# Patient Record
Sex: MALE | Race: AMERICAN INDIAN OR ALASKA NATIVE | NOT HISPANIC OR LATINO | Employment: OTHER | URBAN - METROPOLITAN AREA
[De-identification: names, ages, dates, MRNs, and addresses within clinical notes are randomized per-mention and may not be internally consistent; named-entity substitution may affect disease eponyms.]

---

## 2017-08-27 ENCOUNTER — HOSPITAL ENCOUNTER (INPATIENT)
Facility: CLINIC | Age: 27
LOS: 1 days | Discharge: HOME | DRG: 885 | End: 2017-08-29
Attending: EMERGENCY MEDICINE | Admitting: PSYCHIATRY & NEUROLOGY
Payer: COMMERCIAL

## 2017-08-27 DIAGNOSIS — F31.63 BIPOLAR DISORDER, CURRENT EPISODE MIXED, SEVERE, WITHOUT PSYCHOTIC FEATURES (H): Primary | ICD-10-CM

## 2017-08-27 DIAGNOSIS — F84.5 ASPERGER'S DISORDER: ICD-10-CM

## 2017-08-27 DIAGNOSIS — R45.851 SUICIDAL IDEATION: ICD-10-CM

## 2017-08-27 PROCEDURE — 99285 EMERGENCY DEPT VISIT HI MDM: CPT | Mod: 25 | Performed by: EMERGENCY MEDICINE

## 2017-08-27 PROCEDURE — 80307 DRUG TEST PRSMV CHEM ANLYZR: CPT | Performed by: EMERGENCY MEDICINE

## 2017-08-27 PROCEDURE — 99283 EMERGENCY DEPT VISIT LOW MDM: CPT | Mod: Z6 | Performed by: EMERGENCY MEDICINE

## 2017-08-27 PROCEDURE — 80320 DRUG SCREEN QUANTALCOHOLS: CPT | Performed by: EMERGENCY MEDICINE

## 2017-08-27 RX ORDER — MYCOPHENOLATE MOFETIL 250 MG/1
750 CAPSULE ORAL 2 TIMES DAILY
COMMUNITY
End: 2018-02-28

## 2017-08-27 RX ORDER — CHOLECALCIFEROL (VITAMIN D3) 1250 MCG
50000 CAPSULE ORAL WEEKLY
COMMUNITY
End: 2022-06-13

## 2017-08-27 RX ORDER — TACROLIMUS 1 MG/1
2 CAPSULE ORAL 2 TIMES DAILY
COMMUNITY
End: 2018-02-28

## 2017-08-27 RX ORDER — MUPIROCIN 20 MG/G
OINTMENT TOPICAL 3 TIMES DAILY
Status: ON HOLD | COMMUNITY
End: 2019-10-23

## 2017-08-27 RX ORDER — ARIPIPRAZOLE 30 MG/1
30 TABLET ORAL DAILY
COMMUNITY
End: 2018-04-04

## 2017-08-27 RX ORDER — VENLAFAXINE HYDROCHLORIDE 150 MG/1
75 CAPSULE, EXTENDED RELEASE ORAL EVERY EVENING
COMMUNITY
End: 2019-10-23

## 2017-08-27 NOTE — IP AVS SNAPSHOT
[place] [facility] [[email or web address]] Mental Health    [facility] Station 4AW    [address] [address]    Phone:  [phone]                                       After Visit Summary   8/27/2017    Barry Mcgrath    MRN: [id number]           After Visit Summary Signature Page     I have received my discharge instructions, and my questions have been answered. I have discussed any challenges I see with this plan with the nurse or doctor.    ..........................................................................................................................................  Patient/Patient Representative Signature      ..........................................................................................................................................  Patient Representative Print Name and Relationship to Patient    ..................................................               ................................................  Date                                            Time    ..........................................................................................................................................  Reviewed by Signature/Title    ...................................................              ..............................................  Date                                                            Time

## 2017-08-27 NOTE — IP AVS SNAPSHOT
MRN:[id number]                      After Visit Summary   8/27/2017    Barry Mcgrath    MRN: [id number]           Patient Information     Date Of Birth          1990        Designated Caregiver       Most Recent Value    Caregiver    Will someone help with your care after discharge? yes    Name of designated caregiver August, Friend    Phone number of caregiver [phone]    Caregiver address [address] [place] [id number]      About your hospital stay     You were admitted on:  August 28, 2017 You last received care in the:  [facility]    You were discharged on:  August 29, 2017       Who to Call     For medical emergencies, please call 911.  For non-urgent questions about your medical care, please call your primary care provider or clinic, [phone]          Attending Provider     Provider Specialty    Vinh Contreras MD Emergency Medicine    [facility]       Primary Care Provider Office Phone # Fax #    [facility] [phone]      Further instructions from your care team       Behavioral Discharge Planning and Instructions      Summary: You were admitted on 8/27/2017 to Station [address] [place] [address] due to Suicidal Ideations.  You were treated by Debra Naegele, APRN, CNS and discharged on 8/29/2017 from Station [facility] [place] to Home.     Principle Diagnosis: (Per EMR)  1.  Bipolar disorder without psychosis.   2.  Posttraumatic stress disorder.   3.  Autism spectrum disorder.   4.  End-stage renal disease stage III post-kidney transplant.     Health Care Follow-up Appointments:   Medication Management  Note: CTC offered to get a follow-up medication management appointment scheduled for you before discharge. You report that you don't need assistance making the appointment and would like to make the appointment yourself.   Provider: Dr. Jacqueline Plascencia MD @ [facility]    Address: [address] [place] [address]  [place] [address]   Phone: [phone]  [facility] has faxed the Discharge Summary and AVS to this provider at Fax: [phone]    Therapy Appointment   Date: 8/29/2017  Time: 4:00 PM      Provider: Erin BLAKE @ [facility]  Address: [address] [place] [address]  Phone: [phone]  [facility] has faxed the Discharge Summary and AVS to this provider at Fax: [phone]    Attend all scheduled appointments with your outpatient providers. Call at least 24 hours in advance if you need to reschedule an appointment to ensure continued access to your outpatient providers.   Major Treatments, Procedures and Findings: You were provided with: a psychiatric assessment, assessed for medical stability, medication evaluation and/or management, group therapy, art therapy, milieu management, medical interventions and skills/OT groups.    Symptoms to Report: If you experience any of the following symptoms please report them right away to your provider or to family/friends; feeling more aggressive, increased confusion, losing more sleep, mood getting worse or thoughts of suicide.    Early warning signs can include: Early warning signs that could signal a potential relapse could include but not limited to the following; increased depression or anxiety sleep disturbances increased thoughts or behaviors of suicide or self-harm  increased unusual thinking, such as paranoia or hearing voices.    Safety and Wellness: Take all medicines as directed. Make no changes unless your doctor suggests them.      Follow treatment recommendations. Refrain from alcohol and non-prescribed drugs.  Items could include: Firearms  Medicines (both prescribed and over-the-counter)  Knives and other sharp objects  Ropes and like materials  Car keys  If there is a concern for safety, call 911. If there is a concern for safety, call 911..    Resources:    Crisis Intervention: 534.529.7803 or 794-925-1772 (TTY:  "818.295.3662).  Call anytime for help.  National [place] on Mental Illness (www.mn.azar.org): 274.856.4478 or 071-131-7948.  Alcoholics Anonymous (www.alcoholics-anonymous.org): Check your phone book for your local chapter.  National Suicide Prevention Line (www.mentalhealthmn.org): 996-511-QFBJ (8152)  Self- Management and Recovery Training., SMART-- Toll free: 662.531.8336  www.[employer]  [facility] Crisis (COPE) Response - Adult 299 691-7847  Text 4 Life: txt \"LIFE\" to 17697 for immediate support and crisis intervention  Crisis text line: Text \"START\" to 274-734. Free, confidential, 24/7.  Crisis Intervention: 235.794.6643 or 012-660-1262. Call anytime for help.     The treatment team has appreciated the opportunity to work with you. Barry,  please take care and make your recovery a priority. If you have any questions or concerns our unit number is [phone]. You will be receiving a follow-up phone call within the next three days from a representative from behavioral health. You have identified the best phone number to reach you at is [phone] (home) .      Pending Results     No orders found from 8/25/2017 to 8/28/2017.            Admission Information     Date & Time Provider Department Dept. Phone    8/27/2017 Noah Alvarez MD [facility] [phone]      Your Vitals Were     Blood Pressure Pulse Temperature Respirations Height Weight    129/71 92 98.3  F (36.8  C) (Oral) 18 1.779 m (5' 10.04\") 106.4 kg (234 lb 9.1 oz)    Pulse Oximetry BMI (Body Mass Index)                97% 33.62 kg/m2          [employer] Information     [employer] lets you send messages to your doctor, view your test results, renew your prescriptions, schedule appointments and more. To sign up, go to [email or web address] . Click on \"Log in\" on the left side of the screen, which will take you to the Welcome page. Then click on \"Sign up Now\" on the right side of the page.     You will be " asked to enter the access code listed below, as well as some personal information. Please follow the directions to create your username and password.     Your access code is: [email or web address]  Expires: 2017 11:14 AM     Your access code will  in 90 days. If you need help or a new code, please call your [facility] clinic or [phone].        Care EveryWhere ID     This is your Care EveryWhere ID. This could be used by other organizations to access your [facility] medical records  [id number]        Equal Access to Services     [facility]: Hadlulú chaparro Sotino, waaxda luqadaha, qaybkaden kaalmadoreen medina, deepali foley . So [facility] [phone].    ATENCIÓN: Si habla español, tiene a doll disposición servicios gratuitos de asistencia lingüística. Federico al [phone].    We comply with applicable federal civil rights laws and [place] laws. We do not discriminate on the basis of race, color, national origin, age, disability sex, sexual orientation or gender identity.               Review of your medicines      CONTINUE these medicines which may have CHANGED, or have new prescriptions. If we are uncertain of the size of tablets/capsules you have at home, strength may be listed as something that might have changed.        Dose / Directions    risperiDONE 1 MG ODT tab   Commonly known as:  risperDAL M-TABS   This may have changed:    - medication strength  - how much to take   Used for:  Bipolar disorder, current episode mixed, severe, without psychotic features (H)        Dose:  1 mg   Take 1 tablet (1 mg) by mouth At Bedtime   Quantity:  30 tablet   Refills:  1         CONTINUE these medicines which have NOT CHANGED        Dose / Directions    ARIPiprazole 30 MG tablet   Commonly known as:  ABILIFY        Dose:  30 mg   Take 30 mg by mouth daily   Refills:  0       LAMOTRIGINE PO        Dose:  150 mg   Take 150 mg by mouth daily   Refills:  0       LISINOPRIL PO        Dose:   10 mg   Take 10 mg by mouth daily   Refills:  0       MULTIVITAMIN ADULT PO        Dose:  1 tablet   Take 1 tablet by mouth   Refills:  0       mupirocin 2 % ointment   Commonly known as:  BACTROBAN        Apply topically 3 times daily   Refills:  0       mycophenolate 250 MG capsule   Commonly known as:  GENERIC EQUIVALENT        Dose:  750 mg   Take 750 mg by mouth 2 times daily   Refills:  0       OXCARBAZEPINE PO        Dose:  600 mg   Take 600 mg by mouth 2 times daily   Refills:  0       PREDNISONE PO        Dose:  5 mg   Take 5 mg by mouth daily   Refills:  0       tacrolimus 1 MG capsule   Commonly known as:  GENERIC EQUIVALENT        Dose:  2 mg   Take 2 mg by mouth 2 times daily   Refills:  0       venlafaxine 150 MG 24 hr capsule   Commonly known as:  EFFEXOR-XR        Dose:  300 mg   Take 300 mg by mouth daily   Refills:  0       vitamin D3 84667 UNITS capsule   Commonly known as:  CHOLECALCIFEROL        Dose:  11857 Units   Take 50,000 Units by mouth once a week   Refills:  0            Where to get your medicines      These medications were sent to [facility] [address]  [address] [address]     Phone:  [phone]     risperiDONE 1 MG ODT tab                Protect others around you: Learn how to safely use, store and throw away your medicines at www.disposemymeds.org.             Medication List: This is a list of all your medications and when to take them. Check marks below indicate your daily home schedule. Keep this list as a reference.      Medications           Morning Afternoon Evening Bedtime As Needed    ARIPiprazole 30 MG tablet   Commonly known as:  ABILIFY   Take 30 mg by mouth daily   Last time this was given:  30 mg on 8/29/2017  7:50 AM                                LAMOTRIGINE PO   Take 150 mg by mouth daily   Last time this was given:  150 mg on 8/29/2017  7:50 AM                                LISINOPRIL PO   Take 10  mg by mouth daily   Last time this was given:  10 mg on 8/29/2017  7:50 AM                                MULTIVITAMIN ADULT PO   Take 1 tablet by mouth   Last time this was given:  1 tablet on 8/29/2017  7:50 AM                                mupirocin 2 % ointment   Commonly known as:  BACTROBAN   Apply topically 3 times daily                                mycophenolate 250 MG capsule   Commonly known as:  GENERIC EQUIVALENT   Take 750 mg by mouth 2 times daily   Last time this was given:  750 mg on 8/29/2017  7:50 AM                                OXCARBAZEPINE PO   Take 600 mg by mouth 2 times daily   Last time this was given:  600 mg on 8/29/2017  7:50 AM                                PREDNISONE PO   Take 5 mg by mouth daily   Last time this was given:  5 mg on 8/29/2017  7:50 AM                                risperiDONE 1 MG ODT tab   Commonly known as:  risperDAL M-TABS   Take 1 tablet (1 mg) by mouth At Bedtime   Last time this was given:  0.5 mg on 8/28/2017  9:44 PM                                tacrolimus 1 MG capsule   Commonly known as:  GENERIC EQUIVALENT   Take 2 mg by mouth 2 times daily   Last time this was given:  2 mg on 8/29/2017  7:50 AM                                venlafaxine 150 MG 24 hr capsule   Commonly known as:  EFFEXOR-XR   Take 300 mg by mouth daily                                vitamin D3 39198 UNITS capsule   Commonly known as:  CHOLECALCIFEROL   Take 50,000 Units by mouth once a week

## 2017-08-28 PROBLEM — R45.851 SUICIDAL IDEATION: Status: ACTIVE | Noted: 2017-08-28

## 2017-08-28 LAB
ALBUMIN SERPL-MCNC: 3.7 G/DL (ref 3.4–5)
ALP SERPL-CCNC: 124 U/L (ref 40–150)
ALT SERPL W P-5'-P-CCNC: 40 U/L (ref 0–70)
AMPHETAMINES UR QL SCN: NEGATIVE
ANION GAP SERPL CALCULATED.3IONS-SCNC: 9 MMOL/L (ref 3–14)
AST SERPL W P-5'-P-CCNC: 28 U/L (ref 0–45)
BARBITURATES UR QL: NEGATIVE
BASOPHILS # BLD AUTO: 0.1 10E9/L (ref 0–0.2)
BASOPHILS NFR BLD AUTO: 0.6 %
BENZODIAZ UR QL: NEGATIVE
BILIRUB SERPL-MCNC: 0.3 MG/DL (ref 0.2–1.3)
BUN SERPL-MCNC: 15 MG/DL (ref 7–30)
CALCIUM SERPL-MCNC: 9.1 MG/DL (ref 8.5–10.1)
CANNABINOIDS UR QL SCN: NEGATIVE
CHLORIDE SERPL-SCNC: 109 MMOL/L (ref 94–109)
CO2 SERPL-SCNC: 25 MMOL/L (ref 20–32)
COCAINE UR QL: NEGATIVE
CREAT SERPL-MCNC: 0.74 MG/DL (ref 0.66–1.25)
DIFFERENTIAL METHOD BLD: ABNORMAL
EOSINOPHIL # BLD AUTO: 0.4 10E9/L (ref 0–0.7)
EOSINOPHIL NFR BLD AUTO: 2.8 %
ERYTHROCYTE [DISTWIDTH] IN BLOOD BY AUTOMATED COUNT: 13.7 % (ref 10–15)
ETHANOL UR QL SCN: NEGATIVE
GFR SERPL CREATININE-BSD FRML MDRD: >90 ML/MIN/1.7M2
GLUCOSE SERPL-MCNC: 135 MG/DL (ref 70–99)
HCT VFR BLD AUTO: 45.6 % (ref 40–53)
HGB BLD-MCNC: 15.2 G/DL (ref 13.3–17.7)
IMM GRANULOCYTES # BLD: 0.1 10E9/L (ref 0–0.4)
IMM GRANULOCYTES NFR BLD: 0.5 %
LYMPHOCYTES # BLD AUTO: 2.4 10E9/L (ref 0.8–5.3)
LYMPHOCYTES NFR BLD AUTO: 16.8 %
MCH RBC QN AUTO: 28.8 PG (ref 26.5–33)
MCHC RBC AUTO-ENTMCNC: 33.3 G/DL (ref 31.5–36.5)
MCV RBC AUTO: 87 FL (ref 78–100)
MONOCYTES # BLD AUTO: 1.1 10E9/L (ref 0–1.3)
MONOCYTES NFR BLD AUTO: 7.4 %
NEUTROPHILS # BLD AUTO: 10.2 10E9/L (ref 1.6–8.3)
NEUTROPHILS NFR BLD AUTO: 71.9 %
NRBC # BLD AUTO: 0 10*3/UL
NRBC BLD AUTO-RTO: 0 /100
OPIATES UR QL SCN: NEGATIVE
PLATELET # BLD AUTO: 327 10E9/L (ref 150–450)
POTASSIUM SERPL-SCNC: 3.6 MMOL/L (ref 3.4–5.3)
PROT SERPL-MCNC: 7.4 G/DL (ref 6.8–8.8)
RBC # BLD AUTO: 5.27 10E12/L (ref 4.4–5.9)
SODIUM SERPL-SCNC: 143 MMOL/L (ref 133–144)
TACROLIMUS BLD-MCNC: 3.2 UG/L (ref 5–15)
TME LAST DOSE: ABNORMAL H
WBC # BLD AUTO: 14.2 10E9/L (ref 4–11)

## 2017-08-28 PROCEDURE — 25000125 ZZHC RX 250: Performed by: EMERGENCY MEDICINE

## 2017-08-28 PROCEDURE — 25000131 ZZH RX MED GY IP 250 OP 636 PS 637: Mod: GY | Performed by: EMERGENCY MEDICINE

## 2017-08-28 PROCEDURE — 25000132 ZZH RX MED GY IP 250 OP 250 PS 637: Mod: GY | Performed by: EMERGENCY MEDICINE

## 2017-08-28 PROCEDURE — 12400003 ZZH R&B MH CRITICAL UMMC

## 2017-08-28 PROCEDURE — 85025 COMPLETE CBC W/AUTO DIFF WBC: CPT | Performed by: EMERGENCY MEDICINE

## 2017-08-28 PROCEDURE — 80053 COMPREHEN METABOLIC PANEL: CPT | Performed by: EMERGENCY MEDICINE

## 2017-08-28 PROCEDURE — 99222 1ST HOSP IP/OBS MODERATE 55: CPT | Mod: AI | Performed by: CLINICAL NURSE SPECIALIST

## 2017-08-28 PROCEDURE — 80197 ASSAY OF TACROLIMUS: CPT | Performed by: EMERGENCY MEDICINE

## 2017-08-28 PROCEDURE — A9270 NON-COVERED ITEM OR SERVICE: HCPCS | Mod: GY | Performed by: EMERGENCY MEDICINE

## 2017-08-28 PROCEDURE — 90791 PSYCH DIAGNOSTIC EVALUATION: CPT

## 2017-08-28 RX ORDER — PREDNISONE 5 MG/1
5 TABLET ORAL DAILY
Status: DISCONTINUED | OUTPATIENT
Start: 2017-08-28 | End: 2017-08-29 | Stop reason: HOSPADM

## 2017-08-28 RX ORDER — TACROLIMUS 0.5 MG/1
1 CAPSULE ORAL
Status: DISCONTINUED | OUTPATIENT
Start: 2017-08-28 | End: 2017-08-28

## 2017-08-28 RX ORDER — CHOLECALCIFEROL (VITAMIN D3) 1250 MCG
50000 CAPSULE ORAL WEEKLY
Status: DISCONTINUED | OUTPATIENT
Start: 2017-08-28 | End: 2017-08-28 | Stop reason: CLARIF

## 2017-08-28 RX ORDER — ARIPIPRAZOLE 30 MG/1
30 TABLET ORAL DAILY
Status: DISCONTINUED | OUTPATIENT
Start: 2017-08-28 | End: 2017-08-29 | Stop reason: HOSPADM

## 2017-08-28 RX ORDER — OXCARBAZEPINE 600 MG/1
600 TABLET, FILM COATED ORAL 2 TIMES DAILY
Status: DISCONTINUED | OUTPATIENT
Start: 2017-08-28 | End: 2017-08-29 | Stop reason: HOSPADM

## 2017-08-28 RX ORDER — TACROLIMUS 1 MG/1
2 CAPSULE ORAL 2 TIMES DAILY
Status: DISCONTINUED | OUTPATIENT
Start: 2017-08-28 | End: 2017-08-29 | Stop reason: HOSPADM

## 2017-08-28 RX ORDER — CHOLECALCIFEROL (VITAMIN D3) 1250 MCG
50000 CAPSULE ORAL WEEKLY
Status: DISCONTINUED | OUTPATIENT
Start: 2017-09-03 | End: 2017-08-29 | Stop reason: HOSPADM

## 2017-08-28 RX ORDER — MYCOPHENOLATE MOFETIL 250 MG/1
750 CAPSULE ORAL 2 TIMES DAILY
Status: DISCONTINUED | OUTPATIENT
Start: 2017-08-28 | End: 2017-08-29 | Stop reason: HOSPADM

## 2017-08-28 RX ORDER — RISPERIDONE 0.5 MG/1
0.5 TABLET, ORALLY DISINTEGRATING ORAL AT BEDTIME
Status: DISCONTINUED | OUTPATIENT
Start: 2017-08-28 | End: 2017-08-29

## 2017-08-28 RX ORDER — LISINOPRIL 10 MG/1
10 TABLET ORAL DAILY
Status: DISCONTINUED | OUTPATIENT
Start: 2017-08-28 | End: 2017-08-29 | Stop reason: HOSPADM

## 2017-08-28 RX ORDER — RISPERIDONE 0.5 MG/1
0.5 TABLET, ORALLY DISINTEGRATING ORAL AT BEDTIME
Status: DISCONTINUED | OUTPATIENT
Start: 2017-08-28 | End: 2017-08-28

## 2017-08-28 RX ORDER — MULTIPLE VITAMINS W/ MINERALS TAB 9MG-400MCG
1 TAB ORAL DAILY
Status: DISCONTINUED | OUTPATIENT
Start: 2017-08-28 | End: 2017-08-29 | Stop reason: HOSPADM

## 2017-08-28 RX ORDER — OLANZAPINE 10 MG/1
10 TABLET, ORALLY DISINTEGRATING ORAL DAILY PRN
Status: DISCONTINUED | OUTPATIENT
Start: 2017-08-28 | End: 2017-08-29 | Stop reason: HOSPADM

## 2017-08-28 RX ORDER — VENLAFAXINE HYDROCHLORIDE 150 MG/1
300 TABLET, EXTENDED RELEASE ORAL DAILY
Status: DISCONTINUED | OUTPATIENT
Start: 2017-08-28 | End: 2017-08-29 | Stop reason: HOSPADM

## 2017-08-28 RX ORDER — MYCOPHENOLATE MOFETIL 250 MG/1
750 CAPSULE ORAL
Status: DISCONTINUED | OUTPATIENT
Start: 2017-08-28 | End: 2017-08-28

## 2017-08-28 RX ORDER — LAMOTRIGINE 150 MG/1
150 TABLET ORAL DAILY
Status: DISCONTINUED | OUTPATIENT
Start: 2017-08-28 | End: 2017-08-29 | Stop reason: HOSPADM

## 2017-08-28 RX ORDER — OLANZAPINE 10 MG/2ML
10 INJECTION, POWDER, FOR SOLUTION INTRAMUSCULAR DAILY PRN
Status: DISCONTINUED | OUTPATIENT
Start: 2017-08-28 | End: 2017-08-29 | Stop reason: HOSPADM

## 2017-08-28 RX ORDER — OXCARBAZEPINE 600 MG/1
600 TABLET, FILM COATED ORAL 2 TIMES DAILY
Status: DISCONTINUED | OUTPATIENT
Start: 2017-08-28 | End: 2017-08-28

## 2017-08-28 RX ORDER — MYCOPHENOLATE MOFETIL 250 MG/1
750 CAPSULE ORAL 2 TIMES DAILY
Status: DISCONTINUED | OUTPATIENT
Start: 2017-08-28 | End: 2017-08-28 | Stop reason: CLARIF

## 2017-08-28 RX ORDER — HYDROXYZINE HYDROCHLORIDE 25 MG/1
25-50 TABLET, FILM COATED ORAL EVERY 4 HOURS PRN
Status: DISCONTINUED | OUTPATIENT
Start: 2017-08-28 | End: 2017-08-29 | Stop reason: HOSPADM

## 2017-08-28 RX ADMIN — LISINOPRIL 10 MG: 10 TABLET ORAL at 08:24

## 2017-08-28 RX ADMIN — LAMOTRIGINE 150 MG: 150 TABLET ORAL at 08:26

## 2017-08-28 RX ADMIN — VENLAFAXINE HYDROCHLORIDE 300 MG: 150 TABLET, EXTENDED RELEASE ORAL at 08:29

## 2017-08-28 RX ADMIN — HYDROXYZINE HYDROCHLORIDE 50 MG: 25 TABLET ORAL at 22:11

## 2017-08-28 RX ADMIN — TACROLIMUS 2 MG: 1 CAPSULE ORAL at 08:30

## 2017-08-28 RX ADMIN — OXCARBAZEPINE 600 MG: 600 TABLET ORAL at 00:56

## 2017-08-28 RX ADMIN — MULTIPLE VITAMINS W/ MINERALS TAB 1 TABLET: TAB at 08:29

## 2017-08-28 RX ADMIN — MYCOPHENOLATE MOFETIL 750 MG: 250 CAPSULE ORAL at 00:57

## 2017-08-28 RX ADMIN — RISPERIDONE 0.5 MG: 0.5 TABLET, ORALLY DISINTEGRATING ORAL at 21:44

## 2017-08-28 RX ADMIN — PREDNISONE 5 MG: 5 TABLET ORAL at 08:26

## 2017-08-28 RX ADMIN — MYCOPHENOLATE MOFETIL 750 MG: 250 CAPSULE ORAL at 08:27

## 2017-08-28 RX ADMIN — RISPERIDONE 0.5 MG: 0.5 TABLET, ORALLY DISINTEGRATING ORAL at 00:55

## 2017-08-28 RX ADMIN — TACROLIMUS 2 MG: 1 CAPSULE ORAL at 21:43

## 2017-08-28 RX ADMIN — OXCARBAZEPINE 600 MG: 600 TABLET ORAL at 08:25

## 2017-08-28 RX ADMIN — ARIPIPRAZOLE 30 MG: 30 TABLET ORAL at 08:24

## 2017-08-28 RX ADMIN — TACROLIMUS 1 MG: 1 CAPSULE ORAL at 00:56

## 2017-08-28 RX ADMIN — MYCOPHENOLATE MOFETIL 750 MG: 250 CAPSULE ORAL at 21:43

## 2017-08-28 RX ADMIN — OXCARBAZEPINE 600 MG: 600 TABLET ORAL at 21:43

## 2017-08-28 ASSESSMENT — ACTIVITIES OF DAILY LIVING (ADL)
ORAL_HYGIENE: INDEPENDENT
GROOMING: INDEPENDENT
ORAL_HYGIENE: INDEPENDENT
DRESS: INDEPENDENT
LAUNDRY: UNABLE TO COMPLETE
DRESS: INDEPENDENT
GROOMING: INDEPENDENT

## 2017-08-28 ASSESSMENT — ENCOUNTER SYMPTOMS
DYSPHORIC MOOD: 1
AGITATION: 1

## 2017-08-28 NOTE — PROGRESS NOTES
08/28/17 0507   Patient Belongings   Did you bring any home meds/supplements to the hospital?  Yes   Disposition of meds  Sent to security/pharmacy per site process   Patient Belongings cell phone/electronics;clothing;keys;shoes;wallet   Disposition of Belongings Meds to security/pharmacy. ID & credit cards to security. All other items are in patient's storage bin.   Belongings Search Yes   Clothing Search Yes   Second Staff Marco Antonio URENA     Medications sent to pharmacy/security in envelope #[id number]: 1 bottle mycophenolate, 1 bottle venlafaxine, 1 bottle prednisone, 1 bottle lisinopril    Medications sent to pharmacy/security in envelope #[id number]: men's multivitamin bottle, 1 bottle D3-50 capsules, 1 bottle oxcarbazepine, 1 bottle tacrolimus, 1 bottle aripiprazole, pill case w/ various pills,    Medications sent to pharmacy/security in envelope #[id number]: 1 bottle oxcarbazepine, 1 bottle risperidone, mupirocin ointment, pill cutter,    Items sent to security in envelope #[id number]: [place] ID card, [facility] health insurance card, medicare health insurance card, [place] mastercard ending in [id number], [place] EBT card ending in [id number], [place] ID card,    Items in patient's storage bin: green shirt, black pants, white cane collapsing cane, brown belt, black wallet containing go-to bus pass, Iphone, keys and keychain, 28 cents in change, black shoes,    A               Admission: I am responsible for any personal items that are not sent to the safe or pharmacy.  [facility] is not responsible for loss, theft or damage of any property in my possession.    Signature:  _________________________________ Date: _______  Time: _____                                              Staff Signature:  ____________________________ Date: ________  Time: _____      2nd Staff person, if patient is unable/unwilling to sign:    Signature: ________________________________ Date: ________  Time: _____     Discharge: Sandip has returned all of my  personal belongings:    Signature: _________________________________ Date: ________  Time: _____                                          Staff Signature:  ____________________________ Date: ________  Time: _____

## 2017-08-28 NOTE — ED NOTES
Pt assinged to unit 4A. Answered questions/concerns that pt had regarding the behavioral units. Pt still had some concerns about being admitted but understood more specific concerns can be answered on unit.

## 2017-08-28 NOTE — PLAN OF CARE
Problem: General Plan of Care (Inpatient Behavioral)  Goal: Team Discussion  Team Plan:   Outcome: No Change  Behavioral Team Discussion: (8/28/2017)     Continued Stay Criteria/Rationale: Patient admitted for Suicidal Ideations.  Plan: The following services will be provided to the patient; psychiatric assessment, medication management, therapeutic milieu, individual and group support, art therapy, and skills/OT groups.   Participants: 4A Provider: Debra Naegele, APRN, CNS; 4A RN's: Claudine Hall RN; 4A CTC's: Reynaldo Queen (CTC) and Hardy Kaur (CTC).  Summary/Recommendation: Providers will assess today for treatment recommendations, discharge planning, and aftercare plans. CTC will meet with pt to complete psych-social assessment.   Medical/Physical: Deferred (see medical notes).  Precautions:   Behavioral Orders   Procedures     Code 1 - Restrict to Unit     Routine Programming       As clinically indicated     Status 15       Every 15 minutes.     Suicide precautions     Rationale for change in precautions or plan: N/A  Progress: No Change.

## 2017-08-28 NOTE — PROGRESS NOTES
"CLINICAL NUTRITION SERVICES - BRIEF NOTE    Received provider consult for nutrition education with comments \"ESRD III, low potassium.\"     Per chart review, pt s/p kidney transplant on 7/14/17 and today's potassium lab of 3.6 (wnl).    Attempted to meet with pt regarding low potassium diet education. Pt declined education today. Will attempt to provide education as able/appropriate during pt's admission.     Ritu Guillaume RD, LD   Pager: [phone]      "

## 2017-08-28 NOTE — PLAN OF CARE
"Problem: General Plan of Care (Inpatient Behavioral)  Goal: Individualization/Patient Specific Goal (IP Behavioral)  The patient and/or their representative will achieve their patient-specific goals related to the plan of care.    The patient-specific goals include:   Illness Management Recovery model: Objectives  Patient will identify reason(s) for hospitalization from their perspective.  Patient will identify a minimum of three goals for discharge.  Patient will identify a minimum of three triggers that may increase their symptoms.  Patient will identify a minimum of three coping skills they can do to stay well.   Patient will identify their support system to demonstrate readiness for discharge.    Illness Management & Recovery assists patient to develop relapse prevention as  patient identifies triggers for relapse.  patient identifies a general wellness strategy.  patient identifies the warning signs that they are in danger of relapse.  patient identifies someone they count on to get feedback .  patient identifies ways to take action when in danger of relapse.  patient identifies way to cope with stress or other symptoms.   patient participates in self-reflection.   Outcome: No Change  The patient and/or their representative will achieve their patient-specific goals related to the plan of care.    The patient-specific goals include:      \"Reasons you are in the hospital;\" The patient identifies the following reasons for current hospitalization:   1) Suicidal ideation.  2) Mood decompensation.     \"Goals for Discharge\" The patient identifies the following goals for discharge:  1) Absence of suicidal ideation.  2) Mood stabilization.      "

## 2017-08-28 NOTE — ED PROVIDER NOTES
"  History     Chief Complaint   Patient presents with     Suicidal     Patient states he is feeling suicidal, refuses to report plan     Mental Health Problem     HPI  Barry Mcgrath is a 26 year old male with a history of depression, anxiety, bipolar 1, Asperger's, blindness, ESRD (stage III), and kidney transplant 07/14/17 who presents to the ED with suicidal ideation. Patient states he feels angry and hurt at everyone from family, friends, strangers, and politics. He states he wants to \"harm Bear and all of these f#$%ers\".  Patient also posted several messages stating he wanted to kill himself on his Facebook page.  Father was also concerned that he may harm himself.  He recently moved to to [place] in December and has been in inpatient mental health 4 times at [facility]. His last visit was 1.5 years ago. He otherwise currently denies alcohol or substance use or psychosis.  He states he had SI an hour ago.    PAST MEDICAL HISTORY  Past Medical History:   Diagnosis Date     Anxiety      Bipolar 1 disorder (H)      Blind      Depressive disorder      PAST SURGICAL HISTORY  Past Surgical History:   Procedure Laterality Date     TRANSPLANT       FAMILY HISTORY  No family history on file.  SOCIAL HISTORY  Social History   Substance Use Topics     Smoking status: Never Smoker     Smokeless tobacco: Never Used     Alcohol use No     MEDICATIONS  Current Facility-Administered Medications   Medication     OXcarbazepine (TRILEPTAL) tablet 600 mg     tacrolimus (GENERIC EQUIVALENT) capsule 1 mg     risperiDONE (risperDAL M-TABS) ODT tab 0.5 mg     mycophenolate (GENERIC EQUIVALENT) capsule 750 mg     ARIPiprazole (ABILIFY) tablet 30 mg     lamoTRIgine (LaMICtal) tablet 150 mg     lisinopril (PRINIVIL/ZESTRIL) tablet 10 mg     MULTIVITAMIN ADULT TABS 1 tablet     mycophenolate (GENERIC EQUIVALENT) capsule 750 mg     OXcarbazepine (TRILEPTAL) tablet 600 mg     predniSONE (DELTASONE) tablet 5 mg     tacrolimus (GENERIC " EQUIVALENT) capsule 2 mg     venlafaxine (EFFEXOR-XR) 24 hr capsule 300 mg     vitamin D3 (CHOLECALCIFEROL) capsule 50,000 Units     hydrOXYzine (ATARAX) tablet 25-50 mg     OLANZapine zydis (zyPREXA) ODT tab 10 mg     OLANZapine (zyPREXA) injection 10 mg     ALLERGIES  No Known Allergies    I have reviewed the Medications, Allergies, Past Medical and Surgical History, and Social History in the Epic system.    Review of Systems   Psychiatric/Behavioral: Positive for agitation, dysphoric mood and suicidal ideas.   All other systems reviewed and are negative.    Physical Exam   BP: 154/77  Pulse: 111  Temp: 98  F (36.7  C)  Resp: 20  SpO2: 94 %  Physical Exam   Constitutional: He appears well-developed and well-nourished. No distress.   HENT:   Head: Normocephalic.   Cardiovascular: Normal rate, regular rhythm and normal heart sounds.    Pulmonary/Chest: Effort normal and breath sounds normal. No respiratory distress. He has no wheezes. He has no rales.   Musculoskeletal: Normal range of motion.   Neurological: He is alert.   Skin: Skin is warm and dry. He is not diaphoretic.   Psychiatric: His speech is normal. He is not agitated, not aggressive, not hyperactive and not combative. He exhibits a depressed mood. He expresses homicidal and suicidal ideation. He expresses no suicidal plans and no homicidal plans.   Nursing note and vitals reviewed.    ED Course     ED Course     Procedures        Critical Care time:  none        Labs Ordered and Resulted from Time of ED Arrival Up to the Time of Departure from the ED   CBC WITH PLATELETS DIFFERENTIAL - Abnormal; Notable for the following:        Result Value    WBC 14.2 (*)     Absolute Neutrophil 10.2 (*)     All other components within normal limits   COMPREHENSIVE METABOLIC PANEL - Abnormal; Notable for the following:     Glucose 135 (*)     All other components within normal limits   DRUG ABUSE SCREEN 6 CHEM DEP URINE ([facility])   TACROLIMUS LEVEL        Assessments &  Plan (with Medical Decision Making)   I was physically present and have reviewed and verified the accuracy of this note documented by (myself).     Disclaimer: This note consists of symbols derived from keyboarding, dictation, and/or voice recognition software. As a result, there may be errors in the script that have gone undetected.  Please consider this when interpreting information found in the chart.These sections of the chart were reviewed for accuracy to the best of my knowledge and ability.    Patient was clinically assessed and consented to treatment. After assessment, medical decision making and workup were discussed with the patient. The patient was agreeable to plan for testing, workup, and treatment.  Barry Mcgrath is a 26 year old male who presents today for suicidal ideation.  Patient with a history of kidney transplant and creatinine does look normal here.  He follows up regularly with the [facility] where he had his transplant and was seen in February with concerns.  Labs did show slightly white blood cell count however patient reports no infectious process at this time problem.  He denies any urinary symptoms, no cough or sounds on auscultation.  Patient otherwise well except for mental health.  Patient was stating suicidal thoughts over his concern about coming in the hospital as he [place] and there is no access to any things for blind persons including audio books which he enjoys.  He reports that he is agreeable to coming in but was concerned that he would be isolated again because he is blind and unable to just sit and watch TV like several other patients do.  I spoke with him about this and would inform behavioral intake and that she will need access to either audiobooks or some sort of alternative form of engagement since he is unable to just sit and watch TV or easily engaged with another activities.  Patient agreeable to  coming in given his suicidal thoughts he will be placed in the mental health floor for further care.    I have reviewed the nursing notes.    I have reviewed the findings, diagnosis, plan and need for follow up with the patient.    Current Discharge Medication List          Final diagnoses:   Suicidal ideation       8/27/2017   [facility], [place], EMERGENCY DEPARTMENT     Vinh Contreras MD  08/28/17 0341       Vinh Contreras MD  08/28/17 0503

## 2017-08-28 NOTE — PROGRESS NOTES
"Initial Psychosocial Assessment     Information for assessment was obtained from the patient and the patient's chart: I have reviewed the chart and interviewed the patient.      Presenting Problem/Precipitory Event: Treatment due to own awareness of need for help and treatment due to pressure from family members to get help. Prior to admission, pt had been posting messages on Facebook that he wanted to kill himself. Pt has been getting increasingly angry about the political climate leading up to admission. Per EMR: \"Barry Mcgrath is a 26-year-old  who presents with a history of depression and suicidal ideation.  The patient had a kidney transplant when he was 15 years old at [facility] in [place].  He is followed by a transplant coordinator named Love Ruano, phone number is [phone].  The patient is originally from [place], [place].  He moved to [place] in 12/2016.  He said he wanted to have more opportunities.  He wants to attend the [employer] and study Mandarin.  Patient reports that he said a lot of things on Facebook because he was upset.  He is upset with the political climate, he is upset with white supremacists and says that he has been having trouble with intense anger.  He does not think his meds are working correctly.  The patient is followed by Dr. Jacqueline Plascencia at the [facility].  He is currently being treated with Abilify 30 mg, Lamictal 150 mg, Risperdal 0.5 mg, Trileptal 600 mg b.i.d. and Effexor 300 mg.  The patient was unable to tell me if he has had a recent medication adjustment.\" Patient was admitted to station 4A for further psychiatric evaluation and stabilization.    Current Stressors: Pt reports having relational, housing, financial, medical/physical health, and current political climate as his stressors at this time.    Legal Status: Pt admitted under a voluntary status.  MENTAL HEALTH & CHEMICAL DEPENDENCY HISTORY:  Current symptoms: Pt " "reports having the following MH symptomology; isolating / withdrawn, lack of energy, poor sleep, loss of interest / pleasure, low mood, trouble concentrating, racing thoughts, irritability, agitation, depressed, hopeless, impaired thinking, anger problems and disorganized behaviors and/or thinking.  Previous  Hospitalizations: Yes (explain) - Pt reports having; 4 prior [facility] hospital admission(s) which include the following year(s) and hospital(s) 4-prior hospitalizations at [facility] in [place], [place]. Pt reports his last admission was back in 2015..    Previous MH Treatment (day treatments, psychotherapy, medication management, etc) history: Yes (explain) - Denies currently working with a therapist but does endorse working with a psychiatrict provider for medication management    Commitments (Current or Previous): No.    Hx of suicidal attempts: No.  SIB's: No.  Suicidal thoughts and plan: Yes (explain) - Pt reports having chronic passive SI's and denies having any plan or intent at this time.  Homicidal Ideation and History of Aggression: No. Prior to admission pt had made aggressive and threatening statements about hurting others but adamantly denies that he would actually hurt anyone.     Substance Use/Abuse History:    At time of admission, the patient s drug screen was negative for all substances tested.    History of Chemical Dependency: No.  Family Description (Constellation, Family Psychiatric History):  Pt reports he grew up in [place] until 2016 when he moved to [place], [place] to attend the [employer].  Pt reports his childhood was \"pretty good\" and that they felt supported 75% of the time growing up.   Are you close to any of your family members/natural supports? Yes.  Current relationship(s): Single, in no serious relationship.  Children: No children.    Family MH and/or CD History: Yes (Explain) - Family CD History: Pt reports an extensive family hx of alcoholism in " his family.  Significant Life Events (Illness, Abuse, Trauma, Death):  None    Living Situation: Pt reports that he; has stable housing and has no concerns at this time. Pt reports he is currently living with three other roommates.  Educational Background:   Highest grade of school completed: Pt reports he has some college (2 years), but have no degree.    Occupational History:    Pt reports he is unemployed and is on SSDI for disability.  Financial Status: No immediate concerns identified.  Sources of Income (i.e. social security, [employer], [place], employed or other): Social Security Disability.  Transportation: Pt reports he takes public transportation, relies on family and/or friends for rides and gets medical rides.  Type of insurance: Payor: MEDICARE / Plan: MEDICARE / Product Type: Medicare /  [id number]    Criminal History:  No.  Ethnic/Cultural Issues:  The patient does not identify having any ethnic or cultural issues that would impact treatment.   Spiritual Orientation: Pt reports he is no affiliations.    Service History:  No.  Social Functioning (organization, interests):  Pt reports he enjoys, reading books. Pt reports also having the following; good support network, tendency to isolate from others, no history of legal charges, unemployed and has recently been a student at the [employer].  Current Treatment Providers:  Psychiatrist: Dr. Jacqueline Plascencia MD @ [place] [address]. P: [phone]  Therapist: None  Primary Care Provider: None    Social Service Assessment/Plan:  Patient has been admitted for psychiatric stabilization due to suicidal ideations. Patient will have psychiatric assessment and medication management by the psychiatrist. Medications will be reviewed and adjusted per MD as indicated. The treatment team will continue to assess and stabilize the patient's mental health symptoms with the use of medications and therapeutic  programming. Hospital staff will provide a safe environment and a therapeutic milieu. Staff will continue to assess patient as needed. Patient will participate in unit groups and activities. Patient will receive individual and group support on the unit.  CTC will do individual inpatient treatment planning and after care planning. CTC will discuss options for increasing community supports with the patient. CTC will coordinate with outpatient providers and will place referrals to ensure appropriate follow up care is in place.

## 2017-08-28 NOTE — H&P
"DATE OF ASSESSMENT:  08/28/2017.       CHIEF COMPLAINT:  \"My meds are not quite working, I get intense anger.\"      HISTORY OF PRESENT ILLNESS:  Barry Mcgrath is a 26-year-old  who presents with a history of depression and suicidal ideation.  The patient had a kidney transplant when he was 15 years old at [facility] in [place].  He is followed by a transplant coordinator named Love Ruano, phone number is [phone].  The patient is originally from [place], [place].  He moved to [place] in 12/2016.  He said he wanted to have more opportunities.  He wants to attend the [employer] and study Mandarin.  Patient reports that he said a lot of things on Facebook because he was upset.  He is upset with the political climate, he is upset with white supremacists and says that he has been having trouble with intense anger.  He does not think his meds are working correctly.  The patient is followed by Dr. Jacqueline Plascencia at the [facility].  He is currently being treated with Abilify 30 mg, Lamictal 150 mg, Risperdal 0.5 mg, Trileptal 600 mg b.i.d. and Effexor 300 mg.  The patient was unable to tell me if he has had a recent medication adjustment.      PSYCHIATRIC REVIEW OF SYSTEMS:  The patient reports that he has been depressed and he gets intense anger.  He reports that his sleep and appetite have not been altered.  He has good focus and motivation.  He is able to enjoy all the things that he does.  The patient reports that he has passive suicidal thoughts.  He does not have a plan.  He denies homicidal thoughts.  The patient does endorse some symptoms of shaniqua including pressured speech and intense anger.  He does not endorse psychosis including auditory and visual hallucinations.  He denies any feelings of paranoia.  The patient reports he does have symptoms of PTSD including flashbacks and nightmares.  He denies any history of eating disorder or OCD.  The patient denies any " "problems with general anxiety disorder.      PSYCHIATRIC HISTORY:  The patient has a history of bipolar, anxiety, depression and Asperger's.  He has been hospitalized 4 times in [place], [place].  His last hospitalization was about a year and a half ago.      PAST MEDICAL HISTORY:  The patient is blind.  He had a kidney transplant when he was 15 years old.  History of ESRD stage III.      SUBSTANCE ABUSE HISTORY:  U-tox is negative.  The patient denies using any drugs or alcohol.      FAMILY HISTORY:  Strong history of alcoholism.      SOCIAL HISTORY:  The patient lived in [place], [place].  He moved to [place] to go to the [employer] in 12/2016.  He has 3 roommates that he lives with.        MEDICAL REVIEW OF SYSTEMS:  Reviewed documentation for a 10-point systems review completed by Vinh Contreras, dated 08/27/2017.  No changes noted.      PHYSICAL EXAMINATION:   VITAL SIGNS:  Blood pressure 154/77, pulse 111, temperature 98 Fahrenheit, respiration 20, SpO2 is at 94%.  Reviewed documentation completed by Dr. Vinh Contreras, dated 08/27/2017.  The only changes noted is that he does not express any homicidal ideation.      MENTAL STATUS EXAMINATION:  The patient appears his stated age.  He is dressed in scrubs.  He has adequate hygiene.  The patient was in the lounge area with staff and accompanied me to the interview room.  He was calm and cooperative throughout the interview.  The patient is blind.  No psychomotor abnormalities noted.  Speech was somewhat pressured, \"good.\"  Affect was somewhat blunted.  Thought process was linear and logical.  Associations were intact.  Thought content did not display any evidence of psychosis.  He denies any passive suicidal thoughts.  He does not have an active plan.  He denies any homicidal thoughts.  Insight and judgment appear to be fair.  Cognition appears intact to interviewing including orientation to person, place, time and situation, use " of language and fund of knowledge.  His recent and remote memory are grossly intact.  Muscle strength, tone and gait appear to be within normal limits upon observation.      ASSESSMENT:   1.  Bipolar disorder without psychosis.   2.  Posttraumatic stress disorder.   3.  Autism spectrum disorder.   4.  End-stage renal disease stage III post-kidney transplant.      PLAN:   1.  The patient has been admitted unit 4A on a voluntary basis.   2.  Left message with his transplant coordinator, Love Ruano at [phone].   3.  Continued his psychiatric medications in addition to his transplant medications.   4.  Order a nutrition consult due to lower potassium level.   5.  Psychosocial treatments to be addressed with social work consult.         DEBRA A. NAEGELE, APRN, CNS             D: 2017 14:15   T: 2017 15:26   MT: CITLALI      Name:     DYAN GARCIA   MRN:      [id number]        Account:      [id number]   :      1990           Admitted:     [id number]      Document: [id number]

## 2017-08-28 NOTE — PROGRESS NOTES
"This writer was not able to directly check-in with the pt due to him sleeping a majority of the shift. Pt's stated goal of the day was, \"to speak with my doctor regarding discharge.\" Pt attended community but no other scheduled groups. Pt presents as irritable yet cooperative with a flat/blunt affect. Pt was withdrawn from others in the milieu. Pt is fixated on being able to leave ASAP and mentioned feeling \"way better than 10 hours ago.\" Pt does not appear to be responding to auditory and visual hallucinations. In regards to SI and SIB, none reported or observed.      08/28/17 1450   Behavioral Health   Hallucinations denies / not responding to hallucinations   Thinking distractable;poor concentration   Orientation person: oriented;place: oriented;date: oriented;time: oriented   Memory baseline memory   Insight poor   Judgement impaired   Eye Contact blinking   Affect blunted, flat   Mood depressed;irritable   Physical Appearance/Attire attire appropriate to age and situation   Hygiene neglected grooming - unclean body, hair, teeth   Suicidality other (see comments)  (None reported or observed)   Self Injury other (see comment)  (None reported or observed)   Elopement (No elopement concerns)   Activity isolative;withdrawn   Speech pressured   Medication Sensitivity no stated side effects;no observed side effects   Psychomotor / Gait slow   Safety   Suicidality status 1:1   Psycho Education   Type of Intervention 1:1 intervention   Response observes from a distance   Hours 0.5   Treatment Detail Triggers   Activities of Daily Living   Hygiene/Grooming independent   Oral Hygiene independent   Dress independent   Room Organization independent   Activity   Activity Level of Assistance independent   Behavioral Health Interventions   Depression maintain safety precautions;monitor need to revise level of observation;maintain safe secure environment;assist patient in developing safety plan;assist patient in following " safety plan;encourage nutrition and hydration;encourage participation / independence with adls;provide emotional support;establish therapeutic relationship;assist with developing and utilizing healthy coping strategies;build upon strengths   Social and Therapeutic Interventions (Depression) encourage socialization with peers;encourage effective boundaries with peers;encourage participation in therapeutic groups and milieu activities

## 2017-08-28 NOTE — ED NOTES
"Patient called police d/t suicidal thoughts.  Patient refuses to report plan, denies past suicide attempts.  Patient exhibiting pressured speech, expresses that he would like to hurt \"God and Bear Faustino\" but denies homicidal ideation.  Patient cooperative with plan of care.  Will continue to monitor.  "

## 2017-08-28 NOTE — ED NOTES
"Patient expressing that he does not want to be admitted--reports he gets \"bored\" while hospitalized; states \"they lock you up\".  Patient expresses he called 911 seeking help, explained rationale for admitting patient to hospital.  Patient verbalized understanding. ERMD updated.  "
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
## 2017-08-29 VITALS
HEIGHT: 70 IN | OXYGEN SATURATION: 97 % | HEART RATE: 92 BPM | TEMPERATURE: 98.3 F | BODY MASS INDEX: 33.58 KG/M2 | DIASTOLIC BLOOD PRESSURE: 71 MMHG | WEIGHT: 234.57 LBS | SYSTOLIC BLOOD PRESSURE: 129 MMHG | RESPIRATION RATE: 18 BRPM

## 2017-08-29 PROCEDURE — 99239 HOSP IP/OBS DSCHRG MGMT >30: CPT | Performed by: CLINICAL NURSE SPECIALIST

## 2017-08-29 PROCEDURE — 25000131 ZZH RX MED GY IP 250 OP 636 PS 637: Performed by: EMERGENCY MEDICINE

## 2017-08-29 PROCEDURE — 25000125 ZZHC RX 250: Performed by: EMERGENCY MEDICINE

## 2017-08-29 PROCEDURE — 25000132 ZZH RX MED GY IP 250 OP 250 PS 637: Performed by: EMERGENCY MEDICINE

## 2017-08-29 RX ORDER — RISPERIDONE 0.5 MG/1
1 TABLET, ORALLY DISINTEGRATING ORAL AT BEDTIME
Status: DISCONTINUED | OUTPATIENT
Start: 2017-08-29 | End: 2017-08-29 | Stop reason: HOSPADM

## 2017-08-29 RX ORDER — RISPERIDONE 1 MG/1
1 TABLET, ORALLY DISINTEGRATING ORAL AT BEDTIME
Qty: 30 TABLET | Refills: 1 | Status: SHIPPED | OUTPATIENT
Start: 2017-08-29 | End: 2019-12-16

## 2017-08-29 RX ADMIN — VENLAFAXINE HYDROCHLORIDE 300 MG: 150 TABLET, EXTENDED RELEASE ORAL at 07:50

## 2017-08-29 RX ADMIN — ARIPIPRAZOLE 30 MG: 30 TABLET ORAL at 07:50

## 2017-08-29 RX ADMIN — LAMOTRIGINE 150 MG: 150 TABLET ORAL at 07:50

## 2017-08-29 RX ADMIN — OXCARBAZEPINE 600 MG: 600 TABLET ORAL at 07:50

## 2017-08-29 RX ADMIN — HYDROXYZINE HYDROCHLORIDE 50 MG: 25 TABLET ORAL at 03:24

## 2017-08-29 RX ADMIN — MYCOPHENOLATE MOFETIL 750 MG: 250 CAPSULE ORAL at 07:50

## 2017-08-29 RX ADMIN — TACROLIMUS 2 MG: 1 CAPSULE ORAL at 07:50

## 2017-08-29 RX ADMIN — LISINOPRIL 10 MG: 10 TABLET ORAL at 07:50

## 2017-08-29 RX ADMIN — MULTIPLE VITAMINS W/ MINERALS TAB 1 TABLET: TAB at 07:50

## 2017-08-29 RX ADMIN — PREDNISONE 5 MG: 5 TABLET ORAL at 07:50

## 2017-08-29 NOTE — PROGRESS NOTES
DISCHARGE: pt was DC to his friend August. His aftercare plan and meds were reviewed by this RN with pt. Pt denies SI. Pt  Belongings returned. Pt bright and smiling upon DC.

## 2017-08-29 NOTE — PROGRESS NOTES
Patient had a positive shift.    Barry Mcgrath did/did not participate in groups and was/was not visible in the milieu.    Mental health status: Patient maintained a full range affect and endorses/denies SI, SIB and HI.    Patient is working on these coping/social skills: patience, acceptance, vulnerability     Patient did not receive visitors this shift.      Other information about this shift: pt is pleasant on approach with good insight.  Pt is approachable with a positive attitude.  Pt performs daily tasks independently and moves well with guidance.         08/28/17 2027   Behavioral Health   Hallucinations denies / not responding to hallucinations   Thinking distractable;poor concentration   Orientation person: oriented;place: oriented;date: oriented;time: oriented   Memory baseline memory   Insight poor   Judgement impaired   Eye Contact blinking   Affect blunted, flat   Mood depressed   Physical Appearance/Attire attire appropriate to age and situation   Hygiene neglected grooming - unclean body, hair, teeth   Suicidality other (see comments)  (none stated or observed)   Self Injury other (see comment)  (none stated or observed)   Elopement (no observed elopement behavior)   Activity isolative;withdrawn   Speech pressured   Medication Sensitivity no stated side effects;no observed side effects   Psychomotor / Gait balanced;steady  (moves well with guidance/direction)   Activities of Daily Living   Hygiene/Grooming independent   Oral Hygiene independent   Dress independent   Laundry unable to complete   Room Organization independent   Behavioral Health Interventions   Depression maintain safety precautions;provide emotional support;establish therapeutic relationship;build upon strengths;assist with developing and utilizing healthy coping strategies   Social and Therapeutic Interventions (Depression) encourage socialization with peers;encourage effective boundaries with peers;encourage participation in  therapeutic groups and milieu activities

## 2017-08-29 NOTE — DISCHARGE SUMMARY
Psychiatric Discharge Summary    Barry Mcgrath MRN# [id number]   Age: 26 year old YOB: 1990     Date of Admission:  8/27/2017  Date of Discharge:  8/29/2017  Admitting Physician:  Noah Alvarez MD  Discharge Physician:  Debra A. Naegele, APRN CNS (Contact: [phone])         Event Leading to Hospitalization:    Barry Mcgrath is a 26-year-old  who presents with a history of depression and suicidal ideation.  The patient had a kidney transplant when he was 15 years old at [facility] in [place].  He is followed by a transplant coordinator named Love Ruano, phone number is [phone].  The patient is originally from [place], [place].  He moved to [place] in 12/2016.  He said he wanted to have more opportunities.  He wants to attend the [employer] and study Mandarin.  Patient reports that he said a lot of things on Facebook because he was upset.  He is upset with the political climate, he is upset with white supremacists and says that he has been having trouble with intense anger.  He does not think his meds are working correctly.  The patient is followed by Dr. Jacqueline Plascencia at the [facility].  He is currently being treated with Abilify 30 mg, Lamictal 150 mg, Risperdal 0.5 mg, Trileptal 600 mg b.i.d. and Effexor 300 mg.  The patient was unable to tell me if he has had a recent medication adjustment.        See Admission note by Debra Naegele APRN, CNS on 8/28/2017 for additional details.          DIagnoses:   1.  Bipolar disorder without psychosis.   2.  Posttraumatic stress disorder.   3.  Autism spectrum disorder.   4.  End-stage renal disease stage III post-kidney transplant.            Labs:     Results for orders placed or performed during the hospital encounter of 08/27/17   Drug abuse screen 6 urine (tox)   Result Value Ref Range    Amphetamine Qual Urine Negative NEG^Negative    Barbiturates Qual Urine Negative NEG^Negative    Benzodiazepine  Qual Urine Negative NEG^Negative    Cannabinoids Qual Urine Negative NEG^Negative    Cocaine Qual Urine Negative NEG^Negative    Ethanol Qual Urine Negative NEG^Negative    Opiates Qualitative Urine Negative NEG^Negative   CBC with platelets differential   Result Value Ref Range    WBC 14.2 (H) 4.0 - 11.0 10e9/L    RBC Count 5.27 4.4 - 5.9 10e12/L    Hemoglobin 15.2 13.3 - 17.7 g/dL    Hematocrit 45.6 40.0 - 53.0 %    MCV 87 78 - 100 fl    MCH 28.8 26.5 - 33.0 pg    MCHC 33.3 31.5 - 36.5 g/dL    RDW 13.7 10.0 - 15.0 %    Platelet Count 327 150 - 450 10e9/L    Diff Method Automated Method     % Neutrophils 71.9 %    % Lymphocytes 16.8 %    % Monocytes 7.4 %    % Eosinophils 2.8 %    % Basophils 0.6 %    % Immature Granulocytes 0.5 %    Nucleated RBCs 0 0 /100    Absolute Neutrophil 10.2 (H) 1.6 - 8.3 10e9/L    Absolute Lymphocytes 2.4 0.8 - 5.3 10e9/L    Absolute Monocytes 1.1 0.0 - 1.3 10e9/L    Absolute Eosinophils 0.4 0.0 - 0.7 10e9/L    Absolute Basophils 0.1 0.0 - 0.2 10e9/L    Abs Immature Granulocytes 0.1 0 - 0.4 10e9/L    Absolute Nucleated RBC 0.0    Comprehensive metabolic panel   Result Value Ref Range    Sodium 143 133 - 144 mmol/L    Potassium 3.6 3.4 - 5.3 mmol/L    Chloride 109 94 - 109 mmol/L    Carbon Dioxide 25 20 - 32 mmol/L    Anion Gap 9 3 - 14 mmol/L    Glucose 135 (H) 70 - 99 mg/dL    Urea Nitrogen 15 7 - 30 mg/dL    Creatinine 0.74 0.66 - 1.25 mg/dL    GFR Estimate >90 >60 mL/min/1.7m2    GFR Estimate If Black >90 >60 mL/min/1.7m2    Calcium 9.1 8.5 - 10.1 mg/dL    Bilirubin Total 0.3 0.2 - 1.3 mg/dL    Albumin 3.7 3.4 - 5.0 g/dL    Protein Total 7.4 6.8 - 8.8 g/dL    Alkaline Phosphatase 124 40 - 150 U/L    ALT 40 0 - 70 U/L    AST 28 0 - 45 U/L   Tacrolimus level   Result Value Ref Range    Tacrolimus Last Dose Not Provided     Tacrolimus Level 3.2 (L) 5.0 - 15.0 ug/L            Consults:   No consultations this admission.         Hospital Course:   Barry Mcgrath was admitted to [facility]  "with attending Noah Alvarez MD as a voluntary patient. The patient was placed under status 15 (15 minute checks) to ensure patient safety.     Patient was admitted for suicidal ideation. Patient did not think his medications were \"working right\". Patient is on multiple psychiatric medications in addition to his transplant medication. Increased Risperdal from 0.5 mg to 1 mg to provide more mood stabilization. This patient's care is complex so to provide continuity of care for patient I notified his outpatient psychiatrist , Dr. Jacqueline Plascencia, at the [facility] and his transplant coordinator Love Ruano at [facility].     Patient declined nutrition consult which was ordered because of is potassium level (3.6). It was within normal  limits but of the lower end of normal.     Barry Mcgrath did participate in groups and was visible in the milieu. The patient's symptoms of suicidal ideation improved. He is presenting with a stable mood and denies any suicidal or homicidal ideation. He will be attending his therapy appointment on Tuesday at 4:00 pm. Patient has protective factors of supportive friends and room mates and family. Patient is future oriented. He is looking forward to attending the [employer] to study Mandrin. He has a talent for languages. He does not meet hospital level of care. He is at low risk of relapse.     Barry Mcgrath was released to home. At the time of discharge Barry Mcgrath was determined to not be a danger to himself or others.          Discharge Medications:     Current Discharge Medication List      CONTINUE these medications which have CHANGED    Details   risperiDONE (RISPERDAL M-TABS) 1 MG ODT tab Take 1 tablet (1 mg) by mouth At Bedtime  Qty: 30 tablet, Refills: 1    Associated Diagnoses: Bipolar disorder, current episode mixed, severe, without psychotic features (H)         CONTINUE these medications which have NOT CHANGED    Details   venlafaxine (EFFEXOR-XR) 150 " MG 24 hr capsule Take 300 mg by mouth daily      ARIPiprazole (ABILIFY) 30 MG tablet Take 30 mg by mouth daily      LISINOPRIL PO Take 10 mg by mouth daily      mycophenolate (GENERIC EQUIVALENT) 250 MG capsule Take 750 mg by mouth 2 times daily      OXCARBAZEPINE PO Take 600 mg by mouth 2 times daily      PREDNISONE PO Take 5 mg by mouth daily      Multiple Vitamins-Minerals (MULTIVITAMIN ADULT PO) Take 1 tablet by mouth      tacrolimus (GENERIC EQUIVALENT) 1 MG capsule Take 2 mg by mouth 2 times daily      vitamin D3 (CHOLECALCIFEROL) 78475 UNITS capsule Take 50,000 Units by mouth once a week      LAMOTRIGINE PO Take 150 mg by mouth daily      mupirocin (BACTROBAN) 2 % ointment Apply topically 3 times daily                  Psychiatric Examination:   Appearance:  awake, alert and adequately groomed  Attitude:  cooperative  Eye Contact:  blind  Mood:  good  Affect:  appropriate and in normal range  Speech:  clear, coherent  Psychomotor Behavior:  no evidence of tardive dyskinesia, dystonia, or tics  Thought Process:  logical, linear and goal oriented  Associations:  no loose associations  Thought Content:  no evidence of suicidal ideation or homicidal ideation  Insight:  fair  Judgment:  fair  Oriented to:  time, person, and place  Attention Span and Concentration:  intact  Recent and Remote Memory:  intact  Language: Able to name objects, Able to repeat phrases and Able to read and write  Fund of Knowledge: adequate  Muscle Strength and Tone: normal  Gait and Station: Normal         Discharge Plan:   Summary: You were admitted on 8/27/2017 to Station [address] [place] [address] due to Suicidal Ideations.  You were treated by Debra Naegele, APRN, CNS and discharged on 8/29/2017 from [address] to Home.      Principle Diagnosis: (Per EMR)  1.  Bipolar disorder without psychosis.   2.  Posttraumatic stress disorder.   3.  Autism spectrum disorder.   4.  End-stage renal disease stage III post-kidney transplant.      Health Care  Follow-up Appointments:   Medication Management  Note: [facility] offered to get a follow-up medication management appointment scheduled for you before discharge. You report that you don't need assistance making the appointment and would like to make the appointment yourself.   Provider: Dr. Jacqueline Plascencia MD @ [facility]    Address: [address] [place] [address]   Phone: [phone]  [facility] has faxed the Discharge Summary and AVS to this provider at Fax: [phone]     Therapy Appointment   Date: 8/29/2017  Time: 4:00 PM      Provider: Erin BLAKE @ [facility]  Address: [address] [place] [address]  Phone: [phone]  [facility] has faxed the Discharge Summary and AVS to this provider at Fax: [phone]     Attend all scheduled appointments with your outpatient providers. Call at least 24 hours in advance if you need to reschedule an appointment to ensure continued access to your outpatient providers.   Major Treatments, Procedures and Findings: You were provided with: a psychiatric assessment, assessed for medical stability, medication evaluation and/or management, group therapy, art therapy, milieu management, medical interventions and skills/OT groups.     Symptoms to Report: If you experience any of the following symptoms please report them right away to your provider or to family/friends; feeling more aggressive, increased confusion, losing more sleep, mood getting worse or thoughts of suicide.     Early warning signs can include: Early warning signs that could signal a potential relapse could include but not limited to the following; increased depression or anxiety sleep disturbances increased thoughts or behaviors of suicide or self-harm  increased unusual thinking, such as paranoia or hearing voices.     Safety and Wellness: Take all medicines as directed. Make no changes unless your doctor suggests them.      Follow treatment recommendations. Refrain from  "alcohol and non-prescribed drugs.  Items could include: Firearms  Medicines (both prescribed and over-the-counter)  Knives and other sharp objects  Ropes and like materials  Car keys  If there is a concern for safety, call 911. If there is a concern for safety, call 911..     Resources:    Crisis Intervention: 824.714.9028 or 348-135-9537 (TTY: 358.722.7677).  Call anytime for help.  National [place] on Mental Illness (www.mn.azar.org): 757.434.5853 or 216-411-4378.  Alcoholics Anonymous (www.alcoholics-anonymous.org): Check your phone book for your local chapter.  National Suicide Prevention Line (www.mentalhealthmn.org): 335-892-GBYJ (8168)  Self- Management and Recovery Training., SMART-- Toll free: 830.513.6203  www.[employer]  [facility] Crisis (COPE) Response - Adult 997 633-4564  Text 4 Life: txt \"LIFE\" to 19272 for immediate support and crisis intervention  Crisis text line: Text \"START\" to 098-479. Free, confidential, 24/7.  Crisis Intervention: 628.528.8655 or 203-638-1366. Call anytime for help.      The treatment team has appreciated the opportunity to work with you. Barry,  please take care and make your recovery a priority. If you have any questions or concerns our unit number is [phone]. You will be receiving a follow-up phone call within the next three days from a representative from behavioral health. You have identified the best phone number to reach you at is [phone] (home) .       Attestation:  The patient has been seen and evaluated by me,  Debra A. Naegele, APRN CNS on 8/29/2017  Discharge time > 30 minutes  "

## 2017-08-29 NOTE — DISCHARGE INSTRUCTIONS
Behavioral Discharge Planning and Instructions      Summary: You were admitted on 8/27/2017 to [facility] [place] due to Suicidal Ideations.  You were treated by Debra Naegele, APRN, CNS and discharged on 8/29/2017 from [facility] [place] to Home.     Principle Diagnosis: (Per EMR)  1.  Bipolar disorder without psychosis.   2.  Posttraumatic stress disorder.   3.  Autism spectrum disorder.   4.  End-stage renal disease stage III post-kidney transplant.     Health Care Follow-up Appointments:   Medication Management  Note: [facility] offered to get a follow-up medication management appointment scheduled for you before discharge. You report that you don't need assistance making the appointment and would like to make the appointment yourself.   Provider: Dr. Jacqueline Plascencia MD @ [facility]    Address: [address] [place] [address]   Phone: [phone]  [facility] has faxed the Discharge Summary and AVS to this provider at Fax: [phone]    Therapy Appointment   Date: 8/29/2017  Time: 4:00 PM      Provider: Erin BLAKE @ [facility]  Address: [address] [place] [address]  Phone: [phone]  [facility] has faxed the Discharge Summary and AVS to this provider at Fax: [phone]    Attend all scheduled appointments with your outpatient providers. Call at least 24 hours in advance if you need to reschedule an appointment to ensure continued access to your outpatient providers.   Major Treatments, Procedures and Findings: You were provided with: a psychiatric assessment, assessed for medical stability, medication evaluation and/or management, group therapy, art therapy, milieu management, medical interventions and skills/OT groups.    Symptoms to Report: If you experience any of the following symptoms please report them right away to your provider or to family/friends; feeling more aggressive, increased confusion, losing more sleep, mood getting worse or thoughts of suicide.    Early  "warning signs can include: Early warning signs that could signal a potential relapse could include but not limited to the following; increased depression or anxiety sleep disturbances increased thoughts or behaviors of suicide or self-harm  increased unusual thinking, such as paranoia or hearing voices.    Safety and Wellness: Take all medicines as directed. Make no changes unless your doctor suggests them.      Follow treatment recommendations. Refrain from alcohol and non-prescribed drugs.  Items could include: Firearms  Medicines (both prescribed and over-the-counter)  Knives and other sharp objects  Ropes and like materials  Car keys  If there is a concern for safety, call 911. If there is a concern for safety, call 911..    Resources:    Crisis Intervention: 272.390.8949 or 113-454-7608 (TTY: 109.576.2772).  Call anytime for help.  National [place] on Mental Illness (www.mn.azar.org): 914.530.6047 or 721-705-2587.  Alcoholics Anonymous (www.alcoholics-anonymous.org): Check your phone book for your local chapter.  National Suicide Prevention Line (www.mentalhealthmn.org): 911-396-EMNN (3242)  Self- Management and Recovery Training., SMART-- Toll free: 602.245.3292  www.[employer].org  [facility] Crisis (COPE) Response - Adult 109 618-7293  Text 4 Life: txt \"LIFE\" to 98380 for immediate support and crisis intervention  Crisis text line: Text \"START\" to 036-418. Free, confidential, 24/7.  Crisis Intervention: 176.528.3068 or 451-135-3565. Call anytime for help.     The treatment team has appreciated the opportunity to work with you. Barry,  please take care and make your recovery a priority. If you have any questions or concerns our unit number is [phone]. You will be receiving a follow-up phone call within the next three days from a representative from behavioral health. You have identified the best phone number to reach you at is [phone] (home) .    "

## 2017-10-13 ENCOUNTER — HOSPITAL ENCOUNTER (EMERGENCY)
Facility: CLINIC | Age: 27
Discharge: HOME | End: 2017-10-13
Attending: EMERGENCY MEDICINE | Admitting: EMERGENCY MEDICINE
Payer: MEDICARE

## 2017-10-13 VITALS — TEMPERATURE: 97.4 F | OXYGEN SATURATION: 97 % | SYSTOLIC BLOOD PRESSURE: 128 MMHG | DIASTOLIC BLOOD PRESSURE: 56 MMHG

## 2017-10-13 DIAGNOSIS — F43.10 POSTTRAUMATIC STRESS DISORDER: ICD-10-CM

## 2017-10-13 DIAGNOSIS — R45.851 SUICIDAL IDEATION: ICD-10-CM

## 2017-10-13 DIAGNOSIS — F31.9 BIPOLAR I DISORDER (H): ICD-10-CM

## 2017-10-13 DIAGNOSIS — F31.11 BIPOLAR I DISORDER, MOST RECENT EPISODE (OR CURRENT) MANIC, MILD (H): ICD-10-CM

## 2017-10-13 PROCEDURE — 90791 PSYCH DIAGNOSTIC EVALUATION: CPT

## 2017-10-13 PROCEDURE — 99284 EMERGENCY DEPT VISIT MOD MDM: CPT | Mod: Z6 | Performed by: EMERGENCY MEDICINE

## 2017-10-13 PROCEDURE — 99285 EMERGENCY DEPT VISIT HI MDM: CPT | Mod: 25 | Performed by: EMERGENCY MEDICINE

## 2017-10-13 ASSESSMENT — ENCOUNTER SYMPTOMS
DYSPHORIC MOOD: 1
CARDIOVASCULAR NEGATIVE: 1
GASTROINTESTINAL NEGATIVE: 1

## 2017-10-13 NOTE — ED NOTES
Bed: ED08  Expected date:   Expected time:   Means of arrival:   Comments:  Oren 89  26 yo M  SI  ETA 5 min

## 2017-10-13 NOTE — ED AVS SNAPSHOT
[facility] [place], Emergency Department    [address]    [facility] [place] [address]    Phone:  [phone]    Fax:  [phone]                                       Barry Mcgrath   MRN: [id number]    Department:  [facility], Emergency Department   Date of Visit:  10/13/2017           After Visit Summary Signature Page     I have received my discharge instructions, and my questions have been answered. I have discussed any challenges I see with this plan with the nurse or doctor.    ..........................................................................................................................................  Patient/Patient Representative Signature      ..........................................................................................................................................  Patient Representative Print Name and Relationship to Patient    ..................................................               ................................................  Date                                            Time    ..........................................................................................................................................  Reviewed by Signature/Title    ...................................................              ..............................................  Date                                                            Time
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
## 2017-10-13 NOTE — ED AVS SNAPSHOT
[facility], Emergency Department    [address]    [place] [address]    Phone:  [phone]    Fax:  [phone]                                       Barry Mcgrath   MRN: [id number]    Department:  [facility], Emergency Department   Date of Visit:  10/13/2017           Patient Information     Date Of Birth          1990        Your diagnoses for this visit were:     Suicidal ideation     Bipolar I disorder (H)        You were seen by Fadi Montague MD.        Discharge Instructions       See your psychiatrist today as scheduled  Return if you are not doing well    24 Hour Appointment Hotline       To make an appointment at any [facility] clinic, call [phone] ([phone]). If you don't have a family doctor or clinic, we will help you find one. [facility] clinics are conveniently located to serve the needs of you and your family.             Review of your medicines      Our records show that you are taking the medicines listed below. If these are incorrect, please call your family doctor or clinic.        Dose / Directions Last dose taken    ARIPiprazole 30 MG tablet   Commonly known as:  ABILIFY   Dose:  30 mg        Take 30 mg by mouth daily   Refills:  0        LAMOTRIGINE PO   Dose:  150 mg        Take 150 mg by mouth daily   Refills:  0        LISINOPRIL PO   Dose:  10 mg        Take 10 mg by mouth daily   Refills:  0        MULTIVITAMIN ADULT PO   Dose:  1 tablet        Take 1 tablet by mouth   Refills:  0        mupirocin 2 % ointment   Commonly known as:  BACTROBAN        Apply topically 3 times daily   Refills:  0        mycophenolate 250 MG capsule   Commonly known as:  GENERIC EQUIVALENT   Dose:  750 mg        Take 750 mg by mouth 2 times daily   Refills:  0        OXCARBAZEPINE PO   Dose:  600 mg        Take 600 mg by mouth 2 times daily   Refills:  0        PREDNISONE PO   Dose:  5 mg        Take 5 mg by mouth daily   Refills:  0        risperiDONE 1 MG ODT tab    Commonly known as:  risperDAL M-TABS   Dose:  1 mg   Quantity:  30 tablet        Take 1 tablet (1 mg) by mouth At Bedtime   Refills:  1        tacrolimus 1 MG capsule   Commonly known as:  GENERIC EQUIVALENT   Dose:  2 mg        Take 2 mg by mouth 2 times daily   Refills:  0        venlafaxine 150 MG 24 hr capsule   Commonly known as:  EFFEXOR-XR   Dose:  300 mg        Take 300 mg by mouth daily   Refills:  0        vitamin D3 81733 UNITS capsule   Commonly known as:  CHOLECALCIFEROL   Dose:  48201 Units        Take 50,000 Units by mouth once a week   Refills:  0                Orders Needing Specimen Collection     Ordered          10/13/17 0323  Drug abuse screen 6 urine (tox) - STAT, [facility]: STAT, Needs to be Collected     Scheduled Task Status   10/13/17 0323 Collect Drug abuse screen 6 urine (tox) Open   Order Class:  PCU Collect                  Pending Results     No orders found from 10/11/2017 to 10/14/2017.            Pending Culture Results     No orders found from 10/11/2017 to 10/14/2017.            Pending Results Instructions     If you had any lab results that were not finalized at the time of your Discharge, you can call the ED Lab Result RN at [phone]. You will be contacted by this team for any positive Lab results or changes in treatment. The nurses are available 7 days a week from 10A to 6:30P.  You can leave a message 24 hours per day and they will return your call.        Thank you for choosing [facility]       Thank you for choosing [facility] for your care. Our goal is always to provide you with excellent care. Hearing back from our patients is one way we can continue to improve our services. Please take a few minutes to complete the written survey that you may receive in the mail after you visit with us. Thank you!        [employer] Information     [facility] lets you send messages to your doctor, view your test results, renew your prescriptions, schedule appointments and more. To sign up, go to  [email or web address] . Click on \"Log in\" on the left side of the screen, which will take you to the Welcome page. Then click on \"Sign up Now\" on the right side of the page.     You will be asked to enter the access code listed below, as well as some personal information. Please follow the directions to create your username and password.     Your access code is: [email or web address]  Expires: 2017 11:14 AM     Your access code will  in 90 days. If you need help or a new code, please call your [facility] clinic or [phone].        Care EveryWhere ID     This is your Care EveryWhere ID. This could be used by other organizations to access your [facility] medical records  [id number]        Equal Access to Services     JACOB BAE : Marcello Marcos, igor saenz, malachi medina, deepali sotelo. So [facility] [phone].    ATENCIÓN: Si habla español, tiene a doll disposición servicios gratuitos de asistencia lingüística. Federico al [phone].    We comply with applicable federal civil rights laws and [place] laws. We do not discriminate on the basis of race, color, national origin, age, disability, sex, sexual orientation, or gender identity.            After Visit Summary       This is your record. Keep this with you and show to your community pharmacist(s) and doctor(s) at your next visit.                  "

## 2017-12-05 ENCOUNTER — TRANSFERRED RECORDS (OUTPATIENT)
Dept: HEALTH INFORMATION MANAGEMENT | Facility: CLINIC | Age: 27
End: 2017-12-05

## 2018-02-13 ENCOUNTER — OFFICE VISIT (OUTPATIENT)
Dept: NEPHROLOGY | Facility: CLINIC | Age: 28
End: 2018-02-13
Attending: INTERNAL MEDICINE
Payer: MEDICARE

## 2018-02-13 VITALS
BODY MASS INDEX: 35.79 KG/M2 | DIASTOLIC BLOOD PRESSURE: 77 MMHG | OXYGEN SATURATION: 95 % | HEIGHT: 70 IN | TEMPERATURE: 99.1 F | HEART RATE: 104 BPM | WEIGHT: 250 LBS | SYSTOLIC BLOOD PRESSURE: 122 MMHG

## 2018-02-13 DIAGNOSIS — R73.01 IMPAIRED FASTING GLUCOSE: ICD-10-CM

## 2018-02-13 DIAGNOSIS — I10 BENIGN ESSENTIAL HYPERTENSION: ICD-10-CM

## 2018-02-13 DIAGNOSIS — Z94.0 STATUS POST KIDNEY TRANSPLANT: Primary | ICD-10-CM

## 2018-02-13 DIAGNOSIS — D84.9 IMMUNOSUPPRESSION (H): ICD-10-CM

## 2018-02-13 DIAGNOSIS — Z76.89 ENCOUNTER TO ESTABLISH CARE WITH NEW DOCTOR: ICD-10-CM

## 2018-02-13 DIAGNOSIS — E66.9 OBESITY WITH SERIOUS COMORBIDITY, UNSPECIFIED CLASSIFICATION, UNSPECIFIED OBESITY TYPE: ICD-10-CM

## 2018-02-13 DIAGNOSIS — Z12.83 SKIN CANCER SCREENING: ICD-10-CM

## 2018-02-13 DIAGNOSIS — Z94.0 KIDNEY REPLACED BY TRANSPLANT: ICD-10-CM

## 2018-02-13 DIAGNOSIS — Z94.0 KIDNEY REPLACED BY TRANSPLANT: Primary | ICD-10-CM

## 2018-02-13 LAB
ALBUMIN SERPL-MCNC: 3.7 G/DL (ref 3.4–5)
ALBUMIN UR-MCNC: 30 MG/DL
ANION GAP SERPL CALCULATED.3IONS-SCNC: 5 MMOL/L (ref 3–14)
APPEARANCE UR: CLEAR
BILIRUB UR QL STRIP: NEGATIVE
BUN SERPL-MCNC: 16 MG/DL (ref 7–30)
CALCIUM SERPL-MCNC: 9 MG/DL (ref 8.5–10.1)
CHLORIDE SERPL-SCNC: 103 MMOL/L (ref 94–109)
CO2 SERPL-SCNC: 30 MMOL/L (ref 20–32)
COLOR UR AUTO: YELLOW
CREAT SERPL-MCNC: 0.9 MG/DL (ref 0.66–1.25)
CREAT UR-MCNC: 97 MG/DL
ERYTHROCYTE [DISTWIDTH] IN BLOOD BY AUTOMATED COUNT: 13.6 % (ref 10–15)
GFR SERPL CREATININE-BSD FRML MDRD: >90 ML/MIN/1.7M2
GLUCOSE SERPL-MCNC: 106 MG/DL (ref 70–99)
GLUCOSE UR STRIP-MCNC: NEGATIVE MG/DL
HCT VFR BLD AUTO: 48.4 % (ref 40–53)
HGB BLD-MCNC: 15.4 G/DL (ref 13.3–17.7)
HGB UR QL STRIP: NEGATIVE
KETONES UR STRIP-MCNC: NEGATIVE MG/DL
LEUKOCYTE ESTERASE UR QL STRIP: NEGATIVE
MCH RBC QN AUTO: 27.5 PG (ref 26.5–33)
MCHC RBC AUTO-ENTMCNC: 31.8 G/DL (ref 31.5–36.5)
MCV RBC AUTO: 86 FL (ref 78–100)
NITRATE UR QL: NEGATIVE
PH UR STRIP: 6 PH (ref 5–7)
PHOSPHATE SERPL-MCNC: 4.6 MG/DL (ref 2.5–4.5)
PLATELET # BLD AUTO: 283 10E9/L (ref 150–450)
POTASSIUM SERPL-SCNC: 4.6 MMOL/L (ref 3.4–5.3)
PROT UR-MCNC: 0.33 G/L
PROT/CREAT 24H UR: 0.34 G/G CR (ref 0–0.2)
RBC # BLD AUTO: 5.61 10E12/L (ref 4.4–5.9)
RBC #/AREA URNS AUTO: 1 /HPF (ref 0–2)
SODIUM SERPL-SCNC: 138 MMOL/L (ref 133–144)
SOURCE: ABNORMAL
SP GR UR STRIP: 1.01 (ref 1–1.03)
UROBILINOGEN UR STRIP-MCNC: 0 MG/DL (ref 0–2)
WBC # BLD AUTO: 11.6 10E9/L (ref 4–11)
WBC #/AREA URNS AUTO: <1 /HPF (ref 0–2)

## 2018-02-13 PROCEDURE — 36415 COLL VENOUS BLD VENIPUNCTURE: CPT | Performed by: INTERNAL MEDICINE

## 2018-02-13 PROCEDURE — 85027 COMPLETE CBC AUTOMATED: CPT | Performed by: INTERNAL MEDICINE

## 2018-02-13 PROCEDURE — 81001 URINALYSIS AUTO W/SCOPE: CPT | Performed by: INTERNAL MEDICINE

## 2018-02-13 PROCEDURE — 84156 ASSAY OF PROTEIN URINE: CPT | Performed by: INTERNAL MEDICINE

## 2018-02-13 PROCEDURE — 80069 RENAL FUNCTION PANEL: CPT | Performed by: INTERNAL MEDICINE

## 2018-02-13 PROCEDURE — G0463 HOSPITAL OUTPT CLINIC VISIT: HCPCS | Mod: ZF

## 2018-02-13 RX ORDER — ARIPIPRAZOLE 30 MG/1
30 TABLET ORAL
COMMUNITY
End: 2018-04-04

## 2018-02-13 RX ORDER — METFORMIN HYDROCHLORIDE 500 MG/5ML
1000 SOLUTION ORAL 2 TIMES DAILY
Status: ON HOLD | COMMUNITY
End: 2019-10-23

## 2018-02-13 ASSESSMENT — PAIN SCALES - GENERAL: PAINLEVEL: NO PAIN (0)

## 2018-02-13 NOTE — MR AVS SNAPSHOT
"              After Visit Summary   2/13/2018    Barry Mcgrath    MRN: [id number]           Patient Information     Date Of Birth          1990        Visit Information        Provider Department      2/13/2018 4:25 PM Reagan Burkett MD [facility] Nephrology        Today's Diagnoses     Status post kidney transplant    -  1    Immunosuppression (H)        Benign essential hypertension        Skin cancer screening        Impaired fasting glucose        Obesity with serious comorbidity, unspecified classification, unspecified obesity type           Follow-ups after your visit        Who to contact     If you have questions or need follow up information about today's clinic visit or your schedule please contact [facility] NEPHROLOGY directly at [phone].  Normal or non-critical lab and imaging results will be communicated to you by MyChart, letter or phone within 4 business days after the clinic has received the results. If you do not hear from us within 7 days, please contact the clinic through [employer] or phone. If you have a critical or abnormal lab result, we will notify you by phone as soon as possible.  Submit refill requests through [employer] or call your pharmacy and they will forward the refill request to us. Please allow 3 business days for your refill to be completed.          Additional Information About Your Visit        MyChart Information     [employer] lets you send messages to your doctor, view your test results, renew your prescriptions, schedule appointments and more. To sign up, go to [email or web address] . Click on \"Log in\" on the left side of the screen, which will take you to the Welcome page. Then click on \"Sign up Now\" on the right side of the page.     You will be asked to enter the access code listed below, as well as some personal information. Please follow the directions to create your username and password.     Your access code is: [email or web address]  Expires: 4/30/2018  6:31 AM     Your " "access code will  in 90 days. If you need help or a new code, please call your [facility] clinic or [phone].        Care EveryWhere ID     This is your Care EveryWhere ID. This could be used by other organizations to access your [facility] medical records  [id number]        Your Vitals Were     Pulse Temperature Height Pulse Oximetry BMI (Body Mass Index)       104 99.1  F (37.3  C) (Oral) 1.779 m (5' 10.04\") 95% 35.83 kg/m2        Blood Pressure from Last 3 Encounters:   18 122/77   10/13/17 128/56   17 129/71    Weight from Last 3 Encounters:   18 113.4 kg (250 lb)   17 106.4 kg (234 lb 9.1 oz)              Today, you had the following     No orders found for display       Primary Care Provider Office Phone # Fax #    [facility] [place] [facility] [phone]       [facility] [place] [address]        Equal Access to Services     JACOB BAE : Hadii aad ku hadasho Soomaali, waaxda luqadaha, qaybta kaalmada adeegyada, deepali foley . So [facility] [phone].    ATENCIÓN: Si habla español, tiene a doll disposición servicios gratuitos de asistencia lingüística. Llame al [phone].    We comply with applicable federal civil rights laws and [place] laws. We do not discriminate on the basis of race, color, national origin, age, disability, sex, sexual orientation, or gender identity.            Thank you!     Thank you for choosing [facility] NEPHROLOGY  for your care. Our goal is always to provide you with excellent care. Hearing back from our patients is one way we can continue to improve our services. Please take a few minutes to complete the written survey that you may receive in the mail after your visit with us. Thank you!             Your Updated Medication List - Protect others around you: Learn how to safely use, store and throw away your medicines at www.disposemymeds.org.          This list is accurate as of 18 " 11:59 PM.  Always use your most recent med list.                   Brand Name Dispense Instructions for use Diagnosis    * ARIPiprazole 30 MG tablet    ABILIFY     Take 30 mg by mouth daily        * ARIPiprazole 30 MG tablet    ABILIFY     Take 30 mg by mouth        LISINOPRIL PO      Take 10 mg by mouth daily        metFORMIN 500 MG/5ML Soln solution    GLUCOPHAGE     Take 500 mg by mouth 2 times daily        MULTIVITAMIN ADULT PO      Take 1 tablet by mouth        mupirocin 2 % ointment    BACTROBAN     Apply topically 3 times daily        mycophenolate 250 MG capsule    GENERIC EQUIVALENT     Take 750 mg by mouth 2 times daily        OXCARBAZEPINE PO      Take 600 mg by mouth 2 times daily        PREDNISONE PO      Take 5 mg by mouth daily        risperiDONE 1 MG ODT tab    risperDAL M-TABS    30 tablet    Take 1 tablet (1 mg) by mouth At Bedtime    Bipolar disorder, current episode mixed, severe, without psychotic features (H)       tacrolimus 1 MG capsule    GENERIC EQUIVALENT     Take 2 mg by mouth 2 times daily        venlafaxine 150 MG 24 hr capsule    EFFEXOR-XR     Take 300 mg by mouth daily        vitamin D3 44780 UNITS capsule    CHOLECALCIFEROL     Take 50,000 Units by mouth once a week        * Notice:  This list has 2 medication(s) that are the same as other medications prescribed for you. Read the directions carefully, and ask your doctor or other care provider to review them with you.

## 2018-02-13 NOTE — LETTER
2018      RE: Barry Mcgrath  [address]  [facility] [address]       Assessment and Plan:  1. 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  He is status post  donor kidney transplant in  at [facility].  He is currently on chronic immunosuppression with tacrolimus CellCept and prednisone.  Next    Urine protein to creatinine ratio: 0.34 g/g.    Given the age of the allograft there is no need to follow BK or donor specific antibody.    Baseline creatinine is 0.9 mg/dL with excellent allograft function.    Follow-up in 12 months.  Continue with his screening labs every 2 months locally.    2. Immunosuppression: See above.  His goal tacrolimus level is 3-5 ng/mL.  3.  Hypertension: The patient will continue on with lisinopril.  His goal blood pressure is 100 130/60-80.  4.  Need for skin cancer screening: Patient will follow up with his local primary care provider for this.  We also discussed good sun health.  5.  Impaired fasting glucose: Patient is working on weight loss.  He will need a continual monitoring program for his blood sugars.  He is on multiple medications that can lead to impaired fasting glucose as well.  Including, Risperdal and oxcarbazepine.    Assessment and plan was discussed with patient and he voiced his understanding and agreement.    Reason for Visit:  Mr. Mcgrath is here for routine follow up and kidney transplant.    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         Transplant Hx:       Tx: DDKT  Date:        Present Maintenance IS: Tacrolimus, Mycophenolate mofetil and Prednisone       Baseline Creatinine: 0.9 mg / dl       Recent DSA: getting records       Biopsy: No    The patient is a 27-year-old male with history of end-stage kidney disease due to congenital abnormality of the kidney and urogenital tract.  He is status post kidney transplant in  at [place].   The patient is currently on immunosuppression with tacrolimus CellCept and prednisone.  He recently had his tacrolimus dose increased in January.    The patient overall feels well.  Specifically, he denies any chest pain or shortness of breath.  He has no nausea or vomiting.  No constipation or diarrhea.  No fever shakes or chills.    The patient is working on volitionally losing weight.  He is doing so through decreased caloric intake.    Home BP: at goal impaired fasting glucose:.      ROS:   A comprehensive review of systems was obtained and negative, except as noted in the HPI or PMH.    Active Medical Problems:  Patient Active Problem List   Diagnosis     Suicidal ideation       Personal Hx:  Social History     Social History     Marital status: Single     Spouse name: N/A     Number of children: N/A     Years of education: N/A     Occupational History     Not on file.     Social History Main Topics     Smoking status: Never Smoker     Smokeless tobacco: Never Used     Alcohol use No     Drug use: No     Sexual activity: No     Other Topics Concern     Not on file     Social History Narrative       Allergies:  No Known Allergies    Medications:  Prior to Admission medications    Medication Sig Start Date End Date Taking? Authorizing Provider   risperiDONE (RISPERDAL M-TABS) 1 MG ODT tab Take 1 tablet (1 mg) by mouth At Bedtime 8/29/17  Yes Naegele, Debra Ann, APRN CNS   venlafaxine (EFFEXOR-XR) 150 MG 24 hr capsule Take 300 mg by mouth daily   Yes Reported, Patient   ARIPiprazole (ABILIFY) 30 MG tablet Take 30 mg by mouth daily   Yes Reported, Patient   LISINOPRIL PO Take 10 mg by mouth daily   Yes Reported, Patient   mycophenolate (GENERIC EQUIVALENT) 250 MG capsule Take 750 mg by mouth 2 times daily   Yes Reported, Patient   OXCARBAZEPINE PO Take 600 mg by mouth 2 times daily   Yes Reported, Patient   PREDNISONE PO Take 5 mg by mouth daily   Yes Reported, Patient   Multiple Vitamins-Minerals (MULTIVITAMIN  "ADULT PO) Take 1 tablet by mouth   Yes Reported, Patient   tacrolimus (GENERIC EQUIVALENT) 1 MG capsule Take 2 mg by mouth 2 times daily   Yes Reported, Patient   vitamin D3 (CHOLECALCIFEROL) 86787 UNITS capsule Take 50,000 Units by mouth once a week   Yes Reported, Patient   mupirocin (BACTROBAN) 2 % ointment Apply topically 3 times daily   Yes Reported, Patient   ARIPiprazole (ABILIFY) 30 MG tablet Take 30 mg by mouth    Reported, Patient     Vitals:  /77 (BP Location: Right arm, Patient Position: Sitting, Cuff Size: Adult Large)  Pulse 104  Temp 99.1  F (37.3  C) (Oral)  Ht 1.779 m (5' 10.04\")  Wt 113.4 kg (250 lb)  SpO2 95%  BMI 35.83 kg/m2    Exam:   GENERAL APPEARANCE: alert and no distress  HENT: mouth without ulcers or lesions  LYMPHATICS: no cervical or supraclavicular nodes  RESP: lungs clear to auscultation - no rales, rhonchi or wheezes  CV: regular rhythm, normal rate, no rub, no murmur  EDEMA: no LE edema bilaterally  ABDOMEN: soft, nondistended, nontender, bowel sounds normal  MS: extremities normal - no gross deformities noted, no evidence of inflammation in joints, no muscle tenderness  SKIN: no rash    Results:   Labs reviewed with patient.       Reagan Burkett MD      "

## 2018-02-13 NOTE — NURSING NOTE
"Chief Complaint   Patient presents with     Consult     consult kidney TX       Initial /77 (BP Location: Right arm, Patient Position: Sitting, Cuff Size: Adult Large)  Pulse 104  Temp 99.1  F (37.3  C) (Oral)  Ht 1.779 m (5' 10.04\")  Wt 113.4 kg (250 lb)  SpO2 95%  BMI 35.83 kg/m2 Estimated body mass index is 35.83 kg/(m^2) as calculated from the following:    Height as of this encounter: 1.779 m (5' 10.04\").    Weight as of this encounter: 113.4 kg (250 lb).  Medication Reconciliation: complete     Grzegorz Mckinney MA    "

## 2018-02-13 NOTE — PROGRESS NOTES
Assessment and Plan:  1. 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  He is status post  donor kidney transplant in  at [facility].  He is currently on chronic immunosuppression with tacrolimus CellCept and prednisone.  Next    Urine protein to creatinine ratio: 0.34 g/g.    Given the age of the allograft there is no need to follow BK or donor specific antibody.    Baseline creatinine is 0.9 mg/dL with excellent allograft function.    Follow-up in 12 months.  Continue with his screening labs every 2 months locally.    2. Immunosuppression: See above.  His goal tacrolimus level is 3-5 ng/mL.  3.  Hypertension: The patient will continue on with lisinopril.  His goal blood pressure is 100 130/60-80.  4.  Need for skin cancer screening: Patient will follow up with his local primary care provider for this.  We also discussed good sun health.  5.  Impaired fasting glucose: Patient is working on weight loss.  He will need a continual monitoring program for his blood sugars.  He is on multiple medications that can lead to impaired fasting glucose as well.  Including, Risperdal and oxcarbazepine.    Assessment and plan was discussed with patient and he voiced his understanding and agreement.    Reason for Visit:  Mr. Mcgrath is here for routine follow up and kidney transplant.    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         Transplant Hx:       Tx: DDKT  Date:        Present Maintenance IS: Tacrolimus, Mycophenolate mofetil and Prednisone       Baseline Creatinine: 0.9 mg / dl       Recent DSA: getting records       Biopsy: No    The patient is a 27-year-old male with history of end-stage kidney disease due to congenital abnormality of the kidney and urogenital tract.  He is status post kidney transplant in  at [facility].  The patient is currently on immunosuppression with tacrolimus CellCept and  prednisone.  He recently had his tacrolimus dose increased in January.    The patient overall feels well.  Specifically, he denies any chest pain or shortness of breath.  He has no nausea or vomiting.  No constipation or diarrhea.  No fever shakes or chills.    The patient is working on volitionally losing weight.  He is doing so through decreased caloric intake.    Home BP: at goal impaired fasting glucose:.      ROS:   A comprehensive review of systems was obtained and negative, except as noted in the HPI or PMH.    Active Medical Problems:  Patient Active Problem List   Diagnosis     Suicidal ideation       Personal Hx:  Social History     Social History     Marital status: Single     Spouse name: N/A     Number of children: N/A     Years of education: N/A     Occupational History     Not on file.     Social History Main Topics     Smoking status: Never Smoker     Smokeless tobacco: Never Used     Alcohol use No     Drug use: No     Sexual activity: No     Other Topics Concern     Not on file     Social History Narrative       Allergies:  No Known Allergies    Medications:  Prior to Admission medications    Medication Sig Start Date End Date Taking? Authorizing Provider   risperiDONE (RISPERDAL M-TABS) 1 MG ODT tab Take 1 tablet (1 mg) by mouth At Bedtime 8/29/17  Yes Naegele, Debra Ann, APRN CNS   venlafaxine (EFFEXOR-XR) 150 MG 24 hr capsule Take 300 mg by mouth daily   Yes Reported, Patient   ARIPiprazole (ABILIFY) 30 MG tablet Take 30 mg by mouth daily   Yes Reported, Patient   LISINOPRIL PO Take 10 mg by mouth daily   Yes Reported, Patient   mycophenolate (GENERIC EQUIVALENT) 250 MG capsule Take 750 mg by mouth 2 times daily   Yes Reported, Patient   OXCARBAZEPINE PO Take 600 mg by mouth 2 times daily   Yes Reported, Patient   PREDNISONE PO Take 5 mg by mouth daily   Yes Reported, Patient   Multiple Vitamins-Minerals (MULTIVITAMIN ADULT PO) Take 1 tablet by mouth   Yes Reported, Patient   tacrolimus (GENERIC  "EQUIVALENT) 1 MG capsule Take 2 mg by mouth 2 times daily   Yes Reported, Patient   vitamin D3 (CHOLECALCIFEROL) 14491 UNITS capsule Take 50,000 Units by mouth once a week   Yes Reported, Patient   mupirocin (BACTROBAN) 2 % ointment Apply topically 3 times daily   Yes Reported, Patient   ARIPiprazole (ABILIFY) 30 MG tablet Take 30 mg by mouth    Reported, Patient     Vitals:  /77 (BP Location: Right arm, Patient Position: Sitting, Cuff Size: Adult Large)  Pulse 104  Temp 99.1  F (37.3  C) (Oral)  Ht 1.779 m (5' 10.04\")  Wt 113.4 kg (250 lb)  SpO2 95%  BMI 35.83 kg/m2    Exam:   GENERAL APPEARANCE: alert and no distress  HENT: mouth without ulcers or lesions  LYMPHATICS: no cervical or supraclavicular nodes  RESP: lungs clear to auscultation - no rales, rhonchi or wheezes  CV: regular rhythm, normal rate, no rub, no murmur  EDEMA: no LE edema bilaterally  ABDOMEN: soft, nondistended, nontender, bowel sounds normal  MS: extremities normal - no gross deformities noted, no evidence of inflammation in joints, no muscle tenderness  SKIN: no rash    Results:   Labs reviewed with patient.     "

## 2018-02-28 DIAGNOSIS — Z94.0 STATUS POST KIDNEY TRANSPLANT: ICD-10-CM

## 2018-02-28 DIAGNOSIS — Z94.0 STATUS POST KIDNEY TRANSPLANT: Primary | ICD-10-CM

## 2018-02-28 DIAGNOSIS — R56.9 SEIZURES (H): Primary | ICD-10-CM

## 2018-02-28 RX ORDER — MYCOPHENOLATE MOFETIL 250 MG/1
750 CAPSULE ORAL 2 TIMES DAILY
Qty: 540 CAPSULE | Refills: 3 | Status: SHIPPED | OUTPATIENT
Start: 2018-02-28 | End: 2018-11-06

## 2018-02-28 RX ORDER — PREDNISONE 5 MG/1
5 TABLET ORAL DAILY
Qty: 90 TABLET | Refills: 3 | Status: SHIPPED | OUTPATIENT
Start: 2018-02-28 | End: 2018-02-28

## 2018-02-28 RX ORDER — PREDNISONE 5 MG/1
5 TABLET ORAL DAILY
Qty: 90 TABLET | Refills: 3 | Status: SHIPPED | OUTPATIENT
Start: 2018-02-28 | End: 2018-03-05

## 2018-02-28 RX ORDER — TACROLIMUS 1 MG/1
CAPSULE ORAL
Qty: 450 CAPSULE | Refills: 3 | Status: SHIPPED | OUTPATIENT
Start: 2018-02-28 | End: 2018-08-09

## 2018-02-28 RX ORDER — OXCARBAZEPINE 150 MG/1
600 TABLET, FILM COATED ORAL 2 TIMES DAILY
Qty: 240 TABLET | Refills: 11 | Status: SHIPPED | OUTPATIENT
Start: 2018-02-28 | End: 2019-10-23

## 2018-02-28 RX ORDER — TACROLIMUS 1 MG/1
CAPSULE ORAL
Qty: 450 CAPSULE | Refills: 3 | Status: SHIPPED | OUTPATIENT
Start: 2018-02-28 | End: 2018-02-28

## 2018-02-28 RX ORDER — MYCOPHENOLATE MOFETIL 250 MG/1
750 CAPSULE ORAL 2 TIMES DAILY
Qty: 540 CAPSULE | Refills: 3 | Status: SHIPPED | OUTPATIENT
Start: 2018-02-28 | End: 2018-02-28

## 2018-02-28 NOTE — TELEPHONE ENCOUNTER
Patient called in with the help of his friend, August (both on speaker phone).     Patient recently was seen in Nephrology with Dr. Burkett (last seen 2/13/18). Patient is transferring his cares from [place] to the [facility] and is running low on one of his anti-rejection medications.     Patient goes through [facility] [address] [place] [facility] - [place], [place] - [address] [facility]  [phone] (Phone)  [phone] (Fax)    Prednisone is running low (7 days left - Prescription #: [id number]). Pharmacy won't refill until they hear from pt's provider. Patient has attempted to reach his previous provider at [facility] as well but have not heard back. Since patient has been established with Dr. Burkett he is hoping that the prednisone, as well as his other transplant medications could have their prescriptions updated.    Writer verified current prescription dosages with the patient and August:    -Mycophenolate 750 twice daily     -Tacrolimus - 3mg in the AM and 2mg in the PM (Updated 1-2 months ago via [facility])    -Prednisone - 5 mg daily in AM    -Oxycarbazepine - 600 mg by mouth 2 times daily    Will send high priority to Dr. Burkett and his team.    Patient requesting an update at his number ([phone]). Second number to call would be August ([phone]). Ok to leave a message but prefer to speak with someone.

## 2018-03-05 DIAGNOSIS — Z94.0 STATUS POST KIDNEY TRANSPLANT: ICD-10-CM

## 2018-03-06 RX ORDER — PREDNISONE 5 MG/1
5 TABLET ORAL DAILY
Qty: 90 TABLET | Refills: 3 | Status: SHIPPED | OUTPATIENT
Start: 2018-03-06 | End: 2019-04-12

## 2018-04-04 ENCOUNTER — OFFICE VISIT (OUTPATIENT)
Dept: SLEEP MEDICINE | Facility: CLINIC | Age: 28
End: 2018-04-04
Payer: MEDICARE

## 2018-04-04 VITALS
HEART RATE: 98 BPM | BODY MASS INDEX: 36.54 KG/M2 | WEIGHT: 261 LBS | HEIGHT: 71 IN | SYSTOLIC BLOOD PRESSURE: 112 MMHG | RESPIRATION RATE: 16 BRPM | OXYGEN SATURATION: 93 % | DIASTOLIC BLOOD PRESSURE: 67 MMHG

## 2018-04-04 DIAGNOSIS — R06.83 SNORING: Primary | ICD-10-CM

## 2018-04-04 DIAGNOSIS — R51.9 SLEEP RELATED HEADACHES: ICD-10-CM

## 2018-04-04 DIAGNOSIS — I10 HYPERTENSION, UNSPECIFIED TYPE: ICD-10-CM

## 2018-04-04 PROCEDURE — 99204 OFFICE O/P NEW MOD 45 MIN: CPT | Performed by: PHYSICIAN ASSISTANT

## 2018-04-04 RX ORDER — ARIPIPRAZOLE 30 MG/1
30 TABLET ORAL AT BEDTIME
Status: ON HOLD | COMMUNITY
Start: 2018-04-04 | End: 2019-10-23

## 2018-04-04 NOTE — PATIENT INSTRUCTIONS
"MY TREATMENT INFORMATION FOR SLEEP APNEA-  Barry Mcgrath    DOCTOR : Bennett Ezra Goltz, PA-C  SLEEP CENTER : [facility]     MY CONTACT NUMBER: [phone]    Am I having a sleep study at a sleep center?  Make sure you have an appointment for the study before you leave!    Am I having a home sleep study?  Watch this video:  https://www.[employer].com/watch?v=CteI_GhyP9g&[email or web address]    Frequently asked questions:  1. What is Obstructive Sleep Apnea (RAQUEL)? RAQUEL is the most common type of sleep apnea. Apnea means, \"without breath.\"  Apnea is most often caused by narrowing or collapse of the upper airway as muscles relax during sleep.   Almost everyone has occasional apneas. Most people with sleep apnea have had brief interruptions at night frequently for many years.  The severity of sleep apnea is related to how frequent and severe the events are.   2. What are the consequences of RAQUEL? Symptoms include: feeling sleepy during the day, snoring loudly, gasping or stopping of breathing, trouble sleeping, and occasionally morning headaches or heartburn at night.  Sleepiness can be serious and even increase the risk of falling asleep while driving. Other health consequences may include development of high blood pressure and other cardiovascular disease in persons who are susceptible. Untreated RAQUEL  can contribute to heart disease, stroke and diabetes.   3. What are the treatment options? In most situations, sleep apnea is a lifelong disease that must be managed with daily therapy. Medications are not effective for sleep apnea and surgery is generally not considered until other therapies have been tried. Your treatment is your choice . Continuous Positive Airway (CPAP) works right away and is the therapy that is effective in nearly everyone. An oral device to hold your jaw forward is usually the next most reliable option. Other options include postioning devices (to keep you off your back), weight loss, and " surgery including a tongue pacing device. There is more detail about some of these options below.    Important tips for using CPAP and similar devices   Know your equipment:  CPAP is continuous positive airway pressure that prevents obstructive sleep apnea by keeping the throat from collapsing while you are sleeping. In most cases, the device is  smart  and can slowly self-adjusts if your throat collapses and keeps a record every day of how well you are treated-this information is available to you and your care team.  BPAP is bilevel positive airway pressure that keeps your throat open and also assists each breath with a pressure boost to maintain adequate breathing.  Special kinds of BPAP are used in patients who have inadequate breathing from lung or heart disease. In most cases, the device is  smart  and can slowly self-adjusts to assist breathing. Like CPAP, the device keeps a record of how well you are treated.  Your mask is your connection to the device. You get to choose what feels most comfortable and the staff will help to make sure if fits. Here: are some examples of the different masks that are available:       Key points to remember on your journey with sleep apnea:  1. Sleep study.  PAP devices often need to be adjusted during a sleep study to show that they are effective and adjusted right.  2. Good tips to remember: Try wearing just the mask during a quiet time during the day so your body adapts to wearing it. A humidifier is recommended for comfort in most cases to prevent drying of your nose and throat. Allergy medication from your provider may help you if you are having nasal congestion.  3. Getting settled-in. It takes more than one night for most of us to get used to wearing a mask. Try wearing just the mask during a quiet time during the day so your body adapts to wearing it. A humidifier is recommended for comfort in most cases. Our team will work with you carefully on the first day and will be  in contact within 4 days and again at 2 and 4 weeks for advice and remote device adjustments. Your therapy is evaluated by the device each day.   4. Use it every night. The more you are able to sleep naturally for 7-8 hours, the more likely you will have good sleep and to prevent health risks or symptoms from sleep apnea. Even if you use it 4 hours it helps. Occasionally all of us are unable to use a medical therapy, in sleep apnea, it is not dangerous to miss one night.   5. Communicate. Call our skilled team on the number provided on the first day if your visit for problems that make it difficult to wear the device. Over 2 out of 3 patients can learn to wear the device long-term with help from our team. Remember to call our team or your sleep providers if you are unable to wear the device as we may have other solutions for those who cannot adapt to mask CPAP therapy. It is recommended that you sleep your sleep provider within the first 3 months and yearly after that if you are not having problems.   Take care of your equipment. Make sure you clean your mask and tubing using directions every day and that your filter and mask are replaced as recommended or if they are not working.     BESIDES CPAP, WHAT OTHER THERAPIES ARE THERE?    Positioning Device  Positioning devices are generally used when sleep apnea is mild and only occurs on your back.This example shows a pillow that straps around the waist. It may be appropriate for those whose sleep study shows milder sleep apnea that occurs primarily when lying flat on one's back. Preliminary studies have shown benefit but effectiveness at home may need to be verified by a home sleep test. These devices are generally not covered by medical insurance.  Examples of devices that maintain sleeping on the back to prevent snoring and mild sleep apnea.    Belt type body positioner  Http://Roadstruck/    Electronic  reminder  Http://nightshifttherapy.com/  Http://www.SurroundsMe.com.au/    Oral Appliance  What is oral appliance therapy?  An oral appliance device fits on your teeth at night like a retainer used after having braces. The device is made by a specialized dentist and requires several visits over 1-2 months before a manufactured device is made to fit your teeth and is adjusted to prevent your sleep apnea. Once an oral device is working properly, snoring should be improved. A home sleep test may be recommended at that time if to determine whether the sleep apnea is adequately treated.       Some things to remember:  -Oral devices are often, but not always, covered by your medical insurance. Be sure to check with your insurance provider.   -If you are referred for oral therapy, you will be given a list of specialized dentists to consider or you may choose to visit the Web site of the American Academy of Dental Sleep Medicine  -Oral devices are less likely to work if you have severe sleep apnea or are extremely overweight.     More detailed information  An oral appliance is a small acrylic device that fits over the upper and lower teeth  (similar to a retainer or a mouth guard). This device slightly moves jaw forward, which moves the base of the tongue forward, opens the airway, improves breathing for effective treat snoring and obstructive sleep apnea in perhaps 7 out of 10 people .  The best working devices are custom-made by a dental device  after a mold is made of the teeth 1, 2, 3.  When is an oral appliance indicated?  Oral appliance therapy is recommended as a first-line treatment for patients with primary snoring, mild sleep apnea, and for patients with moderate sleep apnea who prefer appliance therapy to use of CPAP4, 5. Severity of sleep apnea is determined by sleep testing and is based on the number of respiratory events per hour of sleep.   How successful is oral appliance therapy?  The success rate  of oral appliance therapy in patients with mild sleep apnea is 75-80% while in patients with moderate sleep apnea it is 50-70%. The chance of success in patients with severe sleep apnea is 40-50%. The research also shows that oral appliances have a beneficial effect on the cardiovascular health of RAQUEL patients at the same magnitude as CPAP therapy7.  Oral appliances should be a second-line treatment in cases of severe sleep apnea, but if not completely successful then a combination therapy utilizing CPAP plus oral appliance therapy may be effective. Oral appliances tend to be effective in a broad range of patients although studies show that the patients who have the highest success are females, younger patients, those with milder disease, and less severe obesity. 3, 6.   Finding a dentist that practices dental sleep medicine  Specific training is available through the American Academy of Dental Sleep Medicine for dentists interested in working in the field of sleep. To find a dentist who is educated in the field of sleep and the use of oral appliances, near you, visit the Web site of the American Academy of Dental Sleep Medicine.    References  1. Cassius et al. Objectively measured vs self-reported compliance during oral appliance therapy for sleep-disordered breathing. Chest 2013; 144(5): 3459-7271.  2. Cecilia et al. Objective measurement of compliance during oral appliance therapy for sleep-disordered breathing. Thorax 2013; 68(1): 91-96.  3. Brandi et al. Mandibular advancement devices in 620 men and women with RAQUEL and snoring: tolerability and predictors of treatment success. Chest 2004; 125: 9720-8044.  4. Charmaine, et al. Oral appliances for snoring and RAQUEL: a review. Sleep 2006; 29: 244-262.  5. Austin et al. Oral appliance treatment for RAQUEL: an update. J Clin Sleep Med 2014; 10(2): 215-227.  6. Louie et al. Predictors of OSAH treatment outcome. J Dent Res 2007; 86: 3610-8028.    Weight  Loss:    Weight loss is a long-term strategy that may improve sleep apnea in some patients.    Weight management is a personal decision and the decision should be based on your interest and the potential benefits.  If you are interested in exploring weight loss strategies, the following discussion covers the impact on weight loss on sleep apnea and the approaches that may be successful.    Being overweight does not necessarily mean you will have health consequences.  Those who have BMI over 35 or over 27 with existing medical conditions carries greater risk.   Weight loss decreases severity of sleep apnea in most people with obesity. For those with mild obesity who have developed snoring with weight gain, even 15-30 pound weight loss can improve and occasionally eliminate sleep apnea.  Structured and life-long dietary and health habits are necessary to lose weight and keep healthier weight levels.     Though there may be significant health benefits from weight loss, long-term weight loss is very difficult to achieve- studies show success with dietary management in less than 10% of people. In addition, substantial weight loss may require years of dietary control and may be difficult if patients have severe obesity. In these cases, surgical management may be considered.  Finally, older individuals who have tolerated obesity without health complications may be less likely to benefit from weight loss strategies.      Your BMI is Body mass index is 36.92 kg/(m^2).  Weight management is a personal decision.  If you are interested in exploring weight loss strategies, the following discussion covers the approaches that may be successful. Body mass index (BMI) is one way to tell whether you are at a healthy weight, overweight, or obese. It measures your weight in relation to your height.  A BMI of 18.5 to 24.9 is in the healthy range. A person with a BMI of 25 to 29.9 is considered overweight, and someone with a BMI of 30 or  greater is considered obese. More than two-thirds of American adults are considered overweight or obese.  Being overweight or obese increases the risk for further weight gain. Excess weight may lead to heart disease and diabetes.  Creating and following plans for healthy eating and physical activity may help you improve your health.  Weight control is part of healthy lifestyle and includes exercise, emotional health, and healthy eating habits. Careful eating habits lifelong are the mainstay of weight control. Though there are significant health benefits from weight loss, long-term weight loss with diet alone may be very difficult to achieve- studies show long-term success with dietary management in less than 10% of people. Attaining a healthy weight may be especially difficult to achieve in those with severe obesity. In some cases, medications, devices and surgical management might be considered.  What can you do?  If you are overweight or obese and are interested in methods for weight loss, you should discuss this with your provider.     Consider reducing daily calorie intake by 500 calories.     Keep a food journal.     Avoiding skipping meals, consider cutting portions instead.    Diet combined with exercise helps maintain muscle while optimizing fat loss. Strength training is particularly important for building and maintaining muscle mass. Exercise helps reduce stress, increase energy, and improves fitness. Increasing exercise without diet control, however, may not burn enough calories to loose weight.       Start walking three days a week 10-20 minutes at a time    Work towards walking thirty minutes five days a week     Eventually, increase the speed of your walking for 1-2 minutes at time    In addition, we recommend that you review healthy lifestyles and methods for weight loss available through the National Institutes of Health patient information  sites:  http://win.niddk.nih.gov/publications/index.htm    And look into health and wellness programs that may be available through your health insurance provider, employer, local community center, or [employer]ilyn club.    Weight management plan: Patient was referred to their PCP to discuss a diet and exercise plan.    Surgery:    Surgery for obstructive sleep apnea is considered generally only when other therapies fail to work. Surgery may be discussed with you if you are having a difficult time tolerating CPAP and or when there is an abnormal structure that requires surgical correction.  Nose and throat surgeries often enlarge the airway to prevent collapse.  Most of these surgeries create pain for 1-2 weeks and up to half of the most common surgeries are not effective throughout life.  You should carefully discuss the benefits and drawbacks to surgery with your sleep provider and surgeon to determine if it is the best solution for you.   More information  Surgery for RAQUEL is directed at areas that are responsible for narrowing or complete obstruction of the airway during sleep.  There are a wide range of procedures available to enlarge and/or stabilize the airway to prevent blockage of breathing in the three major areas where it can occur: the palate, tongue, and nasal regions.  Successful surgical treatment depends on the accurate identification of the factors responsible for obstructive sleep apnea in each person.  A personalized approach is required because there is no single treatment that works well for everyone.  Because of anatomic variation, consultation with an examination by a sleep surgeon is a critical first step in determining what surgical options are best for each patient.  In some cases, examination during sedation may be recommended in order to guide the selection of procedures.  Patients will be counseled about risks and benefits as well as the typical recovery course after surgery. Surgery is typically  not a cure for a person s RAQUEL.  However, surgery will often significantly improve one s RAQUEL severity (termed  success rate ).  Even in the absence of a cure, surgery will decrease the cardiovascular risk associated with OSA7; improve overall quality of life8 (sleepiness, functionality, sleep quality, etc).  Palate Procedures:  Patients with RAQUEL often have narrowing of their airway in the region of their tonsils and uvula.  The goals of palate procedures are to widen the airway in this region as well as to help the tissues resist collapse.  Modern palate procedure techniques focus on tissue conservation and soft tissue rearrangement, rather than tissue removal.  Often the uvula is preserved in this procedure. Residual sleep apnea is common in patient after pharyngoplasty with an average reduction in sleep apnea events of 33%2.    Tongue Procedures:  ExamWhile patients are awake, the muscles that surround the throat are active and keep this region open for breathing. These muscles relax during sleep, allowing the tongue and other structures to collapse and block breathing.  There are several different tongue procedures available.  Selection of a tongue base procedure depends on characteristics seen on physical exam.  Generally, procedures are aimed at removing bulky tissues in this area or preventing the back of the tongue from falling back during sleep.  Success rates for tongue surgery range from 50-62%3.  Hypoglossal Nerve Stimulation:  Hypoglossal nerve stimulation has recently received approval from the [place] Food and Drug Administration for the treatment of obstructive sleep apnea.  This is based on research showing that the system was safe and effective in treating sleep apnea6.  Results showed that the median AHI score decreased 68%, from 29.3 to 9.0. This therapy uses an implant system that senses breathing patterns and delivers mild stimulation to airway muscles, which keeps the airway open during  sleep.  The system consists of three fully implanted components: a small generator (similar in size to a pacemaker), a breathing sensor, and a stimulation lead.  Using a small handheld remote, a patient turns the therapy on before bed and off upon awakening.    Candidates for this device must be greater than 22 years of age, have moderate to severe RAQUEL (AHI between 20-65), BMI less than 32, have tried CPAP/oral appliance without success, and have appropriate upper airway anatomy (determined by a sleep endoscopy performed by Dr. Hernandez).  Hypoglossal Nerve Stimulation Pathway:    The sleep surgeon s office will work with the patient through the insurance prior-authorization process (including communications and appeals).    Nasal Procedures:  Nasal obstruction can interfere with nasal breathing during the day and night.  Studies have shown that relief of nasal obstruction can improve the ability of some patients to tolerate positive airway pressure therapy for obstructive sleep apnea1.  Treatment options include medications such as nasal saline, topical corticosteroid and antihistamine sprays, and oral medications such as antihistamines or decongestants. Non-surgical treatments can include external nasal dilators for selected patients. If these are not successful by themselves, surgery can improve the nasal airway either alone or in combination with these other options.  Combination Procedures:  Combination of surgical procedures and other treatments may be recommended, particularly if patients have more than one area of narrowing or persistent positional disease.  The success rate of combination surgery ranges from 66-80%2,3.    References  1. Philip MOLINA. The Role of the Nose in Snoring and Obstructive Sleep Apnoea: An Update.  Eur Arch Otorhinolaryngol. 2011; 268: 1365-73.  2.  Travis SM; Michele JA; Peter JR; Pallanch JF; Edilma CHENG; Marc DAVILA; Jing MORILLO. Surgical modifications of the upper airway for obstructive  sleep apnea in adults: a systematic review and meta-analysis. SLEEP 2010;33(10):7399-7762. Nia MCCALL. Hypopharyngeal surgery in obstructive sleep apnea: an evidence-based medicine review.  Arch Otolaryngol Head Neck Surg. 2006 Feb;132(2):206-13.  3. Tyree YH1, Agatha Y, Beto ANGELIA. The efficacy of anatomically based multilevel surgery for obstructive sleep apnea. Otolaryngol Head Neck Surg. 2003 Oct;129(4):327-35.  4. Kezirian E, Goldberg A. Hypopharyngeal Surgery in Obstructive Sleep Apnea: An Evidence-Based Medicine Review. Arch Otolaryngol Head Neck Surg. 2006 Feb;132(2):206-13.  5. Richard RUIZ et al. Upper-Airway Stimulation for Obstructive Sleep Apnea.  N Engl J Med. 2014 Jan 9;370(2):139-49.  6. Jesse Y et al. Increased Incidence of Cardiovascular Disease in Middle-aged Men with Obstructive Sleep Apnea. Am J Respir Crit Care Med; 2002 166: 159-165  7. Caitlyn COLLAZO et al. Studying Life Effects and Effectiveness of Palatopharyngoplasty (SLEEP) study: Subjective Outcomes of Isolated Uvulopalatopharyngoplasty. Otolaryngol Head Neck Surg. 2011; 144: 623-631.

## 2018-04-04 NOTE — MR AVS SNAPSHOT
"              After Visit Summary   4/4/2018    Brary Mcgrath    MRN: [id number]           Patient Information     Date Of Birth          1990        Visit Information        Provider Department      4/4/2018 2:30 PM Goltz, Bennett Ezra, PA-C [facility]        Today's Diagnoses     Snoring    -  1    BMI 36.0-36.9,adult        Hypertension, unspecified type        Sleep related headaches          Care Instructions    MY TREATMENT INFORMATION FOR SLEEP APNEA-  Barry Mcgrath    DOCTOR : Bennett Ezra Goltz, PA-C  SLEEP CENTER : [facility]     MY CONTACT NUMBER: [phone]    Am I having a sleep study at a sleep center?  Make sure you have an appointment for the study before you leave!    Am I having a home sleep study?  Watch this video:  [email or web address]    Frequently asked questions:  1. What is Obstructive Sleep Apnea (RAQUEL)? RAQUEL is the most common type of sleep apnea. Apnea means, \"without breath.\"  Apnea is most often caused by narrowing or collapse of the upper airway as muscles relax during sleep.   Almost everyone has occasional apneas. Most people with sleep apnea have had brief interruptions at night frequently for many years.  The severity of sleep apnea is related to how frequent and severe the events are.   2. What are the consequences of RAQUEL? Symptoms include: feeling sleepy during the day, snoring loudly, gasping or stopping of breathing, trouble sleeping, and occasionally morning headaches or heartburn at night.  Sleepiness can be serious and even increase the risk of falling asleep while driving. Other health consequences may include development of high blood pressure and other cardiovascular disease in persons who are susceptible. Untreated RAQUEL  can contribute to heart disease, stroke and diabetes.   3. What are the treatment options? In most situations, sleep apnea is a lifelong disease that must be managed with daily " therapy. Medications are not effective for sleep apnea and surgery is generally not considered until other therapies have been tried. Your treatment is your choice . Continuous Positive Airway (CPAP) works right away and is the therapy that is effective in nearly everyone. An oral device to hold your jaw forward is usually the next most reliable option. Other options include postioning devices (to keep you off your back), weight loss, and surgery including a tongue pacing device. There is more detail about some of these options below.    Important tips for using CPAP and similar devices   Know your equipment:  CPAP is continuous positive airway pressure that prevents obstructive sleep apnea by keeping the throat from collapsing while you are sleeping. In most cases, the device is  smart  and can slowly self-adjusts if your throat collapses and keeps a record every day of how well you are treated-this information is available to you and your care team.  BPAP is bilevel positive airway pressure that keeps your throat open and also assists each breath with a pressure boost to maintain adequate breathing.  Special kinds of BPAP are used in patients who have inadequate breathing from lung or heart disease. In most cases, the device is  smart  and can slowly self-adjusts to assist breathing. Like CPAP, the device keeps a record of how well you are treated.  Your mask is your connection to the device. You get to choose what feels most comfortable and the staff will help to make sure if fits. Here: are some examples of the different masks that are available:       Key points to remember on your journey with sleep apnea:  1. Sleep study.  PAP devices often need to be adjusted during a sleep study to show that they are effective and adjusted right.  2. Good tips to remember: Try wearing just the mask during a quiet time during the day so your body adapts to wearing it. A humidifier is recommended for comfort in most cases to  prevent drying of your nose and throat. Allergy medication from your provider may help you if you are having nasal congestion.  3. Getting settled-in. It takes more than one night for most of us to get used to wearing a mask. Try wearing just the mask during a quiet time during the day so your body adapts to wearing it. A humidifier is recommended for comfort in most cases. Our team will work with you carefully on the first day and will be in contact within 4 days and again at 2 and 4 weeks for advice and remote device adjustments. Your therapy is evaluated by the device each day.   4. Use it every night. The more you are able to sleep naturally for 7-8 hours, the more likely you will have good sleep and to prevent health risks or symptoms from sleep apnea. Even if you use it 4 hours it helps. Occasionally all of us are unable to use a medical therapy, in sleep apnea, it is not dangerous to miss one night.   5. Communicate. Call our skilled team on the number provided on the first day if your visit for problems that make it difficult to wear the device. Over 2 out of 3 patients can learn to wear the device long-term with help from our team. Remember to call our team or your sleep providers if you are unable to wear the device as we may have other solutions for those who cannot adapt to mask CPAP therapy. It is recommended that you sleep your sleep provider within the first 3 months and yearly after that if you are not having problems.   Take care of your equipment. Make sure you clean your mask and tubing using directions every day and that your filter and mask are replaced as recommended or if they are not working.     BESIDES CPAP, WHAT OTHER THERAPIES ARE THERE?    Positioning Device  Positioning devices are generally used when sleep apnea is mild and only occurs on your back.This example shows a pillow that straps around the waist. It may be appropriate for those whose sleep study shows milder sleep apnea that  occurs primarily when lying flat on one's back. Preliminary studies have shown benefit but effectiveness at home may need to be verified by a home sleep test. These devices are generally not covered by medical insurance.  Examples of devices that maintain sleeping on the back to prevent snoring and mild sleep apnea.    Belt type body positioner  Http://DailyLook.[employer]/    Electronic reminder  Http://nightshifttherapy.com/  Http://www.[employer]au/    Oral Appliance  What is oral appliance therapy?  An oral appliance device fits on your teeth at night like a retainer used after having braces. The device is made by a specialized dentist and requires several visits over 1-2 months before a manufactured device is made to fit your teeth and is adjusted to prevent your sleep apnea. Once an oral device is working properly, snoring should be improved. A home sleep test may be recommended at that time if to determine whether the sleep apnea is adequately treated.       Some things to remember:  -Oral devices are often, but not always, covered by your medical insurance. Be sure to check with your insurance provider.   -If you are referred for oral therapy, you will be given a list of specialized dentists to consider or you may choose to visit the Web site of the American Academy of Dental Sleep Medicine  -Oral devices are less likely to work if you have severe sleep apnea or are extremely overweight.     More detailed information  An oral appliance is a small acrylic device that fits over the upper and lower teeth  (similar to a retainer or a mouth guard). This device slightly moves jaw forward, which moves the base of the tongue forward, opens the airway, improves breathing for effective treat snoring and obstructive sleep apnea in perhaps 7 out of 10 people .  The best working devices are custom-made by a dental device  after a mold is made of the teeth 1, 2, 3.  When is an oral appliance indicated?  Oral  appliance therapy is recommended as a first-line treatment for patients with primary snoring, mild sleep apnea, and for patients with moderate sleep apnea who prefer appliance therapy to use of CPAP4, 5. Severity of sleep apnea is determined by sleep testing and is based on the number of respiratory events per hour of sleep.   How successful is oral appliance therapy?  The success rate of oral appliance therapy in patients with mild sleep apnea is 75-80% while in patients with moderate sleep apnea it is 50-70%. The chance of success in patients with severe sleep apnea is 40-50%. The research also shows that oral appliances have a beneficial effect on the cardiovascular health of RAQUEL patients at the same magnitude as CPAP therapy7.  Oral appliances should be a second-line treatment in cases of severe sleep apnea, but if not completely successful then a combination therapy utilizing CPAP plus oral appliance therapy may be effective. Oral appliances tend to be effective in a broad range of patients although studies show that the patients who have the highest success are females, younger patients, those with milder disease, and less severe obesity. 3, 6.   Finding a dentist that practices dental sleep medicine  Specific training is available through the American Academy of Dental Sleep Medicine for dentists interested in working in the field of sleep. To find a dentist who is educated in the field of sleep and the use of oral appliances, near you, visit the Web site of the American Academy of Dental Sleep Medicine.    References  1. Cassius et al. Objectively measured vs self-reported compliance during oral appliance therapy for sleep-disordered breathing. Chest 2013; 144(5): 1863-4512.  2. Cecilia et al. Objective measurement of compliance during oral appliance therapy for sleep-disordered breathing. Thorax 2013; 68(1): 91-96.  3. Brandi et al. Mandibular advancement devices in 620 men and women with RAQUEL  and snoring: tolerability and predictors of treatment success. Chest 2004; 125: 5514-3846.  4. Charmaine et al. Oral appliances for snoring and RAQUEL: a review. Sleep 2006; 29: 244-262.  5. Austin et al. Oral appliance treatment for RAQUEL: an update. J Clin Sleep Med 2014; 10(2): 215-227.  6. Louie et al. Predictors of OSAH treatment outcome. J Dent Res 2007; 86: 1221-2524.    Weight Loss:    Weight loss is a long-term strategy that may improve sleep apnea in some patients.    Weight management is a personal decision and the decision should be based on your interest and the potential benefits.  If you are interested in exploring weight loss strategies, the following discussion covers the impact on weight loss on sleep apnea and the approaches that may be successful.    Being overweight does not necessarily mean you will have health consequences.  Those who have BMI over 35 or over 27 with existing medical conditions carries greater risk.   Weight loss decreases severity of sleep apnea in most people with obesity. For those with mild obesity who have developed snoring with weight gain, even 15-30 pound weight loss can improve and occasionally eliminate sleep apnea.  Structured and life-long dietary and health habits are necessary to lose weight and keep healthier weight levels.     Though there may be significant health benefits from weight loss, long-term weight loss is very difficult to achieve- studies show success with dietary management in less than 10% of people. In addition, substantial weight loss may require years of dietary control and may be difficult if patients have severe obesity. In these cases, surgical management may be considered.  Finally, older individuals who have tolerated obesity without health complications may be less likely to benefit from weight loss strategies.      Your BMI is Body mass index is 36.92 kg/(m^2).  Weight management is a personal decision.  If you are interested in  exploring weight loss strategies, the following discussion covers the approaches that may be successful. Body mass index (BMI) is one way to tell whether you are at a healthy weight, overweight, or obese. It measures your weight in relation to your height.  A BMI of 18.5 to 24.9 is in the healthy range. A person with a BMI of 25 to 29.9 is considered overweight, and someone with a BMI of 30 or greater is considered obese. More than two-thirds of American adults are considered overweight or obese.  Being overweight or obese increases the risk for further weight gain. Excess weight may lead to heart disease and diabetes.  Creating and following plans for healthy eating and physical activity may help you improve your health.  Weight control is part of healthy lifestyle and includes exercise, emotional health, and healthy eating habits. Careful eating habits lifelong are the mainstay of weight control. Though there are significant health benefits from weight loss, long-term weight loss with diet alone may be very difficult to achieve- studies show long-term success with dietary management in less than 10% of people. Attaining a healthy weight may be especially difficult to achieve in those with severe obesity. In some cases, medications, devices and surgical management might be considered.  What can you do?  If you are overweight or obese and are interested in methods for weight loss, you should discuss this with your provider.     Consider reducing daily calorie intake by 500 calories.     Keep a food journal.     Avoiding skipping meals, consider cutting portions instead.    Diet combined with exercise helps maintain muscle while optimizing fat loss. Strength training is particularly important for building and maintaining muscle mass. Exercise helps reduce stress, increase energy, and improves fitness. Increasing exercise without diet control, however, may not burn enough calories to loose weight.       Start walking  three days a week 10-20 minutes at a time    Work towards walking thirty minutes five days a week     Eventually, increase the speed of your walking for 1-2 minutes at time    In addition, we recommend that you review healthy lifestyles and methods for weight loss available through the National Institutes of Health patient information sites:  http://win.niddk.nih.gov/publications/index.htm    And look into health and wellness programs that may be available through your health insurance provider, employer, local community center, or marilyn club.    Weight management plan: Patient was referred to their PCP to discuss a diet and exercise plan.    Surgery:    Surgery for obstructive sleep apnea is considered generally only when other therapies fail to work. Surgery may be discussed with you if you are having a difficult time tolerating CPAP and or when there is an abnormal structure that requires surgical correction.  Nose and throat surgeries often enlarge the airway to prevent collapse.  Most of these surgeries create pain for 1-2 weeks and up to half of the most common surgeries are not effective throughout life.  You should carefully discuss the benefits and drawbacks to surgery with your sleep provider and surgeon to determine if it is the best solution for you.   More information  Surgery for RAQUEL is directed at areas that are responsible for narrowing or complete obstruction of the airway during sleep.  There are a wide range of procedures available to enlarge and/or stabilize the airway to prevent blockage of breathing in the three major areas where it can occur: the palate, tongue, and nasal regions.  Successful surgical treatment depends on the accurate identification of the factors responsible for obstructive sleep apnea in each person.  A personalized approach is required because there is no single treatment that works well for everyone.  Because of anatomic variation, consultation with an examination by a  sleep surgeon is a critical first step in determining what surgical options are best for each patient.  In some cases, examination during sedation may be recommended in order to guide the selection of procedures.  Patients will be counseled about risks and benefits as well as the typical recovery course after surgery. Surgery is typically not a cure for a person s RAQUEL.  However, surgery will often significantly improve one s RAQUEL severity (termed  success rate ).  Even in the absence of a cure, surgery will decrease the cardiovascular risk associated with OSA7; improve overall quality of life8 (sleepiness, functionality, sleep quality, etc).  Palate Procedures:  Patients with RAQUEL often have narrowing of their airway in the region of their tonsils and uvula.  The goals of palate procedures are to widen the airway in this region as well as to help the tissues resist collapse.  Modern palate procedure techniques focus on tissue conservation and soft tissue rearrangement, rather than tissue removal.  Often the uvula is preserved in this procedure. Residual sleep apnea is common in patient after pharyngoplasty with an average reduction in sleep apnea events of 33%2.    Tongue Procedures:  ExamWhile patients are awake, the muscles that surround the throat are active and keep this region open for breathing. These muscles relax during sleep, allowing the tongue and other structures to collapse and block breathing.  There are several different tongue procedures available.  Selection of a tongue base procedure depends on characteristics seen on physical exam.  Generally, procedures are aimed at removing bulky tissues in this area or preventing the back of the tongue from falling back during sleep.  Success rates for tongue surgery range from 50-62%3.  Hypoglossal Nerve Stimulation:  Hypoglossal nerve stimulation has recently received approval from the [place] Food and Drug Administration for the treatment of obstructive  sleep apnea.  This is based on research showing that the system was safe and effective in treating sleep apnea6.  Results showed that the median AHI score decreased 68%, from 29.3 to 9.0. This therapy uses an implant system that senses breathing patterns and delivers mild stimulation to airway muscles, which keeps the airway open during sleep.  The system consists of three fully implanted components: a small generator (similar in size to a pacemaker), a breathing sensor, and a stimulation lead.  Using a small handheld remote, a patient turns the therapy on before bed and off upon awakening.    Candidates for this device must be greater than 22 years of age, have moderate to severe RAQUEL (AHI between 20-65), BMI less than 32, have tried CPAP/oral appliance without success, and have appropriate upper airway anatomy (determined by a sleep endoscopy performed by Dr. Hernandez).  Hypoglossal Nerve Stimulation Pathway:    The sleep surgeon s office will work with the patient through the insurance prior-authorization process (including communications and appeals).    Nasal Procedures:  Nasal obstruction can interfere with nasal breathing during the day and night.  Studies have shown that relief of nasal obstruction can improve the ability of some patients to tolerate positive airway pressure therapy for obstructive sleep apnea1.  Treatment options include medications such as nasal saline, topical corticosteroid and antihistamine sprays, and oral medications such as antihistamines or decongestants. Non-surgical treatments can include external nasal dilators for selected patients. If these are not successful by themselves, surgery can improve the nasal airway either alone or in combination with these other options.  Combination Procedures:  Combination of surgical procedures and other treatments may be recommended, particularly if patients have more than one area of narrowing or persistent positional disease.  The success rate of  combination surgery ranges from 66-80%2,3.    References  1. Philip MOLINA. The Role of the Nose in Snoring and Obstructive Sleep Apnoea: An Update.  Eur Arch Otorhinolaryngol. 2011; 268: 1365-73.  2.  Travis SM; Michele JA; Peter JR; Pallanch JF; Edilma MB; Marc SG; Jing MORILLO. Surgical modifications of the upper airway for obstructive sleep apnea in adults: a systematic review and meta-analysis. SLEEP 2010;33(10):7593-2470. Nia MCCALL. Hypopharyngeal surgery in obstructive sleep apnea: an evidence-based medicine review.  Arch Otolaryngol Head Neck Surg. 2006 Feb;132(2):206-13.  3. Tyree YH1, Agatha Y, Beto ANGELIA. The efficacy of anatomically based multilevel surgery for obstructive sleep apnea. Otolaryngol Head Neck Surg. 2003 Oct;129(4):327-35.  4. Nia MCCALL, Goldberg A. Hypopharyngeal Surgery in Obstructive Sleep Apnea: An Evidence-Based Medicine Review. Arch Otolaryngol Head Neck Surg. 2006 Feb;132(2):206-13.  5. Strollo PJ et al. Upper-Airway Stimulation for Obstructive Sleep Apnea.  N Engl J Med. 2014 Jan 9;370(2):139-49.  6. Jesse Y et al. Increased Incidence of Cardiovascular Disease in Middle-aged Men with Obstructive Sleep Apnea. Am J Respir Crit Care Med; 2002 166: 159-165  7. Brady EM et al. Studying Life Effects and Effectiveness of Palatopharyngoplasty (SLEEP) study: Subjective Outcomes of Isolated Uvulopalatopharyngoplasty. Otolaryngol Head Neck Surg. 2011; 144: 623-631.              Follow-ups after your visit        Follow-up notes from your care team     Return in about 2 weeks (around 4/18/2018) for Sleep Study Review.      Future tests that were ordered for you today     Open Future Orders        Priority Expected Expires Ordered    Comprehensive Sleep Study Routine  10/1/2018 4/4/2018            Who to contact     If you have questions or need follow up information about today's clinic visit or your schedule please contact [facility] directly at [phone].  Normal or  "non-critical lab and imaging results will be communicated to you by MyChart, letter or phone within 4 business days after the clinic has received the results. If you do not hear from us within 7 days, please contact the clinic through [employer]t or phone. If you have a critical or abnormal lab result, we will notify you by phone as soon as possible.  Submit refill requests through [employer] or call your pharmacy and they will forward the refill request to us. Please allow 3 business days for your refill to be completed.          Additional Information About Your Visit        [employer] Information     [employer] lets you send messages to your doctor, view your test results, renew your prescriptions, schedule appointments and more. To sign up, go to [email or web address] . Click on \"Log in\" on the left side of the screen, which will take you to the Welcome page. Then click on \"Sign up Now\" on the right side of the page.     You will be asked to enter the access code listed below, as well as some personal information. Please follow the directions to create your username and password.     Your access code is: [email or web address]  Expires: 2018  7:31 AM     Your access code will  in 90 days. If you need help or a new code, please call your [facility] clinic or [phone].        Care EveryWhere ID     This is your Care EveryWhere ID. This could be used by other organizations to access your [facility] medical records  [id number]        Your Vitals Were     Pulse Respirations Height Pulse Oximetry BMI (Body Mass Index)       98 16 1.791 m (5' 10.5\") 93% 36.92 kg/m2        Blood Pressure from Last 3 Encounters:   18 112/67   18 122/77   10/13/17 128/56    Weight from Last 3 Encounters:   18 118.4 kg (261 lb)   18 113.4 kg (250 lb)   17 106.4 kg (234 lb 9.1 oz)                 Today's Medication Changes          These changes are accurate as of 18  3:44 PM.  If you have any questions, ask your " nurse or doctor.               These medicines have changed or have updated prescriptions.        Dose/Directions    ARIPiprazole 30 MG tablet   Commonly known as:  ABILIFY   This may have changed:  when to take this   Changed by:  Goltz, Bennett Ezra, PA-C        Dose:  30 mg   Take 1 tablet (30 mg) by mouth At Bedtime   Refills:  0                Primary Care Provider Office Phone # Fax #    [facility] [phone]       [facility] [place] [facility] [address]        Equal Access to Services     JACOB BAE : Hadii aad ku hadasho Soomaali, waaxda luqadaha, qaybta kaalmada adeegyada, waxay idiin hayaan adeeg loyaraelian foley . So [facility] [phone].    ATENCIÓN: Si habla español, tiene a doll disposición servicios gratuitos de asistencia lingüística. [facility] [phone].    We comply with applicable federal civil rights laws and [place] laws. We do not discriminate on the basis of race, color, national origin, age, disability, sex, sexual orientation, or gender identity.            Thank you!     Thank you for choosing [facility]  for your care. Our goal is always to provide you with excellent care. Hearing back from our patients is one way we can continue to improve our services. Please take a few minutes to complete the written survey that you may receive in the mail after your visit with us. Thank you!             Your Updated Medication List - Protect others around you: Learn how to safely use, store and throw away your medicines at www.disposemymeds.org.          This list is accurate as of 4/4/18  3:44 PM.  Always use your most recent med list.                   Brand Name Dispense Instructions for use Diagnosis    ARIPiprazole 30 MG tablet    ABILIFY     Take 1 tablet (30 mg) by mouth At Bedtime        LISINOPRIL PO      Take 10 mg by mouth daily        metFORMIN 500 MG/5ML Soln solution    GLUCOPHAGE     Take 500 mg by mouth 2 times daily         MULTIVITAMIN ADULT PO      Take 1 tablet by mouth        mupirocin 2 % ointment    BACTROBAN     Apply topically 3 times daily        mycophenolate 250 MG capsule    GENERIC EQUIVALENT    540 capsule    Take 3 capsules (750 mg) by mouth 2 times daily    Status post kidney transplant       OXcarbazepine 150 MG tablet    TRILEPTAL    240 tablet    Take 4 tablets (600 mg) by mouth 2 times daily    Seizures (H)       predniSONE 5 MG tablet    DELTASONE    90 tablet    Take 1 tablet (5 mg) by mouth daily    Status post kidney transplant       risperiDONE 1 MG ODT tab    risperDAL M-TABS    30 tablet    Take 1 tablet (1 mg) by mouth At Bedtime    Bipolar disorder, current episode mixed, severe, without psychotic features (H)       tacrolimus 1 MG capsule    GENERIC EQUIVALENT    450 capsule    3 mg q am and 2 mg q pm    Status post kidney transplant       venlafaxine 150 MG 24 hr capsule    EFFEXOR-XR     Take 300 mg by mouth daily        vitamin D3 54497 UNITS capsule    CHOLECALCIFEROL     Take 50,000 Units by mouth once a week
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
## 2018-04-04 NOTE — NURSING NOTE
"Chief Complaint   Patient presents with     Sleep Problem     \"I snore very loud and I think I have sleep apnea.\"       Initial /67  Pulse 98  Resp 16  Ht 1.791 m (5' 10.5\")  Wt 118.4 kg (261 lb)  SpO2 93%  BMI 36.92 kg/m2 Estimated body mass index is 36.92 kg/(m^2) as calculated from the following:    Height as of this encounter: 1.791 m (5' 10.5\").    Weight as of this encounter: 118.4 kg (261 lb).  Medication Reconciliation: complete     ESS 12  Neck 52cm  Christal Llanes    "

## 2018-05-14 ENCOUNTER — TELEPHONE (OUTPATIENT)
Dept: TRANSPLANT | Facility: CLINIC | Age: 28
End: 2018-05-14

## 2018-05-14 NOTE — LETTER
OUTPATIENT LABORATORY TEST ORDER:  After First Year    Patient Name: Barry Mcgrath                           Transplant Date: 2/28/2006  YOB: 1990                                   Issue Date & Time:May 15, 819059:38 AM    [facility] MR: [id number]                                   Exp. Date (1 year after date issued)    Diagnoses:   Kidney Transplant (ICD-10 z94.0)              Lab results to be available on the same day drawn.   Please release results to patient upon their request    Please fax to the Transplant Center at [phone].  Every other month   Complete Blood Count with Platelets    Basic Metabolic Panel (Sodium, Potassium, Chloride, CO2, Creatinine, Urea Nitrogen, Glucose, Calcium)   Amylase, Lipase   /Tacrolimus/Prograf drug level     Every 6 months,     Due: 5/2018 and 11/2018            Urine for Protein/Creatinine    If you have any questions, please call The [facility] at [phone] or [phone].

## 2018-05-15 NOTE — TELEPHONE ENCOUNTER
Returned phone call regarding order needed.  Spoke with nurse gave verbal of what labs we would need checked, and order sent to [facility].

## 2018-05-15 NOTE — TELEPHONE ENCOUNTER
[facility] Call Center    Phone Message    May a detailed message be left on voicemail: no    Reason for Call: Other: Tam at [facility] wanting to know if [facility] is going to send orders for pt to have labs drawn. Per Tam, pt used to have been seen at [facility], and they lennie labs for him every 3 months. He recently transferred care to the  with [facility]. They usually had drawn tacrolimus, cellcept, kidney panel, glucose, and CBC with differential. Per Tam, pt is currently in clinic seeing a provider, so they would like this addressed ASAP. Please advise.     Action Taken: Message routed to:  [facility] ([facility]

## 2018-05-17 ENCOUNTER — THERAPY VISIT (OUTPATIENT)
Dept: SLEEP MEDICINE | Facility: CLINIC | Age: 28
End: 2018-05-17
Payer: MEDICARE

## 2018-05-17 DIAGNOSIS — R06.83 SNORING: ICD-10-CM

## 2018-05-17 DIAGNOSIS — R51.9 SLEEP RELATED HEADACHES: ICD-10-CM

## 2018-05-17 DIAGNOSIS — G47.33 OSA (OBSTRUCTIVE SLEEP APNEA): ICD-10-CM

## 2018-05-17 DIAGNOSIS — I10 HYPERTENSION, UNSPECIFIED TYPE: ICD-10-CM

## 2018-05-17 PROCEDURE — 95811 POLYSOM 6/>YRS CPAP 4/> PARM: CPT | Performed by: INTERNAL MEDICINE

## 2018-05-17 NOTE — Clinical Note
() Very Severe Obstructive Sleep Apnea, liked CPAP and wants to get on therapy. Rec auto 10 - 15 with NEDRA Chamberlain

## 2018-05-17 NOTE — MR AVS SNAPSHOT
"              After Visit Summary   5/17/2018    Barry Mcgrath    MRN: [id number]           Patient Information     Date Of Birth          1990        Visit Information        Provider Department      5/17/2018 8:30 PM BED [facility]        Today's Diagnoses     Snoring        BMI 36.0-36.9,adult        Hypertension, unspecified type        Sleep related headaches           Follow-ups after your visit        Your next 10 appointments already scheduled     May 30, 2018  4:00 PM CDT   Return Sleep Patient with Bennett Ezra Goltz, PA-C   [facility] ([facility])    [address] [address] [phone]              Who to contact     If you have questions or need follow up information about today's clinic visit or your schedule please contact [facility] directly at [phone].  Normal or non-critical lab and imaging results will be communicated to you by MyChart, letter or phone within 4 business days after the clinic has received the results. If you do not hear from us within 7 days, please contact the clinic through [employer] or phone. If you have a critical or abnormal lab result, we will notify you by phone as soon as possible.  Submit refill requests through [employer] or call your pharmacy and they will forward the refill request to us. Please allow 3 business days for your refill to be completed.          Additional Information About Your Visit        MyChart Information     [employer] lets you send messages to your doctor, view your test results, renew your prescriptions, schedule appointments and more. To sign up, go to [email or web address] . Click on \"Log in\" on the left side of the screen, which will take you to the Welcome page. Then click on \"Sign up Now\" on the right side of the page.     You will be asked to enter the access code listed below, as well as some personal information. Please " follow the directions to create your username and password.     Your access code is: [email or web address]  Expires: 2018  6:15 AM     Your access code will  in 90 days. If you need help or a new code, please call your [place] clinic or [phone].        Care EveryWhere ID     This is your Care EveryWhere ID. This could be used by other organizations to access your [place] medical records  [id number]         Blood Pressure from Last 3 Encounters:   18 112/67   18 122/77   10/13/17 128/56    Weight from Last 3 Encounters:   18 118.4 kg (261 lb)   18 113.4 kg (250 lb)   17 106.4 kg (234 lb 9.1 oz)              We Performed the Following     Comprehensive Sleep Study        Primary Care Provider Office Phone # Fax #    [facility] [phone]       [address] [place] [address]        Equal Access to Services     JACOB BAE AH: Hadii aad ku hadasho Soomaali, waaxda luqadaha, qaybta kaalmada adeegyada, waxay idiin haygabrielan erich foley . So [facility] [phone].    ATENCIÓN: Si habla español, tiene a doll disposición servicios gratuitos de asistencia lingüística. Llame al [phone].    We comply with applicable federal civil rights laws and [place] laws. We do not discriminate on the basis of race, color, national origin, age, disability, sex, sexual orientation, or gender identity.            Thank you!     Thank you for choosing [facility]  for your care. Our goal is always to provide you with excellent care. Hearing back from our patients is one way we can continue to improve our services. Please take a few minutes to complete the written survey that you may receive in the mail after your visit with us. Thank you!             Your Updated Medication List - Protect others around you: Learn how to safely use, store and throw away your medicines at www.disposemymeds.org.          This list is accurate as of  5/17/18 11:59 PM.  Always use your most recent med list.                   Brand Name Dispense Instructions for use Diagnosis    ARIPiprazole 30 MG tablet    ABILIFY     Take 1 tablet (30 mg) by mouth At Bedtime        LISINOPRIL PO      Take 10 mg by mouth daily        metFORMIN 500 MG/5ML Soln solution    GLUCOPHAGE     Take 500 mg by mouth 2 times daily        MULTIVITAMIN ADULT PO      Take 1 tablet by mouth        mupirocin 2 % ointment    BACTROBAN     Apply topically 3 times daily        mycophenolate 250 MG capsule    GENERIC EQUIVALENT    540 capsule    Take 3 capsules (750 mg) by mouth 2 times daily    Status post kidney transplant       OXcarbazepine 150 MG tablet    TRILEPTAL    240 tablet    Take 4 tablets (600 mg) by mouth 2 times daily    Seizures (H)       predniSONE 5 MG tablet    DELTASONE    90 tablet    Take 1 tablet (5 mg) by mouth daily    Status post kidney transplant       risperiDONE 1 MG ODT tab    risperDAL M-TABS    30 tablet    Take 1 tablet (1 mg) by mouth At Bedtime    Bipolar disorder, current episode mixed, severe, without psychotic features (H)       tacrolimus 1 MG capsule    GENERIC EQUIVALENT    450 capsule    3 mg q am and 2 mg q pm    Status post kidney transplant       venlafaxine 150 MG 24 hr capsule    EFFEXOR-XR     Take 300 mg by mouth daily        vitamin D3 99076 UNITS capsule    CHOLECALCIFEROL     Take 50,000 Units by mouth once a week

## 2018-05-20 PROBLEM — G47.33 OSA (OBSTRUCTIVE SLEEP APNEA): Status: ACTIVE | Noted: 2018-05-20

## 2018-05-20 NOTE — PROCEDURES
" SLEEP STUDY INTERPRETATION  SPLIT NIGHT STUDY      Patient: DYAN GARCIA  YOB: 1990  Study Date: 5/17/2018  MRN: [id number]  Referring Provider: self  Ordering Provider: Bulmaro Marvin MD    Indications for Polysomnography: The patient is a 27 y old Male who is 5' 10\" and weighs 261.0 lbs. His BMI is 37.8, [place] sleepiness scale 12.0 and neck circumference is 52.0 cm. Relevant medical history includes morbid obesity, HTN, Kideny transplant, bipolar disorder type 1, anxiety, and blindness. A diagnostic polysomnogram was performed to evaluate for RAQUEL and hypoventilation. After 132.5 minutes of sleep time the patient exhibited sufficient respiratory events qualifying him for a CPAP trial which was then initiated.    Polysomnogram Data: A full night polysomnogram recorded the standard physiologic parameters including EEG, EOG, EMG, ECG, nasal and oral airflow. Respiratory parameters of chest and abdominal movements were recorded with respiratory inductance plethysmography. Oxygen saturation was recorded by pulse oximetry.  Hypopnea scoring rule used: 1B 4%    Diagnostic PSG  Sleep Architecture: Normal sleep latency without sleep aid, increased arousal index, and normal sleep efficiency.  The total recording time of the polysomnogram was 152.8 minutes. The total sleep time was 132.5 minutes. Sleep latency was normal at 10.9 minutes without the use of a sleep aid. REM latency was - minutes. Arousal index was increased at 46.2 arousals per hour. Sleep efficiency was normal at 86.7%. Wake after sleep onset was 3.0 minutes. The patient spent 5.3% of total sleep time in Stage N1, 54.0% in Stage N2, 40.8% in Stage N3, and 0.0% in REM. Time in REM supine was - minutes.    Respiration: Very severe obstructive sleep apnea with associated oscillatory hypoxia.  Hypoventilation was not noted, however, should patient have had REM during baseline hypoventilation likely would have been present.  Tachypnea throughout " study.    Events ? The polysomnogram revealed a presence of 16 obstructive, - central, and - mixed apneas resulting in an apnea index of 7.2 events per hour. There were 227 obstructive hypopneas and - central hypopneas resulting in an obstructive hypopnea index of 102.8 events per hour. The combined apnea/hypopnea index was 110.0 events per hour.  The REM AHI was - events per hour. The supine AHI was 110.0 events per hour. The RERA index was 1.8 events per hour. The RDI was 111.8 events per hour.    Snoring - was reported as absent/mild/moderate/loud/intermittent.    Respiratory rate and pattern - was notable for tachypnea which improved on later CPAP settings.    Sustained Sleep Associated Hypoventilation - Transcutaneous carbon dioxide monitoring was used, however significant hypoventilation was not present with a maximum change from 34.8 to 46.5 mmHg and 0 minutes at or greater than 55 mmHg.    Sleep Associated Hypoxemia - (Greater than 5 minutes O2 sat at or below 88%) was present. Baseline oxygen saturation was 88.5%. Lowest oxygen saturation was 65.3%. Time spent less than or equal to 88% was 60.6 minutes. Time spent less than or equal to 89% was 72.5 minutes.     Treatment PSG  Sleep Architecture: On PAP, normal arousal index and normal sleep efficiency.  At 12:51:21 AM the patient was placed on PAP treatment and was titrated at pressures ranging from CPAP 5 cmH2O up to CPAP 11 cmH2O. The total recording time of the treatment portion of the study was 274.8 minutes. The total sleep time was 240.5 minutes. During the treatment portion of the study the sleep latency was 11.5 minutes. REM latency was 23.0 minutes. Arousal index was normal at 17.7 arousals per hour. Sleep efficiency was normal at 87.5%. Wake after sleep onset was 14.5 minutes. The patient spent 3.3% of total sleep time in Stage N1, 27.2% in Stage N2, 23.1% in Stage N3, and 46.4% in REM. Time in REM supine was 111.5 minutes.     Respiration: CPAP  of 11 cmH2O addressed RAQUEL, hypoxia, and tachypnea.  Recommend floor pressure for Auto-CPAP at no lower than 10 cmH2O.    The final pressure was CPAP 11 cmH2O with an AHI of 6.9 events per hour. Time in REM supine on final pressure was - minutes.     This titration was considered:  Good (residual AHI < 10 events per hour or 50% decrease if baseline AHI > 15 events per hour, and including REM?supine sleep at final pressure).    Movement Activity: No abnormal behaviors noted.    Periodic Limb Movements - There were no PLMs recorded.    REM EMG Activity - Excessive muscle activity was not present.    Nocturnal Behavior - Abnormal sleep related behaviors were not noted.    Bruxism - None apparent.    Cardiac Summary: No significant arrhythmias noted.  During the diagnostic portion of the study, the average pulse rate was 81.6 bpm. The minimum pulse rate was 69.8 bpm while the maximum pulse rate was 108.0 bpm.    During the treatment portion of the study, the average pulse rate was 76.4 bpm. The minimum pulse rate was 63.9 bpm while the maximum pulse rate was 109.0 bpm.     Assessment:     Normal sleep latency without sleep aid, increased arousal index, and normal sleep efficiency.    On PAP, normal arousal index and normal sleep efficiency.    Very severe obstructive sleep apnea with associated oscillatory hypoxia.      Hypoventilation was not noted, however, should patient have had REM during baseline hypoventilation likely would have been present.      Tachypnea throughout study.    CPAP of 11 cmH2O addressed RAQUEL, hypoxia, and tachypnea.  Recommend floor pressure for Auto-CPAP at no lower than 10 cmH2O.    Coughing fit during titration PSG necessitated removing CPAP mask temporarily.    No abnormal behaviors noted.    No significant arrhythmias noted.    Recommendations:    Treatment of RAQUEL with Auto?titrating PAP therapy with a range of 10 cmH2O to 15 cmH2O. Recommend clinical follow up with sleep management team,  including review of compliance measures.    If devices are not acceptable or effective, patient may benefit from evaluation of possible surgical options for the treatment of obstructive sleep apnea and/or socially disruptive snoring. If he is interested, would recommend referral to specialized ENT?Sleep provider.    Advice regarding the risks of drowsy driving.    Suggest optimizing sleep schedule and avoiding sleep deprivation.    Weight management (if BMI > 30).    Pharmacologic therapy should be used for management of restless legs syndrome only if present and clinically indicated and not based on the presence of periodic limb movements alone.    Diagnostic Codes: Obstructive Sleep Apnea G47.33; Sleep Hypoxemia/Hypoventilation G47.36    _____________________________________   Electronically Signed By: Bulmaro Marvin MD 5/20/2018

## 2018-05-23 DIAGNOSIS — G47.33 OSA (OBSTRUCTIVE SLEEP APNEA): Primary | ICD-10-CM

## 2018-05-23 NOTE — PROGRESS NOTES
I called Barry and informed him that his PSG showed an AHI of 110/hr. He was eager to get started on CPAP. I placed an order for a full face mask and auto CPAP 10-15 cm. His follow up will get pushed out 2 months.  Bennett Goltz, PA-C

## 2018-05-25 LAB — SLPCOMP: NORMAL

## 2018-05-30 ENCOUNTER — TELEPHONE (OUTPATIENT)
Dept: TRANSPLANT | Facility: CLINIC | Age: 28
End: 2018-05-30

## 2018-06-05 ENCOUNTER — DOCUMENTATION ONLY (OUTPATIENT)
Dept: SLEEP MEDICINE | Facility: CLINIC | Age: 28
End: 2018-06-05
Payer: MEDICARE

## 2018-06-05 NOTE — PROGRESS NOTES
Patient was offered choice of vendor and chose [facility].  Patient Barry Mcgrath was set up at [facility] on June 5, 2018. Patient received a [employer] DreamStation Auto. Pressures were set at 10-15 cm H2O.   Patient s ramp is 7 cm H2O for Auto and FLEX/EPR is A Flex.  Patient received a [employer] Mask name: SIMPLUS  Full Face mask Size Medium, heated tubing and heated humidifier.  Patient is enrolled in the STM Program and does need to meet compliance. Patient has a follow up on 7/31/2018 with Bennett Goltz, PA.    Onur Saunders

## 2018-06-06 ENCOUNTER — CARE COORDINATION (OUTPATIENT)
Dept: NEPHROLOGY | Facility: CLINIC | Age: 28
End: 2018-06-06

## 2018-06-06 NOTE — PROGRESS NOTES
Reason for Call    Patient states BP is averaging 120/80's at home. Denied any symptoms or concerns at this time.    Patient Education    1. Call this nurse if systolic (top number) blood pressure is consistently <110 or >160 for further instructions.     Plan    1. Continue to monitor BP  2. Call if any changes with BP readings or symptoms    Patient was given an opportunity to ask questions and have those questions answered to his satisfaction.  Patient verbalized understanding of instructions provided and agreed to plan of care.    Radha Hogan RN

## 2018-06-08 ENCOUNTER — DOCUMENTATION ONLY (OUTPATIENT)
Dept: SLEEP MEDICINE | Facility: CLINIC | Age: 28
End: 2018-06-08
Payer: MEDICARE

## 2018-06-08 NOTE — PROGRESS NOTES
3 DAY STM VISIT    Diagnostic AHI: 110     Patient contacted for 3 day STM visit  Subjective measures:  Patient states things are going good with his machine and he is sleeping better.     Device type: Auto-CPAP  PAP settings from order::  CPAP min 10 cm  H20       CPAP max 15 cm  H20  Mask type:    Full Face Mask     Device settings from machine     Assessment: No usage reporting. Patient data shows no usage.   Action plan: Pt to have f/u 14 day STM visit.

## 2018-06-20 ENCOUNTER — DOCUMENTATION ONLY (OUTPATIENT)
Dept: SLEEP MEDICINE | Facility: CLINIC | Age: 28
End: 2018-06-20
Payer: MEDICARE

## 2018-06-20 NOTE — PROGRESS NOTES
14 DAY STM VISIT    Diagnostic AHI: 110     Message left for patient to return call.     Assessment: Not getting data the problem is currently being fixed.   Action plan: waiting for patient to return call and pt to have 30 day STM visit.    Device type: Auto-CPAP  PAP settings:  CPAP min 10 cm  H20      CPAP max 15 cm  H20  Mask type:  Full Face Mask  Objective measures: 14 day rolling measures     Objective measure goal  Compliance   Goal >70%  Leak   Goal < 24 lpm  AHI  Goal < 5  Usage  Goal >240

## 2018-07-06 ENCOUNTER — DOCUMENTATION ONLY (OUTPATIENT)
Dept: SLEEP MEDICINE | Facility: CLINIC | Age: 28
End: 2018-07-06
Payer: MEDICARE

## 2018-07-06 NOTE — PROGRESS NOTES
30 DAY STM VISIT    Diagnostic AHI: 110     Subjective measures:   Patient  Stated everything going well with CPAP and he has no questions or concerns.     Assessment: Pt not meeting objective benchmarks for compliance  Patient meeting subjective benchmarks. Patient compliance improving spoke with patient about meeting compliance.   Action plan: 2 week STM recheck appt scheduled.  Patient has a follow up visit with Bennett Goltz, PA on 7/31/2018.   Device type: Auto-CPAP  PAP settings: CPAP min 10 cm  H20     CPAP max 15 cm  H20  Mask type:  Full Face Mask  Objective measures: 14 day rolling measures      Compliance  50 %              AHI 6.82   last  upload      Average number of minutes 220      Objective measure goal  Compliance   Goal >70%  Leak   Goal < 24 lpm  AHI  Goal < 5  Usage  Goal >240

## 2018-07-19 ENCOUNTER — DOCUMENTATION ONLY (OUTPATIENT)
Dept: SLEEP MEDICINE | Facility: CLINIC | Age: 28
End: 2018-07-19
Payer: MEDICARE

## 2018-07-19 NOTE — PROGRESS NOTES
Patient called back and stated he sometimes falls asleep on the couch and forgets to put his mask on. Patient and I discussed compliance and Barry will work hard to use his machine everyday 4 days or greater.

## 2018-07-31 ENCOUNTER — OFFICE VISIT (OUTPATIENT)
Dept: SLEEP MEDICINE | Facility: CLINIC | Age: 28
End: 2018-07-31
Payer: MEDICARE

## 2018-07-31 VITALS
HEART RATE: 80 BPM | SYSTOLIC BLOOD PRESSURE: 108 MMHG | DIASTOLIC BLOOD PRESSURE: 70 MMHG | BODY MASS INDEX: 33.46 KG/M2 | OXYGEN SATURATION: 93 % | RESPIRATION RATE: 16 BRPM | WEIGHT: 239 LBS | HEIGHT: 71 IN

## 2018-07-31 DIAGNOSIS — G47.33 OSA (OBSTRUCTIVE SLEEP APNEA): Primary | ICD-10-CM

## 2018-07-31 PROCEDURE — 99214 OFFICE O/P EST MOD 30 MIN: CPT | Performed by: PHYSICIAN ASSISTANT

## 2018-07-31 NOTE — PROGRESS NOTES
Sleep Study Follow-Up Visit:    Date on this visit: 7/31/2018    Barry Mcgrath comes in today for follow-up of his CPAP use for severe RAQUEL. He was initially seen at the [facility] for snoring and concerns for sleep apnea for several years. 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    Barry's PSG on 5/17/2018 showed an AHI of 110/hr.   He was initiated on auto CPAP 10-15 cm with a full face mask (Simplus).  The compliance data shows that he has used the CPAP for 23/30 nights, 46.7% of nights for >4 hours.  The 90th% pressure is 14.8 cm.  The average time in large leak is 10 min.  The average nightly usage is 4:18.  The average AHI is 3.7/hr.  He sometimes falls asleep before he puts the mask on. He falls asleep quickly. He denies trouble using the machine or sleeping. He is comfortable with the pressures. He has not needed the ramp. He denies dry mouth. He had dry mouth without the mask. He denies mask leak. He is told he does not snore with the CPAP. He does feel he sleeps better and has better energy with it. He has not been using water in the humidifier. He still wakes with condensation on his face. That is why he sometimes removes the mask in the middle of the night.     Past medical/surgical history, family history, social history, medications and allergies were reviewed.      Problem List:  Patient Active Problem List    Diagnosis Date Noted     RAQUEL (obstructive sleep apnea) 05/20/2018     Priority: Medium     5/17/2018 [place] [facility] Study (261.0 lbs) - .0, .8, Supine .0, REM AHI -, Low O2% 65.3%, Time Spent ?88% 60.6, Time Spent ?89% 72.5. Treatment was titrated to a pressure of CPAP 11 with an AHI 6.9. Time spent in REM supine at this pressure was - minutes.       Hypertension, unspecified type 04/04/2018     Priority: Medium     Suicidal ideation 08/28/2017     Priority: Medium         Impression/Plan:    (G47.33) RAQUEL (obstructive sleep apnea)  (primary encounter diagnosis)  Comment: Barry is doing well with CPAP subjectively. His compliance is low because he sometimes falls asleep without the mask on and sometimes removes the mask if he wakes and thinks he has his 4 hours in. He has mask leak, about 10 minutes per night on average. I think his mask is a decent fit though.  Plan: Comprehensive DME        Changed pressures to 11-16 cm. We talked about usage goals. He was advised to use it whenever he sleeps. Set an alarm at bedtime as a reminder to put it on in case he falls asleep without it. Put the mask on the pillow. We reviewed recommendations for cleaning and replacing supplies. Keep a towel by the bed to wipe face if noticing condensation. He will get a new cushion for his mask.      He will follow up with me in about 1 month(s).     Twenty-five minutes spent with patient, all of which were spent face-to-face counseling, consulting, coordinating plan of care.      Bennett Goltz, PA-C    CC: No ref. provider found

## 2018-07-31 NOTE — MR AVS SNAPSHOT
"              After Visit Summary   7/31/2018    Barry Mcgrath    MRN: [id number]           Patient Information     Date Of Birth          1990        Visit Information        Provider Department      7/31/2018 11:30 AM Goltz, Bennett Ezra, PA-C [facility]        Today's Diagnoses     RAQUEL (obstructive sleep apnea)    -  1       Follow-ups after your visit        Your next 10 appointments already scheduled     Aug 28, 2018  1:30 PM CDT   Return Sleep Patient with Bennett Ezra Goltz, PA-C   [facility] [place] [facility])    [address] [id number] [phone]              Who to contact     If you have questions or need follow up information about today's clinic visit or your schedule please contact [facility] directly at [phone].  Normal or non-critical lab and imaging results will be communicated to you by MyChart, letter or phone within 4 business days after the clinic has received the results. If you do not hear from us within 7 days, please contact the clinic through [employer] or phone. If you have a critical or abnormal lab result, we will notify you by phone as soon as possible.  Submit refill requests through [employer] or call your pharmacy and they will forward the refill request to us. Please allow 3 business days for your refill to be completed.          Additional Information About Your Visit        MyChart Information     [employer] lets you send messages to your doctor, view your test results, renew your prescriptions, schedule appointments and more. To sign up, go to [email or web address] . Click on \"Log in\" on the left side of the screen, which will take you to the Welcome page. Then click on \"Sign up Now\" on the right side of the page.     You will be asked to enter the access code listed below, as well as some personal information. Please follow the directions to create your username and " "password.     Your access code is: [email or web address]  Expires: 2018  6:15 AM     Your access code will  in 90 days. If you need help or a new code, please call your [facility] clinic or [phone].        Care EveryWhere ID     This is your Care EveryWhere ID. This could be used by other organizations to access your [facility] medical records  [id number]        Your Vitals Were     Pulse Respirations Height Pulse Oximetry BMI (Body Mass Index)       80 16 1.791 m (5' 10.5\") 93% 33.81 kg/m2        Blood Pressure from Last 3 Encounters:   18 108/70   18 112/67   18 122/77    Weight from Last 3 Encounters:   18 108.4 kg (239 lb)   18 118.4 kg (261 lb)   18 113.4 kg (250 lb)              We Performed the Following     Comprehensive DME        Primary Care Provider Office Phone # Fax #    [facility] [phone] [address] [facility] [address]        Equal Access to Services     [facility] Center[facility] : Hadii aad ku hadasho Solcuíaali, waaxda luqadaha, qaybta kaalmada adeharithayada, deepali foley . So [facility] [phone].    ATENCIÓN: Si habla español, tiene a doll disposición servicios gratuitos de asistencia lingüística. Federico al [phone].    We comply with applicable federal civil rights laws and [place] laws. We do not discriminate on the basis of race, color, national origin, age, disability, sex, sexual orientation, or gender identity.            Thank you!     Thank you for choosing [facility]  for your care. Our goal is always to provide you with excellent care. Hearing back from our patients is one way we can continue to improve our services. Please take a few minutes to complete the written survey that you may receive in the mail after your visit with us. Thank you!             Your Updated Medication List - Protect others around you: Learn how to safely use, store and throw away your " medicines at www.disposemymeds.org.          This list is accurate as of 7/31/18 12:03 PM.  Always use your most recent med list.                   Brand Name Dispense Instructions for use Diagnosis    ARIPiprazole 30 MG tablet    ABILIFY     Take 1 tablet (30 mg) by mouth At Bedtime        LISINOPRIL PO      Take 10 mg by mouth daily        metFORMIN 500 MG/5ML Soln solution    GLUCOPHAGE     Take 500 mg by mouth 2 times daily        MULTIVITAMIN ADULT PO      Take 1 tablet by mouth        mupirocin 2 % ointment    BACTROBAN     Apply topically 3 times daily        mycophenolate 250 MG capsule    GENERIC EQUIVALENT    540 capsule    Take 3 capsules (750 mg) by mouth 2 times daily    Status post kidney transplant       OXcarbazepine 150 MG tablet    TRILEPTAL    240 tablet    Take 4 tablets (600 mg) by mouth 2 times daily    Seizures (H)       predniSONE 5 MG tablet    DELTASONE    90 tablet    Take 1 tablet (5 mg) by mouth daily    Status post kidney transplant       risperiDONE 1 MG ODT tab    risperDAL M-TABS    30 tablet    Take 1 tablet (1 mg) by mouth At Bedtime    Bipolar disorder, current episode mixed, severe, without psychotic features (H)       tacrolimus 1 MG capsule    GENERIC EQUIVALENT    450 capsule    3 mg q am and 2 mg q pm    Status post kidney transplant       venlafaxine 150 MG 24 hr capsule    EFFEXOR-XR     Take 300 mg by mouth daily        vitamin D3 44117 UNITS capsule    CHOLECALCIFEROL     Take 50,000 Units by mouth once a week

## 2018-08-02 ENCOUNTER — DOCUMENTATION ONLY (OUTPATIENT)
Dept: SLEEP MEDICINE | Facility: CLINIC | Age: 28
End: 2018-08-02
Payer: MEDICARE

## 2018-08-08 ENCOUNTER — HOSPITAL ENCOUNTER (EMERGENCY)
Facility: CLINIC | Age: 28
Discharge: HOME | End: 2018-08-08
Attending: EMERGENCY MEDICINE | Admitting: EMERGENCY MEDICINE
Payer: MEDICARE

## 2018-08-08 ENCOUNTER — DOCUMENTATION ONLY (OUTPATIENT)
Dept: SLEEP MEDICINE | Facility: CLINIC | Age: 28
End: 2018-08-08
Payer: MEDICARE

## 2018-08-08 VITALS
HEART RATE: 98 BPM | OXYGEN SATURATION: 93 % | TEMPERATURE: 97.9 F | RESPIRATION RATE: 18 BRPM | SYSTOLIC BLOOD PRESSURE: 143 MMHG | DIASTOLIC BLOOD PRESSURE: 78 MMHG

## 2018-08-08 DIAGNOSIS — F31.9 BIPOLAR I DISORDER (H): ICD-10-CM

## 2018-08-08 DIAGNOSIS — F43.10 PTSD (POST-TRAUMATIC STRESS DISORDER): ICD-10-CM

## 2018-08-08 DIAGNOSIS — G47.33 OSA (OBSTRUCTIVE SLEEP APNEA): ICD-10-CM

## 2018-08-08 LAB
AMPHETAMINES UR QL SCN: NEGATIVE
BARBITURATES UR QL: NEGATIVE
BENZODIAZ UR QL: NEGATIVE
CANNABINOIDS UR QL SCN: NEGATIVE
COCAINE UR QL: NEGATIVE
ETHANOL UR QL SCN: NEGATIVE
OPIATES UR QL SCN: NEGATIVE

## 2018-08-08 PROCEDURE — 80320 DRUG SCREEN QUANTALCOHOLS: CPT | Performed by: EMERGENCY MEDICINE

## 2018-08-08 PROCEDURE — 99284 EMERGENCY DEPT VISIT MOD MDM: CPT | Mod: Z6 | Performed by: EMERGENCY MEDICINE

## 2018-08-08 PROCEDURE — 80307 DRUG TEST PRSMV CHEM ANLYZR: CPT | Performed by: EMERGENCY MEDICINE

## 2018-08-08 PROCEDURE — 99285 EMERGENCY DEPT VISIT HI MDM: CPT | Mod: 25 | Performed by: EMERGENCY MEDICINE

## 2018-08-08 PROCEDURE — 90791 PSYCH DIAGNOSTIC EVALUATION: CPT

## 2018-08-08 ASSESSMENT — ENCOUNTER SYMPTOMS
HALLUCINATIONS: 0
NERVOUS/ANXIOUS: 1
DYSPHORIC MOOD: 1
SLEEP DISTURBANCE: 0

## 2018-08-08 NOTE — ED TRIAGE NOTES
Pt stays with roomates at [address] [place] [address] ben,christopher pryor.  He called ems for ptsd exacerbation.  He denies si or mi.  He is legally blind.  A search has been performed and he is allowed to keep belongings and he is not on a watch due to being voluntary, not under influenc e, and no si or mi.  Pt is pleasant and cooperative.

## 2018-08-08 NOTE — ED NOTES
Bed: ED11  Expected date: 8/8/18  Expected time: 6:02 PM  Means of arrival:   Comments:  27 year old male for Crisis Evaluation; Oren Lockett; Yellow; 8 mins out.

## 2018-08-08 NOTE — ED AVS SNAPSHOT
[facility], [place], Emergency Department    [address]    [facility] [place] [address]    Phone:  [phone]    Fax:  [phone]                                       Barry Mcgrath   MRN: [id number]    Department:  [facility], Emergency Department   Date of Visit:  8/8/2018           After Visit Summary Signature Page     I have received my discharge instructions, and my questions have been answered. I have discussed any challenges I see with this plan with the nurse or doctor.    ..........................................................................................................................................  Patient/Patient Representative Signature      ..........................................................................................................................................  Patient Representative Print Name and Relationship to Patient    ..................................................               ................................................  Date                                            Time    ..........................................................................................................................................  Reviewed by Signature/Title    ...................................................              ..............................................  Date                                                            Time

## 2018-08-08 NOTE — ED AVS SNAPSHOT
[facility], Emergency Department    [address]    [place] [address]    Phone:  [phone]    Fax:  [phone]                                       Barry Mcgrath   MRN: [id number]    Department:  [facility], Emergency Department   Date of Visit:  8/8/2018           Patient Information     Date Of Birth          1990        Your diagnoses for this visit were:     Bipolar I disorder (H)     PTSD (post-traumatic stress disorder)        You were seen by vEi Ba MD.        Discharge Instructions       Continue working with your current outpatient providers.     Your next 10 appointments already scheduled     Aug 28, 2018  1:30 PM CDT   Return Sleep Patient with Bennett Ezra Goltz, PA-C   [facility] ([facility])    [address] [place] [address] [phone]              24 Hour Appointment Hotline       To make an appointment at any [facility] clinic, call [phone] ([phone]). If you don't have a family doctor or clinic, we will help you find one. [facility] clinics are conveniently located to serve the needs of you and your family.             Review of your medicines      Our records show that you are taking the medicines listed below. If these are incorrect, please call your family doctor or clinic.        Dose / Directions Last dose taken    ARIPiprazole 30 MG tablet   Commonly known as:  ABILIFY   Dose:  30 mg        Take 1 tablet (30 mg) by mouth At Bedtime   Refills:  0        LISINOPRIL PO   Dose:  10 mg        Take 10 mg by mouth daily   Refills:  0        metFORMIN 500 MG/5ML Soln solution   Commonly known as:  GLUCOPHAGE   Dose:  500 mg        Take 500 mg by mouth 2 times daily   Refills:  0        MULTIVITAMIN ADULT PO   Dose:  1 tablet        Take 1 tablet by mouth   Refills:  0        mupirocin 2 % ointment   Commonly known as:  BACTROBAN        Apply topically 3 times daily   Refills:  0         mycophenolate 250 MG capsule   Commonly known as:  GENERIC EQUIVALENT   Dose:  750 mg   Quantity:  540 capsule        Take 3 capsules (750 mg) by mouth 2 times daily   Refills:  3        OXcarbazepine 150 MG tablet   Commonly known as:  TRILEPTAL   Dose:  600 mg   Quantity:  240 tablet        Take 4 tablets (600 mg) by mouth 2 times daily   Refills:  11        predniSONE 5 MG tablet   Commonly known as:  DELTASONE   Dose:  5 mg   Quantity:  90 tablet        Take 1 tablet (5 mg) by mouth daily   Refills:  3        risperiDONE 1 MG ODT tab   Commonly known as:  risperDAL M-TABS   Dose:  1 mg   Quantity:  30 tablet        Take 1 tablet (1 mg) by mouth At Bedtime   Refills:  1        tacrolimus 1 MG capsule   Commonly known as:  GENERIC EQUIVALENT   Quantity:  450 capsule        3 mg q am and 2 mg q pm   Refills:  3        venlafaxine 150 MG 24 hr capsule   Commonly known as:  EFFEXOR-XR   Dose:  300 mg        Take 300 mg by mouth daily   Refills:  0        vitamin D3 62011 UNITS capsule   Commonly known as:  CHOLECALCIFEROL   Dose:  20151 Units        Take 50,000 Units by mouth once a week   Refills:  0                Procedures and tests performed during your visit     Drug abuse screen 6 urine (tox)      Orders Needing Specimen Collection     None      Pending Results     No orders found from 8/6/2018 to 8/9/2018.            Pending Culture Results     No orders found from 8/6/2018 to 8/9/2018.            Pending Results Instructions     If you had any lab results that were not finalized at the time of your Discharge, you can call the ED Lab Result RN at [phone]. You will be contacted by this team for any positive Lab results or changes in treatment. The nurses are available 7 days a week from 10A to 6:30P.  You can leave a message 24 hours per day and they will return your call.        Thank you for choosing [facility]       Thank you for choosing [facility] for your care. Our goal is always to provide you with  "excellent care. Hearing back from our patients is one way we can continue to improve our services. Please take a few minutes to complete the written survey that you may receive in the mail after you visit with us. Thank you!        [employer] Information     [employer] lets you send messages to your doctor, view your test results, renew your prescriptions, schedule appointments and more. To sign up, go to [email or web address] . Click on \"Log in\" on the left side of the screen, which will take you to the Welcome page. Then click on \"Sign up Now\" on the right side of the page.     You will be asked to enter the access code listed below, as well as some personal information. Please follow the directions to create your username and password.     Your access code is: [email or web address]  Expires: 2018  6:15 AM     Your access code will  in 90 days. If you need help or a new code, please call your [facility] clinic or [phone].        Care EveryWhere ID     This is your Care EveryWhere ID. This could be used by other organizations to access your [facility] medical records  [id number]        Equal Access to Services     JACOB BAE : Hadii katty Marcos, washanelle saenz, qasuleman kaalmadoreen medina, deepali foley . So [facility] [phone].    ATENCIÓN: Si habla español, tiene a doll disposición servicios gratuitos de asistencia lingüística. Llame al [phone].    We comply with applicable federal civil rights laws and [place] laws. We do not discriminate on the basis of race, color, national origin, age, disability, sex, sexual orientation, or gender identity.            After Visit Summary       This is your record. Keep this with you and show to your community pharmacist(s) and doctor(s) at your next visit.                  "

## 2018-08-09 ENCOUNTER — HOSPITAL ENCOUNTER (EMERGENCY)
Facility: CLINIC | Age: 28
Discharge: HOME | End: 2018-08-09
Attending: EMERGENCY MEDICINE | Admitting: EMERGENCY MEDICINE
Payer: MEDICARE

## 2018-08-09 VITALS
SYSTOLIC BLOOD PRESSURE: 125 MMHG | DIASTOLIC BLOOD PRESSURE: 60 MMHG | RESPIRATION RATE: 16 BRPM | OXYGEN SATURATION: 95 % | HEART RATE: 82 BPM | TEMPERATURE: 96.7 F

## 2018-08-09 DIAGNOSIS — F31.9 BIPOLAR I DISORDER (H): ICD-10-CM

## 2018-08-09 DIAGNOSIS — Z94.0 STATUS POST KIDNEY TRANSPLANT: Primary | ICD-10-CM

## 2018-08-09 DIAGNOSIS — R45.851 SUICIDAL IDEATION: ICD-10-CM

## 2018-08-09 DIAGNOSIS — F43.10 PTSD (POST-TRAUMATIC STRESS DISORDER): ICD-10-CM

## 2018-08-09 PROCEDURE — 99285 EMERGENCY DEPT VISIT HI MDM: CPT | Performed by: EMERGENCY MEDICINE

## 2018-08-09 PROCEDURE — 99284 EMERGENCY DEPT VISIT MOD MDM: CPT | Mod: Z6 | Performed by: EMERGENCY MEDICINE

## 2018-08-09 PROCEDURE — 99283 EMERGENCY DEPT VISIT LOW MDM: CPT | Performed by: EMERGENCY MEDICINE

## 2018-08-09 RX ORDER — TACROLIMUS 1 MG/1
CAPSULE ORAL
Qty: 450 CAPSULE | Refills: 3 | Status: SHIPPED | OUTPATIENT
Start: 2018-08-09 | End: 2019-07-09

## 2018-08-09 ASSESSMENT — ENCOUNTER SYMPTOMS: NERVOUS/ANXIOUS: 1

## 2018-08-09 NOTE — ED PROVIDER NOTES
History     Chief Complaint   Patient presents with     Suicidal     thoughts, no plan. Pt was discharged earlier this evening from [facility], after being seen for PTSD exacerbation. Pt called 911 on self, feeling overwhelmed with suicidal thoughts.     HPI  Barry Mcgrath is a 27 year old male who has a past medical history of ESRD s/p renal transplant at age 15, blindness, history of Asperger, bipolar, PTSD who presents to the ED from home by EMS with c/o of suicide ideation.  Patient was seen in the emergency department earlier today for an exacerbation of his PTSD.  Patient reports he has been having a lot of emotional pain recently and is having painful thoughts. Patient has been using thought stopping to help himself however today this has not been working for him.  Patient  was evaluated earlier today and was feeling better so wanted to go home.  Patient reports that once he went home thoughts returned and he had increasing thoughts and was overwhelmed and developed some suicidal thoughts.  Patient denies any suicidal plan or intent to end his life.  Denies any homicidal ideation, delusions, hallucinations.  Patient with no acute medical conditions.  No history of suicide attempts in the past. Patient reports feeling more stressed and overwhelmed recently.           I have reviewed the Medications, Allergies, Past Medical and Surgical History, and Social History in the Epic system.    Review of Systems   Psychiatric/Behavioral: Positive for suicidal ideas. Negative for self-injury. The patient is nervous/anxious.    All other systems reviewed and are negative.      Physical Exam   BP: 125/60  Pulse: 82  Temp: 96.7  F (35.9  C)  Resp: 16  SpO2: 95 %      Physical Exam   Constitutional: He is oriented to person, place, and time. He appears well-developed. No distress.   HENT:   Head: Normocephalic and atraumatic.   Right Ear: External ear normal.   Left Ear: External ear normal.   Mouth/Throat: Oropharynx is clear and  moist.   Eyes:   blind   Neck: Normal range of motion. Neck supple.   Cardiovascular: Normal rate, regular rhythm and normal heart sounds.    Pulmonary/Chest: Effort normal and breath sounds normal. No respiratory distress. He has no wheezes. He has no rales.   Abdominal: Soft. He exhibits no distension. There is no tenderness. There is no rebound and no guarding.   Musculoskeletal: Normal range of motion. He exhibits no tenderness or deformity.   Neurological: He is alert and oriented to person, place, and time. No cranial nerve deficit. Coordination normal.   Skin: Skin is warm and dry. No rash noted.   Psychiatric: He has a normal mood and affect. His behavior is normal.       ED Course     ED Course     Procedures             Critical Care time:  none             Labs Ordered and Resulted from Time of ED Arrival Up to the Time of Departure from the ED - No data to display         Assessments & Plan (with Medical Decision Making)   Barry Mcgrath is a 27 year old male who has a past medical history of ESRD s/p renal transplant at age 15, history of Asperger, bipolar, PTSD who presents to the ED from home by EMS with c/o of suicide ideation.  Upon arrival patient is well-appearing, afebrile, no distress.  Patient reports increasing intrusive thoughts at home leading him to have suicidal ideation but denies any plan or intent to harm himself.  No suicidal ideation once he arrived in the ED and thoughts are controlled. Patient denies any homicidal ideations, delusions, hallucinations.  Patient sleeping comfortably throughout emergency department stay.  Behavior health assessment evaluate the patient as well as myself and on reevaluation in the morning patient would like to go home.  Patient states that he will be safe at home and thinks he will be able to handle the thoughts.  Safety plan in place.  At this time plan for discharge home.  Return precautions discussed.  Patient understands and agrees the plan.     I have  reviewed the nursing notes.    I have reviewed the findings, diagnosis, plan and need for follow up with the patient.    New Prescriptions    No medications on file       Final diagnoses:   Suicidal ideation   PTSD (post-traumatic stress disorder)   Bipolar I disorder (H)       8/9/2018   [facility], [place], EMERGENCY DEPARTMENT     Sarahy Neves MD  08/09/18 0681

## 2018-08-09 NOTE — ED AVS SNAPSHOT
[facility], Emergency Department    [address]    [place] [address]    Phone:  [phone]    Fax:  [phone]                                       Barry Mcgrath   MRN: [id number]    Department:  [facility], Emergency Department   Date of Visit:  8/9/2018           Patient Information     Date Of Birth          1990        Your diagnoses for this visit were:     Suicidal ideation     PTSD (post-traumatic stress disorder)     Bipolar I disorder (H)        You were seen by Sarahy Neves MD.        Discharge Instructions             Your next 10 appointments already scheduled     Aug 28, 2018  1:30 PM CDT   Return Sleep Patient with Bennett Ezra Goltz, PA-C   [facility] ([facility])    [address] [place] [address] [phone]              24 Hour Appointment Hotline       To make an appointment at any [facility] clinic, call [phone] ([phone]). If you don't have a family doctor or clinic, we will help you find one. [facility] clinics are conveniently located to serve the needs of you and your family.             Review of your medicines      Our records show that you are taking the medicines listed below. If these are incorrect, please call your family doctor or clinic.        Dose / Directions Last dose taken    ARIPiprazole 30 MG tablet   Commonly known as:  ABILIFY   Dose:  30 mg        Take 1 tablet (30 mg) by mouth At Bedtime   Refills:  0        LISINOPRIL PO   Dose:  10 mg        Take 10 mg by mouth daily   Refills:  0        metFORMIN 500 MG/5ML Soln solution   Commonly known as:  GLUCOPHAGE   Dose:  500 mg        Take 500 mg by mouth 2 times daily   Refills:  0        MULTIVITAMIN ADULT PO   Dose:  1 tablet        Take 1 tablet by mouth   Refills:  0        mupirocin 2 % ointment   Commonly known as:  BACTROBAN        Apply topically 3 times daily   Refills:  0        mycophenolate 250 MG capsule    Commonly known as:  GENERIC EQUIVALENT   Dose:  750 mg   Quantity:  540 capsule        Take 3 capsules (750 mg) by mouth 2 times daily   Refills:  3        OXcarbazepine 150 MG tablet   Commonly known as:  TRILEPTAL   Dose:  600 mg   Quantity:  240 tablet        Take 4 tablets (600 mg) by mouth 2 times daily   Refills:  11        predniSONE 5 MG tablet   Commonly known as:  DELTASONE   Dose:  5 mg   Quantity:  90 tablet        Take 1 tablet (5 mg) by mouth daily   Refills:  3        risperiDONE 1 MG ODT tab   Commonly known as:  risperDAL M-TABS   Dose:  1 mg   Quantity:  30 tablet        Take 1 tablet (1 mg) by mouth At Bedtime   Refills:  1        tacrolimus 1 MG capsule   Commonly known as:  GENERIC EQUIVALENT   Quantity:  450 capsule        3 mg q am and 2 mg q pm   Refills:  3        venlafaxine 150 MG 24 hr capsule   Commonly known as:  EFFEXOR-XR   Dose:  300 mg        Take 300 mg by mouth daily   Refills:  0        vitamin D3 38130 UNITS capsule   Commonly known as:  CHOLECALCIFEROL   Dose:  01948 Units        Take 50,000 Units by mouth once a week   Refills:  0                Orders Needing Specimen Collection     None      Pending Results     No orders found from 8/7/2018 to 8/10/2018.            Pending Culture Results     No orders found from 8/7/2018 to 8/10/2018.            Pending Results Instructions     If you had any lab results that were not finalized at the time of your Discharge, you can call the ED Lab Result RN at [phone]. You will be contacted by this team for any positive Lab results or changes in treatment. The nurses are available 7 days a week from 10A to 6:30P.  You can leave a message 24 hours per day and they will return your call.        Thank you for choosing [facility]       Thank you for choosing [facility] for your care. Our goal is always to provide you with excellent care. Hearing back from our patients is one way we can continue to improve our services. Please take a few  "minutes to complete the written survey that you may receive in the mail after you visit with us. Thank you!        [employer] Information     [employer] lets you send messages to your doctor, view your test results, renew your prescriptions, schedule appointments and more. To sign up, go to [email or web address] . Click on \"Log in\" on the left side of the screen, which will take you to the Welcome page. Then click on \"Sign up Now\" on the right side of the page.     You will be asked to enter the access code listed below, as well as some personal information. Please follow the directions to create your username and password.     Your access code is: [email or web address]  Expires: 2018  6:15 AM     Your access code will  in 90 days. If you need help or a new code, please call your [facility] clinic or [phone].        Care EveryWhere ID     This is your Care EveryWhere ID. This could be used by other organizations to access your [facility] medical records  [id number]        Equal Access to Services     [facility] FarmANASTACIA : Hadii katty chaparro Sotino, waaxda luqadaha, qaybta kaalmadoreen medina, deepali foley . So [facility] [phone].    ATENCIÓN: Si habla español, tiene a doll disposición servicios gratuitos de asistencia lingüística. Llame al [phone].    We comply with applicable federal civil rights laws and [place] laws. We do not discriminate on the basis of race, color, national origin, age, disability, sex, sexual orientation, or gender identity.            After Visit Summary       This is your record. Keep this with you and show to your community pharmacist(s) and doctor(s) at your next visit.                  "
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
## 2018-08-09 NOTE — ED AVS SNAPSHOT
[facility], [place], Emergency Department    [address]    [facility] [place] [address]    Phone:  [phone]    Fax:  [phone]                                       Barry Mcgrath   MRN: [id number]    Department:  [facility], Emergency Department   Date of Visit:  8/9/2018           After Visit Summary Signature Page     I have received my discharge instructions, and my questions have been answered. I have discussed any challenges I see with this plan with the nurse or doctor.    ..........................................................................................................................................  Patient/Patient Representative Signature      ..........................................................................................................................................  Patient Representative Print Name and Relationship to Patient    ..................................................               ................................................  Date                                            Time    ..........................................................................................................................................  Reviewed by Signature/Title    ...................................................              ..............................................  Date                                                            Time

## 2018-08-19 ENCOUNTER — HOSPITAL ENCOUNTER (EMERGENCY)
Facility: CLINIC | Age: 28
Discharge: HOME | End: 2018-08-20
Attending: EMERGENCY MEDICINE | Admitting: EMERGENCY MEDICINE
Payer: MEDICARE

## 2018-08-19 ENCOUNTER — MEDICAL CORRESPONDENCE (OUTPATIENT)
Dept: EMERGENCY MEDICINE | Facility: CLINIC | Age: 28
End: 2018-08-19

## 2018-08-19 DIAGNOSIS — R11.2 NON-INTRACTABLE VOMITING WITH NAUSEA, UNSPECIFIED VOMITING TYPE: ICD-10-CM

## 2018-08-19 DIAGNOSIS — R45.851 SUICIDAL IDEATION: ICD-10-CM

## 2018-08-19 DIAGNOSIS — F31.9 BIPOLAR AFFECTIVE DISORDER, REMISSION STATUS UNSPECIFIED (H): ICD-10-CM

## 2018-08-19 LAB
ALBUMIN SERPL-MCNC: 3.7 G/DL (ref 3.4–5)
ALBUMIN UR-MCNC: 30 MG/DL
ALP SERPL-CCNC: 107 U/L (ref 40–150)
ALT SERPL W P-5'-P-CCNC: 31 U/L (ref 0–70)
AMPHETAMINES UR QL SCN: NEGATIVE
ANION GAP SERPL CALCULATED.3IONS-SCNC: 6 MMOL/L (ref 3–14)
APPEARANCE UR: CLEAR
AST SERPL W P-5'-P-CCNC: 17 U/L (ref 0–45)
BARBITURATES UR QL: NEGATIVE
BASOPHILS # BLD AUTO: 0 10E9/L (ref 0–0.2)
BASOPHILS NFR BLD AUTO: 0.2 %
BENZODIAZ UR QL: NEGATIVE
BILIRUB SERPL-MCNC: 0.4 MG/DL (ref 0.2–1.3)
BILIRUB UR QL STRIP: NEGATIVE
BUN SERPL-MCNC: 14 MG/DL (ref 7–30)
CALCIUM SERPL-MCNC: 8.8 MG/DL (ref 8.5–10.1)
CANNABINOIDS UR QL SCN: NEGATIVE
CHLORIDE SERPL-SCNC: 107 MMOL/L (ref 94–109)
CO2 SERPL-SCNC: 26 MMOL/L (ref 20–32)
COCAINE UR QL: NEGATIVE
COLOR UR AUTO: YELLOW
CREAT SERPL-MCNC: 0.95 MG/DL (ref 0.66–1.25)
DIFFERENTIAL METHOD BLD: ABNORMAL
EOSINOPHIL # BLD AUTO: 0.7 10E9/L (ref 0–0.7)
EOSINOPHIL NFR BLD AUTO: 4.9 %
ERYTHROCYTE [DISTWIDTH] IN BLOOD BY AUTOMATED COUNT: 13.3 % (ref 10–15)
ETHANOL UR QL SCN: NEGATIVE
GFR SERPL CREATININE-BSD FRML MDRD: >90 ML/MIN/1.7M2
GLUCOSE SERPL-MCNC: 99 MG/DL (ref 70–99)
GLUCOSE UR STRIP-MCNC: NEGATIVE MG/DL
HCT VFR BLD AUTO: 43.1 % (ref 40–53)
HGB BLD-MCNC: 13.9 G/DL (ref 13.3–17.7)
HGB UR QL STRIP: NEGATIVE
IMM GRANULOCYTES # BLD: 0 10E9/L (ref 0–0.4)
IMM GRANULOCYTES NFR BLD: 0.3 %
KETONES UR STRIP-MCNC: NEGATIVE MG/DL
LEUKOCYTE ESTERASE UR QL STRIP: NEGATIVE
LIPASE SERPL-CCNC: 124 U/L (ref 73–393)
LYMPHOCYTES # BLD AUTO: 2.3 10E9/L (ref 0.8–5.3)
LYMPHOCYTES NFR BLD AUTO: 17.5 %
MCH RBC QN AUTO: 27.6 PG (ref 26.5–33)
MCHC RBC AUTO-ENTMCNC: 32.3 G/DL (ref 31.5–36.5)
MCV RBC AUTO: 86 FL (ref 78–100)
MONOCYTES # BLD AUTO: 0.8 10E9/L (ref 0–1.3)
MONOCYTES NFR BLD AUTO: 6.2 %
MUCOUS THREADS #/AREA URNS LPF: PRESENT /LPF
NEUTROPHILS # BLD AUTO: 9.4 10E9/L (ref 1.6–8.3)
NEUTROPHILS NFR BLD AUTO: 70.9 %
NITRATE UR QL: NEGATIVE
NRBC # BLD AUTO: 0 10*3/UL
NRBC BLD AUTO-RTO: 0 /100
OPIATES UR QL SCN: NEGATIVE
PH UR STRIP: 5.5 PH (ref 5–7)
PLATELET # BLD AUTO: 279 10E9/L (ref 150–450)
POTASSIUM SERPL-SCNC: 4.2 MMOL/L (ref 3.4–5.3)
PROT SERPL-MCNC: 7.6 G/DL (ref 6.8–8.8)
RBC # BLD AUTO: 5.04 10E12/L (ref 4.4–5.9)
RBC #/AREA URNS AUTO: 1 /HPF (ref 0–2)
SODIUM SERPL-SCNC: 139 MMOL/L (ref 133–144)
SOURCE: ABNORMAL
SP GR UR STRIP: 1.02 (ref 1–1.03)
SQUAMOUS #/AREA URNS AUTO: 1 /HPF (ref 0–1)
UROBILINOGEN UR STRIP-MCNC: NORMAL MG/DL (ref 0–2)
WBC # BLD AUTO: 13.3 10E9/L (ref 4–11)
WBC #/AREA URNS AUTO: 1 /HPF (ref 0–5)

## 2018-08-19 PROCEDURE — 80320 DRUG SCREEN QUANTALCOHOLS: CPT | Performed by: FAMILY MEDICINE

## 2018-08-19 PROCEDURE — 90791 PSYCH DIAGNOSTIC EVALUATION: CPT

## 2018-08-19 PROCEDURE — 99285 EMERGENCY DEPT VISIT HI MDM: CPT | Mod: 25 | Performed by: EMERGENCY MEDICINE

## 2018-08-19 PROCEDURE — 80053 COMPREHEN METABOLIC PANEL: CPT | Performed by: EMERGENCY MEDICINE

## 2018-08-19 PROCEDURE — 83690 ASSAY OF LIPASE: CPT | Performed by: EMERGENCY MEDICINE

## 2018-08-19 PROCEDURE — 80307 DRUG TEST PRSMV CHEM ANLYZR: CPT | Performed by: FAMILY MEDICINE

## 2018-08-19 PROCEDURE — 85025 COMPLETE CBC W/AUTO DIFF WBC: CPT | Performed by: EMERGENCY MEDICINE

## 2018-08-19 PROCEDURE — 81001 URINALYSIS AUTO W/SCOPE: CPT | Performed by: EMERGENCY MEDICINE

## 2018-08-19 PROCEDURE — 99284 EMERGENCY DEPT VISIT MOD MDM: CPT | Mod: Z6 | Performed by: EMERGENCY MEDICINE

## 2018-08-19 ASSESSMENT — ENCOUNTER SYMPTOMS
COLOR CHANGE: 0
HALLUCINATIONS: 0
VOMITING: 1
ABDOMINAL PAIN: 0
LIGHT-HEADEDNESS: 0
DYSPHORIC MOOD: 0
AGITATION: 0
DIFFICULTY URINATING: 0
NAUSEA: 1
BRUISES/BLEEDS EASILY: 0
ADENOPATHY: 0
POLYDIPSIA: 0
CHILLS: 0
NECK PAIN: 0
NERVOUS/ANXIOUS: 1
DYSURIA: 0
BACK PAIN: 0
NECK STIFFNESS: 0
SHORTNESS OF BREATH: 0
SLEEP DISTURBANCE: 0
FEVER: 0

## 2018-08-19 NOTE — ED AVS SNAPSHOT
" [facility], Emergency Department    [address]    [facility] [place] [address]    Phone:  [phone]    Fax:  [phone]                                       Barry Mcgrath   MRN: [id number]    Department:  [facility], Emergency Department   Date of Visit:  8/19/2018           Patient Information     Date Of Birth          1990        Your diagnoses for this visit were:     Non-intractable vomiting with nausea, unspecified vomiting type     Suicidal ideation     Bipolar affective disorder, remission status unspecified (H)        You were seen by Iwona Kim MD.        Discharge Instructions       Please follow up with Your Therapy appointments this week and Your Psychiatrist. If your symptoms worsen please come back to the emergency department.    Bipolar Disorder  Bipolar disorder is an illness that causes strong mood swings between depression and  shaniqua . It used to be called \"manic depression.\" The mood swings are different from the normal ups and downs we all experience in our lives. They are more severe, last longer, and can interfere with work and relationships. These episodes are changes from our usual moods and behavior. Their severity can be mild, or drastic and explosive.    In a manic episode, you may think fast and do things quickly. It may seem like you are getting a lot done. At first, this may feel very good. But in the extreme this can lead to a lifestyle that is disorganized, chaotic, and includes risky behavior (spending sprees, sexual acting-out, or drug use). In later stages, it may affect eating (no interest in food) and sleeping (unable to sleep for days at a time). Speech may speed up and become difficult for others to understand. You may appear to others as if you are in your own world.    In a depressive episode, you may feel a lack of interest in normal activities. Sometimes there is sadness or guilt without any clear reason. Thinking may become slow and there can be a " lack energy or feeling of hopelessness. Some people have thoughts of harming themselves at this stage. Thoughts can even turn to suicide.  Between these phases you may actually feel OK. This does not mean that the illness is gone. People with this disorder will usually have to treat it all of their life. Medicine and good care can greatly reduce the symptoms.  The exact cause of this illness is unknown. However, there is a genetic link that makes a person more likely to get this problem. Also, the use of drugs such as speed (amphetamine) and cocaine increase the chances of this illness appearing.  Home care  Here is what you can do at home:    Ongoing care and support help people manage this disease. Find a healthcare provider and therapist who meet your needs. Seek help when you feel like you may be heading into either a manic episode or a depressive state.    Be sure to take your medicine and get regular blood work to check the levels of medicine in your body. Take the medicine and get the follow-up lab work as prescribed, even if you think you don t need to do it.    Be certain to tell each of your healthcare providers about all of the prescription and over-the-counter medicines, and supplements you take. Certain supplements interact with medicines and result in dangerous side effects. You can also use your pharmacist as a resource person when you have questions about medicine interactions.    Talk with your family and trusted friends about your thoughts and feelings. Ask them to help you recognize behavior changes early so you can get help and medicines can be adjusted.    Alcohol and drugs can bring on an episode, and make them worse    If your life is severely impacted by this illness, the Americans with Disabilities Act (ADA) may provide help. The ADA protects people with chronic physical and mental health problems. If you are having trouble keeping jobs, managing workplace issues, or caring for yourself  "because of your bipolar disorder, contact your local ADA office to see if it can help. The US Department of Justice operates a toll-free [employer] information line at: 377.835.2413 (Voice); or 291-082-1214 (TTY). It can help you locate a local office.    Follow-up care  Follow up with your healthcare provider or therapist as advised. They can help you to find ways to improve your life.  Call 911  Call 911 if any of these happen:    You have suicidal thoughts, a plan, and the means to  harm yourself, or serious thoughts of hurting someone else    Trouble breathing    Confusion    Drowsiness or trouble wakening    Fainting or loss of consciousness    Rapid heart rate, very low heart rate, or a new irregular heart rate    Seizure    New chest pain that becomes more severe, lasts longer, or spreads into your shoulder, arm, neck, jaw, or back  When to seek medical advice  Call your healthcare provider right away if any of these happen:    Feeling like your symptoms are getting worse (depression, agitation, and excess energy)    Unable to eat or sleep for more than 48 hours    Feeling out of control (racing thoughts, or poor concentration)    Feeling like you want to harm yourself or another    Being unable to care for yourself  Date Last Reviewed: 10/1/2017    4648-3900 [employer]. [address] [place] [address], [place] [address]. All rights reserved. This information is not intended as a substitute for professional medical care. Always follow your healthcare professional's instructions.          *VOMITING [6yr-Adult]  Vomiting is a common symptom that may be due to different causes. These include gastroenteritis (\"stomach-flu\"), food poisoning and gastritis. There are other more serious causes of vomiting which may be hard to diagnose early in the illness. Therefore, it is important to watch for the warning signs listed below.  The main danger from repeated vomiting is \"dehydration\". This is due to excess loss of " water and minerals from the body. When this occurs, body fluids must be replaced.  HOME CARE:  1) If symptoms are severe, rest at home for the next 24 hours.  2) You may use acetaminophen (Tylenol) 650-1000 mg every 6 hours to control fever, unless another medicine was prescribed. [ NOTE : If you have chronic liver disease, talk with your doctor before using acetaminophen.] (Aspirin should never be used in anyone under 18 years of age who is ill with a fever. It may cause severe liver damage.)  3) Avoid tobacco and alcohol use, which may worsen your symptoms.  4) If medicines for vomiting were prescribed, take as directed.   DURING THE FIRST 12-24 HOURS follow the diet below. Try to take frequent small sips even if you vomit occasionally:    FRUIT JUICES: Apple, grape juice, clear fruit drinks, electrolyte replacement and sports drinks.    BEVERAGES: Sport drinks such as Gatorade, soft drinks without caffeine; mineral water (plain or flavored), decaffeinated tea and coffee.    SOUPS: Clear broth, consommé and bouillon    DESSERTS: Plain gelatin (Jell-O), popsicles and fruit juice bars.  DURING THE NEXT 24 HOURS you may add the following to the above:    Hot cereal, plain toast, bread, rolls, crackers    Plain noodles, rice, mashed potatoes, chicken noodle or rice soup    Unsweetened canned fruit (avoid pineapple), bananas    Avoid dairy products     Limit caffeine and chocolate. No spices or seasonings except salt.   DURING THE NEXT 24 HOURS  Gradually resume a normal diet, as you feel better and your symptoms lessen.  FOLLOW UP with your doctor as advised if you are not improving over the next 2-3 days.  GET PROMPT MEDICAL ATTENTION if any of the following occur:  -- Constant abdominal pain that stays in the same spot or gets worse  -- Continued vomiting (unable to keep liquids down) for 24 hours  -- Frequent diarrhea (more than 5 times a day); blood (red or black color) in diarrhea  -- No urine output for 12 hours  or extreme thirst  -- Weakness, dizziness or fainting  -- Unusually drowsy or confused  -- Fever over 101.0  F (38.3  C) for more than 3 days  -- Yellow color of the eyes or skin    3585-9851 [employer], [address] [place] [address], [place] [address]. All rights reserved. This information is not intended as a substitute for professional medical care. Always follow your healthcare professional's instructions.      Your next 10 appointments already scheduled     Aug 28, 2018  1:30 PM CDT   Return Sleep Patient with Bennett Ezra Goltz, PA-C   [facility] ([facility])    [address] [address] [phone]              24 Hour Appointment Hotline       To make an appointment at any [facility] clinic, call [phone] ([phone]). If you don't have a family doctor or clinic, we will help you find one. [facility] clinics are conveniently located to serve the needs of you and your family.             Review of your medicines      Our records show that you are taking the medicines listed below. If these are incorrect, please call your family doctor or clinic.        Dose / Directions Last dose taken    ARIPiprazole 30 MG tablet   Commonly known as:  ABILIFY   Dose:  30 mg        Take 1 tablet (30 mg) by mouth At Bedtime   Refills:  0        LISINOPRIL PO   Dose:  10 mg        Take 10 mg by mouth daily   Refills:  0        metFORMIN 500 MG/5ML Soln solution   Commonly known as:  GLUCOPHAGE   Dose:  500 mg        Take 500 mg by mouth 2 times daily   Refills:  0        MULTIVITAMIN ADULT PO   Dose:  1 tablet        Take 1 tablet by mouth   Refills:  0        mupirocin 2 % ointment   Commonly known as:  BACTROBAN        Apply topically 3 times daily   Refills:  0        mycophenolate 250 MG capsule   Commonly known as:  GENERIC EQUIVALENT   Dose:  750 mg   Quantity:  540 capsule        Take 3 capsules (750 mg) by mouth 2 times daily   Refills:  3         OXcarbazepine 150 MG tablet   Commonly known as:  TRILEPTAL   Dose:  600 mg   Quantity:  240 tablet        Take 4 tablets (600 mg) by mouth 2 times daily   Refills:  11        predniSONE 5 MG tablet   Commonly known as:  DELTASONE   Dose:  5 mg   Quantity:  90 tablet        Take 1 tablet (5 mg) by mouth daily   Refills:  3        risperiDONE 1 MG ODT tab   Commonly known as:  risperDAL M-TABS   Dose:  1 mg   Quantity:  30 tablet        Take 1 tablet (1 mg) by mouth At Bedtime   Refills:  1        tacrolimus 1 MG capsule   Commonly known as:  GENERIC EQUIVALENT   Quantity:  450 capsule        3 mg q am and 2 mg q pm   Refills:  3        venlafaxine 150 MG 24 hr capsule   Commonly known as:  EFFEXOR-XR   Dose:  300 mg        Take 300 mg by mouth daily   Refills:  0        vitamin D3 28287 UNITS capsule   Commonly known as:  CHOLECALCIFEROL   Dose:  98588 Units        Take 50,000 Units by mouth once a week   Refills:  0                Procedures and tests performed during your visit     CBC with platelets differential    Comprehensive metabolic panel    Drug abuse screen 6 urine (tox)    Lipase    Peripheral IV catheter    UA with Microscopic      Orders Needing Specimen Collection     None      Pending Results     No orders found from 8/17/2018 to 8/20/2018.            Pending Culture Results     No orders found from 8/17/2018 to 8/20/2018.            Pending Results Instructions     If you had any lab results that were not finalized at the time of your Discharge, you can call the ED Lab Result RN at [phone]. You will be contacted by this team for any positive Lab results or changes in treatment. The nurses are available 7 days a week from 10A to 6:30P.  You can leave a message 24 hours per day and they will return your call.        Thank you for choosing [facility]       Thank you for choosing [facility] for your care. Our goal is always to provide you with excellent care. Hearing back from our patients is  "one way we can continue to improve our services. Please take a few minutes to complete the written survey that you may receive in the mail after you visit with us. Thank you!        [employer] Information     [employer] lets you send messages to your doctor, view your test results, renew your prescriptions, schedule appointments and more. To sign up, go to [email or web address] . Click on \"Log in\" on the left side of the screen, which will take you to the Welcome page. Then click on \"Sign up Now\" on the right side of the page.     You will be asked to enter the access code listed below, as well as some personal information. Please follow the directions to create your username and password.     Your access code is: [email or web address]  Expires: 2018 11:49 PM     Your access code will  in 90 days. If you need help or a new code, please call your [facility] clinic or [phone].        Care EveryWhere ID     This is your Care EveryWhere ID. This could be used by other organizations to access your [facility] medical records  [id number]        Equal Access to Services     JACOB BAE : Hadlulú Marcos, igor saenz, malachi medina, deepali foley . So [facility] [phone].    ATENCIÓN: Si habla español, tiene a doll disposición servicios gratuitos de asistencia lingüística. Federico al [phone].    We comply with applicable federal civil rights laws and [place] laws. We do not discriminate on the basis of race, color, national origin, age, disability, sex, sexual orientation, or gender identity.            After Visit Summary       This is your record. Keep this with you and show to your community pharmacist(s) and doctor(s) at your next visit.                  "

## 2018-08-19 NOTE — ED AVS SNAPSHOT
[facility] [place], Emergency Department    [address]    [place] [address]    Phone:  [phone]    Fax:  [phone]                                       Barry Mcgrath   MRN: [id number]    Department:  [facility], Emergency Department   Date of Visit:  8/19/2018           After Visit Summary Signature Page     I have received my discharge instructions, and my questions have been answered. I have discussed any challenges I see with this plan with the nurse or doctor.    ..........................................................................................................................................  Patient/Patient Representative Signature      ..........................................................................................................................................  Patient Representative Print Name and Relationship to Patient    ..................................................               ................................................  Date                                            Time    ..........................................................................................................................................  Reviewed by Signature/Title    ...................................................              ..............................................  Date                                                            Time

## 2018-08-20 VITALS
TEMPERATURE: 97.8 F | DIASTOLIC BLOOD PRESSURE: 76 MMHG | HEART RATE: 81 BPM | RESPIRATION RATE: 20 BRPM | OXYGEN SATURATION: 93 % | SYSTOLIC BLOOD PRESSURE: 138 MMHG

## 2018-08-20 NOTE — ED TRIAGE NOTES
Patient reports he is severely bi-polar and very suicidal, but does not have a plan to harm himself. Previous episode x1, as a teen. Stressors right now include flashbacks, PTSD. Is able to contract for safety. Single episode vomiting today, but has had a lot of vomiting the last few days. Hx Kidney Transplant.

## 2018-08-20 NOTE — DISCHARGE INSTRUCTIONS
"Please follow up with Your Therapy appointments this week and Your Psychiatrist. If your symptoms worsen please come back to the emergency department.    Bipolar Disorder  Bipolar disorder is an illness that causes strong mood swings between depression and  shaniqua . It used to be called \"manic depression.\" The mood swings are different from the normal ups and downs we all experience in our lives. They are more severe, last longer, and can interfere with work and relationships. These episodes are changes from our usual moods and behavior. Their severity can be mild, or drastic and explosive.    In a manic episode, you may think fast and do things quickly. It may seem like you are getting a lot done. At first, this may feel very good. But in the extreme this can lead to a lifestyle that is disorganized, chaotic, and includes risky behavior (spending sprees, sexual acting-out, or drug use). In later stages, it may affect eating (no interest in food) and sleeping (unable to sleep for days at a time). Speech may speed up and become difficult for others to understand. You may appear to others as if you are in your own world.    In a depressive episode, you may feel a lack of interest in normal activities. Sometimes there is sadness or guilt without any clear reason. Thinking may become slow and there can be a lack energy or feeling of hopelessness. Some people have thoughts of harming themselves at this stage. Thoughts can even turn to suicide.  Between these phases you may actually feel OK. This does not mean that the illness is gone. People with this disorder will usually have to treat it all of their life. Medicine and good care can greatly reduce the symptoms.  The exact cause of this illness is unknown. However, there is a genetic link that makes a person more likely to get this problem. Also, the use of drugs such as speed (amphetamine) and cocaine increase the chances of this illness appearing.  Home care  Here is " what you can do at home:    Ongoing care and support help people manage this disease. Find a healthcare provider and therapist who meet your needs. Seek help when you feel like you may be heading into either a manic episode or a depressive state.    Be sure to take your medicine and get regular blood work to check the levels of medicine in your body. Take the medicine and get the follow-up lab work as prescribed, even if you think you don t need to do it.    Be certain to tell each of your healthcare providers about all of the prescription and over-the-counter medicines, and supplements you take. Certain supplements interact with medicines and result in dangerous side effects. You can also use your pharmacist as a resource person when you have questions about medicine interactions.    Talk with your family and trusted friends about your thoughts and feelings. Ask them to help you recognize behavior changes early so you can get help and medicines can be adjusted.    Alcohol and drugs can bring on an episode, and make them worse    If your life is severely impacted by this illness, the Americans with Disabilities Act (ADA) may provide help. The ADA protects people with chronic physical and mental health problems. If you are having trouble keeping jobs, managing workplace issues, or caring for yourself because of your bipolar disorder, contact your local ADA office to see if it can help. The US Department of Justice operates a toll-free ADA information line at: 675.837.3059 (Voice); or 562-255-8810 (TTY). It can help you locate a local office.    Follow-up care  Follow up with your healthcare provider or therapist as advised. They can help you to find ways to improve your life.  Call 911  Call 911 if any of these happen:    You have suicidal thoughts, a plan, and the means to  harm yourself, or serious thoughts of hurting someone else    Trouble breathing    Confusion    Drowsiness or trouble wakening    Fainting or  "loss of consciousness    Rapid heart rate, very low heart rate, or a new irregular heart rate    Seizure    New chest pain that becomes more severe, lasts longer, or spreads into your shoulder, arm, neck, jaw, or back  When to seek medical advice  Call your healthcare provider right away if any of these happen:    Feeling like your symptoms are getting worse (depression, agitation, and excess energy)    Unable to eat or sleep for more than 48 hours    Feeling out of control (racing thoughts, or poor concentration)    Feeling like you want to harm yourself or another    Being unable to care for yourself  Date Last Reviewed: 10/1/2017    0559-1803 [employer]. [address] [place] [address], [place] [address]. All rights reserved. This information is not intended as a substitute for professional medical care. Always follow your healthcare professional's instructions.          *VOMITING [6yr-Adult]  Vomiting is a common symptom that may be due to different causes. These include gastroenteritis (\"stomach-flu\"), food poisoning and gastritis. There are other more serious causes of vomiting which may be hard to diagnose early in the illness. Therefore, it is important to watch for the warning signs listed below.  The main danger from repeated vomiting is \"dehydration\". This is due to excess loss of water and minerals from the body. When this occurs, body fluids must be replaced.  HOME CARE:  1) If symptoms are severe, rest at home for the next 24 hours.  2) You may use acetaminophen (Tylenol) 650-1000 mg every 6 hours to control fever, unless another medicine was prescribed. [ NOTE : If you have chronic liver disease, talk with your doctor before using acetaminophen.] (Aspirin should never be used in anyone under 18 years of age who is ill with a fever. It may cause severe liver damage.)  3) Avoid tobacco and alcohol use, which may worsen your symptoms.  4) If medicines for vomiting were prescribed, take as directed. "   DURING THE FIRST 12-24 HOURS follow the diet below. Try to take frequent small sips even if you vomit occasionally:    FRUIT JUICES: Apple, grape juice, clear fruit drinks, electrolyte replacement and sports drinks.    BEVERAGES: Sport drinks such as Gatorade, soft drinks without caffeine; mineral water (plain or flavored), decaffeinated tea and coffee.    SOUPS: Clear broth, consommé and bouillon    DESSERTS: Plain gelatin (Jell-O), popsicles and fruit juice bars.  DURING THE NEXT 24 HOURS you may add the following to the above:    Hot cereal, plain toast, bread, rolls, crackers    Plain noodles, rice, mashed potatoes, chicken noodle or rice soup    Unsweetened canned fruit (avoid pineapple), bananas    Avoid dairy products     Limit caffeine and chocolate. No spices or seasonings except salt.   DURING THE NEXT 24 HOURS  Gradually resume a normal diet, as you feel better and your symptoms lessen.  FOLLOW UP with your doctor as advised if you are not improving over the next 2-3 days.  GET PROMPT MEDICAL ATTENTION if any of the following occur:  -- Constant abdominal pain that stays in the same spot or gets worse  -- Continued vomiting (unable to keep liquids down) for 24 hours  -- Frequent diarrhea (more than 5 times a day); blood (red or black color) in diarrhea  -- No urine output for 12 hours or extreme thirst  -- Weakness, dizziness or fainting  -- Unusually drowsy or confused  -- Fever over 101.0  F (38.3  C) for more than 3 days  -- Yellow color of the eyes or skin    3473-8664 [employer], [address] [place] [address], [place] [address]. All rights reserved. This information is not intended as a substitute for professional medical care. Always follow your healthcare professional's instructions.

## 2018-08-20 NOTE — ED PROVIDER NOTES
History     Chief Complaint   Patient presents with     Suicidal     Patient reports he is severely bi-polar and very suicidal. Hx Kidney Transplant.      Vomiting     Single episode vomiting today, but has had a lot of vomiting the last few days. Has Hx Kidney Transplant.      HPI  Barry Mcgrath is a 27 year old male with history of kidney transplaint in 2006 presenting with intermittent non bloody, non bilious nausea and vomiting over the last several weeks and worsening depression and suicidal thoughts. He denies any fevers, abdominal pain, chest pain or SOB. He denies any dysuria.     He states that he has suicidal thoughts that have been worsening over the last several weeks. He states he is quite depressed. He does not have a plan on how he will commit suicide. He denies any homicidal ideation or hallucinations.    This part of the medical record was transcribed by Kristin Shah, Medical Scribe, from a dictation done by Dr. Kim.  I have reviewed the Medications, Allergies, Past Medical and Surgical History, and Social History in the Epic system.    Review of Systems   Constitutional: Negative for chills and fever.   HENT: Negative for congestion.    Eyes: Negative for visual disturbance.   Respiratory: Negative for shortness of breath.    Cardiovascular: Negative for chest pain.   Gastrointestinal: Positive for nausea and vomiting. Negative for abdominal pain.   Endocrine: Negative for polydipsia and polyuria.   Genitourinary: Negative for difficulty urinating and dysuria.   Musculoskeletal: Negative for back pain, neck pain and neck stiffness.   Skin: Negative for color change.   Allergic/Immunologic: Positive for immunocompromised state.   Neurological: Negative for light-headedness.   Hematological: Negative for adenopathy. Does not bruise/bleed easily.   Psychiatric/Behavioral: Positive for suicidal ideas. Negative for agitation, behavioral problems, dysphoric mood, hallucinations, self-injury and sleep  disturbance. The patient is nervous/anxious.    All other systems reviewed and are negative.      Physical Exam   BP: 138/76  Pulse: 81  Temp: 97.8  F (36.6  C)  Resp: 20  SpO2: 93 %      Physical Exam   Constitutional: He is oriented to person, place, and time. He appears well-developed and well-nourished. No distress.   HENT:   Head: Normocephalic and atraumatic.   Mouth/Throat: Oropharynx is clear and moist. No oropharyngeal exudate.   Eyes: Conjunctivae and EOM are normal. No scleral icterus.   Neck: Normal range of motion.   Cardiovascular: Normal rate, normal heart sounds and intact distal pulses.    Pulmonary/Chest: Effort normal and breath sounds normal. No respiratory distress.   Abdominal: Soft. Bowel sounds are normal. He exhibits no distension. There is no tenderness. There is no rebound and no guarding.   There is no tenderness over the site of the transplanted kidney.   Musculoskeletal: Normal range of motion. He exhibits no edema or tenderness.   Neurological: He is alert and oriented to person, place, and time. No cranial nerve deficit. He exhibits normal muscle tone. Coordination normal.   Skin: Skin is warm. No rash noted. He is not diaphoretic.   Psychiatric:   Suicidal thoughts.  No plan.  No homicidal ideations.  No signs of active hallucinations.  Linear thought process.   Nursing note and vitals reviewed.      ED Course     ED Course     Procedures             Critical Care time:  none             Labs Ordered and Resulted from Time of ED Arrival Up to the Time of Departure from the ED   CBC WITH PLATELETS DIFFERENTIAL - Abnormal; Notable for the following:        Result Value    WBC 13.3 (*)     Absolute Neutrophil 9.4 (*)     All other components within normal limits   ROUTINE UA WITH MICROSCOPIC - Abnormal; Notable for the following:     Protein Albumin Urine 30 (*)     Mucous Urine Present (*)     All other components within normal limits   DRUG ABUSE SCREEN 6 CHEM DEP URINE ([facility])    COMPREHENSIVE METABOLIC PANEL   LIPASE   PERIPHERAL IV CATHETER            Assessments & Plan (with Medical Decision Making)   This is a 27 year old male presenting with vomiting and suicidal thoughts. Differential diagnosis: dehydration, acute pancreatitis, acute kidney injury, UTI, pyelonephritis, major depressive disorder, bipolar disorder, SI.     After thorough history and physical examination, the patient appears to be in no acute distress. I'll obtain laboratory studies for further diagnostic evaluation.     Patient's laboratory studies returned with leukocytosis of 13,300. There is no evidence of anemia, hemoglobin is normal at 13.9.  Electrolytes show no evidence of dehydration, creatinine is normal at 0.95. LFT's and lipase are normal. Urinalysis shows no evidence of infection. Patient has not vomited in there ED. He is able to tolerate PO liquids without difficulty. At this time he is medically cleared and he is stable for psychiatric assessment.    11:41 PM  Patient seen and evaluated psychiatric .  Apparently throughout his emergency department stay his suicidal thoughts have vanished.  He states he is not suicidal anymore.  He is calm.  He is stable for discharge.  He will follow-up at his therapy appointments that he has scheduled this week and return to the emergency department if his symptoms worsen.    This part of the medical record was transcribed by Kristin Shah, Medical Scribe, from a dictation done by Dr. Kim.  I have reviewed the nursing notes.    I have reviewed the findings, diagnosis, plan and need for follow up with the patient.    New Prescriptions    No medications on file       Final diagnoses:   Non-intractable vomiting with nausea, unspecified vomiting type   Suicidal ideation   Bipolar affective disorder, remission status unspecified (H)     I was physically present and have reviewed and verified the accuracy of this note documented by my scribe.    Iwona Kim,  MD      8/19/2018   [facility], [place], EMERGENCY DEPARTMENT     Iwona Kim MD  08/19/18 1288

## 2018-08-20 NOTE — ED NOTES
Bed: ED09  Expected date: 8/19/18  Expected time: 9:29 PM  Means of arrival: Ambulance  Comments:  [facility]---27 male vomiting and suicidal

## 2018-09-06 ENCOUNTER — TELEPHONE (OUTPATIENT)
Dept: TRANSPLANT | Facility: CLINIC | Age: 28
End: 2018-09-06

## 2018-09-06 NOTE — TELEPHONE ENCOUNTER
Patient Call: Transplant Lab/Orders  Route to LPN  Post Transplant Days: 5953  When patient is less than 60 days post-transplant, route high priority    Reason for Call: Clarification; which order? when he should be gettinghis labs drawn and when he should see the nephrology team for appoitnments  Callback needed? Yes    Return Call Needed  Same as documented in contacts section  When to return call?: Greater than one day: Route standard priority

## 2018-09-17 PROCEDURE — 80197 ASSAY OF TACROLIMUS: CPT | Performed by: INTERNAL MEDICINE

## 2018-09-19 LAB
TACROLIMUS BLD-MCNC: 6.2 UG/L (ref 5–15)
TME LAST DOSE: NORMAL H

## 2018-09-26 DIAGNOSIS — Z94.0 KIDNEY REPLACED BY TRANSPLANT: ICD-10-CM

## 2018-09-26 DIAGNOSIS — Z48.298 AFTERCARE FOLLOWING ORGAN TRANSPLANT: Primary | ICD-10-CM

## 2018-11-06 DIAGNOSIS — Z94.0 STATUS POST KIDNEY TRANSPLANT: Primary | ICD-10-CM

## 2018-11-06 RX ORDER — MYCOPHENOLATE MOFETIL 250 MG/1
750 CAPSULE ORAL 2 TIMES DAILY
Qty: 540 CAPSULE | Refills: 3 | Status: SHIPPED | OUTPATIENT
Start: 2018-11-06 | End: 2019-07-02

## 2018-11-06 NOTE — TELEPHONE ENCOUNTER
[facility] Call Center    Phone Message    May a detailed message be left on voicemail: yes    Reason for Call: Other: August is calling in regarding RX mycophenolate (GENERIC EQUIVALENT) 250 MG capsule. He stated that [facility] pharmacy has tried to send the request 3 times over and have not received anything yet. Please follow up with August when the refill is put in. Thank you.    Action Taken: Message routed to:  [facility] nepherology

## 2018-12-03 ENCOUNTER — DOCUMENTATION ONLY (OUTPATIENT)
Dept: SLEEP MEDICINE | Facility: CLINIC | Age: 28
End: 2018-12-03

## 2018-12-03 DIAGNOSIS — G47.33 OSA (OBSTRUCTIVE SLEEP APNEA): ICD-10-CM

## 2018-12-03 NOTE — PROGRESS NOTES
6 Month [facility] visit    Diagnostic AHI: 110    PSG    Subjective measures:   Pt states things are going well and has no issues or complaints.  Pt is benefiting from therapy.      Assessment: Pt not meeting objective benchmarks for compliance.  Pt acknowledges that he needs to use the device more often.   Patient meeting subjective benchmarks.   Action plan:   pt to follow up per provider request    Device type: Auto-CPAP  PAP settings: CPAP min 11 cm  H20     CPAP max 16 cm  H20     90th % usudoltn68.6 cm  H20    Objective measures: 14 day rolling measures         Compliance  35 %     % of night spent in large leak  0 % last  upload      AHI 2.63   last  upload      Average number of minutes 126           Objective measure goal  Compliance   Goal >70%  Leak   Goal < 10%  AHI  Goal < 5  Usage  Goal >240

## 2018-12-17 ENCOUNTER — OFFICE VISIT (OUTPATIENT)
Dept: OPHTHALMOLOGY | Facility: CLINIC | Age: 28
End: 2018-12-17
Attending: OPHTHALMOLOGY
Payer: MEDICARE

## 2018-12-17 DIAGNOSIS — H47.20 OPTIC NERVE ATROPHY, BILATERAL: Primary | ICD-10-CM

## 2018-12-17 PROCEDURE — G0463 HOSPITAL OUTPT CLINIC VISIT: HCPCS | Mod: ZF

## 2018-12-17 ASSESSMENT — VISUAL ACUITY
OS_SC: NLP
OD_SC: NLP
METHOD: SNELLEN - LINEAR

## 2018-12-17 ASSESSMENT — CONF VISUAL FIELD
OS_INFERIOR_NASAL_RESTRICTION: 1
OD_INFERIOR_TEMPORAL_RESTRICTION: 1
OS_INFERIOR_TEMPORAL_RESTRICTION: 1
OD_SUPERIOR_TEMPORAL_RESTRICTION: 1
OS_SUPERIOR_NASAL_RESTRICTION: 1
OD_SUPERIOR_NASAL_RESTRICTION: 1
OS_SUPERIOR_TEMPORAL_RESTRICTION: 1
OD_INFERIOR_NASAL_RESTRICTION: 1

## 2018-12-17 ASSESSMENT — TONOMETRY
OD_IOP_MMHG: 19
IOP_METHOD: ICARE
OS_IOP_MMHG: 21

## 2018-12-17 ASSESSMENT — SLIT LAMP EXAM - LIDS
COMMENTS: NORMAL
COMMENTS: NORMAL

## 2018-12-17 ASSESSMENT — EXTERNAL EXAM - LEFT EYE: OS_EXAM: NORMAL

## 2018-12-17 ASSESSMENT — EXTERNAL EXAM - RIGHT EYE: OD_EXAM: NORMAL

## 2018-12-17 NOTE — PROGRESS NOTES
"I have confirmed the patient's and reviewed Past Medical History, Past Surgical History, Social History, Family History, Problem List, Medication List and agree with Tech note.    CC: Annual    HPI: 28 year old presents for general eye exam.  Moved from [place] and moved to [place] December 2016. Last eye exam was March 2016 in [place]. 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 Could see colors OD when first moved here as well as large letters but he has gradually lost his vision OD and feels that he has been NLP for past year.''    Patient cannot recall having MRI brain.    H/o kidney transplant April 2006.    Assessment/plan:  1. Optic nerve atrophy OU   - Gradual vision loss OD to complete NLP 1.5 years ago   - Recommend neuro-ophtho evaluation (VEP?) and to determine if MRI brain warranted        [facility] retina PRVIELKA Choi M.D.  PGY-3, Ophthalmology    ATTESTATION:     I have seen and examined the patient with Dr. Choi and agree with the findings in this note, and the interpretation of the diagnostic tests.    Daren Quinteros MD PhD.  Professor & Chair      "

## 2018-12-18 ENCOUNTER — TELEPHONE (OUTPATIENT)
Dept: TRANSPLANT | Facility: CLINIC | Age: 28
End: 2018-12-18

## 2018-12-18 NOTE — TELEPHONE ENCOUNTER
Call placed to patient. Patient notes that he has a cold and has had some diarrhea. Patient v\u to f\u with his PCP and repeat transplant labs with next lab draw. Order sent

## 2018-12-18 NOTE — TELEPHONE ENCOUNTER
ISSUE:  WBC remains elevated at 12    PLAN:   Please call pt to see if he is having any s/s infection or illness.  If so, pt to f/u with PCP.  Otherwise, pt to repeat level with next set of transplant labs.

## 2018-12-18 NOTE — LETTER
PHYSICIAN ORDERS      DATE & TIME ISSUED: 2018 3:50 PM  PATIENT NAME: Barry Mcgrath   : 1990     [facility] MR# [if applicable]: [id number]     DIAGNOSIS:  Kidney transplant   ICD-10 CODE: Z94.0       Please complete the following labs in with next lab draw.  Tacrolimus level  CBC  BMP    Any questions please call: [phone] option #5    Please fax results to [phone].

## 2018-12-19 DIAGNOSIS — Z48.298 AFTERCARE FOLLOWING ORGAN TRANSPLANT: ICD-10-CM

## 2018-12-19 DIAGNOSIS — Z94.0 KIDNEY REPLACED BY TRANSPLANT: ICD-10-CM

## 2018-12-19 PROCEDURE — 80197 ASSAY OF TACROLIMUS: CPT | Performed by: INTERNAL MEDICINE

## 2018-12-23 LAB
TACROLIMUS BLD-MCNC: 5.8 UG/L (ref 5–15)
TME LAST DOSE: NORMAL H

## 2019-01-16 ENCOUNTER — OFFICE VISIT (OUTPATIENT)
Dept: OPHTHALMOLOGY | Facility: CLINIC | Age: 29
End: 2019-01-16
Attending: OPHTHALMOLOGY
Payer: MEDICARE

## 2019-01-16 DIAGNOSIS — H54.10 BLINDNESS AND LOW VISION: ICD-10-CM

## 2019-01-16 DIAGNOSIS — H53.10 SUBJECTIVE VISUAL DISTURBANCE: Primary | ICD-10-CM

## 2019-01-16 DIAGNOSIS — H47.20 PARTIAL OPTIC ATROPHY: ICD-10-CM

## 2019-01-16 PROCEDURE — G0463 HOSPITAL OUTPT CLINIC VISIT: HCPCS | Mod: ZF | Performed by: TECHNICIAN/TECHNOLOGIST

## 2019-01-16 ASSESSMENT — TONOMETRY
OD_IOP_MMHG: 15
IOP_METHOD: ICARE
OS_IOP_MMHG: 18

## 2019-01-16 ASSESSMENT — VISUAL ACUITY
OD_CC: NLP
METHOD: SNELLEN - LINEAR
OS_CC: NLP

## 2019-01-16 ASSESSMENT — SLIT LAMP EXAM - LIDS
COMMENTS: NORMAL
COMMENTS: NORMAL

## 2019-01-16 NOTE — LETTER
"2019    RE: Barry Mcgrath  : 1990  MRN: [id number]    Dear Dr. Rivers    Thank you for referring your patient, Barry Mcgrath, to my neuro-ophthalmology clinic recently.  After a thorough neuro-ophthalmic history and examination, I came to the following conclusions:      1. Severe bilateral vision loss with history of unexplained progression to no light perception only in last 3-5 years  2. Congenital nystagmus    Barry Mcgrath is an 28 year old male who presents today for evaluation of longstanding poor vision with optic nerve abnormality.  His past medical history is significant for anxiety/depression, bipolar disorder, and history of kidney transplant.  He states that his vision has been poor for a very long time.  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  States previously he could see better (3-5+ years ago) ago he had \"good vision\", good enough to play video games and use the computer.  In the past 2-3 years there has been rapid decline and he has lost the ability to see colors.  Does not get regular headaches, no nausea.    On exam his vision is no light perception in both eyes. His eye pressures are normal.  He has horizontal pendular nystagmus in both eyes.  Posterior exam is difficult given rapid eye movements and nystagmus.  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  His nerves do not appear hypoplastic or malformed, but they are pale and cupped.     In summary this is an 28 year old male with nystagmus and longstanding poor vision.  It is difficult to examine his eyes given nystagmus.  The nerves appear pale and cupped on limited exam.  This does not seem to be a case of optic nerve hypoplasia based upon the appearance of the nerves.  It is possible that he had undiagnosed glaucoma in the right eye and that is why he experienced visual decline in " his 20's.  MRI was discussed with the patient and we will order this study to look for any other etiology that could explain his progressive visual decline over the past decade.    Follow-up with me will depend on MRI results which will be conveyed to patient over the phone.      Again, thank you for trusting me with the care of your patient.  For further exam details, please feel free to contact our office for additional records.  If you wish to contact me regarding this patient please email me at [email or web address] or give my clinic a call to arrange a phone conversation.    Sincerely,    Jaswinder Montes MD  , Neuro-Ophthalmology and Adult Strabismus Surgery  [employer] and [employer] Chair in Neuro-Ophthalmology  Department of Ophthalmology and Visual Neurosciences  [facility]    DX: optic atrophy

## 2019-01-16 NOTE — PROGRESS NOTES
"   1. Severe bilateral vision loss with history of unexplained progression to no light perception only in last 3-5 years  2. Congenital nystagmus    Barry Mcgrath is an 28 year old male who presents today for evaluation of longstanding poor vision with optic nerve abnormality.  His past medical history is significant for anxiety/depression, bipolar disorder, and history of kidney transplant.  He states that his vision has been poor for a very long time.  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  States previously he could see better (3-5+ years ago) ago he had \"good vision\", good enough to play video games and use the computer.  In the past 2-3 years there has been rapid decline and he has lost the ability to see colors.  Does not get regular headaches, no nausea.    On exam his vision is no light perception in both eyes. His eye pressures are normal.  He has horizontal pendular nystagmus in both eyes.  Posterior exam is difficult given rapid eye movements and nystagmus.  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  His nerves do not appear hypoplastic or malformed, but they are pale and cupped.     In summary this is an 28 year old male with nystagmus and longstanding poor vision.  It is difficult to examine his eyes given nystagmus.  The nerves appear pale and cupped on limited exam.  This does not seem to be a case of optic nerve hypoplasia based upon the appearance of the nerves.  It is possible that he had undiagnosed glaucoma in the right eye and that is why he experienced visual decline in his 20's.  MRI was discussed with the patient and we will order this study to look for any other etiology that could explain his progressive visual decline over the past decade.    Follow-up with me will depend on MRI results which will be conveyed to patient over the phone.      "   Complete documentation of historical and exam elements from today's encounter can be found in the full encounter summary report (not reduplicated in this progress note).  I personally obtained the chief complaint(s) and history of present illness.  I confirmed and edited as necessary the review of systems, past medical/surgical history, family history, social history, and examination findings as documented by others; and I examined the patient myself.  I personally reviewed the relevant tests, images, and reports as documented above.  I formulated and edited as necessary the assessment and plan and discussed the findings and management plan with the patient and family.  I personally reviewed the ophthalmic test(s) associated with this encounter, agree with the interpretation(s) as documented by the resident/fellow, and have edited the corresponding report(s) as necessary.     MD Meño Aaron M.D.  PGY-3, Ophthalmology

## 2019-01-29 ENCOUNTER — ANCILLARY PROCEDURE (OUTPATIENT)
Dept: MRI IMAGING | Facility: CLINIC | Age: 29
End: 2019-01-29
Payer: MEDICARE

## 2019-01-29 DIAGNOSIS — H54.10 BLINDNESS AND LOW VISION: ICD-10-CM

## 2019-01-29 LAB
CREAT BLD-MCNC: 0.9 MG/DL (ref 0.66–1.25)
GFR SERPL CREATININE-BSD FRML MDRD: >90 ML/MIN/{1.73_M2}

## 2019-01-29 RX ORDER — GADOBUTROL 604.72 MG/ML
10 INJECTION INTRAVENOUS ONCE
Status: COMPLETED | OUTPATIENT
Start: 2019-01-29 | End: 2019-01-29

## 2019-01-29 RX ADMIN — GADOBUTROL 10 ML: 604.72 INJECTION INTRAVENOUS at 17:41

## 2019-01-30 NOTE — DISCHARGE INSTRUCTIONS

## 2019-01-30 NOTE — DISCHARGE INSTRUCTIONS

## 2019-02-01 ENCOUNTER — TELEPHONE (OUTPATIENT)
Dept: OPHTHALMOLOGY | Facility: CLINIC | Age: 29
End: 2019-02-01

## 2019-02-01 NOTE — TELEPHONE ENCOUNTER
[facility] Call Center    Phone Message    May a detailed message be left on voicemail: yes    Reason for Call: Other: per pt- would like to know his MRI results asap- as he is very anxious. please call pt thanks     Action Taken: Message routed to:  [facility] eye

## 2019-02-01 NOTE — TELEPHONE ENCOUNTER
[facility] Call Center    Phone Message    May a detailed message be left on voicemail: yes    Reason for Call: Requesting Results   Name/type of test: MRI  Date of test: 1/16/19  Was test done at a location other than [facility] (Please fill in the location if not [facility])?: Yes: Pt so very anxious about hearing back from Dr. Montes's team regarding this MRI of his eye. Please call pt to help him with this. Thank you.      Action Taken: Message routed to:  [facility]): [facility] EYE GENERAL

## 2019-02-01 NOTE — TELEPHONE ENCOUNTER
Reviewed with pt no acute pathology on MRI      Small left globe and right staphyloma. Atrophy of both optic  nerves, greatest on the left.  2. No abnormal signal or enhancement along the bilateral visual  pathways.  3. No acute intracranial pathology.         Reviewed will ask dr. Becker to contact pt to review further tomorrow    Wei Benz RN 5:38 PM 02/01/19

## 2019-02-04 ENCOUNTER — TELEPHONE (OUTPATIENT)
Dept: OPHTHALMOLOGY | Facility: CLINIC | Age: 29
End: 2019-02-04

## 2019-02-04 NOTE — TELEPHONE ENCOUNTER
----- Message from Jaswinder Montes MD sent at 2/3/2019 11:04 AM CST -----  Regarding: MRI results  Mike,    Please call this patient and tell him that his MRI came back essentially unremarkable in terms of a cause for progressive vision loss.  There eis no structural explanation. The MRI shows expected fidnings of malformed / maldeveloped eyes and optic nerves (congenital) but no other new problems; no tumors, cancers, inflammation, etc.    He should follow-up with me as needed and resume routine eye care closer to home.    Thank you. - Jaswinder

## 2019-02-04 NOTE — TELEPHONE ENCOUNTER
Called and spoke to Barry regarding his MRI results. No evidence of structural etiology causing his vision loss. His eyes appear malformed but appears congenital. All questions answered.

## 2019-02-20 ENCOUNTER — DOCUMENTATION ONLY (OUTPATIENT)
Dept: TRANSPLANT | Facility: CLINIC | Age: 29
End: 2019-02-20

## 2019-02-20 DIAGNOSIS — T86.10 COMPLICATIONS, KIDNEY TRANSPLANT: ICD-10-CM

## 2019-02-20 DIAGNOSIS — Z94.0 KIDNEY TRANSPLANTED: Primary | ICD-10-CM

## 2019-02-20 DIAGNOSIS — Z48.298 AFTERCARE FOLLOWING ORGAN TRANSPLANT: ICD-10-CM

## 2019-02-20 NOTE — PROGRESS NOTES
Chart Prep    Clinic Visit on: 4/5/19    Last lab completed:  1/9/19    Lab letter updated: 2/20/19     Lab orders faxed to:  [facility] [phone] (Phone)  [phone] (Fax)     Lab orders up to date in Epic.

## 2019-02-20 NOTE — LETTER
PHYSICIAN ORDERS    DATE & TIME ISSUED: 2019 12:56 PM  PATIENT NAME: Barry Mcgrath   : 1990     [facility] MR# [if applicable]: [id number]     DIAGNOSIS / ICD - 10 CODES    Kidney Transplanted (Z94.0)    After Care Following Organ Transplant (Z48.298)    Long Term Use of Medication (Z79.899)    Complications Kidney Transplant (T86.10)    Every 3 months    Hemogram and Platelet    Basic Metabolic Panel (Sodium,potassium,chloride,CO2,creatinine,urea,nitrogen,glucose,calcium)     / tacrolimus / Prograf drug level    Every 6 months    Urine for protein creatinine ratio      Patient should release information to the [facility].   Please fax results to the Transplant Center at [phone].  Any questions please call [phone] or [phone].        .

## 2019-02-20 NOTE — LETTER
PHYSICIAN ORDERS    DATE & TIME ISSUED: 2019 12:56 PM  PATIENT NAME: Barry Mcgrath   : 1990     [facility] MR# [if applicable]: [id number]     DIAGNOSIS / ICD - 10 CODES    Kidney Transplanted (Z94.0)    After Care Following Organ Transplant (Z48.298)    Long Term Use of Medication (Z79.899)    Complications Kidney Transplant (T86.10)    Every 3 months    Hemogram and Platelet    Basic Metabolic Panel (Sodium,potassium,chloride,CO2,creatinine,urea,nitrogen,glucose,calcium)     / tacrolimus / Prograf drug level    Every 6 months    Urine for protein creatinine ratio      Patient should release information to the [facility] Center.   Please fax results to the Transplant Center at [phone].  Any questions please call [phone] or [phone].        .

## 2019-03-01 DIAGNOSIS — Z94.0 KIDNEY TRANSPLANTED: ICD-10-CM

## 2019-03-01 DIAGNOSIS — T86.10 COMPLICATIONS, KIDNEY TRANSPLANT: ICD-10-CM

## 2019-03-01 DIAGNOSIS — Z48.298 AFTERCARE FOLLOWING ORGAN TRANSPLANT: ICD-10-CM

## 2019-03-01 PROCEDURE — 80197 ASSAY OF TACROLIMUS: CPT | Performed by: INTERNAL MEDICINE

## 2019-03-04 LAB
TACROLIMUS BLD-MCNC: 5.6 UG/L (ref 5–15)
TME LAST DOSE: NORMAL H

## 2019-03-11 ENCOUNTER — HOSPITAL ENCOUNTER (EMERGENCY)
Facility: CLINIC | Age: 29
Discharge: HOME | DRG: 882 | End: 2019-03-11
Attending: EMERGENCY MEDICINE | Admitting: EMERGENCY MEDICINE
Payer: MEDICARE

## 2019-03-11 VITALS
BODY MASS INDEX: 35.07 KG/M2 | DIASTOLIC BLOOD PRESSURE: 57 MMHG | OXYGEN SATURATION: 97 % | HEIGHT: 70 IN | RESPIRATION RATE: 16 BRPM | SYSTOLIC BLOOD PRESSURE: 120 MMHG | WEIGHT: 245 LBS | TEMPERATURE: 96.9 F | HEART RATE: 80 BPM

## 2019-03-11 DIAGNOSIS — F16.283: ICD-10-CM

## 2019-03-11 DIAGNOSIS — R45.851 PASSIVE SUICIDAL IDEATIONS: ICD-10-CM

## 2019-03-11 PROCEDURE — 90791 PSYCH DIAGNOSTIC EVALUATION: CPT

## 2019-03-11 PROCEDURE — 99284 EMERGENCY DEPT VISIT MOD MDM: CPT | Mod: Z6 | Performed by: EMERGENCY MEDICINE

## 2019-03-11 PROCEDURE — 99285 EMERGENCY DEPT VISIT HI MDM: CPT | Mod: 25 | Performed by: EMERGENCY MEDICINE

## 2019-03-11 RX ORDER — SODIUM CHLORIDE 9 MG/ML
INJECTION, SOLUTION INTRAVENOUS
Status: DISCONTINUED
Start: 2019-03-11 | End: 2019-03-11 | Stop reason: HOSPADM

## 2019-03-11 ASSESSMENT — MIFFLIN-ST. JEOR: SCORE: 2092.56

## 2019-03-11 NOTE — ED TRIAGE NOTES
Pt was brought in by EMS. EMS reported pt was found outside with [employer] with SI complaint and HI toward people that have caused him pain in the past.

## 2019-03-11 NOTE — DISCHARGE INSTRUCTIONS
Please make an appointment to follow up with your therapist today.      Return to the ED if you are having worsening symptoms, or any urgent/life-threatening concerns.

## 2019-03-11 NOTE — ED AVS SNAPSHOT
[facility], [place], Emergency Department  [address]  [facility] [place] [place] [address]  Phone:  [phone]  Fax:  [phone]                                    Barry Mcgrath   MRN: [id number]    Department:  [facility], Emergency Department   Date of Visit:  3/11/2019           After Visit Summary Signature Page    I have received my discharge instructions, and my questions have been answered. I have discussed any challenges I see with this plan with the nurse or doctor.    ..........................................................................................................................................  Patient/Patient Representative Signature      ..........................................................................................................................................  Patient Representative Print Name and Relationship to Patient    ..................................................               ................................................  Date                                   Time    ..........................................................................................................................................  Reviewed by Signature/Title    ...................................................              ..............................................  Date                                               Time          22EPIC Rev 08/18

## 2019-03-11 NOTE — ED NOTES
Bed: ED09  Expected date:   Expected time:   Means of arrival:   Comments:  Oren [age] y/o M psych eval
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
## 2019-03-12 ENCOUNTER — HOSPITAL ENCOUNTER (INPATIENT)
Facility: CLINIC | Age: 29
LOS: 1 days | Discharge: HOME | DRG: 882 | End: 2019-03-14
Attending: EMERGENCY MEDICINE | Admitting: PSYCHIATRY & NEUROLOGY
Payer: MEDICARE

## 2019-03-12 DIAGNOSIS — R45.851 SUICIDE IDEATION: ICD-10-CM

## 2019-03-12 DIAGNOSIS — F31.32 BIPOLAR AFFECTIVE DISORDER, CURRENTLY DEPRESSED, MODERATE (H): ICD-10-CM

## 2019-03-12 DIAGNOSIS — F43.10 PTSD (POST-TRAUMATIC STRESS DISORDER): Primary | ICD-10-CM

## 2019-03-12 PROCEDURE — 99285 EMERGENCY DEPT VISIT HI MDM: CPT | Mod: Z6 | Performed by: EMERGENCY MEDICINE

## 2019-03-12 PROCEDURE — 80320 DRUG SCREEN QUANTALCOHOLS: CPT | Performed by: FAMILY MEDICINE

## 2019-03-12 PROCEDURE — 99285 EMERGENCY DEPT VISIT HI MDM: CPT | Mod: 25 | Performed by: EMERGENCY MEDICINE

## 2019-03-12 PROCEDURE — 80307 DRUG TEST PRSMV CHEM ANLYZR: CPT | Performed by: FAMILY MEDICINE

## 2019-03-12 RX ORDER — PREDNISONE 5 MG/1
5 TABLET ORAL DAILY
Status: DISCONTINUED | OUTPATIENT
Start: 2019-03-13 | End: 2019-03-14 | Stop reason: HOSPADM

## 2019-03-12 RX ORDER — TACROLIMUS 1 MG/1
3 CAPSULE ORAL EVERY MORNING
Status: DISCONTINUED | OUTPATIENT
Start: 2019-03-13 | End: 2019-03-14 | Stop reason: HOSPADM

## 2019-03-12 RX ORDER — TACROLIMUS 1 MG/1
2 CAPSULE ORAL
Status: DISCONTINUED | OUTPATIENT
Start: 2019-03-13 | End: 2019-03-14 | Stop reason: HOSPADM

## 2019-03-12 RX ORDER — VENLAFAXINE HYDROCHLORIDE 150 MG/1
300 CAPSULE, EXTENDED RELEASE ORAL DAILY
Status: DISCONTINUED | OUTPATIENT
Start: 2019-03-13 | End: 2019-03-14 | Stop reason: HOSPADM

## 2019-03-12 RX ORDER — ARIPIPRAZOLE 15 MG/1
30 TABLET ORAL AT BEDTIME
Status: DISCONTINUED | OUTPATIENT
Start: 2019-03-13 | End: 2019-03-14 | Stop reason: HOSPADM

## 2019-03-12 RX ORDER — RISPERIDONE 1 MG/1
1 TABLET, ORALLY DISINTEGRATING ORAL AT BEDTIME
Status: DISCONTINUED | OUTPATIENT
Start: 2019-03-13 | End: 2019-03-14 | Stop reason: HOSPADM

## 2019-03-12 RX ORDER — LISINOPRIL 10 MG/1
10 TABLET ORAL DAILY
Status: DISCONTINUED | OUTPATIENT
Start: 2019-03-13 | End: 2019-03-14 | Stop reason: HOSPADM

## 2019-03-12 RX ORDER — MYCOPHENOLATE MOFETIL 250 MG/1
750 CAPSULE ORAL 2 TIMES DAILY
Status: DISCONTINUED | OUTPATIENT
Start: 2019-03-13 | End: 2019-03-14 | Stop reason: HOSPADM

## 2019-03-12 RX ORDER — OXCARBAZEPINE 600 MG/1
600 TABLET, FILM COATED ORAL 2 TIMES DAILY
Status: DISCONTINUED | OUTPATIENT
Start: 2019-03-13 | End: 2019-03-14 | Stop reason: HOSPADM

## 2019-03-12 ASSESSMENT — ENCOUNTER SYMPTOMS
COLOR CHANGE: 0
DIFFICULTY URINATING: 0
SHORTNESS OF BREATH: 0
FEVER: 0
ARTHRALGIAS: 0
NECK STIFFNESS: 0
ABDOMINAL PAIN: 0
HEADACHES: 0
EYE REDNESS: 0
DYSPHORIC MOOD: 1
CONFUSION: 0

## 2019-03-13 PROBLEM — F31.9 BIPOLAR 1 DISORDER (H): Status: ACTIVE | Noted: 2019-03-13

## 2019-03-13 LAB
ALBUMIN SERPL-MCNC: 3.8 G/DL (ref 3.4–5)
ALP SERPL-CCNC: 103 U/L (ref 40–150)
ALT SERPL W P-5'-P-CCNC: 29 U/L (ref 0–70)
ANION GAP SERPL CALCULATED.3IONS-SCNC: 10 MMOL/L (ref 3–14)
AST SERPL W P-5'-P-CCNC: 17 U/L (ref 0–45)
BASOPHILS # BLD AUTO: 0 10E9/L (ref 0–0.2)
BASOPHILS NFR BLD AUTO: 0.3 %
BILIRUB SERPL-MCNC: 0.3 MG/DL (ref 0.2–1.3)
BUN SERPL-MCNC: 11 MG/DL (ref 7–30)
CALCIUM SERPL-MCNC: 8.9 MG/DL (ref 8.5–10.1)
CHLORIDE SERPL-SCNC: 105 MMOL/L (ref 94–109)
CO2 SERPL-SCNC: 26 MMOL/L (ref 20–32)
CREAT SERPL-MCNC: 0.75 MG/DL (ref 0.66–1.25)
DIFFERENTIAL METHOD BLD: ABNORMAL
EOSINOPHIL # BLD AUTO: 0.9 10E9/L (ref 0–0.7)
EOSINOPHIL NFR BLD AUTO: 6.9 %
ERYTHROCYTE [DISTWIDTH] IN BLOOD BY AUTOMATED COUNT: 13.1 % (ref 10–15)
GFR SERPL CREATININE-BSD FRML MDRD: >90 ML/MIN/{1.73_M2}
GLUCOSE BLDC GLUCOMTR-MCNC: 103 MG/DL (ref 70–99)
GLUCOSE BLDC GLUCOMTR-MCNC: 91 MG/DL (ref 70–99)
GLUCOSE SERPL-MCNC: 103 MG/DL (ref 70–99)
HCT VFR BLD AUTO: 43.2 % (ref 40–53)
HGB BLD-MCNC: 14.2 G/DL (ref 13.3–17.7)
IMM GRANULOCYTES # BLD: 0.1 10E9/L (ref 0–0.4)
IMM GRANULOCYTES NFR BLD: 0.5 %
LYMPHOCYTES # BLD AUTO: 2.8 10E9/L (ref 0.8–5.3)
LYMPHOCYTES NFR BLD AUTO: 21.8 %
MCH RBC QN AUTO: 28.4 PG (ref 26.5–33)
MCHC RBC AUTO-ENTMCNC: 32.9 G/DL (ref 31.5–36.5)
MCV RBC AUTO: 86 FL (ref 78–100)
MONOCYTES # BLD AUTO: 0.6 10E9/L (ref 0–1.3)
MONOCYTES NFR BLD AUTO: 5 %
NEUTROPHILS # BLD AUTO: 8.4 10E9/L (ref 1.6–8.3)
NEUTROPHILS NFR BLD AUTO: 65.5 %
NRBC # BLD AUTO: 0 10*3/UL
NRBC BLD AUTO-RTO: 0 /100
PLATELET # BLD AUTO: 261 10E9/L (ref 150–450)
POTASSIUM SERPL-SCNC: 3.7 MMOL/L (ref 3.4–5.3)
PROT SERPL-MCNC: 7.4 G/DL (ref 6.8–8.8)
RBC # BLD AUTO: 5 10E12/L (ref 4.4–5.9)
SODIUM SERPL-SCNC: 141 MMOL/L (ref 133–144)
TSH SERPL DL<=0.005 MIU/L-ACNC: 2.27 MU/L (ref 0.4–4)
WBC # BLD AUTO: 12.8 10E9/L (ref 4–11)

## 2019-03-13 PROCEDURE — 80053 COMPREHEN METABOLIC PANEL: CPT | Performed by: EMERGENCY MEDICINE

## 2019-03-13 PROCEDURE — 84443 ASSAY THYROID STIM HORMONE: CPT | Performed by: EMERGENCY MEDICINE

## 2019-03-13 PROCEDURE — 25000125 ZZHC RX 250: Performed by: EMERGENCY MEDICINE

## 2019-03-13 PROCEDURE — 99231 SBSQ HOSP IP/OBS SF/LOW 25: CPT | Performed by: PHYSICIAN ASSISTANT

## 2019-03-13 PROCEDURE — 85025 COMPLETE CBC W/AUTO DIFF WBC: CPT | Performed by: EMERGENCY MEDICINE

## 2019-03-13 PROCEDURE — A9270 NON-COVERED ITEM OR SERVICE: HCPCS | Mod: GY | Performed by: STUDENT IN AN ORGANIZED HEALTH CARE EDUCATION/TRAINING PROGRAM

## 2019-03-13 PROCEDURE — 99223 1ST HOSP IP/OBS HIGH 75: CPT | Mod: AI | Performed by: PSYCHIATRY & NEUROLOGY

## 2019-03-13 PROCEDURE — 12400001 ZZH R&B MH UMMC

## 2019-03-13 PROCEDURE — 25000131 ZZH RX MED GY IP 250 OP 636 PS 637: Mod: GY | Performed by: EMERGENCY MEDICINE

## 2019-03-13 PROCEDURE — A9270 NON-COVERED ITEM OR SERVICE: HCPCS | Mod: GY | Performed by: EMERGENCY MEDICINE

## 2019-03-13 PROCEDURE — 25000132 ZZH RX MED GY IP 250 OP 250 PS 637: Mod: GY | Performed by: STUDENT IN AN ORGANIZED HEALTH CARE EDUCATION/TRAINING PROGRAM

## 2019-03-13 PROCEDURE — 00000146 ZZHCL STATISTIC GLUCOSE BY METER IP

## 2019-03-13 PROCEDURE — 90791 PSYCH DIAGNOSTIC EVALUATION: CPT

## 2019-03-13 PROCEDURE — 25000132 ZZH RX MED GY IP 250 OP 250 PS 637: Performed by: EMERGENCY MEDICINE

## 2019-03-13 RX ORDER — ACETAMINOPHEN 325 MG/1
650 TABLET ORAL EVERY 4 HOURS PRN
Status: DISCONTINUED | OUTPATIENT
Start: 2019-03-13 | End: 2019-03-14 | Stop reason: HOSPADM

## 2019-03-13 RX ORDER — BISACODYL 10 MG
10 SUPPOSITORY, RECTAL RECTAL DAILY PRN
Status: DISCONTINUED | OUTPATIENT
Start: 2019-03-13 | End: 2019-03-14 | Stop reason: HOSPADM

## 2019-03-13 RX ORDER — OLANZAPINE 10 MG/1
10 TABLET ORAL
Status: DISCONTINUED | OUTPATIENT
Start: 2019-03-13 | End: 2019-03-14 | Stop reason: HOSPADM

## 2019-03-13 RX ORDER — TRAZODONE HYDROCHLORIDE 50 MG/1
50 TABLET, FILM COATED ORAL
Status: DISCONTINUED | OUTPATIENT
Start: 2019-03-13 | End: 2019-03-14 | Stop reason: HOSPADM

## 2019-03-13 RX ORDER — OLANZAPINE 10 MG/2ML
10 INJECTION, POWDER, FOR SOLUTION INTRAMUSCULAR
Status: DISCONTINUED | OUTPATIENT
Start: 2019-03-13 | End: 2019-03-14 | Stop reason: HOSPADM

## 2019-03-13 RX ORDER — ALUMINA, MAGNESIA, AND SIMETHICONE 2400; 2400; 240 MG/30ML; MG/30ML; MG/30ML
30 SUSPENSION ORAL EVERY 4 HOURS PRN
Status: DISCONTINUED | OUTPATIENT
Start: 2019-03-13 | End: 2019-03-14 | Stop reason: HOSPADM

## 2019-03-13 RX ORDER — HYDROXYZINE HYDROCHLORIDE 25 MG/1
25 TABLET, FILM COATED ORAL EVERY 4 HOURS PRN
Status: DISCONTINUED | OUTPATIENT
Start: 2019-03-13 | End: 2019-03-14 | Stop reason: HOSPADM

## 2019-03-13 RX ORDER — PRAZOSIN HYDROCHLORIDE 1 MG/1
1 CAPSULE ORAL 2 TIMES DAILY
Status: DISCONTINUED | OUTPATIENT
Start: 2019-03-13 | End: 2019-03-14 | Stop reason: HOSPADM

## 2019-03-13 RX ADMIN — MYCOPHENOLATE MOFETIL 750 MG: 250 CAPSULE ORAL at 21:56

## 2019-03-13 RX ADMIN — TACROLIMUS 3 MG: 1 CAPSULE ORAL at 10:45

## 2019-03-13 RX ADMIN — RISPERIDONE 1 MG: 1 TABLET, ORALLY DISINTEGRATING ORAL at 21:59

## 2019-03-13 RX ADMIN — OXCARBAZEPINE 600 MG: 600 TABLET ORAL at 01:27

## 2019-03-13 RX ADMIN — MYCOPHENOLATE MOFETIL 750 MG: 250 CAPSULE ORAL at 09:30

## 2019-03-13 RX ADMIN — RISPERIDONE 1 MG: 1 TABLET, ORALLY DISINTEGRATING ORAL at 01:26

## 2019-03-13 RX ADMIN — MYCOPHENOLATE MOFETIL 750 MG: 250 CAPSULE ORAL at 01:27

## 2019-03-13 RX ADMIN — ARIPIPRAZOLE 30 MG: 15 TABLET ORAL at 21:56

## 2019-03-13 RX ADMIN — METFORMIN HYDROCHLORIDE 500 MG: 500 TABLET, FILM COATED ORAL at 09:15

## 2019-03-13 RX ADMIN — PRAZOSIN HYDROCHLORIDE 1 MG: 1 CAPSULE ORAL at 10:43

## 2019-03-13 RX ADMIN — VENLAFAXINE HYDROCHLORIDE 300 MG: 150 CAPSULE, EXTENDED RELEASE ORAL at 09:14

## 2019-03-13 RX ADMIN — PRAZOSIN HYDROCHLORIDE 1 MG: 1 CAPSULE ORAL at 21:57

## 2019-03-13 RX ADMIN — ARIPIPRAZOLE 30 MG: 15 TABLET ORAL at 01:27

## 2019-03-13 RX ADMIN — OXCARBAZEPINE 600 MG: 600 TABLET ORAL at 21:57

## 2019-03-13 RX ADMIN — LISINOPRIL 10 MG: 10 TABLET ORAL at 09:15

## 2019-03-13 RX ADMIN — TACROLIMUS 2 MG: 1 CAPSULE ORAL at 01:26

## 2019-03-13 RX ADMIN — PREDNISONE 5 MG: 5 TABLET ORAL at 09:15

## 2019-03-13 RX ADMIN — TACROLIMUS 2 MG: 1 CAPSULE ORAL at 17:52

## 2019-03-13 RX ADMIN — OXCARBAZEPINE 600 MG: 600 TABLET ORAL at 09:15

## 2019-03-13 RX ADMIN — METFORMIN HYDROCHLORIDE 500 MG: 500 TABLET, FILM COATED ORAL at 21:56

## 2019-03-13 ASSESSMENT — ACTIVITIES OF DAILY LIVING (ADL)
HYGIENE/GROOMING: INDEPENDENT
TRANSFERRING: 1-->ASSISTIVE EQUIPMENT
NUMBER_OF_TIMES_PATIENT_HAS_FALLEN_WITHIN_LAST_SIX_MONTHS: 0
RETIRED_COMMUNICATION: 0-->UNDERSTANDS/COMMUNICATES WITHOUT DIFFICULTY
HYGIENE/GROOMING: INDEPENDENT
FALL_HISTORY_WITHIN_LAST_SIX_MONTHS: NO
ORAL_HYGIENE: INDEPENDENT
AMBULATION: 1-->ASSISTIVE EQUIPMENT
DRESS: INDEPENDENT
RETIRED_EATING: 0-->INDEPENDENT
SWALLOWING: 0-->SWALLOWS FOODS/LIQUIDS WITHOUT DIFFICULTY
HYGIENE/GROOMING: INDEPENDENT
BATHING: 0-->INDEPENDENT
ORAL_HYGIENE: INDEPENDENT
TOILETING: 0-->INDEPENDENT
DRESS: INDEPENDENT
LAUNDRY: UNABLE TO COMPLETE
DRESS: 0-->INDEPENDENT
DRESS: INDEPENDENT
ORAL_HYGIENE: INDEPENDENT
LAUNDRY: WITH SUPERVISION
COGNITION: 0 - NO COGNITION ISSUES REPORTED

## 2019-03-13 ASSESSMENT — MIFFLIN-ST. JEOR: SCORE: 2029.06

## 2019-03-13 NOTE — PLAN OF CARE
BEHAVIORAL TEAM DISCUSSION    Participants: Dr. Erik Garrett PGY-1  Priscila Portillo [facility]  Progress: Initiated with hospitalization.   Continued Stay Criteria/Rationale:   Medical/Physical: See H&P  Precautions:   Behavioral Orders   Procedures    Code 1 - Restrict to Unit    Fall precautions    Routine Programming     As clinically indicated    Status 15     Every 15 minutes.    Status Individual Observation     Patient is legally blind, needs support on the unit     Order Specific Question:   CONTINUOUS 24 hours / day     Answer:   5 feet     Order Specific Question:   Indications for SIO     Answer:   Medical equipment / ligature risk    Suicide precautions     Patients on Suicide Precautions should have a Combination Diet ordered that includes a Diet selection(s) AND a Behavioral Tray selection for Safe Tray - with utensils, or Safe Tray - NO utensils       Plan: Patient discussed his concerns with PTSD symptoms and is agreeable to starting a new medication to help with symptoms. Patient is willing to start working with a trauma therapist and will require a referral.   Rationale for change in precautions or plan: No change has been indicated.

## 2019-03-13 NOTE — PROGRESS NOTES
03/13/19 0302   Patient Belongings   Did you bring any home meds/supplements to the hospital?  Yes   Disposition of meds  Sent to security/pharmacy per site process   Patient Belongings locker   Patient Belongings Put in Hospital Secure Location (Security or Locker, etc.) cash/credit card;cell phone/electronics;wallet;tote;keys  (tote c medications,cell phone,clothing,coat,shoes,keys all in liocker)   Belongings Search Yes   Clothing Search Yes   Second Staff Sonu    Comment medications to pharmacy/debit card to the safe   A               Admission:  I am responsible for any personal items that are not sent to the safe or pharmacy.  [facility] is not responsible for loss, theft or damage of any property in my possession.    Signature:  _________________________________ Date: _______  Time: _____                                              Staff Signature:  ____________________________ Date: ________  Time: _____      2nd Staff person, if patient is unable/unwilling to sign:    Signature: ________________________________ Date: ________  Time: _____     Discharge:  [facility] has returned all of my personal belongings:    Signature: _________________________________ Date: ________  Time: _____                                          Staff Signature:  ____________________________ Date: ________  Time: _____

## 2019-03-13 NOTE — ED NOTES
I have performed an in person assessment of the patient. Based on this assessment the patient no longer requires a one on one attendant at this point in time.    Kailash Scott MD  10:55 PM  March 12, 2019         Kailash Scott MD  03/12/19 7841
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
## 2019-03-13 NOTE — CONSULTS
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 Admitted to inpatient psych with worsening depression. Pt reports his mood is definitely improving. He is concerned that he accidentally missed a few doses of his immunosuppressants, did not mean to he was just overwhelmed. He denies any chest pain, sob, or dyspnea. No fevers or chills. No abdominal pain. Voiding without issue. No bowel complaints. No other acute concerns.       Past Medical History:     Past Medical History:   Diagnosis Date     Anxiety      Bipolar 1 disorder (H)      Blind      Depressive disorder          Past Surgical History:      Past Surgical History:   Procedure Laterality Date     TRANSPLANT      Kidney transplant in 2006         Social History:     Social History     Tobacco Use     Smoking status: Never Smoker     Smokeless tobacco: Never Used   Substance Use Topics     Alcohol use: No         Family History:     Family History   Problem Relation Age of Onset     Sleep Apnea Father      Diabetes Father      Sleep Apnea Brother      Glaucoma Maternal Grandfather      Macular Degeneration No family hx of          Allergies:   No Known Allergies      Medications:   Reviewed. Please see MAR     Review of Systems:   10 point ROS of systems including Constitutional, Eyes, Respiratory, Cardiovascular, Gastroenterology, Genitourinary, Integumentary, Muscularskeletal, Psychiatric were all  "negative except for pertinent positives noted in my HPI.      OBJECTIVE   Physical Exam:   Vitals were reviewed  Blood pressure 125/79, pulse 77, temperature 97.9  F (36.6  C), temperature source Oral, resp. rate 14, height 1.778 m (5' 10\"), weight 104.8 kg (231 lb), SpO2 95 %.  General:alert, blind, NAD, very pleasant and cooperative  HEENT: blind  Cardiovascular: RRR  Lungs:CTAB  Abdomen: + BS, soft with no distention and no tenderness   Vascular: no peripheral edema, distal pulses palpable  Neurologic: AAO X 3  Skin: no jaundice, rashes, or lesions on exposed areas of skin         Data:        Lab Results   Component Value Date     03/13/2019    Lab Results   Component Value Date    CHLORIDE 105 03/13/2019    Lab Results   Component Value Date    BUN 11 03/13/2019      Lab Results   Component Value Date    POTASSIUM 3.7 03/13/2019    Lab Results   Component Value Date    CO2 26 03/13/2019    Lab Results   Component Value Date    CR 0.75 03/13/2019        Lab Results   Component Value Date    WBC 12.8 (H) 03/13/2019    HGB 14.2 03/13/2019    HCT 43.2 03/13/2019    MCV 86 03/13/2019     03/13/2019     Lab Results   Component Value Date    WBC 12.8 (H) 03/13/2019       Assessment and Plan/Recommendations:   Barry Mcgrath is a 29yo male with a hx of bipolar disorder, anxiety, depression, 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 Defer to psych, primary team.    HTN. BPs normotensive. Cont lisinopril 10mg daily. Lytes stable.    Congenital abnormality s/p renal transplant in 2006. Doing well, voiding without issue. Cr 0.75. Possibly missed a few doses of his tacro prior to admission, not intentional. No s/sx of infection.   - Immunosuppression: mycophenolate 750mg BID, cont tacro 3mg qAM/2mg qPM (goal 3-5) will check 12hr trough tomorrow, prednisone 5mg daily  - No longer on ppx given 13yrs out from " transplant    Currently, medically stable and I will be happy to follow up and see again for any intercurrent medical issues. Thank you for the opportunity to be a part of this patient's care.      Helen Szymanski Janna  Internal Medicine [facility] Hospitalist  [phone]  March 13, 2019
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
## 2019-03-13 NOTE — ED NOTES
ED to Behavioral Floor Handoff    SITUATION  Barry Mcgrath is a 28 year old male who speaks English and lives in a home with others The patient arrived in the ED by private car from home with a complaint of Suicidal  .The patient's current symptoms started/worsened 1 month(s) ago and during this time the symptoms have increased.   In the ED, pt was diagnosed with   Final diagnoses:   Suicide ideation   Bipolar affective disorder, currently depressed, moderate (H)        Initial vitals were: BP: 132/74  Pulse: 81  Temp: 98.4  F (36.9  C)  Resp: 16  Weight: 107 kg (236 lb)  SpO2: 91 %   --------  Is the patient diabetic? No   If yes, last blood glucose? --     If yes, was this treated in the ED? --  --------  Is the patient inebriated (ETOH) No or Impaired on other substances? No  MSSA done? No  Last MSSA score: --    Were withdrawal symptoms treated? No  Does the patient have a seizure history? No. If yes, date of most recent seizure--  --------  Is the patient patient experiencing suicidal ideation? reports suicidal ideation with out intention or a suicidal plan    Homicidal ideation? denies current or recent homicidal ideation or behaviors.    Self-injurious behavior/urges? denies current or recent self injurious behavior or ideation.  ------  Was pt aggressive in the ED No  Was a code called No  Is the pt now cooperative? Yes  -------  Meds given in ED:   Medications   ARIPiprazole (ABILIFY) tablet 30 mg (30 mg Oral Given 3/13/19 0127)   lisinopril (PRINIVIL/ZESTRIL) tablet 10 mg (not administered)   metFORMIN (GLUCOPHAGE) tablet 500 mg (not administered)   mycophenolate (GENERIC EQUIVALENT) capsule 750 mg (750 mg Oral Given 3/13/19 0127)   OXcarbazepine (TRILEPTAL) tablet 600 mg (600 mg Oral Given 3/13/19 0127)   predniSONE (DELTASONE) tablet 5 mg (not administered)   risperiDONE (risperDAL M-TABS) ODT tab 1 mg (1 mg Oral Given 3/13/19 0126)   tacrolimus (GENERIC EQUIVALENT) capsule 3 mg (not administered)    venlafaxine (EFFEXOR-XR) 24 hr capsule 300 mg (not administered)   tacrolimus (GENERIC EQUIVALENT) capsule 2 mg (2 mg Oral Given 3/13/19 0126)      Family present during ED course? No  Family currently present? No    BACKGROUND  Does the patient have a cognitive impairment or developmental disability? Yes, pt is legally blind.  Allergies: No Known Allergies.   Social demographics are   Social History     Socioeconomic History     Marital status: Single     Spouse name: None     Number of children: None     Years of education: None     Highest education level: None   Occupational History     None   Social Needs     Financial resource strain: None     Food insecurity:     Worry: None     Inability: None     Transportation needs:     Medical: None     Non-medical: None   Tobacco Use     Smoking status: Never Smoker     Smokeless tobacco: Never Used   Substance and Sexual Activity     Alcohol use: No     Drug use: No     Sexual activity: No   Lifestyle     Physical activity:     Days per week: None     Minutes per session: None     Stress: None   Relationships     Social connections:     Talks on phone: None     Gets together: None     Attends [employer] service: None     Active member of club or organization: None     Attends meetings of clubs or organizations: None     Relationship status: None     Intimate partner violence:     Fear of current or ex partner: None     Emotionally abused: None     Physically abused: None     Forced sexual activity: None   Other Topics Concern     Parent/sibling w/ CABG, MI or angioplasty before 65F 55M? Not Asked   Social History Narrative     None        ASSESSMENT  Labs results   Labs Ordered and Resulted from Time of ED Arrival Up to the Time of Departure from the ED   CBC WITH PLATELETS DIFFERENTIAL - Abnormal; Notable for the following components:       Result Value    WBC 12.8 (*)     Absolute Neutrophil 8.4 (*)     Absolute Eosinophils 0.9 (*)     All other components within  normal limits   COMPREHENSIVE METABOLIC PANEL - Abnormal; Notable for the following components:    Glucose 103 (*)     All other components within normal limits   DRUG ABUSE SCREEN 6 CHEM DEP URINE ([facility])   TSH WITH FREE T4 REFLEX      Imaging Studies: No results found for this or any previous visit (from the past 24 hour(s)).   Most recent vital signs /74   Pulse 81   Temp 98.4  F (36.9  C) (Oral)   Resp 16   Wt 107 kg (236 lb)   SpO2 91%   BMI 33.86 kg/m     Abnormal labs/tests/findings requiring intervention:---   Pain control: good  Nausea control: good    RECOMMENDATION  Are any infection precautions needed (MRSA, VRE, etc.)? No If yes, what infection? --  ---  Does the patient have mobility issues? with stand-by assist. If yes, what device does the pt use? ---  ---  Is patient on 72 hour hold or commitment? No If on 72 hour hold, have hold and rights been given to patient? N/A  Are admitting orders written if after 10 p.m. ?N/A  Tasks needing to be completed:---     Shayy Stevens      [phone] [place] ED   [phone] [facility] ED

## 2019-03-13 NOTE — PROGRESS NOTES
Pt was visible in the milieu in the morning, napped for the remainder of the day after meeting with his team. Pt ate meals, although felt uneasy after meals, stated he was experiencing a lot of gas. Pt requested to have someone assist him so he could get acclimated to the unit, now on a 1:1. Pt was calm and cooperative, requested a C-pap to help him sleep better.     03/13/19 1439   Behavioral Health   Hallucinations denies / not responding to hallucinations   Thinking distractable;poor concentration   Orientation person: oriented;place: oriented   Memory baseline memory   Insight insight appropriate to situation   Judgement impaired   Eye Contact out of corner of eyes   Affect full range affect   Mood anxious   Physical Appearance/Attire attire appropriate to age and situation   Hygiene other (see comment)  (adequate )   1. Wish to be Dead No   2. Non-Specific Active Suicidal Thoughts  No   Activity withdrawn   Speech clear;coherent   Medication Sensitivity no stated side effects;no observed side effects   Psychomotor / Gait balanced;steady   Activities of Daily Living   Hygiene/Grooming independent   Oral Hygiene independent   Dress independent   Laundry with supervision   Room Organization independent   Activity   Activity Assistance Provided independent

## 2019-03-13 NOTE — ED PROVIDER NOTES
[facility] - [place], [place] EMERGENCY DEPARTMENT ([facility])    3/12/19       History     Chief Complaint   Patient presents with     Suicidal     The history is provided by the patient and medical records.     Barry Mcgrath is a 28 year old male with a medical history significant for bipolar 1 disorder, depression, anxiety, kidney transplant, diabetes, and blindness who presents to the Emergency Department for evaluation of suicidal ideation.  The patient reports that he has had worsening depression and anxiety over the past couple of months.  Per review of patient's chart, he was seen in the Emergency Department here yesterday for his suicidal ideations.  The patient was seen by a DEC  and they recommended monitoring overnight and discharge in the morning.  The patient had resolution of his passive suicidal ideation.    The patient returns to the Emergency Department now as he does not feel safe as he is continuing to have suicidal ideations.  He does not have a specific plan in mind, but he does not feel safe at home.  He denies any homicidal ideation.  The patient reports that he is having racing thoughts, restlessness, and anxiety.  The patient is unable to control his suicidal thoughts.  The patient is a full-time student here at the [employer].  He takes philosophy classes as well as other classes.  He does note that he is behind on some of his classes.  He notes that this is a stressor for him, but he reports that life in general is also a stressor for him.  He notes that he does have good friends as well as a supportive family.  The patient notes that he does not take his medications regularly; he will sometimes forget to take them or he will oversleep.  The patient does have a therapist, but has not seen them for 5 months now.  The patient denies any drug or alcohol use.    The patient denies any recent illness or medical concerns.  He denies pain, fever, cough, trouble breathing.    I have  reviewed the Medications, Allergies, Past Medical and Surgical History, and Social History in the [employer] system.    Past Medical History:   Diagnosis Date     Anxiety      Bipolar 1 disorder (H)      Blind      Depressive disorder        Past Surgical History:   Procedure Laterality Date     TRANSPLANT      Kidney transplant in 2006       Family History   Problem Relation Age of Onset     Sleep Apnea Father      Diabetes Father      Sleep Apnea Brother      Glaucoma Maternal Grandfather      Macular Degeneration No family hx of        Social History     Tobacco Use     Smoking status: Never Smoker     Smokeless tobacco: Never Used   Substance Use Topics     Alcohol use: No       Current Facility-Administered Medications   Medication     ARIPiprazole (ABILIFY) tablet 30 mg     lisinopril (PRINIVIL/ZESTRIL) tablet 10 mg     metFORMIN (GLUCOPHAGE) solution 500 mg     mycophenolate (GENERIC EQUIVALENT) capsule 750 mg     OXcarbazepine (TRILEPTAL) tablet 600 mg     predniSONE (DELTASONE) tablet 5 mg     risperiDONE (risperDAL M-TABS) ODT tab 1 mg     tacrolimus (GENERIC EQUIVALENT) capsule 2 mg     tacrolimus (GENERIC EQUIVALENT) capsule 3 mg     venlafaxine (EFFEXOR-XR) 24 hr capsule 300 mg     Current Outpatient Medications   Medication     ARIPiprazole (ABILIFY) 30 MG tablet     LISINOPRIL PO     metFORMIN (GLUCOPHAGE) 500 MG/5ML SOLN solution     Multiple Vitamins-Minerals (MULTIVITAMIN ADULT PO)     mycophenolate (GENERIC EQUIVALENT) 250 MG capsule     OXcarbazepine (TRILEPTAL) 150 MG tablet     predniSONE (DELTASONE) 5 MG tablet     risperiDONE (RISPERDAL M-TABS) 1 MG ODT tab     tacrolimus (GENERIC EQUIVALENT) 1 MG capsule     venlafaxine (EFFEXOR-XR) 150 MG 24 hr capsule     vitamin D3 (CHOLECALCIFEROL) 61051 UNITS capsule     mupirocin (BACTROBAN) 2 % ointment      No Known Allergies      Review of Systems   Constitutional: Negative for fever.   HENT: Negative for congestion.    Eyes: Negative for redness.    Respiratory: Negative for shortness of breath.    Cardiovascular: Negative for chest pain.   Gastrointestinal: Negative for abdominal pain.   Genitourinary: Negative for difficulty urinating.   Musculoskeletal: Negative for arthralgias and neck stiffness.   Skin: Negative for color change.   Neurological: Negative for headaches.   Psychiatric/Behavioral: Positive for dysphoric mood and suicidal ideas. Negative for confusion and self-injury.       Physical Exam   BP: 132/74  Pulse: 81  Temp: 98.4  F (36.9  C)  Resp: 16  Weight: 107 kg (236 lb)  SpO2: 91 %      Physical Exam   Constitutional: He appears well-developed and well-nourished. No distress.   HENT:   Head: Normocephalic and atraumatic.   Mouth/Throat: Oropharynx is clear and moist.   Eyes: Pupils are equal, round, and reactive to light. No scleral icterus.   Neck: Normal range of motion.   Cardiovascular: Normal rate, regular rhythm, normal heart sounds and intact distal pulses.   Pulmonary/Chest: Effort normal and breath sounds normal. No respiratory distress.   Abdominal: Soft. Bowel sounds are normal. There is no tenderness.   Musculoskeletal: Normal range of motion. He exhibits no edema or tenderness.   Neurological: He is alert. He has normal strength. Coordination normal.   Skin: Skin is warm. No rash noted. He is not diaphoretic.   Psychiatric: His speech is normal and behavior is normal. His mood appears anxious. He expresses suicidal ideation. He expresses no suicidal plans.   Nursing note and vitals reviewed.      ED Course        Procedures             Critical Care time:             Seen by B[facility] .  Discussed with BEC consultant.       Assessments & Plan (with Medical Decision Making)   28 year old with history of bipolar disorder to the emergency department with worsening suicidal ideations.  He does not have a specific plan but is not able to contract for safety at home.  Patient also has a history of kidney transplant and diabetes.  He  denies any acute illness or medical concerns.  The patient has a history of kidney transplant and reports compliance with his medications.  The patient's labs are remarkable for a chronic leukocytosis.  He denies a fever and does not have any signs or symptoms concerning for acute infection.  The patient's labs are otherwise normal.  The patient appears medically stable for psychiatric admission.    I have reviewed the nursing notes.    I have reviewed the findings, diagnosis, plan and need for follow up with the patient.       Medication List      There are no discharge medications for this visit.         Final diagnoses:   Suicide ideation   Bipolar affective disorder, currently depressed, moderate (H)     IArmani, am serving as a trained medical scribe to document services personally performed by Meño Grajeda MD, based on the provider's statements to me.   IMeño MD, was physically present and have reviewed and verified the accuracy of this note documented by Armani Barroso.    3/12/2019   [facility], [place], EMERGENCY DEPARTMENT     Meño Grajeda MD  03/13/19 0050
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
## 2019-03-14 VITALS
TEMPERATURE: 97.7 F | OXYGEN SATURATION: 95 % | BODY MASS INDEX: 32.21 KG/M2 | WEIGHT: 225 LBS | SYSTOLIC BLOOD PRESSURE: 114 MMHG | HEART RATE: 74 BPM | RESPIRATION RATE: 14 BRPM | HEIGHT: 70 IN | DIASTOLIC BLOOD PRESSURE: 75 MMHG

## 2019-03-14 LAB
GLUCOSE BLDC GLUCOMTR-MCNC: 119 MG/DL (ref 70–99)
TACROLIMUS BLD-MCNC: 5.9 UG/L (ref 5–15)
TME LAST DOSE: NORMAL H

## 2019-03-14 PROCEDURE — 25000131 ZZH RX MED GY IP 250 OP 636 PS 637: Mod: GY | Performed by: EMERGENCY MEDICINE

## 2019-03-14 PROCEDURE — 80197 ASSAY OF TACROLIMUS: CPT | Performed by: PHYSICIAN ASSISTANT

## 2019-03-14 PROCEDURE — 25000132 ZZH RX MED GY IP 250 OP 250 PS 637: Mod: GY | Performed by: EMERGENCY MEDICINE

## 2019-03-14 PROCEDURE — 00000146 ZZHCL STATISTIC GLUCOSE BY METER IP

## 2019-03-14 PROCEDURE — 25000125 ZZHC RX 250: Performed by: EMERGENCY MEDICINE

## 2019-03-14 PROCEDURE — 25000132 ZZH RX MED GY IP 250 OP 250 PS 637: Mod: GY | Performed by: STUDENT IN AN ORGANIZED HEALTH CARE EDUCATION/TRAINING PROGRAM

## 2019-03-14 PROCEDURE — A9270 NON-COVERED ITEM OR SERVICE: HCPCS | Mod: GY | Performed by: STUDENT IN AN ORGANIZED HEALTH CARE EDUCATION/TRAINING PROGRAM

## 2019-03-14 PROCEDURE — A9270 NON-COVERED ITEM OR SERVICE: HCPCS | Mod: GY | Performed by: EMERGENCY MEDICINE

## 2019-03-14 PROCEDURE — 99238 HOSP IP/OBS DSCHRG MGMT 30/<: CPT | Mod: GC | Performed by: PSYCHIATRY & NEUROLOGY

## 2019-03-14 PROCEDURE — 36415 COLL VENOUS BLD VENIPUNCTURE: CPT | Performed by: PHYSICIAN ASSISTANT

## 2019-03-14 RX ORDER — PRAZOSIN HYDROCHLORIDE 1 MG/1
1 CAPSULE ORAL 2 TIMES DAILY
Qty: 60 CAPSULE | Refills: 0 | Status: SHIPPED | OUTPATIENT
Start: 2019-03-14 | End: 2022-06-13

## 2019-03-14 RX ADMIN — MYCOPHENOLATE MOFETIL 750 MG: 250 CAPSULE ORAL at 09:13

## 2019-03-14 RX ADMIN — PREDNISONE 5 MG: 5 TABLET ORAL at 09:13

## 2019-03-14 RX ADMIN — PRAZOSIN HYDROCHLORIDE 1 MG: 1 CAPSULE ORAL at 09:13

## 2019-03-14 RX ADMIN — VENLAFAXINE HYDROCHLORIDE 300 MG: 150 CAPSULE, EXTENDED RELEASE ORAL at 09:13

## 2019-03-14 RX ADMIN — OXCARBAZEPINE 600 MG: 600 TABLET ORAL at 09:13

## 2019-03-14 RX ADMIN — METFORMIN HYDROCHLORIDE 500 MG: 500 TABLET, FILM COATED ORAL at 09:13

## 2019-03-14 RX ADMIN — TACROLIMUS 3 MG: 1 CAPSULE ORAL at 09:13

## 2019-03-14 RX ADMIN — LISINOPRIL 10 MG: 10 TABLET ORAL at 09:13

## 2019-03-14 ASSESSMENT — ACTIVITIES OF DAILY LIVING (ADL)
ORAL_HYGIENE: INDEPENDENT
HYGIENE/GROOMING: INDEPENDENT
DRESS: INDEPENDENT

## 2019-03-14 ASSESSMENT — MIFFLIN-ST. JEOR: SCORE: 2001.84

## 2019-03-14 NOTE — PROGRESS NOTES
Pt was isolative throughout most of the day but socialized with 1:1 staff intermittently. Pt was observed mostly sleeping or laying in bed but was also seen eating dinner, talking on the phone, and meeting with visitor. Pt was pleasant up on approach but did appear somewhat anxious and showed some indication of short-term memory problems. Pt did not report experiencing SI, HI, or SIB and no indication of these symptoms was observed.        03/13/19 2152   Behavioral Health   Hallucinations denies / not responding to hallucinations   Thinking distractable;poor concentration   Orientation person: oriented;place: oriented   Memory (pt asked question multiple times, sometimes repeated things)   Insight (unsubstantiated)   Judgement impaired   Eye Contact (N/A)   Affect full range affect   Mood anxious;mood is calm   Physical Appearance/Attire attire appropriate to age and situation   Hygiene (adequate)   Suicidality (pt did not report thoughts or urges)   Self Injury (pt did not report thoughts or urges)   Elopement (no apparent risk)   Activity isolative   Speech clear;coherent   Medication Sensitivity no stated side effects;no observed side effects   Psychomotor / Gait balanced;steady   Activities of Daily Living   Hygiene/Grooming independent   Oral Hygiene independent   Dress independent   Laundry (-pt did not do laundry)   Room Organization independent   Activity   Activity Assistance Provided independent;assistance, 1 person
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
## 2019-03-14 NOTE — DISCHARGE INSTRUCTIONS
Behavioral Discharge Planning and Instructions      Summary:  You were admitted on 3/12/2019  due to PTSD (Post Tramatic Stress Disorder) and Suicidal Ideations.  You were treated by Dr. Erik Peters MD and Dr. Adelaida Gray MD and discharged on 3/14/19 from [facility] to [facility]    Principal Diagnosis: Bipolar disorder, ASD      Health Care Follow-up Appointments:     Date/Time: Monday March 18th 1pm    Provider: Jonathan Lopez  Address: [address] [address]  Phone:[phone]  Fax: [phone]    Date/Time: Friday March 22nd 1:45pm  Provider: Dr. Plascencia  Address:[facility] [address] [address]  [address] Floor  Phone: [phone]  Fax:[phone]     Attend all scheduled appointments with your outpatient providers. Call at least 24 hours in advance if you need to reschedule an appointment to ensure continued access to your outpatient providers.   Major Treatments, Procedures and Findings:  You were provided with: a psychiatric assessment, assessed for medical stability, medication evaluation and/or management and milieu management    Symptoms to Report: feeling more aggressive, losing more sleep, mood getting worse or thoughts of suicide    Early warning signs can include: increased depression or anxiety increased thoughts or behaviors of suicide or self-harm     Safety and Wellness:  Take all medicines as directed.  Make no changes unless your doctor suggests them.      Follow treatment recommendations.  Refrain from alcohol and non-prescribed drugs.  If there is a concern for safety, call 911.    Resources:   Crisis Intervention: 965.713.6167 or 264-323-8639 (TTY: 690.203.9404).  Call anytime for help.  National [place] on Mental Illness (www.mn.azar.org): 491.540.8026 or 004-449-4755.  Suicide Awareness Voices of Education (SAVE) (www.save.org): 002-592-TXYH (4056)  National Suicide Prevention Line (www.mentalhealthmn.org): 316-602-ROYO (0342)  Mental Health  "Consumer/Survivor Network of [place] (www.mhcsn.net): 156-183-6303 or 050-815-6422  Mental Health Association of [place] (www.mentalhealth.org): 922.660.7777 or 411-592-0588  Self- Management and Recovery Training., SMART-- Toll free: 537.441.4776  www.Scaleform  [facility] Crisis (COPE) Response - Adult 692 225-6179  Text 4 Life: txt \"LIFE\" to 37442 for immediate support and crisis intervention  Crisis text line: Text \"[place]\" to 090496. Free, confidential, 24/7.  Crisis Intervention: 363.292.1458 or 002-540-6944. Call anytime for help.       The treatment team has appreciated the opportunity to work with you.     If you have any questions or concerns our unit number is [phone]        "

## 2019-03-14 NOTE — PROGRESS NOTES
Pt was awake until about 7992-7566, sitting in lounge area with writer. Pt appeared to be in a calm and positive mood. Pt stated that he knew that it was the right decision coming to  to seek help. Pt was rather talkative and friendly and expressed appreciation for his stay thus far. Pt does appear to have a short attention span and possibly short-term memory problems, forgetting simple information told to him a few minutes prior. Pt expressed looking forward to participating in groups for Thursday; stated that he hopes to be discharged as well (same day). Pt used bathroom independently at about 0030 before going to bed. Pt slept 5 hours, woke up once around 0445 to use bathroom independently and promptly went back to sleep. Pt stated that he had been sleeping well. CPAP used; light occasional snoring, brief coughing. Required/corrected with mask adjustments. No behavioral issues this shift.

## 2019-03-14 NOTE — PLAN OF CARE
"  Pt discharged from unit at approximately 1300. Pt's caretaker, August, escorted Pt off of unit and provided transportation from [facility] facility. AVS report reviewed and signed by Pt. Pt signs paperwork with a stamp. Belongings returned without incident. Belongings sheet signed by Pt. Medications given to Pt without incident. Pt signed medication sheet. Caretaker, August, present to collect belongings and medications from staff.    Pt states his appetite is \"good\" with no issues reported. Pt explains his sleep hygiene is \"good\" with no issues reported. Pt denies any adverse/side effects to medications and none seen by staff. Pt denies any acute physical ailments and none seen by staff.     Pt rates his anxiety a 0/10 and his depression a 0/10. Pt denies SI/SIB/HI. Pt denies AH and VH.   "

## 2019-03-15 ENCOUNTER — TELEPHONE (OUTPATIENT)
Dept: PHARMACY | Facility: OTHER | Age: 29
End: 2019-03-15

## 2019-03-15 NOTE — TELEPHONE ENCOUNTER
MTM referral from: Transitions of Care (recent hospital discharge or ED visit)    MTM referral outreach attempt #1   on March 15, 2019 at 8:11 AM      Outcome: Patient is not interested at this time because they are a non fv, will route to MTM Pharmacist/Provider as an FYI. Thank you for the referral.     Dee Rosenthal, MTM Coordinator

## 2019-04-03 ENCOUNTER — TELEPHONE (OUTPATIENT)
Dept: NURSING | Facility: CLINIC | Age: 29
End: 2019-04-03

## 2019-04-04 NOTE — TELEPHONE ENCOUNTER
"\"I am out of my   prazosin (MINIPRESS) 1 MG capsule 60 capsule 0 3/14/2019  No   Sig - Route: Take 1 capsule (1 mg) by mouth 2 times daily - Oral     \"I was in the hospital last month and they gave it to me then. I also saw Dr. Plascencia (non ) psychiatrist a week or so ago and forgot to askeher for a refill. I am now out and she is on vacation.\" Patient denies any sx. States he did take his dose for today. I advised him to call Dr. Plascencia's office in am 4/4 to request a refill. One of her partners should be able to fill the RX on file.  Call back if needed.  Deborah Frank RN [facility]      "

## 2019-04-05 ENCOUNTER — OFFICE VISIT (OUTPATIENT)
Dept: NEPHROLOGY | Facility: CLINIC | Age: 29
End: 2019-04-05
Attending: INTERNAL MEDICINE
Payer: MEDICARE

## 2019-04-05 VITALS
BODY MASS INDEX: 38.08 KG/M2 | TEMPERATURE: 98.2 F | SYSTOLIC BLOOD PRESSURE: 123 MMHG | WEIGHT: 265.4 LBS | HEART RATE: 85 BPM | OXYGEN SATURATION: 95 % | DIASTOLIC BLOOD PRESSURE: 80 MMHG

## 2019-04-05 DIAGNOSIS — Z12.83 SKIN CANCER SCREENING: ICD-10-CM

## 2019-04-05 DIAGNOSIS — Z94.0 HTN, KIDNEY TRANSPLANT RELATED: ICD-10-CM

## 2019-04-05 DIAGNOSIS — I15.1 HTN, KIDNEY TRANSPLANT RELATED: ICD-10-CM

## 2019-04-05 DIAGNOSIS — Z94.0 STATUS POST KIDNEY TRANSPLANT: Primary | ICD-10-CM

## 2019-04-05 DIAGNOSIS — D84.9 IMMUNOSUPPRESSION (H): ICD-10-CM

## 2019-04-05 DIAGNOSIS — R73.01 IMPAIRED FASTING GLUCOSE: ICD-10-CM

## 2019-04-05 PROCEDURE — G0463 HOSPITAL OUTPT CLINIC VISIT: HCPCS | Mod: ZF

## 2019-04-05 ASSESSMENT — PAIN SCALES - GENERAL: PAINLEVEL: NO PAIN (0)

## 2019-04-05 NOTE — PROGRESS NOTES
[facility]  Nephrology Clinic  Kidney/Pancreas Recipient  2019     Name: Barry Mcgrath  MRN: [id number]   Age: 28 year old  : 1990  Referring provider: [facility]     CHRONIC TRANSPLANT NEPHROLOGY VISIT    Assessment and Plan:   # DDKT:    - Baseline Cr ~ 0.7 mg/dL; Stable   - Proteinuria: Minimal   - Date of DSA last checked: not followed.   - BK Viremia: No   - Kidney Tx Biopsy: No    # Immunosuppression: Tac, MMF, pred.    - Changes: No    Goal tac is 4-6 ng / ml.    # Prophylaxis:    - PJP: None    # Hypertension: Controlled; Goal BP: < 130/80   - Changes: No    - On lisinopril    # Impaired fasting glucose: patient is working on weight loss. Requires continual monitoring of blood sugars. He is on metformin.    # Electrolytes:   - Potassium; level: 3.7  - Bicarbonate; level: 26    # Skin Cancer Risk:    - Discussed sun protection and recommend regular follow up with Dermatology.    # Medical Compliance: Yes    Follow-up: 6 months    # Transplant History:  Etiology of kidney failure: Dysplasia  Tx: DDKT  Transplant: 2006 (Kidney)  Donor Type:  Donor Class:   Significant changes in immunosuppression: None  Significant transplant-related complications: None    Transplant Office Phone Number: [phone]    Assessment and plan was discussed with the patient and he voiced his understanding and agreement.    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    The patient was last evaluated on 18 by Dr. Burkett. At that time, no changes were made to hypertension or immunosuppression. Please see note for further details.     Today, the patient is doing well; however, he recently visited the emergency department for mental distress.      Recent Hospitalizations:  [x] No [] Yes    New Medical Issues: [x] No [] Yes    Decreased  energy: [x] No [] Yes    Chest pain or SOB with exertion:  [x] No [] Yes    Appetite change or weight change: [x] No [] Yes    Nausea, vomiting or diarrhea:  [x] No [] Yes    Fever, sweats or chills: [x] No [] Yes    Leg swelling: [x] No [] Yes      Other medical issues:  Yes - See above    Home BP: Doesn't know     Review of Systems:   A comprehensive review of systems was obtained and negative, except as noted in the HPI or past medical history.     Active Medications:     Current Outpatient Medications:      ARIPiprazole (ABILIFY) 30 MG tablet, Take 1 tablet (30 mg) by mouth At Bedtime, Disp: , Rfl:      LISINOPRIL PO, Take 10 mg by mouth daily, Disp: , Rfl:      metFORMIN (GLUCOPHAGE) 500 MG/5ML SOLN solution, Take 500 mg by mouth 2 times daily, Disp: , Rfl:      mycophenolate (GENERIC EQUIVALENT) 250 MG capsule, Take 3 capsules (750 mg) by mouth 2 times daily, Disp: 540 capsule, Rfl: 3     OXcarbazepine (TRILEPTAL) 150 MG tablet, Take 4 tablets (600 mg) by mouth 2 times daily, Disp: 240 tablet, Rfl: 11     prazosin (MINIPRESS) 1 MG capsule, Take 1 capsule (1 mg) by mouth 2 times daily, Disp: 60 capsule, Rfl: 0     predniSONE (DELTASONE) 5 MG tablet, Take 1 tablet (5 mg) by mouth daily, Disp: 90 tablet, Rfl: 3     risperiDONE (RISPERDAL M-TABS) 1 MG ODT tab, Take 1 tablet (1 mg) by mouth At Bedtime, Disp: 30 tablet, Rfl: 1     tacrolimus (GENERIC EQUIVALENT) 1 MG capsule, 3 mg q am and 2 mg q pm, Disp: 450 capsule, Rfl: 3     venlafaxine (EFFEXOR-XR) 150 MG 24 hr capsule, Take 300 mg by mouth daily, Disp: , Rfl:      vitamin D3 (CHOLECALCIFEROL) 19432 UNITS capsule, Take 50,000 Units by mouth once a week, Disp: , Rfl:      Multiple Vitamins-Minerals (MULTIVITAMIN ADULT PO), Take 1 tablet by mouth, Disp: , Rfl:      mupirocin (BACTROBAN) 2 % ointment, Apply topically 3 times daily, Disp: , Rfl:       Allergies:   Patient has no known allergies.      Active Medical Problems:  Patient Active Problem List    Diagnosis     Suicidal ideation     Hypertension, unspecified type     RAQUEL (obstructive sleep apnea)     Bipolar 1 disorder (H)     Social History:   The patient has never smoked or used smokeless tobacco. He also denies alcohol and drug use.     Physical Exam:   There were no vitals taken for this visit.   Wt Readings from Last 4 Encounters:   03/14/19 102.1 kg (225 lb)   03/11/19 111.1 kg (245 lb)   07/31/18 108.4 kg (239 lb)   04/04/18 118.4 kg (261 lb)     GENERAL APPEARANCE: alert and no distress  HENT: mouth without ulcers or lesions  LYMPHATICS: no cervical or supraclavicular nodes  RESP: lungs clear to auscultation - no rales, rhonchi or wheezes  CV: regular rhythm, normal rate, no rub, no murmur  EDEMA: no LE edema bilaterally  ABDOMEN: soft, nondistended, nontender, bowel sounds normal  MS: extremities normal - no gross deformities noted, no evidence of inflammation in joints, no muscle tenderness  SKIN: no rash  TX KIDNEY: normal  DIALYSIS ACCESS:  None    Data:     Renal Latest Ref Rng & Units 3/13/2019 1/9/2019 12/19/2018   Na 133 - 144 mmol/L 141 - -   Na (external) 135 - 146 mmol/L - 138 133(L)   K 3.4 - 5.3 mmol/L 3.7 - -   K (external) 3.5 - 5.3 mmol/L - 3.7 4.0   Cl 94 - 109 mmol/L 105 - -   Cl (external) 98 - 110 mmol/L - 100 98   CO2 20 - 32 mmol/L 26 - -   CO2 (external) 20 - 32 mmol/L - 26 28   BUN 7 - 30 mg/dL 11 - -   BUN (external) 7 - 25 mg/dL - 12 15   Cr 0.66 - 1.25 mg/dL 0.75 - -   Cr (external) 0.60 - 1.35 mg/dL - 0.93 0.93   Glucose 70 - 99 mg/dL 103(H) - -   Glucose (external) 65 - 99 mg/dL - 91 82   Ca  8.5 - 10.1 mg/dL 8.9 - -   Ca (external) 8.6 - 10.3 mg/dL - 9.7 8.9     Bone Health Latest Ref Rng & Units 2/13/2018   Phos 2.5 - 4.5 mg/dL 4.6(H)     Heme Latest Ref Rng & Units 3/13/2019 3/1/2019 12/19/2018   WBC 4.0 - 11.0 10e9/L 12.8(H) - -   WBC (external) 3.8 - 10.8 x10:3/uL - 9.2 12.8(H)   Hgb 13.3 - 17.7 g/dL 14.2 - -   Hgb (external) 13.2 - 17.1 g/dl - 13.7 14.1   Plt 150 -  450 10e9/L 261 - -   Plt (external) 140 - 400 x10:3/uL - 241 292     Liver Latest Ref Rng & Units 3/13/2019 12/19/2018 12/13/2018   AP 40 - 150 U/L 103 - -   AP (external) 40 - 115 U/L - 95 93   TBili 0.2 - 1.3 mg/dL 0.3 - -   TBili (external) 0.2 - 1.2 mg/dL - 0.5 0.3   ALT 0 - 70 U/L 29 - -   ALT (external) 9 - 46 U/L - 20 22   AST 0 - 45 U/L 17 - -   AST (external) 10 - 40 U/L - 15 16   Tot Protein 6.8 - 8.8 g/dL 7.4 - -   Tot Protein (external) 6.1 - 8.1 g/dL - 6.7 6.9   Albumin 3.4 - 5.0 g/dL 3.8 - -   Albumin (external) 3.6 - 5.1 g/dL - 4.1 4.2     Pancreas Latest Ref Rng & Units 12/13/2018 8/19/2018 6/1/2018   A1C (external) <6.6 % 5.6 - -   Amylase (external) 21 - 101 U/L - - 42   Lipase 73 - 393 U/L - 124 -   Lipase (external) 21 - 101 U/L - - 42     Iron studies Latest Ref Rng & Units 6/1/2018 6/1/2018   Iron (external) 7 - 60 U/L 21 21     Recent Labs   Lab Test 12/19/18  1020 03/01/19  1114 03/14/19  0842   [facility] [id number] 02/28/19 Not Provided   TACROL 5.8 5.6 5.9     Scribe Disclosure:   INishi, am serving as a scribe to document services personally performed by Reagan Burkett MD at this visit, based upon the provider's statements to me. All documentation has been reviewed by the aforementioned provider prior to being entered into the official medical record.     INishi, served as a scribe preparing the chart for the clinic encounter through chart review for the provider team.

## 2019-04-05 NOTE — LETTER
2019      RE: Barry Mcgrath  [address]  [facility] [address]       [facility]  Nephrology Clinic  Kidney/Pancreas Recipient  2019     Name: Barry Mcgrath  MRN: [id number]   Age: 28 year old  : 1990  Referring provider: [facility]     CHRONIC TRANSPLANT NEPHROLOGY VISIT    Assessment and Plan:   # DDKT:    - Baseline Cr ~ 0.7 mg/dL; Stable   - Proteinuria: Minimal   - Date of DSA last checked: not followed.   - BK Viremia: No   - Kidney Tx Biopsy: No    # Immunosuppression: Tac, MMF, pred.    - Changes: No    Goal tac is 4-6 ng / ml.    # Prophylaxis:    - PJP: None    # Hypertension: Controlled; Goal BP: < 130/80   - Changes: No    - On lisinopril    # Impaired fasting glucose: patient is working on weight loss. Requires continual monitoring of blood sugars. He is on metformin.    # Electrolytes:   - Potassium; level: 3.7  - Bicarbonate; level: 26    # Skin Cancer Risk:    - Discussed sun protection and recommend regular follow up with Dermatology.    # Medical Compliance: Yes    Follow-up: 6 months    # Transplant History:  Etiology of kidney failure: Dysplasia  Tx: DDKT  Transplant: 2006 (Kidney)  Donor Type:  Donor Class:   Significant changes in immunosuppression: None  Significant transplant-related complications: None    Transplant Office Phone Number: [phone]    Assessment and plan was discussed with the patient and he voiced his understanding and agreement.    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    The patient was last evaluated on 18 by Dr. Burkett. At that time, no changes were made to hypertension or immunosuppression. Please see note for further details.     Today, the patient is doing well; however, he recently visited the emergency department for mental distress.      Recent  Hospitalizations:  [x] No [] Yes    New Medical Issues: [x] No [] Yes    Decreased energy: [x] No [] Yes    Chest pain or SOB with exertion:  [x] No [] Yes    Appetite change or weight change: [x] No [] Yes    Nausea, vomiting or diarrhea:  [x] No [] Yes    Fever, sweats or chills: [x] No [] Yes    Leg swelling: [x] No [] Yes      Other medical issues:  Yes - See above    Home BP: Doesn't know     Review of Systems:   A comprehensive review of systems was obtained and negative, except as noted in the HPI or past medical history.     Active Medications:     Current Outpatient Medications:      ARIPiprazole (ABILIFY) 30 MG tablet, Take 1 tablet (30 mg) by mouth At Bedtime, Disp: , Rfl:      LISINOPRIL PO, Take 10 mg by mouth daily, Disp: , Rfl:      metFORMIN (GLUCOPHAGE) 500 MG/5ML SOLN solution, Take 500 mg by mouth 2 times daily, Disp: , Rfl:      mycophenolate (GENERIC EQUIVALENT) 250 MG capsule, Take 3 capsules (750 mg) by mouth 2 times daily, Disp: 540 capsule, Rfl: 3     OXcarbazepine (TRILEPTAL) 150 MG tablet, Take 4 tablets (600 mg) by mouth 2 times daily, Disp: 240 tablet, Rfl: 11     prazosin (MINIPRESS) 1 MG capsule, Take 1 capsule (1 mg) by mouth 2 times daily, Disp: 60 capsule, Rfl: 0     predniSONE (DELTASONE) 5 MG tablet, Take 1 tablet (5 mg) by mouth daily, Disp: 90 tablet, Rfl: 3     risperiDONE (RISPERDAL M-TABS) 1 MG ODT tab, Take 1 tablet (1 mg) by mouth At Bedtime, Disp: 30 tablet, Rfl: 1     tacrolimus (GENERIC EQUIVALENT) 1 MG capsule, 3 mg q am and 2 mg q pm, Disp: 450 capsule, Rfl: 3     venlafaxine (EFFEXOR-XR) 150 MG 24 hr capsule, Take 300 mg by mouth daily, Disp: , Rfl:      vitamin D3 (CHOLECALCIFEROL) 97905 UNITS capsule, Take 50,000 Units by mouth once a week, Disp: , Rfl:      Multiple Vitamins-Minerals (MULTIVITAMIN ADULT PO), Take 1 tablet by mouth, Disp: , Rfl:      mupirocin (BACTROBAN) 2 % ointment, Apply topically 3 times daily, Disp: , Rfl:       Allergies:   Patient has no  known allergies.      Active Medical Problems:  Patient Active Problem List   Diagnosis     Suicidal ideation     Hypertension, unspecified type     RAQUEL (obstructive sleep apnea)     Bipolar 1 disorder (H)     Social History:   The patient has never smoked or used smokeless tobacco. He also denies alcohol and drug use.     Physical Exam:   There were no vitals taken for this visit.   Wt Readings from Last 4 Encounters:   03/14/19 102.1 kg (225 lb)   03/11/19 111.1 kg (245 lb)   07/31/18 108.4 kg (239 lb)   04/04/18 118.4 kg (261 lb)     GENERAL APPEARANCE: alert and no distress  HENT: mouth without ulcers or lesions  LYMPHATICS: no cervical or supraclavicular nodes  RESP: lungs clear to auscultation - no rales, rhonchi or wheezes  CV: regular rhythm, normal rate, no rub, no murmur  EDEMA: no LE edema bilaterally  ABDOMEN: soft, nondistended, nontender, bowel sounds normal  MS: extremities normal - no gross deformities noted, no evidence of inflammation in joints, no muscle tenderness  SKIN: no rash  TX KIDNEY: normal  DIALYSIS ACCESS:  None    Data:     Renal Latest Ref Rng & Units 3/13/2019 1/9/2019 12/19/2018   Na 133 - 144 mmol/L 141 - -   Na (external) 135 - 146 mmol/L - 138 133(L)   K 3.4 - 5.3 mmol/L 3.7 - -   K (external) 3.5 - 5.3 mmol/L - 3.7 4.0   Cl 94 - 109 mmol/L 105 - -   Cl (external) 98 - 110 mmol/L - 100 98   CO2 20 - 32 mmol/L 26 - -   CO2 (external) 20 - 32 mmol/L - 26 28   BUN 7 - 30 mg/dL 11 - -   BUN (external) 7 - 25 mg/dL - 12 15   Cr 0.66 - 1.25 mg/dL 0.75 - -   Cr (external) 0.60 - 1.35 mg/dL - 0.93 0.93   Glucose 70 - 99 mg/dL 103(H) - -   Glucose (external) 65 - 99 mg/dL - 91 82   Ca  8.5 - 10.1 mg/dL 8.9 - -   Ca (external) 8.6 - 10.3 mg/dL - 9.7 8.9     Bone Health Latest Ref Rng & Units 2/13/2018   Phos 2.5 - 4.5 mg/dL 4.6(H)     Heme Latest Ref Rng & Units 3/13/2019 3/1/2019 12/19/2018   WBC 4.0 - 11.0 10e9/L 12.8(H) - -   WBC (external) 3.8 - 10.8 x10:3/uL - 9.2 12.8(H)   Hgb 13.3 -  17.7 g/dL 14.2 - -   Hgb (external) 13.2 - 17.1 g/dl - 13.7 14.1   Plt 150 - 450 10e9/L 261 - -   Plt (external) 140 - 400 x10:3/uL - 241 292     Liver Latest Ref Rng & Units 3/13/2019 12/19/2018 12/13/2018   AP 40 - 150 U/L 103 - -   AP (external) 40 - 115 U/L - 95 93   TBili 0.2 - 1.3 mg/dL 0.3 - -   TBili (external) 0.2 - 1.2 mg/dL - 0.5 0.3   ALT 0 - 70 U/L 29 - -   ALT (external) 9 - 46 U/L - 20 22   AST 0 - 45 U/L 17 - -   AST (external) 10 - 40 U/L - 15 16   Tot Protein 6.8 - 8.8 g/dL 7.4 - -   Tot Protein (external) 6.1 - 8.1 g/dL - 6.7 6.9   Albumin 3.4 - 5.0 g/dL 3.8 - -   Albumin (external) 3.6 - 5.1 g/dL - 4.1 4.2     Pancreas Latest Ref Rng & Units 12/13/2018 8/19/2018 6/1/2018   A1C (external) <6.6 % 5.6 - -   Amylase (external) 21 - 101 U/L - - 42   Lipase 73 - 393 U/L - 124 -   Lipase (external) 21 - 101 U/L - - 42     Iron studies Latest Ref Rng & Units 6/1/2018 6/1/2018   Iron (external) 7 - 60 U/L 21 21     Recent Labs   Lab Test 12/19/18  1020 03/01/19  1114 03/14/19  0842   [facility] [id number] 02/28/19 Not Provided   TACROL 5.8 5.6 5.9     Scribe Disclosure:   INishi, am serving as a scribe to document services personally performed by Reagan Burkett MD at this visit, based upon the provider's statements to me. All documentation has been reviewed by the aforementioned provider prior to being entered into the official medical record.     INishi, served as a scribe preparing the chart for the clinic encounter through chart review for the provider team.         Kidney/Pancreas Recipient

## 2019-04-05 NOTE — NURSING NOTE
Barry Mcgrath was seen today in clinic by this writer. Medications, lab orders, lab frequency, and necessary follow up discussed with patient. Patient was provided with a copy of the current lab letter. Patient voiced understanding and agreement of education and plan.     Anastasiia Carcamo

## 2019-04-05 NOTE — NURSING NOTE
"Chief Complaint   Patient presents with     RECHECK     Follow up KidneyTX     Vital signs:  Temp: 98.2  F (36.8  C) Temp src: Oral BP: 123/80 Pulse: 85     SpO2: 95 %       Weight: 120.4 kg (265 lb 6.4 oz)  Estimated body mass index is 38.08 kg/m  as calculated from the following:    Height as of 3/13/19: 1.778 m (5' 10\").    Weight as of this encounter: 120.4 kg (265 lb 6.4 oz).        Molly Lynch    "

## 2019-04-12 DIAGNOSIS — Z94.0 STATUS POST KIDNEY TRANSPLANT: Primary | ICD-10-CM

## 2019-04-12 RX ORDER — PREDNISONE 5 MG/1
5 TABLET ORAL DAILY
Qty: 90 TABLET | Refills: 3 | Status: SHIPPED | OUTPATIENT
Start: 2019-04-12 | End: 2019-07-09

## 2019-05-03 ENCOUNTER — HOSPITAL ENCOUNTER (EMERGENCY)
Facility: CLINIC | Age: 29
Discharge: HOME | End: 2019-05-03
Attending: EMERGENCY MEDICINE | Admitting: EMERGENCY MEDICINE
Payer: MEDICARE

## 2019-05-03 VITALS
BODY MASS INDEX: 33.86 KG/M2 | WEIGHT: 236 LBS | HEART RATE: 90 BPM | RESPIRATION RATE: 16 BRPM | OXYGEN SATURATION: 98 % | TEMPERATURE: 97.7 F | DIASTOLIC BLOOD PRESSURE: 72 MMHG | SYSTOLIC BLOOD PRESSURE: 122 MMHG

## 2019-05-03 DIAGNOSIS — K62.5 BRIGHT RED BLOOD PER RECTUM: ICD-10-CM

## 2019-05-03 LAB
BASOPHILS # BLD AUTO: 0.1 10E9/L (ref 0–0.2)
BASOPHILS NFR BLD AUTO: 0.7 %
DIFFERENTIAL METHOD BLD: ABNORMAL
EOSINOPHIL # BLD AUTO: 0.8 10E9/L (ref 0–0.7)
EOSINOPHIL NFR BLD AUTO: 6.5 %
ERYTHROCYTE [DISTWIDTH] IN BLOOD BY AUTOMATED COUNT: 13.1 % (ref 10–15)
HCT VFR BLD AUTO: 43.8 % (ref 40–53)
HGB BLD-MCNC: 14.5 G/DL (ref 13.3–17.7)
IMM GRANULOCYTES # BLD: 0.1 10E9/L (ref 0–0.4)
IMM GRANULOCYTES NFR BLD: 0.4 %
LYMPHOCYTES # BLD AUTO: 2.7 10E9/L (ref 0.8–5.3)
LYMPHOCYTES NFR BLD AUTO: 22.5 %
MCH RBC QN AUTO: 27.9 PG (ref 26.5–33)
MCHC RBC AUTO-ENTMCNC: 33.1 G/DL (ref 31.5–36.5)
MCV RBC AUTO: 84 FL (ref 78–100)
MONOCYTES # BLD AUTO: 0.8 10E9/L (ref 0–1.3)
MONOCYTES NFR BLD AUTO: 6.9 %
NEUTROPHILS # BLD AUTO: 7.6 10E9/L (ref 1.6–8.3)
NEUTROPHILS NFR BLD AUTO: 63 %
NRBC # BLD AUTO: 0 10*3/UL
NRBC BLD AUTO-RTO: 0 /100
PLATELET # BLD AUTO: 294 10E9/L (ref 150–450)
RBC # BLD AUTO: 5.2 10E12/L (ref 4.4–5.9)
WBC # BLD AUTO: 12.1 10E9/L (ref 4–11)

## 2019-05-03 PROCEDURE — 46600 DIAGNOSTIC ANOSCOPY SPX: CPT | Mod: Z6 | Performed by: EMERGENCY MEDICINE

## 2019-05-03 PROCEDURE — 99284 EMERGENCY DEPT VISIT MOD MDM: CPT | Mod: 25 | Performed by: EMERGENCY MEDICINE

## 2019-05-03 PROCEDURE — 85025 COMPLETE CBC W/AUTO DIFF WBC: CPT | Performed by: EMERGENCY MEDICINE

## 2019-05-03 PROCEDURE — 46600 DIAGNOSTIC ANOSCOPY SPX: CPT | Performed by: EMERGENCY MEDICINE

## 2019-05-03 RX ORDER — POLYETHYLENE GLYCOL 3350 17 G/17G
1 POWDER, FOR SOLUTION ORAL 2 TIMES DAILY
Qty: 527 G | Refills: 0 | Status: ON HOLD | OUTPATIENT
Start: 2019-05-03 | End: 2019-10-23

## 2019-05-03 ASSESSMENT — ENCOUNTER SYMPTOMS
COLOR CHANGE: 0
FEVER: 0
NECK STIFFNESS: 0
SHORTNESS OF BREATH: 0
ARTHRALGIAS: 0
ABDOMINAL PAIN: 0
DIFFICULTY URINATING: 0
EYE REDNESS: 0
CONFUSION: 0
HEADACHES: 0

## 2019-05-03 NOTE — ED AVS SNAPSHOT
[facility], [place], Emergency Department  [address]  [facility] [place] [address]  Phone:  [phone]  Fax:  [phone]                                    Barry Mcgrath   MRN: [id number]    Department:  [facility], Emergency Department   Date of Visit:  5/3/2019           After Visit Summary Signature Page    I have received my discharge instructions, and my questions have been answered. I have discussed any challenges I see with this plan with the nurse or doctor.    ..........................................................................................................................................  Patient/Patient Representative Signature      ..........................................................................................................................................  Patient Representative Print Name and Relationship to Patient    ..................................................               ................................................  Date                                   Time    ..........................................................................................................................................  Reviewed by Signature/Title    ...................................................              ..............................................  Date                                               Time          22EPIC Rev 08/18

## 2019-05-03 NOTE — ED PROVIDER NOTES
History     Chief Complaint   Patient presents with     Rectal Bleeding     Patient reports it started this morning, per is Blind and one of his room mates noted the blood in hi stool, he claims that it was bright red blood.      HPI  Barry Mcgrath is a 28 year old male with a history of congenital blindness, status post renal transplant 13 years ago, PTSD, depression and anxiety who presents for evaluation of rectal bleeding.  He states that he had a bowel movement approximately 5 hours prior to coming in and this morning his roommate noticed that there was blood both on the toilet seat and in the toilet bowl.  He denies that he had pain with a bowel movement or that it was hard or voluminous.  He denies any and all other symptoms.  Specifically, he denies feelings of weakness, lightheadedness, dizziness, any type of abdominal pain, nausea, vomiting or diarrhea.  He likewise specifies that he has never had this condition in the past.    I have reviewed the Medications, Allergies, Past Medical and Surgical History, and Social History in the Epic system.    Review of Systems   Constitutional: Negative for fever.   HENT: Negative for congestion.    Eyes: Negative for redness.   Respiratory: Negative for shortness of breath.    Cardiovascular: Negative for chest pain.   Gastrointestinal: Negative for abdominal pain.   Genitourinary: Negative for difficulty urinating.   Musculoskeletal: Negative for arthralgias and neck stiffness.   Skin: Negative for color change.   Neurological: Negative for headaches.   Psychiatric/Behavioral: Negative for confusion.       Physical Exam   BP: 142/81  Pulse: 90  Temp: 97.7  F (36.5  C)  Resp: 16  Weight: 107 kg (236 lb)  SpO2: 98 %      Physical Exam   Constitutional: No distress.   HENT:   Head: Atraumatic.   Mouth/Throat: Oropharynx is clear and moist.   Eyes: No scleral icterus. Right eye exhibits nystagmus. Left eye exhibits nystagmus.   Cardiovascular: Normal heart sounds and  intact distal pulses.   Pulmonary/Chest: Breath sounds normal. No respiratory distress.   Abdominal: Soft. Bowel sounds are normal. There is no tenderness.   Genitourinary: Rectal exam shows fissure. Rectal exam shows no external hemorrhoid, no internal hemorrhoid, no mass, no tenderness and anal tone normal.         Musculoskeletal: He exhibits no edema or tenderness.   Skin: Skin is warm. No rash noted. He is not diaphoretic.     Anoscopy:  Procedure explained and verbal consent obtained.  Digital rectal examination was performed prior to anoscopy and revealed no masses. The anoscope was pre-lubricatred and inserted, the obturator was removed, and the anoscope was rotated 360 degrees. Cotton swabs and forceps were used to clean the area as necessary. Examination of the anal canal and distal rectum revealed typical fissure at 12 o'clock. The patient tolerated the procedure well.  ED Course        Procedures             Labs Ordered and Resulted from Time of ED Arrival Up to the Time of Departure from the ED   CBC WITH PLATELETS DIFFERENTIAL - Abnormal; Notable for the following components:       Result Value    WBC 12.1 (*)     Absolute Eosinophils 0.8 (*)     All other components within normal limits            Assessments & Plan (with Medical Decision Making)   28-year-old male presents for evaluation of bright red blood per rectum.  Differential included bleeding hemorrhoid, mass, cancer, tear, fissure, polyp.  Initial exam revealed normal vital signs and digital rectal exam without evidence of hemorrhoids, tenderness or mass.  Anoscopy revealed typical fissure.  Laboratories including hemoglobin were normal or unchanged from baseline.  Hemoglobin was 14.5.  Patient will be discharged on MiraLAX and instructions to be rechecked in the next week.    I have reviewed the nursing notes.    I have reviewed the findings, diagnosis, plan and need for follow up with the patient.       Medication List      Started     polyethylene glycol powder  Commonly known as:  MIRALAX  1 capful, Oral, 2 TIMES DAILY            Final diagnoses:   Bright red blood per rectum       5/3/2019   [facility], [place], EMERGENCY DEPARTMENT     Jason Montes MD  05/03/19 1004

## 2019-07-02 DIAGNOSIS — Z94.0 STATUS POST KIDNEY TRANSPLANT: ICD-10-CM

## 2019-07-03 RX ORDER — MYCOPHENOLATE MOFETIL 250 MG/1
750 CAPSULE ORAL 2 TIMES DAILY
Qty: 180 CAPSULE | Refills: 2 | Status: SHIPPED | OUTPATIENT
Start: 2019-07-03 | End: 2019-09-24

## 2019-07-09 DIAGNOSIS — Z94.0 STATUS POST KIDNEY TRANSPLANT: ICD-10-CM

## 2019-07-09 RX ORDER — PREDNISONE 5 MG/1
5 TABLET ORAL DAILY
Qty: 90 TABLET | Refills: 3 | Status: SHIPPED | OUTPATIENT
Start: 2019-07-09 | End: 2020-05-14

## 2019-07-09 RX ORDER — TACROLIMUS 1 MG/1
CAPSULE ORAL
Qty: 450 CAPSULE | Refills: 3 | Status: SHIPPED | OUTPATIENT
Start: 2019-07-09 | End: 2020-01-24

## 2019-07-15 DIAGNOSIS — Z94.0 KIDNEY TRANSPLANTED: ICD-10-CM

## 2019-07-15 DIAGNOSIS — T86.10 COMPLICATIONS, KIDNEY TRANSPLANT: ICD-10-CM

## 2019-07-15 DIAGNOSIS — Z48.298 AFTERCARE FOLLOWING ORGAN TRANSPLANT: ICD-10-CM

## 2019-07-15 PROCEDURE — 80197 ASSAY OF TACROLIMUS: CPT | Performed by: INTERNAL MEDICINE

## 2019-07-17 ENCOUNTER — TELEPHONE (OUTPATIENT)
Dept: TRANSPLANT | Facility: CLINIC | Age: 29
End: 2019-07-17

## 2019-07-17 LAB
TACROLIMUS BLD-MCNC: 5.9 UG/L (ref 5–15)
TME LAST DOSE: NORMAL H

## 2019-07-17 NOTE — TELEPHONE ENCOUNTER
WBC = 12.4 (7/15/19)    PLAN:  Call Barry Mcgrath and discuss elevated WBC.  Check if having cough/cold/congestion.  Any symptoms have him see PCP.  Have transplant labs rechecked in 1 - 2 weeks.

## 2019-07-17 NOTE — LETTER
PHYSICIAN ORDERS      DATE & TIME ISSUED: 2019 10:32 AM  PATIENT NAME: Barry Mcgrath   : 1990     [facility] MR# [if applicable]: [id number]     DIAGNOSIS:  Kidney transplant   ICD-10 CODE: Z94.0      Please complete the following labs in 1-2 weeks   Tacrolimus level ( ensure 12 hours between last dose and blood draw)  CBC with platelets and differential  BMP    Any questions please call: [phone] option #5    please fax results to [phone].

## 2019-07-19 NOTE — TELEPHONE ENCOUNTER
Call placed to patient. No answer. Voice message left requesting a return call to discuss elevated WBC. Instructions left for patient to f\u with PCP for s\s of infection and repeat txp labs in 1-2 weeks. Order sent

## 2019-09-23 DIAGNOSIS — G47.33 OSA (OBSTRUCTIVE SLEEP APNEA): ICD-10-CM

## 2019-09-24 DIAGNOSIS — Z94.0 STATUS POST KIDNEY TRANSPLANT: Primary | ICD-10-CM

## 2019-09-24 RX ORDER — MYCOPHENOLATE MOFETIL 250 MG/1
750 CAPSULE ORAL 2 TIMES DAILY
Qty: 180 CAPSULE | Refills: 11 | Status: SHIPPED | OUTPATIENT
Start: 2019-09-24 | End: 2019-12-16

## 2019-09-30 ENCOUNTER — OFFICE VISIT (OUTPATIENT)
Dept: SLEEP MEDICINE | Facility: CLINIC | Age: 29
End: 2019-09-30
Payer: MEDICARE

## 2019-09-30 VITALS
DIASTOLIC BLOOD PRESSURE: 77 MMHG | SYSTOLIC BLOOD PRESSURE: 122 MMHG | HEART RATE: 76 BPM | HEIGHT: 71 IN | OXYGEN SATURATION: 93 % | WEIGHT: 237 LBS | BODY MASS INDEX: 33.18 KG/M2 | RESPIRATION RATE: 16 BRPM

## 2019-09-30 DIAGNOSIS — G47.33 OSA (OBSTRUCTIVE SLEEP APNEA): Primary | ICD-10-CM

## 2019-09-30 PROCEDURE — 99214 OFFICE O/P EST MOD 30 MIN: CPT | Performed by: INTERNAL MEDICINE

## 2019-09-30 ASSESSMENT — MIFFLIN-ST. JEOR: SCORE: 2059.4

## 2019-09-30 NOTE — NURSING NOTE
"Chief Complaint   Patient presents with     CPAP Follow Up     Patient has been extremely tired for the past 3 weeks, concerned cpap pressure isn't high enough       Initial /77   Pulse 76   Resp 16   Ht 1.791 m (5' 10.51\")   Wt 107.5 kg (237 lb)   SpO2 93%   BMI 33.51 kg/m   Estimated body mass index is 33.51 kg/m  as calculated from the following:    Height as of this encounter: 1.791 m (5' 10.51\").    Weight as of this encounter: 107.5 kg (237 lb).    Medication Reconciliation: complete    ESS 20    Jacqueline Martini MA      "

## 2019-09-30 NOTE — PROGRESS NOTES
Obstructive Sleep Apnea - PAP Follow-Up Visit:    Chief Complaint   Patient presents with     CPAP Follow Up     Patient has been extremely tired for the past 3 weeks, concerned cpap pressure isn't high enough       Barry Mcgrath comes in today for follow-up of their severe sleep apnea, managed with CPAP.     Mr. Mcgrath is a 29 years old male, with blindness, Bipolar I, PTSD, HTN. Kidney transplant who was diagnosed with severe sleep apnea in 2018 with an AHI of 110 per hour.     Patient reports that he has been having increased sleepiness. He wants to increase CPAP pressure to make him feel better.    Overall, he rates the experience with PAP as 9 (0 poor, 10 great). The mask is comfortable. The mask is not leaking.  He is not snoring with the mask on. He is not having gasp arousals.  He is not having significant oral/nasal dryness. The pressure settings are comfortable.     He uses full-face mask.     Bedtime is typically 11 pm. Usually it takes about 10 minutes to fall asleep with the mask on. Wake time is typically 6 am.  Patient is using PAP therapy 6-7 hours per night. The patient is usually getting 7 hours of sleep per night.    He does feel rested in the morning.    Total score - [place]: 20 (9/30/2019  9:22 AM)    Respironics    Auto-PAP 11-16 cmH2O download:  28/30 total days of use. 2 nonuse days.  Average use 6 hours 24 minutes per day.  73% days with >4 hours use.  Large leak 22 mins per day average. CPAP 90% pressure 14.4cm. AHI 1.9        Reviewed by team: Tobacco  Allergies  Meds       Reviewed by provider:        Problem List:  Patient Active Problem List    Diagnosis Date Noted     Bipolar 1 disorder (H) 03/13/2019     Priority: Medium     RAQUEL (obstructive sleep apnea) 05/20/2018     Priority: Medium     5/17/2018 [place] Split Sleep Study (261.0 lbs) - .0, .8, Supine .0, REM AHI -, Low O2% 65.3%, Time Spent ?88% 60.6, Time Spent ?89% 72.5. Treatment was titrated to a  "pressure of CPAP 11 with an AHI 6.9. Time spent in REM supine at this pressure was - minutes.       Hypertension, unspecified type 04/04/2018     Priority: Medium     Suicidal ideation 08/28/2017     Priority: Medium          /77   Pulse 76   Resp 16   Ht 1.791 m (5' 10.51\")   Wt 107.5 kg (237 lb)   SpO2 93%   BMI 33.51 kg/m      Impression/Plan:     Severe sleep apnea.     -  Tolerating PAP well but complains of increased sleepiness over last few months. CPAP download shows normal AHI, indicating adequate treatment. Pattern of PAP use indicates irregular and broken sleep. I will increase pressures by one point but he should work on optimization of sleep wake schedules and follow up with Psychiatry.     Plan:     1. Continue auto PAP therapy. I have increased pressure range to 12-17 cm H2O     Barry Mcgrath will follow up in about 6 month(s).     Twenty-five minutes spent with patient, all of which were spent face-to-face counseling, consulting, coordinating plan of care.      Edward Claros MD, MD    CC:  Carly Agustin,   "

## 2019-09-30 NOTE — PATIENT INSTRUCTIONS
Your BMI is Body mass index is 33.51 kg/m .  Weight management is a personal decision.  If you are interested in exploring weight loss strategies, the following discussion covers the approaches that may be successful. Body mass index (BMI) is one way to tell whether you are at a healthy weight, overweight, or obese. It measures your weight in relation to your height.  A BMI of 18.5 to 24.9 is in the healthy range. A person with a BMI of 25 to 29.9 is considered overweight, and someone with a BMI of 30 or greater is considered obese. More than two-thirds of American adults are considered overweight or obese.  Being overweight or obese increases the risk for further weight gain. Excess weight may lead to heart disease and diabetes.  Creating and following plans for healthy eating and physical activity may help you improve your health.  Weight control is part of healthy lifestyle and includes exercise, emotional health, and healthy eating habits. Careful eating habits lifelong are the mainstay of weight control. Though there are significant health benefits from weight loss, long-term weight loss with diet alone may be very difficult to achieve- studies show long-term success with dietary management in less than 10% of people. Attaining a healthy weight may be especially difficult to achieve in those with severe obesity. In some cases, medications, devices and surgical management might be considered.  What can you do?  If you are overweight or obese and are interested in methods for weight loss, you should discuss this with your provider.     Consider reducing daily calorie intake by 500 calories.     Keep a food journal.     Avoiding skipping meals, consider cutting portions instead.    Diet combined with exercise helps maintain muscle while optimizing fat loss. Strength training is particularly important for building and maintaining muscle mass. Exercise helps reduce stress, increase energy, and improves fitness.  Increasing exercise without diet control, however, may not burn enough calories to loose weight.       Start walking three days a week 10-20 minutes at a time    Work towards walking thirty minutes five days a week     Eventually, increase the speed of your walking for 1-2 minutes at time    In addition, we recommend that you review healthy lifestyles and methods for weight loss available through the National Institutes of Health patient information sites:  http://win.niddk.nih.gov/publications/index.htm    And look into health and wellness programs that may be available through your health insurance provider, employer, local community center, or marilyn club.    Weight management plan: Patient was referred to their PCP to discuss a diet and exercise plan.        Your Body mass index is 33.51 kg/m .  Weight management is a personal decision.  If you are interested in exploring weight loss strategies, the following discussion covers the approaches that may be successful. Body mass index (BMI) is one way to tell whether you are at a healthy weight, overweight, or obese. It measures your weight in relation to your height.  A BMI of 18.5 to 24.9 is in the healthy range. A person with a BMI of 25 to 29.9 is considered overweight, and someone with a BMI of 30 or greater is considered obese. More than two-thirds of American adults are considered overweight or obese.  Being overweight or obese increases the risk for further weight gain. Excess weight may lead to heart disease and diabetes.  Creating and following plans for healthy eating and physical activity may help you improve your health.  Weight control is part of healthy lifestyle and includes exercise, emotional health, and healthy eating habits. Careful eating habits lifelong are the mainstay of weight control. Though there are significant health benefits from weight loss, long-term weight loss with diet alone may be very difficult to achieve- studies show long-term  success with dietary management in less than 10% of people. Attaining a healthy weight may be especially difficult to achieve in those with severe obesity. In some cases, medications, devices and surgical management might be considered.  What can you do?  If you are overweight or obese and are interested in methods for weight loss, you should discuss this with your provider.     Consider reducing daily calorie intake by 500 calories.     Keep a food journal.     Avoiding skipping meals, consider cutting portions instead.    Diet combined with exercise helps maintain muscle while optimizing fat loss. Strength training is particularly important for building and maintaining muscle mass. Exercise helps reduce stress, increase energy, and improves fitness. Increasing exercise without diet control, however, may not burn enough calories to loose weight.       Start walking three days a week 10-20 minutes at a time    Work towards walking thirty minutes five days a week     Eventually, increase the speed of your walking for 1-2 minutes at time    In addition, we recommend that you review healthy lifestyles and methods for weight loss available through the National Institutes of Health patient information sites:  http://win.niddk.nih.gov/publications/index.htm    And look into health and wellness programs that may be available through your health insurance provider, employer, local community center, or marilyn club.    {Weight Management Plan -- Delete if patient has a normal BMI:[id number]}

## 2019-10-23 ENCOUNTER — HOSPITAL ENCOUNTER (INPATIENT)
Facility: CLINIC | Age: 29
LOS: 1 days | Discharge: HOME | DRG: 885 | End: 2019-10-24
Attending: EMERGENCY MEDICINE | Admitting: PSYCHIATRY & NEUROLOGY
Payer: MEDICARE

## 2019-10-23 DIAGNOSIS — F31.60 MIXED BIPOLAR I DISORDER (H): ICD-10-CM

## 2019-10-23 DIAGNOSIS — F32.A DEPRESSION, UNSPECIFIED DEPRESSION TYPE: ICD-10-CM

## 2019-10-23 LAB
AMPHETAMINES UR QL SCN: NEGATIVE
ANION GAP SERPL CALCULATED.3IONS-SCNC: 5 MMOL/L (ref 3–14)
BARBITURATES UR QL: NEGATIVE
BASOPHILS # BLD AUTO: 0 10E9/L (ref 0–0.2)
BASOPHILS NFR BLD AUTO: 0.3 %
BENZODIAZ UR QL: NEGATIVE
BUN SERPL-MCNC: 15 MG/DL (ref 7–30)
CALCIUM SERPL-MCNC: 9.1 MG/DL (ref 8.5–10.1)
CANNABINOIDS UR QL SCN: NEGATIVE
CHLORIDE SERPL-SCNC: 107 MMOL/L (ref 94–109)
CO2 SERPL-SCNC: 27 MMOL/L (ref 20–32)
COCAINE UR QL: NEGATIVE
CREAT SERPL-MCNC: 0.76 MG/DL (ref 0.66–1.25)
DIFFERENTIAL METHOD BLD: ABNORMAL
EOSINOPHIL # BLD AUTO: 0.5 10E9/L (ref 0–0.7)
EOSINOPHIL NFR BLD AUTO: 3.5 %
ERYTHROCYTE [DISTWIDTH] IN BLOOD BY AUTOMATED COUNT: 13.1 % (ref 10–15)
ETHANOL UR QL SCN: NEGATIVE
GFR SERPL CREATININE-BSD FRML MDRD: >90 ML/MIN/{1.73_M2}
GLUCOSE SERPL-MCNC: 104 MG/DL (ref 70–99)
HCT VFR BLD AUTO: 44.7 % (ref 40–53)
HGB BLD-MCNC: 14.3 G/DL (ref 13.3–17.7)
IMM GRANULOCYTES # BLD: 0.1 10E9/L (ref 0–0.4)
IMM GRANULOCYTES NFR BLD: 0.4 %
LYMPHOCYTES # BLD AUTO: 3.1 10E9/L (ref 0.8–5.3)
LYMPHOCYTES NFR BLD AUTO: 23.9 %
MCH RBC QN AUTO: 27.2 PG (ref 26.5–33)
MCHC RBC AUTO-ENTMCNC: 32 G/DL (ref 31.5–36.5)
MCV RBC AUTO: 85 FL (ref 78–100)
MONOCYTES # BLD AUTO: 0.8 10E9/L (ref 0–1.3)
MONOCYTES NFR BLD AUTO: 6.1 %
NEUTROPHILS # BLD AUTO: 8.6 10E9/L (ref 1.6–8.3)
NEUTROPHILS NFR BLD AUTO: 65.8 %
NRBC # BLD AUTO: 0 10*3/UL
NRBC BLD AUTO-RTO: 0 /100
OPIATES UR QL SCN: NEGATIVE
PLATELET # BLD AUTO: 296 10E9/L (ref 150–450)
POTASSIUM SERPL-SCNC: 3.6 MMOL/L (ref 3.4–5.3)
RBC # BLD AUTO: 5.26 10E12/L (ref 4.4–5.9)
SODIUM SERPL-SCNC: 139 MMOL/L (ref 133–144)
WBC # BLD AUTO: 13 10E9/L (ref 4–11)

## 2019-10-23 PROCEDURE — 80048 BASIC METABOLIC PNL TOTAL CA: CPT | Performed by: EMERGENCY MEDICINE

## 2019-10-23 PROCEDURE — 25000131 ZZH RX MED GY IP 250 OP 636 PS 637: Mod: GY | Performed by: PSYCHIATRY & NEUROLOGY

## 2019-10-23 PROCEDURE — 25000132 ZZH RX MED GY IP 250 OP 250 PS 637: Mod: GY | Performed by: EMERGENCY MEDICINE

## 2019-10-23 PROCEDURE — 99285 EMERGENCY DEPT VISIT HI MDM: CPT | Mod: Z6 | Performed by: EMERGENCY MEDICINE

## 2019-10-23 PROCEDURE — 80320 DRUG SCREEN QUANTALCOHOLS: CPT | Performed by: FAMILY MEDICINE

## 2019-10-23 PROCEDURE — 90791 PSYCH DIAGNOSTIC EVALUATION: CPT

## 2019-10-23 PROCEDURE — 25000132 ZZH RX MED GY IP 250 OP 250 PS 637: Mod: GY | Performed by: PSYCHIATRY & NEUROLOGY

## 2019-10-23 PROCEDURE — 90853 GROUP PSYCHOTHERAPY: CPT

## 2019-10-23 PROCEDURE — 25000131 ZZH RX MED GY IP 250 OP 636 PS 637: Mod: GY | Performed by: EMERGENCY MEDICINE

## 2019-10-23 PROCEDURE — 80307 DRUG TEST PRSMV CHEM ANLYZR: CPT | Performed by: FAMILY MEDICINE

## 2019-10-23 PROCEDURE — 99285 EMERGENCY DEPT VISIT HI MDM: CPT | Mod: 25 | Performed by: EMERGENCY MEDICINE

## 2019-10-23 PROCEDURE — 85025 COMPLETE CBC W/AUTO DIFF WBC: CPT | Performed by: EMERGENCY MEDICINE

## 2019-10-23 PROCEDURE — 12400001 ZZH R&B MH UMMC

## 2019-10-23 RX ORDER — PRAZOSIN HYDROCHLORIDE 2 MG/1
2 CAPSULE ORAL AT BEDTIME
Status: DISCONTINUED | OUTPATIENT
Start: 2019-10-23 | End: 2019-10-24 | Stop reason: HOSPADM

## 2019-10-23 RX ORDER — TRAZODONE HYDROCHLORIDE 50 MG/1
50 TABLET, FILM COATED ORAL
Status: DISCONTINUED | OUTPATIENT
Start: 2019-10-23 | End: 2019-10-24 | Stop reason: HOSPADM

## 2019-10-23 RX ORDER — TACROLIMUS 1 MG/1
3 CAPSULE ORAL
Status: DISCONTINUED | OUTPATIENT
Start: 2019-10-23 | End: 2019-10-24 | Stop reason: HOSPADM

## 2019-10-23 RX ORDER — CHOLECALCIFEROL (VITAMIN D3) 1250 MCG
50000 CAPSULE ORAL WEEKLY
Status: DISCONTINUED | OUTPATIENT
Start: 2019-10-27 | End: 2019-10-24 | Stop reason: HOSPADM

## 2019-10-23 RX ORDER — VENLAFAXINE HYDROCHLORIDE 75 MG/1
75 CAPSULE, EXTENDED RELEASE ORAL EVERY EVENING
COMMUNITY
End: 2019-12-16

## 2019-10-23 RX ORDER — ACETAMINOPHEN 325 MG/1
650 TABLET ORAL EVERY 4 HOURS PRN
Status: DISCONTINUED | OUTPATIENT
Start: 2019-10-23 | End: 2019-10-24 | Stop reason: HOSPADM

## 2019-10-23 RX ORDER — OXCARBAZEPINE 600 MG/1
600 TABLET, FILM COATED ORAL DAILY
COMMUNITY
End: 2019-12-16

## 2019-10-23 RX ORDER — MYCOPHENOLATE MOFETIL 250 MG/1
750 CAPSULE ORAL 2 TIMES DAILY
Status: DISCONTINUED | OUTPATIENT
Start: 2019-10-23 | End: 2019-10-24 | Stop reason: HOSPADM

## 2019-10-23 RX ORDER — ALUMINA, MAGNESIA, AND SIMETHICONE 2400; 2400; 240 MG/30ML; MG/30ML; MG/30ML
30 SUSPENSION ORAL EVERY 4 HOURS PRN
Status: DISCONTINUED | OUTPATIENT
Start: 2019-10-23 | End: 2019-10-24 | Stop reason: HOSPADM

## 2019-10-23 RX ORDER — METFORMIN HCL 500 MG
1000 TABLET, EXTENDED RELEASE 24 HR ORAL 2 TIMES DAILY
COMMUNITY

## 2019-10-23 RX ORDER — HYDROXYZINE HYDROCHLORIDE 25 MG/1
25 TABLET, FILM COATED ORAL EVERY 4 HOURS PRN
Status: DISCONTINUED | OUTPATIENT
Start: 2019-10-23 | End: 2019-10-24 | Stop reason: HOSPADM

## 2019-10-23 RX ORDER — PREDNISONE 5 MG/1
5 TABLET ORAL EVERY MORNING
Status: DISCONTINUED | OUTPATIENT
Start: 2019-10-23 | End: 2019-10-24 | Stop reason: HOSPADM

## 2019-10-23 RX ORDER — VENLAFAXINE HYDROCHLORIDE 37.5 MG/1
75 TABLET, EXTENDED RELEASE ORAL EVERY EVENING
Status: DISCONTINUED | OUTPATIENT
Start: 2019-10-23 | End: 2019-10-24 | Stop reason: HOSPADM

## 2019-10-23 RX ORDER — BISACODYL 10 MG
10 SUPPOSITORY, RECTAL RECTAL DAILY PRN
Status: DISCONTINUED | OUTPATIENT
Start: 2019-10-23 | End: 2019-10-24 | Stop reason: HOSPADM

## 2019-10-23 RX ORDER — LISINOPRIL 10 MG/1
10 TABLET ORAL EVERY EVENING
Status: DISCONTINUED | OUTPATIENT
Start: 2019-10-23 | End: 2019-10-24 | Stop reason: HOSPADM

## 2019-10-23 RX ORDER — RISPERIDONE 2 MG/1
2 TABLET, ORALLY DISINTEGRATING ORAL 2 TIMES DAILY
Status: DISCONTINUED | OUTPATIENT
Start: 2019-10-23 | End: 2019-10-24 | Stop reason: HOSPADM

## 2019-10-23 RX ORDER — OXCARBAZEPINE 300 MG/1
600 TABLET, FILM COATED ORAL DAILY
Status: DISCONTINUED | OUTPATIENT
Start: 2019-10-23 | End: 2019-10-24 | Stop reason: HOSPADM

## 2019-10-23 RX ORDER — TACROLIMUS 1 MG/1
2 CAPSULE ORAL
Status: DISCONTINUED | OUTPATIENT
Start: 2019-10-23 | End: 2019-10-24 | Stop reason: HOSPADM

## 2019-10-23 RX ADMIN — OXCARBAZEPINE 600 MG: 600 TABLET, FILM COATED ORAL at 07:45

## 2019-10-23 RX ADMIN — PREDNISONE 5 MG: 5 TABLET ORAL at 07:46

## 2019-10-23 RX ADMIN — PRAZOSIN HYDROCHLORIDE 2 MG: 2 CAPSULE ORAL at 19:58

## 2019-10-23 RX ADMIN — LISINOPRIL 10 MG: 10 TABLET ORAL at 04:11

## 2019-10-23 RX ADMIN — TACROLIMUS 3 MG: 1 CAPSULE ORAL at 07:47

## 2019-10-23 RX ADMIN — VENLAFAXINE HYDROCHLORIDE 75 MG: 75 TABLET, EXTENDED RELEASE ORAL at 04:11

## 2019-10-23 RX ADMIN — METFORMIN HYDROCHLORIDE 500 MG: 500 TABLET, FILM COATED ORAL at 07:43

## 2019-10-23 RX ADMIN — TACROLIMUS 2 MG: 1 CAPSULE ORAL at 19:56

## 2019-10-23 RX ADMIN — RISPERIDONE 2 MG: 2 TABLET, ORALLY DISINTEGRATING ORAL at 07:46

## 2019-10-23 RX ADMIN — MYCOPHENOLATE MOFETIL 750 MG: 250 CAPSULE ORAL at 07:44

## 2019-10-23 RX ADMIN — METFORMIN HYDROCHLORIDE 500 MG: 500 TABLET, FILM COATED ORAL at 19:54

## 2019-10-23 RX ADMIN — LISINOPRIL 10 MG: 10 TABLET ORAL at 19:54

## 2019-10-23 RX ADMIN — MYCOPHENOLATE MOFETIL 750 MG: 250 CAPSULE ORAL at 19:55

## 2019-10-23 RX ADMIN — PRAZOSIN HYDROCHLORIDE 2 MG: 2 CAPSULE ORAL at 04:10

## 2019-10-23 RX ADMIN — VENLAFAXINE HYDROCHLORIDE 75 MG: 75 TABLET, EXTENDED RELEASE ORAL at 19:54

## 2019-10-23 RX ADMIN — RISPERIDONE 2 MG: 2 TABLET, ORALLY DISINTEGRATING ORAL at 19:54

## 2019-10-23 ASSESSMENT — ACTIVITIES OF DAILY LIVING (ADL)
LAUNDRY: WITH SUPERVISION
HYGIENE/GROOMING: INDEPENDENT
DRESS: INDEPENDENT
ORAL_HYGIENE: INDEPENDENT

## 2019-10-23 ASSESSMENT — ENCOUNTER SYMPTOMS
FEVER: 0
SHORTNESS OF BREATH: 0
ABDOMINAL PAIN: 0
DYSPHORIC MOOD: 1
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
## 2019-10-23 NOTE — ED NOTES
Patient is currently a boarder in the ED.   Patient was offered hygiene supplies: Pt is resting at this time. Will offer when pt goes to the bathroom.  Patient was offered to ambulate: Pt is resting at this time.   Patient was ordered a breakfast tray: Yes, will give tray to pt when trays arrive.

## 2019-10-23 NOTE — PROGRESS NOTES
10/23/19 1410   Patient Belongings   Did you bring any home meds/supplements to the hospital?  No   Patient Belongings locker;sent to security per site process   Patient Belongings Put in Hospital Secure Location (Security or Locker, etc.) cane;clothing;cell phone/electronics;cash/credit card;keys;wallet   Belongings Search Yes   Clothing Search Yes   Second Staff Nick Streeter     In Locker: Black pants, Blue shirt, Socks, Underwear, Black wallet, 3 keys on laniyard, Orange jacket, Black shoes, Walking stick, Black Iphone.    To Security: [employer] and [employer]    A               Admission:  I am responsible for any personal items that are not sent to the safe or pharmacy.  [facility] is not responsible for loss, theft or damage of any property in my possession.    Signature:  _________________________________ Date: _______  Time: _____                                              Staff Signature:  ____________________________ Date: ________  Time: _____      2nd Staff person, if patient is unable/unwilling to sign:    Signature: ________________________________ Date: ________  Time: _____     Discharge:  [facility] has returned all of my personal belongings:    Signature: _________________________________ Date: ________  Time: _____                                          Staff Signature:  ____________________________ Date: ________  Time: _____

## 2019-10-23 NOTE — PROGRESS NOTES
10/23/19 1600   Group Therapy Session   Group Attendance attended group session   Total Time (minutes) 35   Group Type psychotherapeutic   Group Topic Covered other (see comments)   Patient Participation/Contribution other (see comments)  (observed )   CBT activity was utilized to help individuals identify and process a problem while considering various coping strategies. Barry arrived late to group. He presented with a flat mood/affect though he was friendly and responded when spoken to.  He chose not to participate in the activity but listened while others processed.

## 2019-10-23 NOTE — ED NOTES
Bed: HW04  Expected date:   Expected time:   Means of arrival:   Comments:  Mandy Lott 417 28 y/o M SI
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
## 2019-10-23 NOTE — ED NOTES
ED to Behavioral Floor Handoff    SITUATION  Barry Mcgrath is a 29 year old male who speaks English and lives in a home with others The patient arrived in the ED by ambulance from traffic with a complaint of Suicidal (walked into traffic)  .The patient's current symptoms started/worsened a few weeks ago and during this time the symptoms have increased.   In the ED, pt was diagnosed with   Final diagnoses:   Depression, unspecified depression type        Initial vitals were: BP: (!) 142/81  Pulse: 87  Temp: 98.3  F (36.8  C)  Resp: 16  SpO2: 96 %   --------  Is the patient diabetic? prediabetic  If yes, last blood glucose? --     If yes, was this treated in the ED? --  --------  Is the patient inebriated (ETOH) No or Impaired on other substances? No  MSSA done? N/A  Last MSSA score: --    Were withdrawal symptoms treated? N/A  Does the patient have a seizure history? No. If yes, date of most recent seizure--  --------  Is the patient patient experiencing suicidal ideation? reports the following suicide factors: attempted suicide by walking into traffic    Homicidal ideation? denies current or recent homicidal ideation or behaviors.    Self-injurious behavior/urges? denies current or recent self injurious behavior or ideation.  ------  Was pt aggressive in the ED No  Was a code called No  Is the pt now cooperative? Yes  -------  Meds given in ED:   Medications   mycophenolate (CELLCEPT BRAND) capsule 750 mg (has no administration in time range)   tacrolimus (GENERIC EQUIVALENT) capsule 3 mg (has no administration in time range)   tacrolimus (GENERIC EQUIVALENT) capsule 2 mg (has no administration in time range)   predniSONE (DELTASONE) tablet 5 mg (has no administration in time range)   risperiDONE (risperDAL M-TABS) ODT tab 2 mg (has no administration in time range)   venlafaxine (EFFEXOR-ER) 24 hr tablet 75 mg (has no administration in time range)   prazosin (MINIPRESS) capsule 2 mg (has no administration in time  range)   lisinopril (PRINIVIL/ZESTRIL) tablet 10 mg (has no administration in time range)   metFORMIN (GLUCOPHAGE-XR) 24 hr tablet 500 mg (has no administration in time range)   OXcarbazepine (TRILEPTAL) tablet 600 mg (has no administration in time range)      Family present during ED course? No  Family currently present? No    BACKGROUND  Does the patient have a cognitive impairment or developmental disability? No  Allergies: No Known Allergies.   Social demographics are   Social History     Socioeconomic History     Marital status: Single     Spouse name: None     Number of children: None     Years of education: None     Highest education level: None   Occupational History     None   Social Needs     Financial resource strain: None     Food insecurity:     Worry: None     Inability: None     Transportation needs:     Medical: None     Non-medical: None   Tobacco Use     Smoking status: Never Smoker     Smokeless tobacco: Never Used   Substance and Sexual Activity     Alcohol use: No     Drug use: No     Sexual activity: Never   Lifestyle     Physical activity:     Days per week: None     Minutes per session: None     Stress: None   Relationships     Social connections:     Talks on phone: None     Gets together: None     Attends Rastafari service: None     Active member of club or organization: None     Attends meetings of clubs or organizations: None     Relationship status: None     Intimate partner violence:     Fear of current or ex partner: None     Emotionally abused: None     Physically abused: None     Forced sexual activity: None   Other Topics Concern     Parent/sibling w/ CABG, MI or angioplasty before 65F 55M? Not Asked   Social History Narrative     None        ASSESSMENT  Labs results   Labs Ordered and Resulted from Time of ED Arrival Up to the Time of Departure from the ED   CBC WITH PLATELETS DIFFERENTIAL - Abnormal; Notable for the following components:       Result Value    WBC 13.0 (*)      Absolute Neutrophil 8.6 (*)     All other components within normal limits   DRUG ABUSE SCREEN 6 CHEM DEP URINE ([facility])   BASIC METABOLIC PANEL      Imaging Studies: No results found for this or any previous visit (from the past 24 hour(s)).   Most recent vital signs BP (!) 142/81   Pulse 87   Temp 98.3  F (36.8  C) (Oral)   Resp 16   SpO2 96%    Abnormal labs/tests/findings requiring intervention:---   Pain control: pt had none  Nausea control: pt had none    RECOMMENDATION  Are any infection precautions needed (MRSA, VRE, etc.)? No If yes, what infection? --  ---  Does the patient have mobility issues? Totally blind and uses walking stick. If yes, what device does the pt use? Walking stick  ---  Is patient on 72 hour hold or commitment? No If on 72 hour hold, have hold and rights been given to patient? N/A  Are admitting orders written if after 10 p.m. ?N/A  Tasks needing to be completed:---     Amanda Saunders, RN    [phone] [facility]   [phone] [facility]

## 2019-10-24 VITALS
RESPIRATION RATE: 16 BRPM | HEART RATE: 82 BPM | DIASTOLIC BLOOD PRESSURE: 78 MMHG | BODY MASS INDEX: 32.24 KG/M2 | OXYGEN SATURATION: 95 % | SYSTOLIC BLOOD PRESSURE: 121 MMHG | TEMPERATURE: 97.6 F | WEIGHT: 228 LBS

## 2019-10-24 PROCEDURE — 99235 HOSP IP/OBS SAME DATE MOD 70: CPT | Performed by: PSYCHIATRY & NEUROLOGY

## 2019-10-24 PROCEDURE — G0177 OPPS/PHP; TRAIN & EDUC SERV: HCPCS

## 2019-10-24 PROCEDURE — 25000131 ZZH RX MED GY IP 250 OP 636 PS 637: Mod: GY | Performed by: PSYCHIATRY & NEUROLOGY

## 2019-10-24 PROCEDURE — 25000132 ZZH RX MED GY IP 250 OP 250 PS 637: Mod: GY | Performed by: PSYCHIATRY & NEUROLOGY

## 2019-10-24 RX ADMIN — MYCOPHENOLATE MOFETIL 750 MG: 250 CAPSULE ORAL at 08:39

## 2019-10-24 RX ADMIN — PREDNISONE 5 MG: 5 TABLET ORAL at 08:38

## 2019-10-24 RX ADMIN — TACROLIMUS 3 MG: 1 CAPSULE ORAL at 08:35

## 2019-10-24 RX ADMIN — METFORMIN HYDROCHLORIDE 500 MG: 500 TABLET, FILM COATED ORAL at 08:39

## 2019-10-24 RX ADMIN — OXCARBAZEPINE 600 MG: 600 TABLET, FILM COATED ORAL at 08:38

## 2019-10-24 RX ADMIN — RISPERIDONE 2 MG: 2 TABLET, ORALLY DISINTEGRATING ORAL at 08:39

## 2019-10-24 ASSESSMENT — ACTIVITIES OF DAILY LIVING (ADL)
HYGIENE/GROOMING: INDEPENDENT
ORAL_HYGIENE: INDEPENDENT

## 2019-10-24 NOTE — DISCHARGE INSTRUCTIONS
Behavioral Discharge Planning and Instructions      Summary:  You were admitted on 10/23/2019  due to Depression, Bipolar II Disorder  and Suicidal Ideations.  You were treated by Dr. Viviana Corado MD and discharged on 10/24/19 from [facility] to Home      Principal Diagnosis:   Bipolar 2 disorder, most recent episode depressed    Health Care Follow-up Appointments:   As discussed with your treatment team, you have confirmed that you have a therapist and psychiatrist you see regularly. You have opted to make appointments with the independently after you discharge. When additional resources or information for services was offered you declined any further assistance. The treatment team enjoyed working with you and are happy you choose to seek help in your time of need. We support your choice to see your therapist more often, and would encourage you to see your therapist at least twice a week.   Attend all scheduled appointments with your outpatient providers. Call at least 24 hours in advance if you need to reschedule an appointment to ensure continued access to your outpatient providers.   Major Treatments, Procedures and Findings:  You were provided with: a psychiatric assessment, assessed for medical stability, medication evaluation and/or management, group therapy and milieu management    Symptoms to Report: increased confusion, losing more sleep, mood getting worse or thoughts of suicide    Early warning signs can include: increased depression or anxiety sleep disturbances increased thoughts or behaviors of suicide or self-harm     Safety and Wellness:  Take all medicines as directed.  Make no changes unless your doctor suggests them.      Follow treatment recommendations.  Refrain from alcohol and non-prescribed drugs.  If there is a concern for safety, call 911.    Resources:   Crisis Intervention: 120.475.6924 or 248-184-3451 (TTY: 203.946.9678).  Call anytime for help.  National [place] on Mental  Illness (www.mn.azar.org): 246-261-3852 or 435-650-0441.  Suicide Awareness Voices of Education (SAVE) (www.save.org): 021-501-LSJC (7883)  National Suicide Prevention Line (www.mentalhealthmn.org): 148-113-HPUD (9723)  Mental Health Consumer/Survivor Network of [place] (www.mhcsn.net): 934.141.4499 or 283-946-3725  Mental Health Association of [place] (www.mentalhealth.org): 518.375.2053 or 713-332-8471  [facility] (COPE) Response - Adult [phone]      The treatment team has appreciated the opportunity to work with you.     If you have any questions or concerns our unit number is [phone]

## 2019-10-24 NOTE — PROGRESS NOTES
10/24/19 1600   Occupational Therapy   Type of Intervention structured groups   Response Initiates, socially acceptable   Hours 1

## 2019-10-24 NOTE — PROGRESS NOTES
[facility] reviewed pt's case and intended to complete psycho-social assessment, but was informed that pt would be discharged today by attending. Pt has services in place according to attending and DEC assessment, so will not need additional service references upon discharge. Additionally, pt's [facility]'s care coordinator spoke with [facility] and confirmed that pt had providers set up in the community. [facility] met with pt briefly prior to discharge and confirmed that pt had a standing appointment with his therapist, and plans to increase the number of time he goes a week to see her. He also confirmed that he has a support system at home that he can utilize to help manage his symptoms. Pt denies any SI and expressed feeling ready to discharge today.

## 2019-10-24 NOTE — PLAN OF CARE
Pt appears calm, cooperative. Social with peers/staff. Pt denies current SI/SIB/HI. Pt states his thoughts of SI subsided after seeking help and staying on the unit overnight. Pt reports his roomates and family are a good support network. Pt denies current hallucinations/paranoia/delusions. Pt feels hopeful for the future and states he enjoys studying at [employer]. Safety plan and discharge plan reviewed with pt and he verbalizes understanding. Pt remains with SIO 1:1 staff until discharge. AVS faxed to PCP. Pt discharged to home via friend's car.

## 2019-10-24 NOTE — PLAN OF CARE
"Patient was calm and cooperative. Napped on and off but present in milieu and going to groups. He denies SI,SIB,depression, anxiety, racing thoughts and other psychotic symptoms. Sleep and appetite are \"good. No concerns noted this shift.   "

## 2019-10-24 NOTE — PLAN OF CARE
BEHAVIORAL TEAM DISCUSSION    Participants: Dr. Viviana Corado MD, Kwame Powell LPC  Progress: Stabalized  Anticipated length of stay: Pt will discharge today at pt attending  Continued Stay Criteria/Rationale: Pt's admit was appropriate at time of admit, but is reported to now be stabalized  Medical/Physical: None reported in team  Precautions:   Behavioral Orders   Procedures     Code 1 - Restrict to Unit     Routine Programming     As clinically indicated     Single Room     Status 15     Every 15 minutes.     Status Individual Observation     Patient is visually impaired and requires assistance to navigate unit for patients safety.     Order Specific Question:   CONTINUOUS 24 hours / day     Answer:   Other     Order Specific Question:   Specify distance     Answer:   5 feet in milieu, may be at door when patient in room     Order Specific Question:   Indications for SIO     Answer:   Self-injury risk     Plan: Pt will discharge today  Rationale for change in precautions or plan: None

## 2019-10-24 NOTE — PROGRESS NOTES
SU (writer) reviewed DEC assessment and started pt's IPA based on chart review. SU will meet with pt tomorrow to complete IPA and review discharge planing.
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
## 2019-11-05 ENCOUNTER — TELEPHONE (OUTPATIENT)
Dept: TRANSPLANT | Facility: CLINIC | Age: 29
End: 2019-11-05

## 2019-11-05 DIAGNOSIS — Z94.0 KIDNEY TRANSPLANTED: Primary | ICD-10-CM

## 2019-11-05 DIAGNOSIS — D72.829 ELEVATED WBC COUNT: ICD-10-CM

## 2019-11-05 NOTE — TELEPHONE ENCOUNTER
"DATE:  11/5/2019   TIME OF RECEIPT FROM LAB:  1440  LAB TEST:  TAC   LAB VALUE:  4.3  Lab was drawn on 11/01/19  Nadya from [facility] is requesting a call back from coordinator \"so I am in the loop\"    RESULTS GIVEN WITH READ-BACK TO (PROVIDER):  Shira Mckoy  TIME LAB VALUE REPORTED TO PROVIDER:   0510  "

## 2019-11-05 NOTE — TELEPHONE ENCOUNTER
Call returned to [facility]. Let her know tac level 4.3 is within patient's goal range 4-6. [facility] v/u, updated patient's chart.

## 2019-11-05 NOTE — TELEPHONE ENCOUNTER
Reagan Burkett MD Ylitalo, Kim Michelle, DODIE             His WBC count has been elevated for years.  I doubt infection ( but he has had pneumonia in the past).  If asymptomatic, I would send him to [facility] to make sure there isn't a smoldering CLL (although he would be young for this.).      PLAN:  Call pt and check if he has any s/s infection?     OUTCOME:

## 2019-11-05 NOTE — TELEPHONE ENCOUNTER
Call placed to pt.  Pt reports a cold for 1 month, but nothing further.  Pt has f/u with local PCP for management, but nothing found.  Pt aware we would recommend pt is seen by Hematology for further evaluation for chronically elevated WBC.  Pt v/u and is OK with moving forward with appointment.  Referral placed.  Pt aware scheduling will reach out to him to set up appointment.     Pt questions answered, pt v/u.

## 2019-11-06 ENCOUNTER — TELEPHONE (OUTPATIENT)
Dept: TRANSPLANT | Facility: CLINIC | Age: 29
End: 2019-11-06

## 2019-11-06 NOTE — TELEPHONE ENCOUNTER
ONCOLOGY INTAKE: Records Information      APPT INFORMATION:  Referring provider:  Reagan Burkett MD  Referring provider s clinic:   [facility]  Reason for visit/diagnosis:    Z94.0 (ICD-10-CM) - Kidney transplanted   D72.829 (ICD-10-CM) - Elevated WBC count       Has patient been notified of appointment date and time?: Yes    RECORDS INFORMATION:  Were the records received with the referral (via [employer])? No, Internally    Has patient been seen for any external appt for this diagnosis? No    If yes, where? NA    Has patient had any imaging or procedures outside of [place] for this condition? No      If Yes, where? NA    ADDITIONAL INFORMATION:

## 2019-11-06 NOTE — TELEPHONE ENCOUNTER
Patient called regarding his WBC and some other concerns patient would like a return call today if possible.

## 2019-11-06 NOTE — TELEPHONE ENCOUNTER
"Call returned to patient. Explained plan for pt to see hematology for leukocytosis. Explained why his appt is scheduled with \"heme/onc\" and that it does not necessary mean he has cancer or a \"tumor\". Pt v/u. Has appt set up for December.   "

## 2019-11-07 NOTE — TELEPHONE ENCOUNTER
RECORDS STATUS - ALL OTHER DIAGNOSIS      RECORDS RECEIVED FROM: [facility]   DATE RECEIVED: 11/7/19   NOTES STATUS DETAILS   OFFICE NOTE from referring provider [facility] Telephone Enc 11/6/19 - Ylitalo   OFFICE NOTE from medical oncologist NA Nephrologist - Last seen 4/5/19   DISCHARGE SUMMARY from hospital [facility] 10/23/19   DISCHARGE REPORT from the ER [facility] 5/3/19   OPERATIVE REPORT     MEDICATION LIST [facility] 9/24/19   CLINICAL TRIAL TREATMENTS TO DATE     LABS     PATHOLOGY REPORTS     ANYTHING RELATED TO DIAGNOSIS Epic 11/1/19   GENONOMIC TESTING     TYPE:     IMAGING (NEED IMAGES & REPORT)     CT SCANS     MRI PACS 1/29/19   MAMMO     ULTRASOUND     PET

## 2019-11-12 ENCOUNTER — TELEPHONE (OUTPATIENT)
Dept: NEPHROLOGY | Facility: CLINIC | Age: 29
End: 2019-11-12

## 2019-11-12 NOTE — TELEPHONE ENCOUNTER
Spoke to patient who states that he has a copay now for IS meds.  Verified this info with [facility] pharmacy.  Patient is checking with Medicare to see why he has a copay now.

## 2019-11-12 NOTE — TELEPHONE ENCOUNTER
[facility]    Phone Message    May a detailed message be left on voicemail: yes    Reason for Call: Medication Question or concern regarding medication   Prescription Clarification  Name of Medication: mycophenolate (GENERIC EQUIVALENT) 250 MG capsule  Prescribing Provider: Dr. Burkett   Pharmacy: [id number] [address] [place] [address]   What on the order needs clarification?:  atbud stated that he has not had to pay for these medications before, but this time they are very expensive. Stated that they are on a fixed income, and wondering what changes could be made to bring the costs down. Unsure if generic would help, but it looks like two of the prescriptions already are. Please call back to discuss.        May a detailed message be left on voicemail: yes    Reason for Call: Medication Question or concern regarding medication   Prescription Clarification  Name of Medication: predniSONE (DELTASONE) 5 MG tablet  Prescribing Provider: Dr. Burkett   Pharmacy: [facility] #[id number] [place] [address] [address] [facility]   What on the order needs clarification?:  atient stated that he has not had to pay for these medications before, but this time they are very expensive. Stated that they are on a fixed income, and wondering what changes could be made to bring the costs down. Unsure if generic would help, but it looks like two of the prescriptions already are. Please call back to discuss.        May a detailed message be left on voicemail: yes    Reason for Call: Medication Question or concern regarding medication   Prescription Clarification  Name of Medication: tacrolimus (GENERIC EQUIVALENT) 1 MG capsule  Prescribing Provider: Dr. Burkett   Pharmacy: [facility] #[id number] [place] [address] [address] [facility]   What on the order needs clarification?:  atient stated that he has not had to pay for these medications before, but this time they are very expensive. Stated that they are on a fixed  income, and wondering what changes could be made to bring the costs down. Unsure if generic would help, but it looks like two of the prescriptions already are. Please call back to discuss.        Action Taken: Message routed to:  [facility] Nephrology

## 2019-12-16 ENCOUNTER — ONCOLOGY VISIT (OUTPATIENT)
Dept: ONCOLOGY | Facility: CLINIC | Age: 29
End: 2019-12-16
Attending: INTERNAL MEDICINE
Payer: MEDICARE

## 2019-12-16 ENCOUNTER — PRE VISIT (OUTPATIENT)
Dept: ONCOLOGY | Facility: CLINIC | Age: 29
End: 2019-12-16

## 2019-12-16 VITALS — RESPIRATION RATE: 18 BRPM | HEART RATE: 78 BPM

## 2019-12-16 VITALS
SYSTOLIC BLOOD PRESSURE: 138 MMHG | BODY MASS INDEX: 32.24 KG/M2 | TEMPERATURE: 98 F | HEIGHT: 71 IN | RESPIRATION RATE: 16 BRPM | HEART RATE: 85 BPM | DIASTOLIC BLOOD PRESSURE: 80 MMHG | OXYGEN SATURATION: 96 %

## 2019-12-16 DIAGNOSIS — Z94.0 STATUS POST KIDNEY TRANSPLANT: Primary | ICD-10-CM

## 2019-12-16 DIAGNOSIS — D72.829 ELEVATED WBC COUNT: ICD-10-CM

## 2019-12-16 DIAGNOSIS — Z94.0 KIDNEY TRANSPLANTED: ICD-10-CM

## 2019-12-16 PROBLEM — N18.6 ESRD ON PERITONEAL DIALYSIS (H): Status: ACTIVE | Noted: 2019-12-16

## 2019-12-16 PROBLEM — Z99.2 ESRD ON PERITONEAL DIALYSIS (H): Status: ACTIVE | Noted: 2019-12-16

## 2019-12-16 LAB
BASOPHILS # BLD AUTO: 0.1 10E9/L (ref 0–0.2)
BASOPHILS NFR BLD AUTO: 0.6 %
DIFFERENTIAL METHOD BLD: ABNORMAL
EOSINOPHIL # BLD AUTO: 0.7 10E9/L (ref 0–0.7)
EOSINOPHIL NFR BLD AUTO: 5 %
ERYTHROCYTE [DISTWIDTH] IN BLOOD BY AUTOMATED COUNT: 12.9 % (ref 10–15)
HCT VFR BLD AUTO: 42.6 % (ref 40–53)
HGB BLD-MCNC: 14.2 G/DL (ref 13.3–17.7)
IMM GRANULOCYTES # BLD: 0.1 10E9/L (ref 0–0.4)
IMM GRANULOCYTES NFR BLD: 0.6 %
LYMPHOCYTES # BLD AUTO: 3.7 10E9/L (ref 0.8–5.3)
LYMPHOCYTES NFR BLD AUTO: 25.9 %
MCH RBC QN AUTO: 27.8 PG (ref 26.5–33)
MCHC RBC AUTO-ENTMCNC: 33.3 G/DL (ref 31.5–36.5)
MCV RBC AUTO: 83 FL (ref 78–100)
MONOCYTES # BLD AUTO: 0.9 10E9/L (ref 0–1.3)
MONOCYTES NFR BLD AUTO: 6.5 %
NEUTROPHILS # BLD AUTO: 8.8 10E9/L (ref 1.6–8.3)
NEUTROPHILS NFR BLD AUTO: 61.4 %
NRBC # BLD AUTO: 0 10*3/UL
NRBC BLD AUTO-RTO: 0 /100
PLATELET # BLD AUTO: 273 10E9/L (ref 150–450)
RBC # BLD AUTO: 5.11 10E12/L (ref 4.4–5.9)
RETICS # AUTO: 104.4 10E9/L (ref 25–95)
RETICS/RBC NFR AUTO: 2.1 % (ref 0.5–2)
WBC # BLD AUTO: 14.3 10E9/L (ref 4–11)

## 2019-12-16 PROCEDURE — 36415 COLL VENOUS BLD VENIPUNCTURE: CPT

## 2019-12-16 PROCEDURE — 40000611 ZZHCL STATISTIC MORPHOLOGY W/INTERP HEMEPATH TC 85060: Performed by: INTERNAL MEDICINE

## 2019-12-16 PROCEDURE — 85045 AUTOMATED RETICULOCYTE COUNT: CPT | Performed by: INTERNAL MEDICINE

## 2019-12-16 PROCEDURE — 85025 COMPLETE CBC W/AUTO DIFF WBC: CPT | Performed by: INTERNAL MEDICINE

## 2019-12-16 PROCEDURE — 99204 OFFICE O/P NEW MOD 45 MIN: CPT | Mod: ZP | Performed by: INTERNAL MEDICINE

## 2019-12-16 PROCEDURE — G0463 HOSPITAL OUTPT CLINIC VISIT: HCPCS | Mod: ZF

## 2019-12-16 RX ORDER — DIVALPROEX SODIUM 500 MG/1
TABLET, EXTENDED RELEASE ORAL
Refills: 0 | COMMUNITY
Start: 2019-11-01 | End: 2022-06-13

## 2019-12-16 RX ORDER — OXCARBAZEPINE 150 MG/1
TABLET, FILM COATED ORAL
Refills: 0 | COMMUNITY
Start: 2019-11-04 | End: 2019-12-16

## 2019-12-16 RX ORDER — ARIPIPRAZOLE 20 MG/1
20 TABLET ORAL DAILY
Refills: 1 | COMMUNITY
Start: 2019-08-25 | End: 2019-12-16

## 2019-12-16 RX ORDER — DIPHENHYDRAMINE HCL 25 MG
TABLET ORAL
Refills: 0 | COMMUNITY
Start: 2019-11-16 | End: 2019-12-16

## 2019-12-16 RX ORDER — HYDROCORTISONE 2.5 %
CREAM (GRAM) TOPICAL 2 TIMES DAILY PRN
Refills: 0 | COMMUNITY
Start: 2019-11-16 | End: 2019-12-16

## 2019-12-16 RX ORDER — VENLAFAXINE HYDROCHLORIDE 37.5 MG/1
CAPSULE, EXTENDED RELEASE ORAL
Refills: 0 | COMMUNITY
Start: 2019-11-04 | End: 2019-12-16

## 2019-12-16 RX ORDER — OXCARBAZEPINE 300 MG/1
TABLET, FILM COATED ORAL
Refills: 0 | COMMUNITY
Start: 2019-11-04 | End: 2019-12-16

## 2019-12-16 RX ORDER — RISPERIDONE 2 MG/1
TABLET ORAL
Refills: 0 | COMMUNITY
Start: 2019-11-12 | End: 2019-12-16

## 2019-12-16 RX ORDER — RISPERIDONE 4 MG/1
4 TABLET ORAL DAILY
COMMUNITY
End: 2022-06-13

## 2019-12-16 RX ORDER — LISINOPRIL 10 MG/1
10 TABLET ORAL DAILY
Refills: 5 | COMMUNITY
Start: 2019-11-30

## 2019-12-16 RX ORDER — MYCOPHENOLATE MOFETIL 250 MG/1
750 CAPSULE ORAL 2 TIMES DAILY
Qty: 180 CAPSULE | Refills: 11 | Status: SHIPPED | OUTPATIENT
Start: 2019-12-16 | End: 2020-05-14

## 2019-12-16 RX ORDER — INFLUENZA A VIRUS A/NEBRASKA/14/2019 (H1N1) ANTIGEN (MDCK CELL DERIVED, PROPIOLACTONE INACTIVATED), INFLUENZA A VIRUS A/DELAWARE/39/2019 (H3N2) ANTIGEN (MDCK CELL DERIVED, PROPIOLACTONE INACTIVATED), INFLUENZA B VIRUS B/SINGAPORE/INFTT-16-0610/2016 ANTIGEN (MDCK CELL DERIVED, PROPIOLACTONE INACTIVATED), INFLUENZA B VIRUS B/DARWIN/7/2019 ANTIGEN (MDCK CELL DERIVED, PROPIOLACTONE INACTIVATED) 15; 15; 15; 15 UG/.5ML; UG/.5ML; UG/.5ML; UG/.5ML
INJECTION, SUSPENSION INTRAMUSCULAR
Refills: 0 | COMMUNITY
Start: 2019-09-22 | End: 2024-07-26

## 2019-12-16 RX ORDER — DIVALPROEX SODIUM 250 MG/1
TABLET, EXTENDED RELEASE ORAL
Refills: 0 | COMMUNITY
Start: 2019-11-01 | End: 2019-12-16

## 2019-12-16 ASSESSMENT — PAIN SCALES - GENERAL
PAINLEVEL: NO PAIN (0)
PAINLEVEL: NO PAIN (0)

## 2019-12-16 NOTE — LETTER
2019       RE: Barry Mcgrath  [address]  [facility] [address]     Dear Colleague,    Thank you for referring your patient, Barry Mcgrath, to the [facility]. Please see a copy of my visit note below.        [facility] Hematology Consultation    Outpatient Visit Note:    Patient: Barry Mcgrath  MRN: [id number]  : 1990  ECTOR: 2019    Reason for Consultation:  Barry Mcgrath is a referred by Dr Burkett for evaluation and treatment of elevated WBC count     History of Present Illness:  Barry Mcgrath is a 29 year old male with PMH of renal transplant who has been on long term immune suppression with prednisone who was found to have persistent leukocytosis since him establishing care with Dr Burkett for past 2 years. He does not remember that he was told that before. On the aside the patient has major pysch issues with BPD and anxiety and depressive ds.   He denies having repeated infections like pneumonia, skin infections but does report frequent viral infections.   Pt denies SOB , cough, chest pain , fevers , chills, nausea , vomiting , abdominal pain, change in bowel / bladder habits,   No c/o sore throat , mucositis,Lowe extremity swelling ,  bleeding gums, tingling or numbness, easy bruising , muscular weakness, fatigue , weight loss. Pt has not noticed any new swelling/ lymph glands .    Past Medical History:  Past Medical History:   Diagnosis Date     Anxiety      Bipolar 1 disorder (H)      Blind      Depressive disorder        Past Surgical History:  Past Surgical History:   Procedure Laterality Date     TRANSPLANT      Kidney transplant in        Medications:  Current Outpatient Medications   Medication Sig     divalproex sodium extended-release (DEPAKOTE ER) 500 MG 24 hr tablet Take 1 tablet by mouth for 5 days after finishing the smaller dose, then take 2 tablets each day.     lisinopril (PRINIVIL/ZESTRIL) 10 MG tablet Take 10 mg by mouth daily     metFORMIN  (GLUCOPHAGE-XR) 500 MG 24 hr tablet Take 1,000 mg by mouth 2 times daily     mycophenolate (GENERIC EQUIVALENT) 250 MG capsule Take 3 capsules (750 mg) by mouth 2 times daily     prazosin (MINIPRESS) 1 MG capsule Take 1 capsule (1 mg) by mouth 2 times daily (Patient taking differently: Take 2 mg by mouth At Bedtime )     predniSONE (DELTASONE) 5 MG tablet Take 1 tablet (5 mg) by mouth daily     risperiDONE (RISPERDAL) 4 MG tablet Take 4 mg by mouth daily     tacrolimus (GENERIC EQUIVALENT) 1 MG capsule 3 mg q am and 2 mg q pm     vitamin D3 (CHOLECALCIFEROL) 83602 UNITS capsule Take 50,000 Units by mouth once a week     FLUCELVAX QUADRIVALENT 0.5 ML LAKSHMI Pharmacist to administer IM     No current facility-administered medications for this visit.        Allergies:  Allergies   Allergen Reactions     Seasonal Allergies Other (See Comments)     hamster hair     No Known Allergies        ROS:  A 14 point ROS is negative except as stated in the HPI    Social History:  Denies any tobacco use. No significant alcohol use. Denies any illicit drug use.    Family History:  Family History   Problem Relation Age of Onset     Sleep Apnea Father      Diabetes Father      Sleep Apnea Brother      Glaucoma Maternal Grandfather      Macular Degeneration No family hx of        Objective:  Vitals: B/P: 138/80, T: 98, P: 85, R: 16, Wt: 0 lbs 0 oz Body mass index is 32.24 kg/m .   Exam:   Gen: Appears well, no distress  HEENT: no scleral icterus or hemorrhage, no wet purpura, no lymphadenopathy  Abd: soft, nontender, no splenomegaly  Ext: no edema  Skin: no ecchymoses or hematomas  Neuro: no focal deficits, affect and cognition are normal    Labs:  Results for DYAN GARCIA (MRN [id number]) as of 12/17/2019 11:11   Ref. Range 11/1/2019 15:53   Sodium (External) Latest Ref Range: 135 - 146 mmol/L 137   Potassium (External) Latest Ref Range: 3.5 - 5.3 mmol/L 4.0   Chloride (External) Latest Ref Range: 98 - 110 mmol/L 101   CO2 (External)  Latest Ref Range: 20 - 32 mmol/L 27   Urea Nitrogen (External) Latest Ref Range: 7 - 25 mg/dL 13   Creatinine (External) Latest Ref Range: 0.60 - 1.35 mg/dL 0.83   GFR Est if Black (External) Latest Ref Range: >=60 mL/min/1.73m2 139   GFR Estimated (External) Latest Ref Range: >=60 mL/min/1.73m2 120   Calcium (External) Latest Ref Range: 8.6 - 10.3 mg/dL 9.2   Albumin (External) Latest Ref Range: 3.6 - 5.1 g/dL 4.2   Protein Total (External) Latest Ref Range: 6.1 - 8.1 g/dL 7.0   Alk Phosphatase (External) Latest Ref Range: 40 - 115 U/L 99   ALT (External) Latest Ref Range: 9 - 46 U/L 34   AST (External) Latest Ref Range: 10 - 40 U/L 24   Bilirubin Total (External) Latest Ref Range: 0.2 - 1.2 mg/dL 0.3   BUN/Creatinine Ratio (External) Latest Ref Range: 6 - 22 (calc) NOT APPLICABLE   Creatinine Urine Random (External) Latest Ref Range: 20 - 320 mg/dL 186   Glucose Urine (External) Latest Ref Range: Negative mg/dl Negative   Protein Total Ur per Cr (External) Latest Ref Range: 22 - 128 mg/g creat 409 (H)   Glucose (External) Latest Ref Range: 65 - 99 mg/dL 91   WBC Count (External) Latest Ref Range: 3.8 - 10 Thousand/uL 12.4 (H)   Hemoglobin (External) Latest Ref Range: 13.2 - 17.1 g/dL 14.4   Hematocrit (External) Latest Ref Range: 38.5 - 50.0 % 41.4   Platelet Count (External) Latest Ref Range: 140 - 400 Thousand/uL 330   RBC Count (External) Latest Ref Range: 4.20 - 5.80 Million/uL 5.07   MCV (External) Latest Ref Range: 80.0 - 100.0 fL 81.7   MCH (External) Latest Ref Range: 27.0 - 33.0 pg 28.4   MCHC (External) Latest Ref Range: 32.0 - 36.0 g/dL 34.8   RDW (External) Latest Ref Range: 11.0 - 15.05 % 13.3   % Neutrophils (External) Latest Units: % 61.8   % Lymphocytes (External) Latest Units: % 25.4   % Monocytes (External) Latest Units: % 6.2   % Eosinophils (External) Latest Units: % 5.8   % Basophils (External) Latest Units: % 0.8   Absolute Basophils (External) Latest Ref Range: 0 - 200 cells/uL 99   Absolute  Eosinophils (External) Latest Ref Range: 15 - 500 cells/uL 719 (H)   Absolute Lymphocytes (External) Latest Ref Range: 850 - 3,900 cells/uL 3,150   Absolute Monocytes (External) Latest Ref Range: 200 - 950 cells/uL 769   Absolute Neutrophils (External) Latest Ref Range: 1,500 - 7,800 cells/uL 7,663       Imaging:  No pertinent imaging     Assessment:  In summary, Barry Mcgrath is a 29 year old with renal transplant in 2006 who has been on long term immune suppression and was noted to have elevated WBC count with neutrophilia. I reviewed his chart across the different health care systems and we have records going back to 2012 and all of them show leukocytosis ( mild) with neutrophilia - for a period of time he also had eosinophilia.    Plan:  I discussed with the patient that this is likely a result of prednisone which he has been on for his transplant - in the setting of lack of infections - him being young his response to steroids is more robust and is leading to leukocytosis.   I will send for below mentioned labs and follow up with the patient and Dr cadena.   Orders Placed This Encounter   Procedures     Blood Morphology Pathologist Review     CBC with platelets differential     Dania Dave   of Medicine   Hematology and medical Oncology   [facility]

## 2019-12-16 NOTE — TELEPHONE ENCOUNTER
Patient Call: Medication Refill  Route to LPN  Instruct the patient to first contact their pharmacy. If they have called their pharmacy and require further assistance, route to LPN.    [facility] Name: [facility]  Pharmacy Location: [facility]  Name of Medication: Mycopheolate Dose: 250 mg  When will the patient be out of this medication?: Less than 3 days (Route high priority)

## 2019-12-16 NOTE — NURSING NOTE
Labs drawn via venipuncture by Paramedic in lab. Vital signs taken. Pt checked in for next appointment.   Elizabeth Nova, Paramedic

## 2019-12-16 NOTE — NURSING NOTE
"Oncology Rooming Note    December 16, 2019 4:13 PM   Barry Mcgrath is a 29 year old male who presents for:    Chief Complaint   Patient presents with     Oncology Clinic Visit     New Patient Visit for Elevated White Blood Cell Count & Kidney Transplant     Initial Vitals: /80 (BP Location: Right arm, Patient Position: Chair, Cuff Size: Adult Regular)   Pulse 85   Temp 98  F (36.7  C) (Oral)   Resp 16   Ht 1.791 m (5' 10.51\")   SpO2 96%   BMI 32.24 kg/m   Estimated body mass index is 32.24 kg/m  as calculated from the following:    Height as of this encounter: 1.791 m (5' 10.51\").    Weight as of 10/24/19: 103.4 kg (228 lb). Body surface area is 2.27 meters squared.  No Pain (0) Comment: Data Unavailable   No LMP for male patient.  Allergies reviewed: Yes  Medications reviewed: Yes    Medications: Medication refills not needed today.  Pharmacy name entered into Open Me: [id number] [place], [place] - [address] [facility]    Clinical concerns: Patient is here to learn more about his high WBC levels.  He is blind, uses a cane, & denies any major concerns today.   Dr. Dave was notified.      Hiwot Asher, RN, MSN            "

## 2019-12-16 NOTE — PROGRESS NOTES
[facility] Hematology Consultation    Outpatient Visit Note:    Patient: Barry Mcgrath  MRN: [id number]  : 1990  ECTOR: 2019    Reason for Consultation:  Barry Mcgrath is a referred by Dr Burkett for evaluation and treatment of elevated WBC count     History of Present Illness:  Barry Mcgrath is a 29 year old male with PMH of renal transplant who has been on long term immune suppression with prednisone who was found to have persistent leukocytosis since him establishing care with Dr Burkett for past 2 years. He does not remember that he was told that before. On the aside the patient has major pysch issues with BPD and anxiety and depressive ds.   He denies having repeated infections like pneumonia, skin infections but does report frequent viral infections.   Pt denies SOB , cough, chest pain , fevers , chills, nausea , vomiting , abdominal pain, change in bowel / bladder habits,   No c/o sore throat , mucositis,Lowe extremity swelling ,  bleeding gums, tingling or numbness, easy bruising , muscular weakness, fatigue , weight loss. Pt has not noticed any new swelling/ lymph glands .    Past Medical History:  Past Medical History:   Diagnosis Date     Anxiety      Bipolar 1 disorder (H)      Blind      Depressive disorder        Past Surgical History:  Past Surgical History:   Procedure Laterality Date     TRANSPLANT      Kidney transplant in        Medications:  Current Outpatient Medications   Medication Sig     divalproex sodium extended-release (DEPAKOTE ER) 500 MG 24 hr tablet Take 1 tablet by mouth for 5 days after finishing the smaller dose, then take 2 tablets each day.     lisinopril (PRINIVIL/ZESTRIL) 10 MG tablet Take 10 mg by mouth daily     metFORMIN (GLUCOPHAGE-XR) 500 MG 24 hr tablet Take 1,000 mg by mouth 2 times daily     mycophenolate (GENERIC EQUIVALENT) 250 MG capsule Take 3 capsules (750 mg) by mouth 2 times daily     prazosin (MINIPRESS) 1 MG capsule Take 1 capsule (1  mg) by mouth 2 times daily (Patient taking differently: Take 2 mg by mouth At Bedtime )     predniSONE (DELTASONE) 5 MG tablet Take 1 tablet (5 mg) by mouth daily     risperiDONE (RISPERDAL) 4 MG tablet Take 4 mg by mouth daily     tacrolimus (GENERIC EQUIVALENT) 1 MG capsule 3 mg q am and 2 mg q pm     vitamin D3 (CHOLECALCIFEROL) 68548 UNITS capsule Take 50,000 Units by mouth once a week     FLUCELVAX QUADRIVALENT 0.5 ML LAKSHMI Pharmacist to administer IM     No current facility-administered medications for this visit.        Allergies:  Allergies   Allergen Reactions     Seasonal Allergies Other (See Comments)     hamster hair     No Known Allergies        ROS:  A 14 point ROS is negative except as stated in the HPI    Social History:  Denies any tobacco use. No significant alcohol use. Denies any illicit drug use.    Family History:  Family History   Problem Relation Age of Onset     Sleep Apnea Father      Diabetes Father      Sleep Apnea Brother      Glaucoma Maternal Grandfather      Macular Degeneration No family hx of        Objective:  Vitals: B/P: 138/80, T: 98, P: 85, R: 16, Wt: 0 lbs 0 oz Body mass index is 32.24 kg/m .   Exam:   Gen: Appears well, no distress  HEENT: no scleral icterus or hemorrhage, no wet purpura, no lymphadenopathy  Abd: soft, nontender, no splenomegaly  Ext: no edema  Skin: no ecchymoses or hematomas  Neuro: no focal deficits, affect and cognition are normal    Labs:  Results for DYAN GARCIA (MRN [id number]) as of 12/17/2019 11:11   Ref. Range 11/1/2019 15:53   Sodium (External) Latest Ref Range: 135 - 146 mmol/L 137   Potassium (External) Latest Ref Range: 3.5 - 5.3 mmol/L 4.0   Chloride (External) Latest Ref Range: 98 - 110 mmol/L 101   CO2 (External) Latest Ref Range: 20 - 32 mmol/L 27   Urea Nitrogen (External) Latest Ref Range: 7 - 25 mg/dL 13   Creatinine (External) Latest Ref Range: 0.60 - 1.35 mg/dL 0.83   GFR Est if Black (External) Latest Ref Range: >=60 mL/min/1.73m2 139    GFR Estimated (External) Latest Ref Range: >=60 mL/min/1.73m2 120   Calcium (External) Latest Ref Range: 8.6 - 10.3 mg/dL 9.2   Albumin (External) Latest Ref Range: 3.6 - 5.1 g/dL 4.2   Protein Total (External) Latest Ref Range: 6.1 - 8.1 g/dL 7.0   Alk Phosphatase (External) Latest Ref Range: 40 - 115 U/L 99   ALT (External) Latest Ref Range: 9 - 46 U/L 34   AST (External) Latest Ref Range: 10 - 40 U/L 24   Bilirubin Total (External) Latest Ref Range: 0.2 - 1.2 mg/dL 0.3   BUN/Creatinine Ratio (External) Latest Ref Range: 6 - 22 (calc) NOT APPLICABLE   Creatinine Urine Random (External) Latest Ref Range: 20 - 320 mg/dL 186   Glucose Urine (External) Latest Ref Range: Negative mg/dl Negative   Protein Total Ur per Cr (External) Latest Ref Range: 22 - 128 mg/g creat 409 (H)   Glucose (External) Latest Ref Range: 65 - 99 mg/dL 91   WBC Count (External) Latest Ref Range: 3.8 - 10 Thousand/uL 12.4 (H)   Hemoglobin (External) Latest Ref Range: 13.2 - 17.1 g/dL 14.4   Hematocrit (External) Latest Ref Range: 38.5 - 50.0 % 41.4   Platelet Count (External) Latest Ref Range: 140 - 400 Thousand/uL 330   RBC Count (External) Latest Ref Range: 4.20 - 5.80 Million/uL 5.07   MCV (External) Latest Ref Range: 80.0 - 100.0 fL 81.7   MCH (External) Latest Ref Range: 27.0 - 33.0 pg 28.4   MCHC (External) Latest Ref Range: 32.0 - 36.0 g/dL 34.8   RDW (External) Latest Ref Range: 11.0 - 15.05 % 13.3   % Neutrophils (External) Latest Units: % 61.8   % Lymphocytes (External) Latest Units: % 25.4   % Monocytes (External) Latest Units: % 6.2   % Eosinophils (External) Latest Units: % 5.8   % Basophils (External) Latest Units: % 0.8   Absolute Basophils (External) Latest Ref Range: 0 - 200 cells/uL 99   Absolute Eosinophils (External) Latest Ref Range: 15 - 500 cells/uL 719 (H)   Absolute Lymphocytes (External) Latest Ref Range: 850 - 3,900 cells/uL 3,150   Absolute Monocytes (External) Latest Ref Range: 200 - 950 cells/uL 769   Absolute  Neutrophils (External) Latest Ref Range: 1,500 - 7,800 cells/uL 7,663       Imaging:  No pertinent imaging     Assessment:  In summary, Barry Mcgrath is a 29 year old with renal transplant in 2006 who has been on long term immune suppression and was noted to have elevated WBC count with neutrophilia. I reviewed his chart across the different health care systems and we have records going back to 2012 and all of them show leukocytosis ( mild) with neutrophilia - for a period of time he also had eosinophilia.    Plan:  I discussed with the patient that this is likely a result of prednisone which he has been on for his transplant - in the setting of lack of infections - him being young his response to steroids is more robust and is leading to leukocytosis.   I will send for below mentioned labs and follow up with the patient and Dr cadena.   Orders Placed This Encounter   Procedures     Blood Morphology Pathologist Review     CBC with platelets differential     Dania Dave   of Medicine   Hematology and medical Oncology   [facility]

## 2019-12-17 LAB — COPATH REPORT: NORMAL

## 2019-12-18 NOTE — RESULT ENCOUNTER NOTE
Please call the patient to update him that we did not see any abnormal cells on the eval. I have sent a message to Dr England to consider prednisone need and he will have further discussion with the patient.   Dania Dave   of Medicine   Hematology and medical Oncology   [facility]

## 2019-12-27 ENCOUNTER — HOSPITAL ENCOUNTER (EMERGENCY)
Facility: CLINIC | Age: 29
Discharge: HOME | End: 2019-12-27
Attending: EMERGENCY MEDICINE | Admitting: EMERGENCY MEDICINE
Payer: MEDICARE

## 2019-12-27 VITALS
RESPIRATION RATE: 16 BRPM | OXYGEN SATURATION: 95 % | DIASTOLIC BLOOD PRESSURE: 73 MMHG | SYSTOLIC BLOOD PRESSURE: 170 MMHG | TEMPERATURE: 97.5 F

## 2019-12-27 DIAGNOSIS — H92.02 ACUTE EAR PAIN, LEFT: ICD-10-CM

## 2019-12-27 DIAGNOSIS — H66.002 NON-RECURRENT ACUTE SUPPURATIVE OTITIS MEDIA OF LEFT EAR WITHOUT SPONTANEOUS RUPTURE OF TYMPANIC MEMBRANE: ICD-10-CM

## 2019-12-27 DIAGNOSIS — J06.9 VIRAL URI WITH COUGH: ICD-10-CM

## 2019-12-27 PROCEDURE — 99283 EMERGENCY DEPT VISIT LOW MDM: CPT | Performed by: EMERGENCY MEDICINE

## 2019-12-27 PROCEDURE — 99284 EMERGENCY DEPT VISIT MOD MDM: CPT | Mod: Z6 | Performed by: EMERGENCY MEDICINE

## 2019-12-27 PROCEDURE — 25000132 ZZH RX MED GY IP 250 OP 250 PS 637: Mod: GY | Performed by: EMERGENCY MEDICINE

## 2019-12-27 RX ORDER — ACETAMINOPHEN 325 MG/1
650 TABLET ORAL EVERY 6 HOURS PRN
Qty: 60 TABLET | Refills: 0 | Status: SHIPPED | OUTPATIENT
Start: 2019-12-27 | End: 2022-06-13

## 2019-12-27 RX ORDER — ACETAMINOPHEN 325 MG/1
650 TABLET ORAL ONCE
Status: COMPLETED | OUTPATIENT
Start: 2019-12-27 | End: 2019-12-27

## 2019-12-27 RX ORDER — AMOXICILLIN 500 MG/1
500 CAPSULE ORAL 3 TIMES DAILY
Qty: 21 CAPSULE | Refills: 0 | Status: SHIPPED | OUTPATIENT
Start: 2019-12-27 | End: 2020-05-22

## 2019-12-27 RX ADMIN — ACETAMINOPHEN 650 MG: 325 TABLET, FILM COATED ORAL at 05:37

## 2019-12-27 NOTE — DISCHARGE INSTRUCTIONS
Please make an appointment to follow up with [facility] (phone: [phone] in 5-7 days.     Return to the ED if you are having fever, worsening symptoms, or any urgent/life-threatening concerns.

## 2019-12-27 NOTE — ED TRIAGE NOTES
Presents ambulatory to triage c/o L ear pain x several days, worse past few hours. Also has upper respiratory, cold symptoms for past week or so. Pt has history of kidney xplant in 2006, congenital blindness. States pain in ear is exacerbated by coughing.

## 2019-12-27 NOTE — ED AVS SNAPSHOT
[facility], [place], Emergency Department  [address]  [place] [address]  Phone:  [phone]  Fax:  [phone]                                    Barry Mcgrath   MRN: [id number]    Department:  [facility], Emergency Department   Date of Visit:  12/27/2019           After Visit Summary Signature Page    I have received my discharge instructions, and my questions have been answered. I have discussed any challenges I see with this plan with the nurse or doctor.    ..........................................................................................................................................  Patient/Patient Representative Signature      ..........................................................................................................................................  Patient Representative Print Name and Relationship to Patient    ..................................................               ................................................  Date                                   Time    ..........................................................................................................................................  Reviewed by Signature/Title    ...................................................              ..............................................  Date                                               Time          22EPIC Rev 08/18

## 2019-12-27 NOTE — ED PROVIDER NOTES
"  History     Chief Complaint   Patient presents with     Otalgia     states severe L ear pain x 2-3 days, suddenly worse past couple hours.     Cold Symptoms     Itchy, scratchy throat, \"cold symptoms\" for past week.     HPI  Barry Mcgrath is a 29 year old male with hx of vision deficit and kidney transplant. who presents to the ED with left ear pain. Patient notes mild pain for the past day or two, increased significantly over the past few hours. He also notes congestion, sore throat, and mild cough the past week. Patient describes those symptoms as \"just have a cold\". No fevers. No hearing change. No urinary changes, no chest nor abdominal pain.     I have reviewed the Medications, Allergies, Past Medical and Surgical History, and Social History in the Epic system.    Review of Systems  No recent fevers, no chest pain, no abdominal pain    Physical Exam   BP: (!) 170/73  Heart Rate: 95  Temp: 97.5  F (36.4  C)  Resp: 16  SpO2: 95 %    Physical Exam  General: No acute distress. Appears stated age.   HENT: MMM, no oropharyngeal lesions. No tonsillar hypertrophy nor exudate. Midline uvula. Right ear canal normal but with cerumen obscuring TM. Left ear canal clear, TM white and bulging. Non-tender mastoids.   Eyes: Pupils equal, round. Normal sclerae. Chronic vision impairment.   Cardio: Regular rate, extremities well perfused  Resp: Normal work of breathing, normal respiratory rate  Neuro: alert and fully oriented. CN II-XII grossly intact. Grossly normal strength and sensation in all extremities.   MSK: no deformities.   Integumentary/Skin: no rash, normal color  Psych: normal affect, normal behavior    ED Course      Procedures        Critical Care time:  none     Labs Ordered and Resulted from Time of ED Arrival Up to the Time of Departure from the ED - No data to display  No orders to display          Assessments & Plan (with Medical Decision Making)   Patient presenting with left ear pain after a week of URI " symptoms. Vitals in the ED unremarkable. Exam consistent with left suppurative otitis media. No evidence of mastoiditis. APAP given for pain.     After counseling on the diagnosis, work-up, and treatment plan, the patient was discharged to home. Amoxacillin, APAP prescription provided. The patient was advised to follow-up with primary care in 5-7 days. The patient was advised to return to the ED if worsening symptoms, or if there are any urgent/life-threatening concerns.     Final diagnoses:   Non-recurrent acute suppurative otitis media of left ear without spontaneous rupture of tympanic membrane   Acute ear pain, left   Viral URI with cough       --  Darell Burgos MD  Emergency Medicine     I have reviewed the nursing notes.  I have reviewed the findings, diagnosis, plan and need for follow up with the patient.  12/27/2019   [facility] [place], EMERGENCY DEPARTMENT     Darell Burgos MD  12/27/19 0565

## 2019-12-31 ENCOUNTER — TELEPHONE (OUTPATIENT)
Dept: TRANSPLANT | Facility: CLINIC | Age: 29
End: 2019-12-31

## 2019-12-31 NOTE — TELEPHONE ENCOUNTER
Patient Call: Transplant Illness  Was recently presented to our ED  12/27/19very bad ear infection and bad cold  Barry is still not feeling well.  Is on Antibiotic Wanted Dr. Burkett to know about his  Ear infection and chest cold.    Duration of illness:few days   Transplanted organ? kidney  Illness: Other Ear infection and bad chest cold        Please call Barry 3 [phone]

## 2019-12-31 NOTE — TELEPHONE ENCOUNTER
Call placed to patient. Patient v\u to complete abx therapy as ordered and to f\u with ordering provider is symptoms persist after completing therapy.

## 2019-12-31 NOTE — TELEPHONE ENCOUNTER
Please call Barry Mcgrath at [phone] and make aware he needs to complete antibiotic therapy as ordered by Provider.  If it persists even after antibiotic therapy completed, call ordering provider and let them know.  If started on new med, call transplant center to update.

## 2020-01-02 ENCOUNTER — TELEPHONE (OUTPATIENT)
Dept: ONCOLOGY | Facility: CLINIC | Age: 30
End: 2020-01-02

## 2020-01-02 NOTE — TELEPHONE ENCOUNTER
Spoke with pt re: Dr. Dave's input on his test results and to f/u with Dr. Burkett regarding prednisone dosing. Pt stated understanding of all and stated he will call Dr. Burkett' office to make an appointment.     ----- Message from Dania Dave MD sent at 12/18/2019 11:24 AM CST -----  Please call the patient to update him that we did not see any abnormal cells on the eval. I have sent a message to Dr England to consider prednisone need and he will have further discussion with the patient.   Dania Dave   of Medicine   Hematology and medical Oncology   [facility]

## 2020-01-03 ENCOUNTER — TELEPHONE (OUTPATIENT)
Dept: TRANSPLANT | Facility: CLINIC | Age: 30
End: 2020-01-03

## 2020-01-03 NOTE — TELEPHONE ENCOUNTER
Spoke with Nurse Practitioner at [facility].  States Barry Mcgrath's ear infection has worsened and patient also has bronchitis.  Wanting to put Barry on Azithromycin.  Per UpToDate Azithromycin can increase Tacrolimus levels.  Patient will start today. Azithromycin x 5 days but stays in the body for 10 days.  Will check Tac levels on Monday 1/6 and Friday 1/10.

## 2020-01-08 ENCOUNTER — TELEPHONE (OUTPATIENT)
Dept: TRANSPLANT | Facility: CLINIC | Age: 30
End: 2020-01-08

## 2020-01-08 NOTE — TELEPHONE ENCOUNTER
Provider Call: General  Route to LPN    Reason for call: Call from  Clinic  They need a call back re Tacro 1/6/2020 - 10.0  Call Carly Agustin back at [phone]  Needs a call back today     Call back needed? Yes    Return Call Needed

## 2020-01-08 NOTE — TELEPHONE ENCOUNTER
Called back Carlyadrian Agustin at [phone].  Reports recheck tac level after starting azithromycin was elevated at 10.0 as expected.  Current dose is 3 mg AM and 2 mg PM.  Recommended decreasing dose to 2 mg in AM and 1 mg in PM while on Azithromycin.  Rechecking tac levels on Friday.    Called Barry and discussed lowering dose and checking tacrolimus on Friday.  Reluctant with dose change. States he is blind and it will be hard to make the dose change. Wants to keep same dose, does not want to touch it.  Discussed s/e with elevated Tacrolimus levels. Decided he will have his friend Tod help him make the dose changes.  Recommended taking 1 mg tonight. New dose should be 2 mg in am and 1 mg in PM. Recheck tac levels on Friday or Saturday.  Depending on levels, we can make further dose changes. Plan on another Tac level next week.  Verbalized understanding and agreement to plan. Appreciates call.

## 2020-01-09 ENCOUNTER — TELEPHONE (OUTPATIENT)
Dept: TRANSPLANT | Facility: CLINIC | Age: 30
End: 2020-01-09

## 2020-01-09 NOTE — TELEPHONE ENCOUNTER
Patient on a zpak and Tac dose was elevated. Dose decreased Wednesday 1/8/20.  Will recheck labs on Saturday 1/11 and Wednesday 1/15 next week.  Goal is 4-6 but if around 7-8 we can just recheck levels once azithromycin is out of his system.

## 2020-01-24 ENCOUNTER — TELEPHONE (OUTPATIENT)
Dept: TRANSPLANT | Facility: CLINIC | Age: 30
End: 2020-01-24

## 2020-01-24 DIAGNOSIS — Z94.0 STATUS POST KIDNEY TRANSPLANT: ICD-10-CM

## 2020-01-24 RX ORDER — TACROLIMUS 1 MG/1
2 CAPSULE ORAL 2 TIMES DAILY
Qty: 120 CAPSULE | Refills: 3 | Status: SHIPPED | OUTPATIENT
Start: 2020-01-24 | End: 2020-05-14

## 2020-01-24 NOTE — TELEPHONE ENCOUNTER
Patient called to speak to Dr Burkett Directly with is concerns regarding the Coronavirus. I explained that someone from the team with get back to him.

## 2020-01-24 NOTE — TELEPHONE ENCOUNTER
PLAN:  Call back Barry Mcgrath and discuss his concerns with coronavirus.    OUTCOME:  Left VM.  There are no vaccines or antiviral drugs approved for prevention or treatment.  Wash hands with soap and water or an alcohol-based hand .  Keep your hands and fingers, away from your eyes nose and mouth.  Stay away from sick people.

## 2020-01-24 NOTE — LETTER
PHYSICIAN ORDERS      DATE & TIME ISSUED: 2020 9:48 AM  PATIENT NAME: Barry Mcgrath   : 1990     [facility] MR# [if applicable]: [id number]     DIAGNOSIS:  Kidney Transplanted; Long Term Use of Immunosuppressive Drug  ICD-10 CODE: Z94.0; Z79.899     Please check the following labs in 1-2 weeks:  -Tacrolimus Level (12 hr trough-Record Date and Time of Last Dose)  -BMP      Any questions please call: [phone]. Fax lab results to [phone].

## 2020-01-24 NOTE — TELEPHONE ENCOUNTER
OUTCOME:   Spoke with patient, they confirm accurate trough level and current dose 3mg in the morning and 2 mg in the evening. Patient confirmed dose change to 2 mg BID and to repeat labs in 1-2 weeks. Orders sent to preferred pharmacy for dose change and lab for repeat labs. Patient voiced understanding of plan. Reports he will get labs the week of Feb 3.     [facility] [phone] (Phone)  [phone] (Fax)     Pt also inquiring about discontinuing Prednisone as discussed with Dr. Dave in December appt. Explained that this was an immunosuppressant medication for his kidney transplant and if he wanted to discontinue it he would need to discuss with his nephrologist. He states about a month ago he had testing done but did not receive results. This writer was unable to locate if Cosyntropin testing had been completed. Please discuss with Nephrologist and follow up with patient early next week.

## 2020-01-24 NOTE — TELEPHONE ENCOUNTER
Tacrolimus = 8.3 (1/22/20)  Goal 4-6  Current tac dose 3.0/2.0      PLAN:   Call Barry Mcgrath and confirm this was a good 12-hour trough. Verify dose 3.0/2.0.   Confirm no new medications or illness (lex. Diarrhea).   If good trough, decrease dose to 2 mg BID and recheck level in 1-2 weeks  Otherwise, if not a good level, have Tac levels rechecked and make sure it is a good trough.

## 2020-02-10 ENCOUNTER — TELEPHONE (OUTPATIENT)
Dept: TRANSPLANT | Facility: CLINIC | Age: 30
End: 2020-02-10

## 2020-02-10 NOTE — TELEPHONE ENCOUNTER
Tacrolimus = 7.7  (2/6/20)  Goal 4-6  Current tac dose 2 mg BID    PLAN:   Call Barry Mcgrath and confirm this was a good 12-hour trough. Verify dose 2 mg BID.   Confirm no new medications or illness (lex. Diarrhea).   If good trough, decrease dose to 1.5 mg BID and recheck level in 2 weeks  Otherwise, if not a good level, have Tac levels rechecked and make sure it is a good trough.    ADDENDUM:  Spoke with JUSTIN pineda from [facility].  States it looks like patient still on 3mg /2 mg dose.  RNCC recommend decreasing dose to 2 mg BID.  RNCCC discussed with Reynaldo li msg was left to decrease dose to 2 mg BID when his tac level was 8.  Appreciates help.

## 2020-02-10 NOTE — TELEPHONE ENCOUNTER
Provider Call: General  Route to LPN    Reason for call: Nurses called back from  [facility] they were able to reach pt  He is taking 2 mg bid and will recheck level on Feb 20th     Call back needed? Yes    Return Call Needed  Same as documented in contacts section  When to return call?: Greater than one day: Route standard priority

## 2020-02-10 NOTE — TELEPHONE ENCOUNTER
PLAN:  Call missy back with original plan.  If Barry Mcgrath taking tacrolimus 2 mg BID.  Will decrease dose to 1.5 mg BID and recheck levels.    ADDENDUM:  Provider left message.  Barry Mcgrath was taking 3mg /2mg.  Dose decreased to 2 mg BID.

## 2020-02-25 ENCOUNTER — TELEPHONE (OUTPATIENT)
Dept: TRANSPLANT | Facility: CLINIC | Age: 30
End: 2020-02-25

## 2020-02-25 NOTE — TELEPHONE ENCOUNTER
Nurse from [facility] called re: Tac level of 6.2.  Discussed result within goal. No changes. Stay at Tac 2 mg BID.

## 2020-03-05 ENCOUNTER — TELEPHONE (OUTPATIENT)
Dept: TRANSPLANT | Facility: CLINIC | Age: 30
End: 2020-03-05

## 2020-03-25 ENCOUNTER — TELEPHONE (OUTPATIENT)
Dept: TRANSPLANT | Facility: CLINIC | Age: 30
End: 2020-03-25

## 2020-03-25 NOTE — TELEPHONE ENCOUNTER
Called back Carly and discussed labs.  Barry Mcgrath needs every 3 months labs and every 6 months urine protein creatinine ratio.  Inquiring if  has lab that goes to draw patients.  Discuss [facility] has nurses that draw labs for homebound patients.  Carly states they have another patient who gets drawn by an [facility] she will try to see if Barry can avail of this service.

## 2020-03-25 NOTE — TELEPHONE ENCOUNTER
Provider Call: General  Route to LPN    Reason for call: Needs a call back to review in home  services they can offer  What labs we need done     Call back needed? Yes    Return Call Needed  Same as documented in contacts section  When to return call?: Greater than one day: Route standard priority

## 2020-03-25 NOTE — TELEPHONE ENCOUNTER
Provider Call: Transplant Provider  Route to RN  Facility Name: Carly Vamsi, CNP # [phone] [facility]   Question about Barry getting his labs drawn  (or if he could get [facility] to draw him at home??)      Callback needed? Yes    Return Call Needed  Same as documented in contacts section  When to return call?: Same day: Route High Priority

## 2020-03-26 ENCOUNTER — TELEPHONE (OUTPATIENT)
Dept: TRANSPLANT | Facility: CLINIC | Age: 30
End: 2020-03-26

## 2020-03-26 NOTE — TELEPHONE ENCOUNTER
Called back Shira. Left VM re: currently does not have service of home labs.   We have HomeCare nurses but this is usually for homebound patients.  However, if RNCC finds out that  has this service will update [facility].

## 2020-03-26 NOTE — TELEPHONE ENCOUNTER
Voicemail  Date/Time: 3/26/20 at 1:10 pm  Reason for call: RN from [facility] called regarding home lab draws that the patient needing his insurance is not covering service and wondering if we do.

## 2020-04-02 ENCOUNTER — TELEPHONE (OUTPATIENT)
Dept: TRANSPLANT | Facility: CLINIC | Age: 30
End: 2020-04-02

## 2020-04-02 NOTE — TELEPHONE ENCOUNTER
Called back Barry Mcgrath.  Asking why he was checking tac level frequently.  RNCC reminded him that  Provider at [facility] was drawing his tacrolimus to monitor while on antibiotics.  RNCC already spoke with nurse at clinic that he should go back to labs every 3 months.  Verbalized understanding.

## 2020-05-14 ENCOUNTER — TELEPHONE (OUTPATIENT)
Dept: TRANSPLANT | Facility: CLINIC | Age: 30
End: 2020-05-14

## 2020-05-14 ENCOUNTER — VIRTUAL VISIT (OUTPATIENT)
Dept: NEPHROLOGY | Facility: CLINIC | Age: 30
End: 2020-05-14
Attending: INTERNAL MEDICINE
Payer: MEDICARE

## 2020-05-14 DIAGNOSIS — D84.9 IMMUNOSUPPRESSION (H): Primary | ICD-10-CM

## 2020-05-14 DIAGNOSIS — I15.1 HTN, KIDNEY TRANSPLANT RELATED: ICD-10-CM

## 2020-05-14 DIAGNOSIS — Z94.0 HTN, KIDNEY TRANSPLANT RELATED: ICD-10-CM

## 2020-05-14 DIAGNOSIS — Z94.0 STATUS POST KIDNEY TRANSPLANT: ICD-10-CM

## 2020-05-14 DIAGNOSIS — Z12.83 SKIN CANCER SCREENING: ICD-10-CM

## 2020-05-14 DIAGNOSIS — R73.01 IMPAIRED FASTING GLUCOSE: ICD-10-CM

## 2020-05-14 RX ORDER — PREDNISONE 5 MG/1
5 TABLET ORAL DAILY
Qty: 90 TABLET | Refills: 3 | Status: SHIPPED | OUTPATIENT
Start: 2020-05-14 | End: 2021-01-25

## 2020-05-14 RX ORDER — TACROLIMUS 1 MG/1
2 CAPSULE ORAL 2 TIMES DAILY
Qty: 120 CAPSULE | Refills: 11 | Status: SHIPPED | OUTPATIENT
Start: 2020-05-14 | End: 2020-08-11

## 2020-05-14 RX ORDER — MYCOPHENOLATE MOFETIL 250 MG/1
750 CAPSULE ORAL 2 TIMES DAILY
Qty: 180 CAPSULE | Refills: 11 | Status: SHIPPED | OUTPATIENT
Start: 2020-05-14 | End: 2021-01-11

## 2020-05-14 ASSESSMENT — PAIN SCALES - GENERAL: PAINLEVEL: NO PAIN (0)

## 2020-05-14 NOTE — TELEPHONE ENCOUNTER
Patient Call: Voicemail  Date/Time: 5/14/2020  0238  Reason for call: Pt wants to review what he can take  Having terrible time sleeping and  waking up during the night frequently Naps a lot during the day  Does not feel rested

## 2020-05-14 NOTE — TELEPHONE ENCOUNTER
Call placed to patient. No answer. Voice message left instructing patient to f\u with PCP for sleep issues.

## 2020-05-14 NOTE — PROGRESS NOTES
"TRANSPLANT NEPHROLOGY TELEMEDICINE VISIT    Barry Mcgrath is a 29 year old male who is being evaluated via a billable telemedicine (video/telephone) visit on May 14, 2020.     The patient has been notified of following:     \"This telemedicine (video/telephone) visit will be conducted via a video/phone call between you and your physician. We have found that certain health care needs can be provided without the need for a physical exam.  This service lets us provide the care you need with a short video/phone conversation.  If a prescription is necessary, we can send it directly to your pharmacy.  If lab work is needed, we can place an order for that and you can then stop by our lab to have the test done at a later time.    If during the course of this video/phone call the physician feels a telemedicine (video/telephone) visit is not appropriate, you will not be charged for this service and an in clinic visit will be arranged at a later date.\"     Assessment & Plan   # DDKT: Stable   - Baseline Cr ~ 0.7 to 0.9 mg / dl   - Proteinuria: Normal (<0.2 grams)   - Date DSA Last Checked: none   - BK Viremia: No   - Kidney Tx Biopsy: none    # Immunosuppression: Tacrolimus immediate release (goal 4-6) and Mycophenolate mofetil (goal not followed)   - Changes: No    # Infection Prophylaxis:   - PJP: None    # Hypertension: Controlled;  Goal BP: < 130/80   - Changes: No    # Diabetes: Controlled (HbA1c <7%) Last HbA1c: 5.8%   - Management as per primary team.    # Mineral Bone Disorder:   PTH and vit d not checked recently. Calcium 8.9 mg / dl.    # insomnia: sleepig 5 hours at night, but napping due to sleepiness during the day. Being treated for RAQUEL and bipolar. I recommended making sure the anti-depression meds aren't contributing.     [facility] board in [place] [address].   Phone: [phone] - Dr beverly Love    # Skin Cancer Risk:    - Discussed sun protection and recommend regular follow up with " Dermatology.    # Medical Compliance: Yes    # COVID-19 Virus Review: Discussed COVID-19 virus and the potential medical risks.  Reviewed preventative health recommendations, which includes washing hands for 20 seconds, avoid touching your face, and social distancing.  Asked patient to inform the transplant center if they are exposed or diagnosed with this virus.    # Transplant History:  Etiology of Kidney Failure: Congenital dysplagia  Tx: DDKT  Significant changes in immunosuppression: None  Significant transplant-related complications: None    Transplant Office Phone Number: [phone]    Assessment and plan was discussed with the patient and he voiced his understanding and agreement.    Return Visit: 12 months      Barry Mcgrath has a clinical telephone visit for kidney transplant and immunosuppression management.    History of Present Illness      The patient has been doing well since I last saw him.  He is a 29-year-old gentleman that underwent a kidney transplant from a  donor in  for congenital dysplasia and has been having a baseline creatinine of approximately 0.7 to 0.9 mg/dL.    His most recent labs from April show a creatinine of 0.8 mg/dL at baseline.    He is on tacrolimus with his most recent level 6 ng/mL which is his goal.    He is having some problems with sleep disturbance and states that he falls asleep but ends up waking up in the middle the night and cannot fall back to sleep and therefore he has daytime sleepiness.  He is currently on medications for his bipolar disorder and I will speak with his therapist to be able to see if these medications need to be changed at all.  He also does have sleep apnea so I did ask him to make sure that his machine is calibrated appropriately.    The patient denies any chest pain or breathing difficulties.  He has no nausea or vomiting.  Is no fever shakes or chills.  Is moving his bowels appropriately.    Recent Hospitalizations:  [x] No [] Yes     New Medical Issues: [x] No [] Yes    Decreased energy: [] No [x] Yes    Chest pain or SOB with exertion:  [x] No [] Yes    Appetite change or weight change: [x] No [] Yes    Nausea, vomiting or diarrhea:  [x] No [] Yes    Fever, sweats or chills: [x] No [] Yes    Leg swelling: [x] No [] Yes      Home BP: at goal    Review of Systems   A comprehensive review of systems was obtained and negative, except as noted in the HPI or PMH.    I have reviewed and updated the patient's Past Medical History, Social History, and Medication List.    Active Medical Problems  Patient Active Problem List   Diagnosis     Suicidal ideation     Hypertension, unspecified type     RAQUEL (obstructive sleep apnea)     Bipolar 1 disorder (H)     Immunodeficiency (H)     ESRD on peritoneal dialysis (H)     End stage renal disease (H)     Allergies  Seasonal allergies and No known allergies    Medications  Current Outpatient Medications   Medication Sig     acetaminophen (TYLENOL) 325 MG tablet Take 2 tablets (650 mg) by mouth every 6 hours as needed for mild pain     divalproex sodium extended-release (DEPAKOTE ER) 500 MG 24 hr tablet Take 1 tablet by mouth for 5 days after finishing the smaller dose, then take 2 tablets each day.     FLUCELVAX QUADRIVALENT 0.5 ML LAKSHMI Pharmacist to administer IM     lisinopril (PRINIVIL/ZESTRIL) 10 MG tablet Take 10 mg by mouth daily     metFORMIN (GLUCOPHAGE-XR) 500 MG 24 hr tablet Take 1,000 mg by mouth 2 times daily     mycophenolate (GENERIC EQUIVALENT) 250 MG capsule Take 3 capsules (750 mg) by mouth 2 times daily     prazosin (MINIPRESS) 1 MG capsule Take 1 capsule (1 mg) by mouth 2 times daily (Patient taking differently: Take 2 mg by mouth At Bedtime )     predniSONE (DELTASONE) 5 MG tablet Take 1 tablet (5 mg) by mouth daily     risperiDONE (RISPERDAL) 4 MG tablet Take 4 mg by mouth daily     tacrolimus (GENERIC EQUIVALENT) 1 MG capsule Take 2 capsules (2 mg) by mouth 2 times daily     vitamin D3  (CHOLECALCIFEROL) 36535 UNITS capsule Take 50,000 Units by mouth once a week     No current facility-administered medications for this visit.        Phone call contact time:  Call Started at 1544  Call Ended at 1557    Reagan Burkett MD

## 2020-05-14 NOTE — PROGRESS NOTES
"Barry Mcgrath is a 29 year old male who is being evaluated via a billable telephone visit.      The patient has been notified of following:     \"This telephone visit will be conducted via a call between you and your physician/provider. We have found that certain health care needs can be provided without the need for a physical exam.  This service lets us provide the care you need with a short phone conversation.  If a prescription is necessary we can send it directly to your pharmacy.  If lab work is needed we can place an order for that and you can then stop by our lab to have the test done at a later time.    Telephone visits are billed at different rates depending on your insurance coverage. During this emergency period, for some insurers they may be billed the same as an in-person visit.  Please reach out to your insurance provider with any questions.    If during the course of the call the physician/provider feels a telephone visit is not appropriate, you will not be charged for this service.\"    Patient has given verbal consent for Telephone visit?  Yes    What phone number would you like to be contacted at? [phone]    How would you like to obtain your AVS? Mail a copy    Patient would like to know if he can take energy supplements    Phone call duration: 12  minutes    Reagan Burkett MD      "

## 2020-05-20 ENCOUNTER — TELEPHONE (OUTPATIENT)
Dept: SCHEDULING | Facility: CLINIC | Age: 30
End: 2020-05-20

## 2020-05-20 DIAGNOSIS — G47.33 OSA (OBSTRUCTIVE SLEEP APNEA): ICD-10-CM

## 2020-05-20 NOTE — TELEPHONE ENCOUNTER
Reason for call:  Other   Patient called regarding (reason for call): call back  Additional comments: Patient requesting that the air pressure on his CPAP be increased, and also would like some information on the Inspire implant.    Phone number to reach patient:  Home number on file [phone] (home)    Best Time:  Any time    Can we leave a detailed message on this number?  YES    Travel screening: Not Applicable

## 2020-05-21 NOTE — TELEPHONE ENCOUNTER
Called patient 5/21/2020 at 1:16 PM and left voicemail to please return call as patient needs a follow up appointment.    Tomas Plascencia  Sleep Clinic Specialist - [place]

## 2020-05-22 VITALS — HEIGHT: 71 IN | WEIGHT: 240 LBS | BODY MASS INDEX: 33.6 KG/M2

## 2020-05-22 ASSESSMENT — MIFFLIN-ST. JEOR: SCORE: 2072.82

## 2020-05-22 NOTE — PROGRESS NOTES
"Barry Mcgrath is a 29 year old male who is being evaluated via a billable telephone visit.      The patient has been notified of following:     \"This telephone visit will be conducted via a call between you and your physician/provider. We have found that certain health care needs can be provided without the need for a physical exam.  This service lets us provide the care you need with a short phone conversation.  If a prescription is necessary we can send it directly to your pharmacy.  If lab work is needed we can place an order for that and you can then stop by our lab to have the test done at a later time.    Telephone visits are billed at different rates depending on your insurance coverage. During this emergency period, for some insurers they may be billed the same as an in-person visit.  Please reach out to your insurance provider with any questions.    If during the course of the call the physician/provider feels a telephone visit is not appropriate, you will not be charged for this service.\"    Patient has given verbal consent for Telephone visit?  Yes    What phone number would you like to be contacted at? [phone]    How would you like to obtain your AVS? Mail a copy    Shweta Young MA on 5/22/2020 at 10:00 AM    CPAP Follow-Up Visit:    Date on this visit: 5/26/2020    Barry Mcgrath has a follow-up visit today to review his CPAP use for RAQUEL. His medical history is significant for blindness, Bipolar I, PTSD, HTN, and kidney transplant.    5/17/2018 [facility] Sleep Study (261.0 lbs) - .0/hr, .8, Supine .0, REM AHI -, Low O2% 65.3%, Time Spent ?88% 60.6, Time Spent ?89% 72.5. Treatment was titrated to a pressure of CPAP 11 with an AHI 6.9. Time spent in REM supine at this pressure was - minutes.    He thinks the pressure should be increased. He thinks that because he is not sleeping very long at night and thus is napping 4-5 hours in the daytime.  He feels more problems with " stress and depression, although it has been a little better in the last week. He has been more stressed from school work.    He goes to bed around 9 PM and sleeps until 12-1 AM. He is then awake until noon to 1 PM. He then sleeps 4-5 hours. This has been an issue for 2-3 months. He had been watching TV or getting on the computer ([employer]), sometimes eating. Lately he has tried laying perfectly still. He likes to be up between 6-8 AM.       PAP machine: RespirNetwork. Pressure settings: 12-17 cm.    The compliance data shows that the patient used the CPAP for 30/30 nights, 85% of nights for >4 hours.  The 90th% pressure is 15.8 cm.  The average time in large leak is 1% of the night.  The average nightly usage is 584 min.  The average AHI is 2.3/hr.       Interface:  Mask: Simplus full face mask  Chin strap: No  Leak: No  Using Humidifier: Yes  Condensation in hose or mask: No     Difficulties with therapy:    [-] Snoring with CPAP: friends have not heard him, but they don't share a room  [-] Difficulty tolerating the pressure: often feels too low, after having it on about 15 minutes. Does not have to remove it to catch his breath. He often does not even notice it.  [-] Epistaxis/dry nose:   [-] Nasal congestion:  [-] Dry mouth:  [-] Mouth breathing:   [-] Pain/skin breakdown:      Improvements noted with CPAP:   Still tired, but I think that is more related to his sleep schedule.     Weight change since sleep study: down 30+ pounds. 228 pounds in December (per chart), 261 at study.    Past medical/surgical history, family history, social history, medications and allergies were reviewed.      Problem List:  Patient Active Problem List    Diagnosis Date Noted     ESRD on peritoneal dialysis (H) 12/16/2019     Priority: Medium     Overview:   for 3months from dec/05 to 4/2006        Bipolar 1 disorder (H) 03/13/2019     Priority: Medium     RAQUEL (obstructive sleep apnea) 05/20/2018     Priority: Medium     5/17/2018  [facility] Sleep Study (261.0 lbs) - .0, .8, Supine .0, REM AHI -, Low O2% 65.3%, Time Spent ?88% 60.6, Time Spent ?89% 72.5. Treatment was titrated to a pressure of CPAP 11 with an AHI 6.9. Time spent in REM supine at this pressure was - minutes.       Hypertension, unspecified type 04/04/2018     Priority: Medium     Suicidal ideation 08/28/2017     Priority: Medium     End stage renal disease (H) 07/14/2017     Priority: Medium     Overview:   Overview:   for 3months from dec/05 to 4/2006        Immunodeficiency (H) 09/13/2012     Priority: Medium        Impression/Plan:    (G47.23) Irregular sleep-wake rhythm  (primary encounter diagnosis)  Comment: Barry presents with concerns of being sleepy in the daytime and having trouble sleeping at night over the last 3 months. He only gets about 3-4 hours of sleep at night and 4-5 hours in the afternoon.  Plan: Try to only be in bed between 10 PM and 6 AM. Set an alarm in the morning to avoid over sleeping.   Reduce your nap time to 1PM to 3 PM for 2 weeks. Then reduce it further to 1-1:30 PM. Use an alarm to avoid oversleeping in the daytime. Stay physically active to avoid sleep at times when you should not be sleeping.    (G47.33) RAQUEL (obstructive sleep apnea)  Comment: Barry was initially suspecting he needed higher CPAP pressures because he was not sleeping well at night. He has lost 30 pounds since his study, so his pressures should not need to be higher. The data from the download shows his apnea is well treated and his pressures still have some room to go higher if needed. His sleepiness is more likely secondary to irregular sleep schedule and possibly mental health.   Plan: Comprehensive DME        Continue auto CPAP 12-17 cm. A prescription was written for new supplies. I answered questions about L[facility]da. He would not be a candidate given his good compliance and response to CPAP.      He will follow up with me in about 2 month(s).      28 minutes (10:25-10:53 AM) spent with patient, all of which were spent face-to-face counseling, consulting, coordinating plan of care.      Bennett Goltz, PA-C    CC: No ref. provider found

## 2020-05-22 NOTE — PATIENT INSTRUCTIONS
Try to only be in bed between 10 PM and 6 AM. Set an alarm in the morning to avoid over sleeping.   Reduce your nap time to 1PM to 3 PM for 2 weeks. Then reduce it further to 1-1:30 PM. Use an alarm to avoid oversleeping in the daytime. Stay physically active to avoid sleep at times when you should not be sleeping.  Your BMI is Body mass index is 33.95 kg/m .  Weight management is a personal decision.  If you are interested in exploring weight loss strategies, the following discussion covers the approaches that may be successful. Body mass index (BMI) is one way to tell whether you are at a healthy weight, overweight, or obese. It measures your weight in relation to your height.  A BMI of 18.5 to 24.9 is in the healthy range. A person with a BMI of 25 to 29.9 is considered overweight, and someone with a BMI of 30 or greater is considered obese. More than two-thirds of American adults are considered overweight or obese.  Being overweight or obese increases the risk for further weight gain. Excess weight may lead to heart disease and diabetes.  Creating and following plans for healthy eating and physical activity may help you improve your health.  Weight control is part of healthy lifestyle and includes exercise, emotional health, and healthy eating habits. Careful eating habits lifelong are the mainstay of weight control. Though there are significant health benefits from weight loss, long-term weight loss with diet alone may be very difficult to achieve- studies show long-term success with dietary management in less than 10% of people. Attaining a healthy weight may be especially difficult to achieve in those with severe obesity. In some cases, medications, devices and surgical management might be considered.  What can you do?  If you are overweight or obese and are interested in methods for weight loss, you should discuss this with your provider.     Consider reducing daily calorie intake by 500 calories.     Keep a  food journal.     Avoiding skipping meals, consider cutting portions instead.    Diet combined with exercise helps maintain muscle while optimizing fat loss. Strength training is particularly important for building and maintaining muscle mass. Exercise helps reduce stress, increase energy, and improves fitness. Increasing exercise without diet control, however, may not burn enough calories to loose weight.       Start walking three days a week 10-20 minutes at a time    Work towards walking thirty minutes five days a week     Eventually, increase the speed of your walking for 1-2 minutes at time    In addition, we recommend that you review healthy lifestyles and methods for weight loss available through the National Institutes of Health patient information sites:  http://win.niddk.nih.gov/publications/index.htm    And look into health and wellness programs that may be available through your health insurance provider, employer, local community center, or marilyn club.    Weight management plan: Patient was referred to their PCP to discuss a diet and exercise plan.

## 2020-05-26 ENCOUNTER — VIRTUAL VISIT (OUTPATIENT)
Dept: SLEEP MEDICINE | Facility: CLINIC | Age: 30
End: 2020-05-26
Payer: MEDICARE

## 2020-05-26 DIAGNOSIS — G47.23 IRREGULAR SLEEP-WAKE RHYTHM: Primary | ICD-10-CM

## 2020-05-26 DIAGNOSIS — G47.33 OSA (OBSTRUCTIVE SLEEP APNEA): ICD-10-CM

## 2020-05-26 PROCEDURE — 99443: CPT | Performed by: PHYSICIAN ASSISTANT

## 2020-06-04 ENCOUNTER — TELEPHONE (OUTPATIENT)
Dept: TRANSPLANT | Facility: CLINIC | Age: 30
End: 2020-06-04

## 2020-06-04 NOTE — TELEPHONE ENCOUNTER
Provider Call: Voicemail  Date/Time: 6/4/20 @ 3:57   Reason for call: DODIE Silva from patients PCP called to confirm we received recent tacro level of 6.5.

## 2020-06-05 NOTE — TELEPHONE ENCOUNTER
Reports tac level at 6.5.  Goal 4-6.  Call back Melissa and recommend no dose change at this time.    OUTCOME:  Left message re Tac level.   Recommended no dose change.

## 2020-07-29 ENCOUNTER — TELEPHONE (OUTPATIENT)
Dept: TRANSPLANT | Facility: CLINIC | Age: 30
End: 2020-07-29

## 2020-07-29 NOTE — TELEPHONE ENCOUNTER
Patient Call: Voicemail  Date/Time: 7/29/20 2:10PM  Reason for call: Pt calling RE: their tacrolimus level. It is currently at 8.0 and they do not know if this is good or bad. Please call back.

## 2020-07-29 NOTE — LETTER
PHYSICIAN ORDERS      DATE & TIME ISSUED: 2020 3:53 PM  PATIENT NAME: Barry Mcgrath   : 1990     [facility] MR# [if applicable]: [id number]     DIAGNOSIS:  Kidney transplant   ICD-10 CODE: Z94.0     Please complete the following labs in 1-2 weeks  Tacrolimus level (ensure 12 hours between last dose and blood draw)  BMP  CBC with platelets and differential    Any questions please call: [phone] option 5    Please fax results to [phone].

## 2020-07-30 NOTE — TELEPHONE ENCOUNTER
Left VM message for Barry that his results had not been received from lab and we must have official report for dose adjustment. Provided fax number for SOT office and asked that he call his lab facility to confirm his labs were sent over. Tacrolimus goal is 4-6; current dose 2 mg BID yet to be confirmed with Barry when results back.

## 2020-07-31 NOTE — TELEPHONE ENCOUNTER
Call placed to patient. Patient confirms current dose and accurate trough level. Denies any recent illness, diarrhea or medication changes. Patient v\u to continue current dose and repeat level in 1-2 weeks. Order sent

## 2020-08-11 ENCOUNTER — TELEPHONE (OUTPATIENT)
Dept: TRANSPLANT | Facility: CLINIC | Age: 30
End: 2020-08-11

## 2020-08-11 DIAGNOSIS — Z94.0 KIDNEY TRANSPLANTED: Primary | ICD-10-CM

## 2020-08-11 DIAGNOSIS — Z48.298 AFTERCARE FOLLOWING ORGAN TRANSPLANT: ICD-10-CM

## 2020-08-11 DIAGNOSIS — Z94.0 STATUS POST KIDNEY TRANSPLANT: ICD-10-CM

## 2020-08-11 DIAGNOSIS — Z79.899 IMMUNOSUPPRESSIVE MANAGEMENT ENCOUNTER FOLLOWING KIDNEY TRANSPLANT: ICD-10-CM

## 2020-08-11 DIAGNOSIS — Z94.0 IMMUNOSUPPRESSIVE MANAGEMENT ENCOUNTER FOLLOWING KIDNEY TRANSPLANT: ICD-10-CM

## 2020-08-11 RX ORDER — TACROLIMUS 0.5 MG/1
0.5 CAPSULE ORAL 2 TIMES DAILY
Qty: 60 CAPSULE | Refills: 11 | Status: SHIPPED | OUTPATIENT
Start: 2020-08-11 | End: 2021-01-25

## 2020-08-11 RX ORDER — TACROLIMUS 1 MG/1
1 CAPSULE ORAL 2 TIMES DAILY
Qty: 60 CAPSULE | Refills: 11 | Status: SHIPPED | OUTPATIENT
Start: 2020-08-11 | End: 2021-01-25

## 2020-08-11 NOTE — LETTER
PHYSICIAN ORDERS      DATE & TIME ISSUED: 2020 4:13 PM  PATIENT NAME: Barry Mcgrath   : 1990     [facility] MR# [if applicable]: [id number]     DIAGNOSIS / ICD - 10 CODES    Kidney Transplanted (Z94.0)    After Care Following Organ Transplant (Z48.298)    Long Term Use of Medication (Z79.899)    Immunosuppressed Status (Z92.25)    Complications Kidney Transplant (T86.10)      Please recheck:    Tacrolimus level (  12 hour trough: indicate date and time of last dose)      Patient should release information to the [facility].   Please fax results to the Transplant Center at [phone].  Any questions please call [phone].      .

## 2020-08-11 NOTE — LETTER
PHYSICIAN ORDERS      DATE & TIME ISSUED: 2020 4:13 PM  PATIENT NAME: Barry Mcgrath   : 1990     [facility] MR# [if applicable]: [id number]     DIAGNOSIS / ICD - 10 CODES    Kidney Transplanted (Z94.0)    After Care Following Organ Transplant (Z48.298)    Long Term Use of Medication (Z79.899)    Immunosuppressed Status (Z92.25)    Complications Kidney Transplant (T86.10)      Please recheck:    Tacrolimus level (  12 hour trough: indicate date and time of last dose)      Patient should release information to the [facility].   Please fax results to the Transplant Center at [phone].  Any questions please call [phone].      .

## 2020-08-11 NOTE — TELEPHONE ENCOUNTER
Patient Call: Voicemail  Date/Time: 8/11/20 / 2:55 pm  Reason for call: Tacrolimus level is out of range at 8.1 and need to change the dose of the tacrolimus.  Please call Carly Agustin back at [phone] dial [phone] for nurse line.

## 2020-08-11 NOTE — TELEPHONE ENCOUNTER
Tacrolimus = 8.1  Previous level also above goal at 8.0  Goal 4-6  Current dose 2 mg BID    PLAN:   Called back Carly Agustin NP.   Confirm no new medications or illness (lex. Diarrhea).   If good trough, decrease dose to 1.5 mg BID.   If no 0.5 mg caps on hand may take 2.0/1.0.  Recheck level in 2 weeks and make sure it is a good trough.    OUTCOME:  She will ask Barry about the diarrhea and make the dose change.  She will call back if he uses a different Pharmacy.  RX updated and sent to   [id number] [place], [place] - [address] [phone] (Phone)  [phone] (Fax)     Lab order sent to:  [facility] [phone] (Phone)  [phone] (Fax)

## 2020-11-11 ENCOUNTER — HOSPITAL ENCOUNTER (EMERGENCY)
Facility: CLINIC | Age: 30
Discharge: HOME | End: 2020-11-11
Attending: EMERGENCY MEDICINE | Admitting: EMERGENCY MEDICINE
Payer: MEDICARE

## 2020-11-11 ENCOUNTER — NURSE TRIAGE (OUTPATIENT)
Dept: NURSING | Facility: CLINIC | Age: 30
End: 2020-11-11

## 2020-11-11 VITALS
DIASTOLIC BLOOD PRESSURE: 73 MMHG | TEMPERATURE: 98.9 F | OXYGEN SATURATION: 93 % | RESPIRATION RATE: 16 BRPM | HEART RATE: 95 BPM | SYSTOLIC BLOOD PRESSURE: 119 MMHG

## 2020-11-11 DIAGNOSIS — K08.89 PAIN, DENTAL: ICD-10-CM

## 2020-11-11 PROCEDURE — 250N000013 HC RX MED GY IP 250 OP 250 PS 637: Performed by: EMERGENCY MEDICINE

## 2020-11-11 PROCEDURE — 99283 EMERGENCY DEPT VISIT LOW MDM: CPT | Performed by: EMERGENCY MEDICINE

## 2020-11-11 RX ORDER — AMOXICILLIN 500 MG/1
500 CAPSULE ORAL 3 TIMES DAILY
Qty: 21 CAPSULE | Refills: 0 | Status: SHIPPED | OUTPATIENT
Start: 2020-11-11 | End: 2020-11-11

## 2020-11-11 RX ORDER — AMOXICILLIN 500 MG/1
500 CAPSULE ORAL 3 TIMES DAILY
Qty: 21 CAPSULE | Refills: 0 | Status: SHIPPED | OUTPATIENT
Start: 2020-11-11 | End: 2021-03-24

## 2020-11-11 RX ORDER — AMOXICILLIN 250 MG/1
500 CAPSULE ORAL ONCE
Status: COMPLETED | OUTPATIENT
Start: 2020-11-11 | End: 2020-11-11

## 2020-11-11 RX ADMIN — AMOXICILLIN 500 MG: 250 CAPSULE ORAL at 02:14

## 2020-11-11 NOTE — ED PROVIDER NOTES
History     Chief Complaint   Patient presents with     Dental Pain     Patient BIBA for tooth Left lower tooth pain. Sxs onset yesterday. Patient unable to get into Dentist today.      HPI  Barry Mcgrath is a 30 year old male who presents to us with chief complaint of left lower jaw pain.  No fevers or chills.  Patient states use Tylenol with some relief.  No drainage.  He did call his dentist but they could not get him in for some time.  He otherwise has no complaints.    I have reviewed the Medications, Allergies, Past Medical and Surgical History, and Social History in the [employer] system.  PAST MEDICAL HISTORY:   Past Medical History:   Diagnosis Date     Anxiety      Bipolar 1 disorder (H)      Blind      Depressive disorder        PAST SURGICAL HISTORY:   Past Surgical History:   Procedure Laterality Date     TRANSPLANT      Kidney transplant in 2006       Past medical history, past surgical history, medications, and allergies were reviewed with the patient. Additional pertinent items: None    FAMILY HISTORY:   Family History   Problem Relation Age of Onset     Sleep Apnea Father      Diabetes Father      Sleep Apnea Brother      Glaucoma Maternal Grandfather      Macular Degeneration No family hx of        SOCIAL HISTORY:   Social History     Tobacco Use     Smoking status: Never Smoker     Smokeless tobacco: Never Used   Substance Use Topics     Alcohol use: No     Social history was reviewed with the patient. Additional pertinent items: None      Patient's Medications   New Prescriptions    AMOXICILLIN (AMOXIL) 500 MG CAPSULE    Take 1 capsule (500 mg) by mouth 3 times daily for 7 days   Previous Medications    ACETAMINOPHEN (TYLENOL) 325 MG TABLET    Take 2 tablets (650 mg) by mouth every 6 hours as needed for mild pain    DIVALPROEX SODIUM EXTENDED-RELEASE (DEPAKOTE ER) 500 MG 24 HR TABLET    Take 1 tablet by mouth for 5 days after finishing the smaller dose, then take 2 tablets each day.    FLUCELVAX  QUADRIVALENT 0.5 ML LAKSHMI    Pharmacist to administer IM    LISINOPRIL (PRINIVIL/ZESTRIL) 10 MG TABLET    Take 10 mg by mouth daily    METFORMIN (GLUCOPHAGE-XR) 500 MG 24 HR TABLET    Take 1,000 mg by mouth 2 times daily    MYCOPHENOLATE (GENERIC EQUIVALENT) 250 MG CAPSULE    Take 3 capsules (750 mg) by mouth 2 times daily    PRAZOSIN (MINIPRESS) 1 MG CAPSULE    Take 1 capsule (1 mg) by mouth 2 times daily    PREDNISONE (DELTASONE) 5 MG TABLET    Take 1 tablet (5 mg) by mouth daily    RISPERIDONE (RISPERDAL) 4 MG TABLET    Take 4 mg by mouth daily    TACROLIMUS (GENERIC EQUIVALENT) 0.5 MG CAPSULE    Take 1 capsule (0.5 mg) by mouth 2 times daily TOTAL DOSE: 1.5 mg twice daily.    TACROLIMUS (GENERIC EQUIVALENT) 1 MG CAPSULE    Take 1 capsule (1 mg) by mouth 2 times daily TOTAL DOSE: 1.5 mg twice daily.    VITAMIN D3 (CHOLECALCIFEROL) 94944 UNITS CAPSULE    Take 50,000 Units by mouth once a week   Modified Medications    No medications on file   Discontinued Medications    No medications on file          Allergies   Allergen Reactions     Seasonal Allergies Other (See Comments)     hamster hair     No Known Allergies         Review of Systems  A complete review of systems was performed with pertinent positives and negatives noted in the HPI, and all other systems negative.    Physical Exam   BP: 119/73  Pulse: 95  Temp: 98.9  F (37.2  C)  Resp: 16  SpO2: 93 %      Physical Exam  Vitals signs and nursing note reviewed.   Constitutional:       General: He is not in acute distress.     Appearance: He is well-developed.   HENT:      Head: Normocephalic.      Mouth/Throat:      Comments: Teeth normal in appearance with exception of some fillings.  Patient does have tenderness over the left lower teeth.  Premolar and molar area  Eyes:      Extraocular Movements: Extraocular movements intact.   Neck:      Musculoskeletal: Neck supple.   Pulmonary:      Effort: No respiratory distress.   Musculoskeletal:         General: No  deformity.   Skin:     General: Skin is dry.   Neurological:      Mental Status: He is alert.      Comments: Oriented   Psychiatric:         Behavior: Behavior normal.         ED Course        Procedures          No results found for this or any previous visit (from the past 24 hour(s)).  Medications   amoxicillin (AMOXIL) capsule 500 mg (has no administration in time range)             Assessments & Plan (with Medical Decision Making)   30-year-old male presents to us with a chief complaint of dental pain.  There is not an obvious abscess.  Given his tenderness we will give him prescription for antibiotics as I suspect he has an infection.  He cannot take NSAIDs due to his kidney transplant.  He is hesitant to take anything stronger than Tylenol so recommend he continue Tylenol as needed.  He will follow-up with his dentist.    I have reviewed the nursing notes.    I have reviewed the findings, diagnosis, plan and need for follow up with the patient.    New Prescriptions    AMOXICILLIN (AMOXIL) 500 MG CAPSULE    Take 1 capsule (500 mg) by mouth 3 times daily for 7 days       Final diagnoses:   Pain, dental       11/11/2020   [facility] EMERGENCY DEPARTMENT     Ric Stockton,   11/11/20 0207

## 2020-11-11 NOTE — ED AVS SNAPSHOT
[facility]  [address]  [place] [address]  Phone: [phone]  Fax: [phone]                                    Barry Mcgrath   MRN: [id number]    Department: [facility]   Date of Visit: 11/11/2020           After Visit Summary Signature Page    I have received my discharge instructions, and my questions have been answered. I have discussed any challenges I see with this plan with the nurse or doctor.    ..........................................................................................................................................  Patient/Patient Representative Signature      ..........................................................................................................................................  Patient Representative Print Name and Relationship to Patient    ..................................................               ................................................  Date                                   Time    ..........................................................................................................................................  Reviewed by Signature/Title    ...................................................              ..............................................  Date                                               Time          22EPIC Rev 08/18

## 2020-11-11 NOTE — ED NOTES
Bed: ED07  Expected date: 11/11/20  Expected time: 1:39 AM  Means of arrival:   Comments:  H441  30 M  Toothache

## 2020-11-11 NOTE — TELEPHONE ENCOUNTER
Patient calling reporting his tooth hurts on his left side. States left side of his face is slightly swollen. Denies fever. Per guideline, advised patient to be seen within 4 hours. Patient states he will go to the emergency department. Caller verbalized understanding. Denies further questions.      Biju Singh RN  [facility]     COVID 19 Nurse Triage Plan/Patient Instructions    Please be aware that novel coronavirus (COVID-19) may be circulating in the community. If you develop symptoms such as fever, cough, or SOB or if you have concerns about the presence of another infection including coronavirus (COVID-19), please contact your health care provider or visit [email or web address].     Disposition/Instructions    ED Visit recommended. Follow protocol based instructions.     Bring Your Own Device:  Please also bring your smart device(s) (smart phones, tablets, laptops) and their charging cables for your personal use and to communicate with your care team during your visit.    Thank you for taking steps to prevent the spread of this virus.  o Limit your contact with others.  o Wear a simple mask to cover your cough.  o Wash your hands well and often.    Resources    [facility]: About COVID-19: [email or web address]    CDC: What to Do If You're Sick: www.cdc.gov/coronavirus/2019-ncov/about/steps-when-sick.html    CDC: Ending Home Isolation: www.cdc.gov/coronavirus/2019-ncov/hcp/disposition-in-home-patients.html     CDC: Caring for Someone: www.cdc.gov/coronavirus/2019-ncov/if-you-are-sick/care-for-someone.html     [facility]: Interim Guidance for Hospital Discharge to Home: www.health.state.[place]/diseases/coronavirus/hcp/hospdischarge.pdf    [facility] clinical trials (COVID-19 research studies): clinicalaffairs.[facility]/umn-clinical-trials     Below are the COVID-19 hotlines at the [place] Department of Health ([facility]). Interpreters are available.   o For health questions: Call  "337.260.9130 or 1-376.155.3782 (7 a.m. to 7 p.m.)  o For questions about schools and childcare: Call 501-254-3869 or 1-965.367.9085 (7 a.m. to 7 p.m.)                       Additional Information    Negative: Shock suspected (e.g., cold/pale/clammy skin, too weak to stand, low BP, rapid pulse)    Negative: [1] Similar pain previously AND [2] it was from \"heart attack\"    Negative: [1] Similar pain previously AND [2] it was from \"angina\" AND [3] not relieved by nitroglycerin    Negative: Sounds like a life-threatening emergency to the triager    Negative: Tongue is very swollen and tender    Negative: [1] Face is swollen AND [2] fever    Negative: Patient sounds very sick or weak to the triager    [1] SEVERE pain (e.g., excruciating, unable to do any normal activities) AND [2] not improved 2 hours after pain medicine    Protocols used: TOOTHACHE-A-AH      "

## 2020-11-12 ENCOUNTER — NURSE TRIAGE (OUTPATIENT)
Dept: NURSING | Facility: CLINIC | Age: 30
End: 2020-11-12

## 2020-11-12 NOTE — TELEPHONE ENCOUNTER
Caller is on an antibiotic for dental abscess. Caller is complaining of severe tooth pain and rates pain 10/10. Caller is an kidney transplant patient and wants to know if her can take tylenol while on antibiotics. Triage guidelines recommend to home care. Caller verbalized and understands directives.  COVID 19 Nurse Triage Plan/Patient Instructions    Please be aware that novel coronavirus (COVID-19) may be circulating in the community. If you develop symptoms such as fever, cough, or SOB or if you have concerns about the presence of another infection including coronavirus (COVID-19), please contact your health care provider or visit [email or web address].     Disposition/Instructions    Home care recommended. Follow home care protocol based instructions.    Thank you for taking steps to prevent the spread of this virus.  o Limit your contact with others.  o Wear a simple mask to cover your cough.  o Wash your hands well and often.    Resources    [facility]: About COVID-19: [email or web address]    CDC: What to Do If You're Sick: www.cdc.gov/coronavirus/2019-ncov/about/steps-when-sick.html    CDC: Ending Home Isolation: www.cdc.gov/coronavirus/2019-ncov/hcp/disposition-in-home-patients.html     CDC: Caring for Someone: www.cdc.gov/coronavirus/2019-ncov/if-you-are-sick/care-for-someone.html     [facility]: Interim Guidance for Hospital Discharge to Home: www.health.[facility].[place]/diseases/coronavirus/hcp/hospdischarge.pdf    [facility] clinical trials (COVID-19 research studies): clinicalaffairs.[facility]-clinical-trials     Below are the COVID-19 hotlines at the [place] Department of Health ([facility]). Interpreters are available.   o For health questions: Call 740-662-2566 or 1-436.817.3590 (7 a.m. to 7 p.m.)  o For questions about schools and childcare: Call 977-757-8443 or 1-850.460.8538 (7 a.m. to 7 p.m.)                     Additional Information    Negative: [1] Major bleeding (e.g., actively  dripping or spurting) AND [2] can't be stopped    Negative: Fainted or too weak to stand following large blood loss    Negative: Knocked out (unconscious) > 1 minute    Negative: Difficult to awaken or acting confused (e.g., disoriented, slurred speech)    Negative: Difficulty breathing    Negative: Severe neck pain    Negative: Sounds like a life-threatening emergency to the triager    Negative: Main injury is to the teeth    Negative: Gaping cut of OUTER LIP  (or length > 1/4 inch or 6 mm)    Negative: Gaping cut through border of the LIP where it meets the skin  (or length > 1/4 inch or 6 mm)    Negative: Cut of side or tip of TONGUE that gapes open (split tongue)    Negative: Cut on surface of TONGUE > 1 inch (24 mm) and that gapes open    Negative: [1] Bleeding AND [2] won't stop after 10 minutes of direct pressure (using correct technique)    Negative: Can't open or close the mouth fully    Negative: Sounds like a serious injury to the triager    Negative: Injury to the back of the throat, tonsil or soft palate  (e.g., pencil or other sharp object placed in mouth)    Negative: Unable to swallow or new onset of drooling    Negative: Closing the mouth and biting down does not feel normal    Negative: [1] SEVERE pain AND [2] not improved 2 hours after pain medicine/ice packs    Negative: [1] Mouth looks infected AND [2] fever    Negative: Suspicious history for the injury    Negative: Patient is confused or is an unreliable provider of information (e.g., dementia, profound mental retardation, alcohol intoxication)    Negative: [1] Looks infected (increasing pain, redness or swelling after 48 hrs) AND [2] no fever    Negative: [1] No prior tetanus shots (or is not fully vaccinated) AND [2] any wound (e.g., cut, scrape)    Negative: [1] Last tetanus shot > 5 years ago AND [2] DIRTY cut or scrape of outer lip    Negative: [1] Last tetanus shot > 10 years ago AND [2] CLEAN cut or scrape (e.g., object AND skin were  "clean)    Negative: [1] After 72 hours AND [2] pain not improving    Negative: Minor mouth injury    Negative: Tongue: bruise or small cut    Negative: Outer lip: bruise or small cut    Negative: Minor mouth burn from hot food or drink (mouth pain or lip pain)    Negative: Shock suspected (e.g., cold/pale/clammy skin, too weak to stand, low BP, rapid pulse)    Negative: [1] Similar pain previously AND [2] it was from \"heart attack\"    Negative: [1] Similar pain previously AND [2] it was from \"angina\" AND [3] not relieved by nitroglycerin    Negative: Sounds like a life-threatening emergency to the triager    Negative: Chest pain    Negative: Toothache followed tooth injury    Negative: Toothache or mouth pain after tooth extraction    Negative: Canker sore (i.e., aphthous ulcer)    Negative: Tongue is very swollen and tender    Negative: [1] Face is swollen AND [2] fever    Negative: Patient sounds very sick or weak to the triager    Negative: [1] SEVERE pain (e.g., excruciating, unable to do any normal activities) AND [2] not improved 2 hours after pain medicine    Negative: Face is very swollen    Negative: Fever    Negative: [1] Face is swollen AND [2] no fever    Negative: Toothache present > 24 hours    Negative: [1] Toothache AND [2] intermittent AND [3] brought on by hot or cold liquids    Negative: Brown cavity visible in the painful tooth    Negative: Cracked or chipped tooth    Negative: Red or yellow lump present at the gum line of the painful tooth    Negative: Lost crown    Negative: Lost filling    Negative: Broken braces wire or end of braces wire is jabbing into gum, cheek, or tongue    Toothache present < 24 hours    Protocols used: MOUTH INJURY-A-, TOOTHACHE-A-AH      "

## 2020-11-13 ENCOUNTER — NURSE TRIAGE (OUTPATIENT)
Dept: NURSING | Facility: CLINIC | Age: 30
End: 2020-11-13

## 2020-11-14 NOTE — TELEPHONE ENCOUNTER
Pt called in ask medication question.  The question is answered.  no other concern at this time.      Karan Oropeza Nurse Advisor 11/13/2020 6:26 PM

## 2020-11-16 ENCOUNTER — NURSE TRIAGE (OUTPATIENT)
Dept: NURSING | Facility: CLINIC | Age: 30
End: 2020-11-16

## 2020-11-16 NOTE — TELEPHONE ENCOUNTER
Questions about antibiotic he is taking for dental issues.  Answered questions. If issues arise he should call back to be seen or contact his provider.  He agrees to this plan     Additional Information    Caller has medication question, adult has minor symptoms, caller declines triage, and triager answers question    Protocols used: MEDICATION QUESTION CALL-A-OH

## 2020-12-21 ENCOUNTER — TELEPHONE (OUTPATIENT)
Dept: TRANSPLANT | Facility: CLINIC | Age: 30
End: 2020-12-21

## 2020-12-21 NOTE — TELEPHONE ENCOUNTER
Returned a call to Barry. Let him know that if he is offered the vaccine he is safe to take it. We will have our official recommendations out in the next couple weeks. Barry verbalized understanding.

## 2021-01-05 NOTE — TELEPHONE ENCOUNTER
Pt waiting to hear back re the COVID vaccine  Has been a couple of weeks wonders if we heard more yet?

## 2021-01-06 ENCOUNTER — TELEPHONE (OUTPATIENT)
Dept: TRANSPLANT | Facility: CLINIC | Age: 31
End: 2021-01-06

## 2021-01-06 NOTE — LETTER
PHYSICIAN ORDERS    DATE & TIME ISSUED: 2021 12:56 PM  PATIENT NAME: Barry Mcgrath   : 1990     [facility] MR# [if applicable]: [id number]     DIAGNOSIS / ICD - 10 CODES    Kidney Transplanted (Z94.0)    After Care Following Organ Transplant (Z48.298)    Long Term Use of Medication (Z79.899)    Complications Kidney Transplant (T86.10)    Every 3 months (January, April, July, October)    Hemogram and Platelet    Basic Metabolic Panel (Sodium,potassium,chloride,CO2,creatinine,urea,nitrogen,glucose,calcium)     / tacrolimus / Prograf drug level-12 hour trough indicating time of last dose taken.    Every 6 months (January, July)    Urine for protein creatinine ratio    *Please time labs to get a good 12 hour drug trough*      Patient should release information to the [facility].   Please fax results to the Transplant Center at [phone].  Any questions please call [phone].        .

## 2021-01-06 NOTE — LETTER
PHYSICIAN ORDERS    DATE & TIME ISSUED: 2021 12:56 PM  PATIENT NAME: Barry Mcgrath   : 1990     [facility] MR# [if applicable]: [id number]     DIAGNOSIS / ICD - 10 CODES    Kidney Transplanted (Z94.0)    After Care Following Organ Transplant (Z48.298)    Long Term Use of Medication (Z79.899)    Complications Kidney Transplant (T86.10)    Every 3 months    Hemogram and Platelet    Basic Metabolic Panel (Sodium,potassium,chloride,CO2,creatinine,urea,nitrogen,glucose,calcium)     / tacrolimus / Prograf drug level-12 hour trough indicating time of last dose taken.    Every 6 months    Urine for protein creatinine ratio      Patient should release information to the [facility].   Please fax results to the Transplant Center at [phone].  Any questions please call [phone].        .

## 2021-01-06 NOTE — TELEPHONE ENCOUNTER
Returned a call to Barry let him know that we do not yet know when the COVID vaccine will be available to the next tier of people, and that he should check in on the [place] dept of health website. He should also check in with the [facility] where he receives care. I let him know that if he is offered the Pfizer or Moderna mRNA vaccine that he is safe to get it.  Barry is due for a full set of labs. He states that since COVID he has a home care RN coming 1 x a month. New orders sent to the [facility] and Barry.  Getting Home Care through [facility] [phone]  F: [phone]  Lab orders faxed.

## 2021-01-11 ENCOUNTER — TELEPHONE (OUTPATIENT)
Dept: NEPHROLOGY | Facility: CLINIC | Age: 31
End: 2021-01-11

## 2021-01-11 DIAGNOSIS — Z94.0 STATUS POST KIDNEY TRANSPLANT: ICD-10-CM

## 2021-01-11 RX ORDER — MYCOPHENOLATE MOFETIL 250 MG/1
750 CAPSULE ORAL 2 TIMES DAILY
Qty: 180 CAPSULE | Refills: 3 | Status: SHIPPED | OUTPATIENT
Start: 2021-01-11 | End: 2021-05-10

## 2021-01-11 NOTE — TELEPHONE ENCOUNTER
[facility] Call Center    Phone Message    May a detailed message be left on voicemail: yes     Reason for Call: Medication Refill Request    Has the patient contacted the pharmacy for the refill? Yes   Name of medication being requested: mycophenolate (GENERIC EQUIVALENT) 250 MG capsule    Provider who prescribed the medication: Reagan Burkett MD  Pharmacy: [id number] [[address]  Date medication is needed: ASA       Pharmacy called to ask us to transfer this medication Rx to their location.  Pt is OUT of medication - please send new Rx ASAP to fax [phone]  Action Taken: Message routed to:  [facility] Renal    Travel Screening: Not Applicable

## 2021-01-25 ENCOUNTER — TELEPHONE (OUTPATIENT)
Dept: TRANSPLANT | Facility: CLINIC | Age: 31
End: 2021-01-25

## 2021-01-25 DIAGNOSIS — Z94.0 IMMUNOSUPPRESSIVE MANAGEMENT ENCOUNTER FOLLOWING KIDNEY TRANSPLANT: ICD-10-CM

## 2021-01-25 DIAGNOSIS — Z94.0 STATUS POST KIDNEY TRANSPLANT: ICD-10-CM

## 2021-01-25 DIAGNOSIS — Z94.0 KIDNEY TRANSPLANTED: ICD-10-CM

## 2021-01-25 DIAGNOSIS — Z48.298 AFTERCARE FOLLOWING ORGAN TRANSPLANT: ICD-10-CM

## 2021-01-25 DIAGNOSIS — Z79.899 IMMUNOSUPPRESSIVE MANAGEMENT ENCOUNTER FOLLOWING KIDNEY TRANSPLANT: ICD-10-CM

## 2021-01-25 RX ORDER — TACROLIMUS 1 MG/1
1 CAPSULE ORAL 2 TIMES DAILY
Qty: 60 CAPSULE | Refills: 1 | Status: SHIPPED | OUTPATIENT
Start: 2021-01-25 | End: 2021-03-12

## 2021-01-25 RX ORDER — TACROLIMUS 0.5 MG/1
0.5 CAPSULE ORAL 2 TIMES DAILY
Qty: 60 CAPSULE | Refills: 1 | Status: SHIPPED | OUTPATIENT
Start: 2021-01-25 | End: 2021-03-12

## 2021-01-25 RX ORDER — PREDNISONE 5 MG/1
5 TABLET ORAL DAILY
Qty: 30 TABLET | Refills: 1 | Status: SHIPPED | OUTPATIENT
Start: 2021-01-25 | End: 2021-02-21

## 2021-01-25 NOTE — TELEPHONE ENCOUNTER
Patient Call: Medication Refill  New Pharmacy      Pharmacy Name: [id number]    Needs new scripts/Prior Auth (Barry changed pharmacy)     Name of Medication: Mycophenolate 250mg/ Prednisone 5mg/ Tacrolimus 0.5mg and 1.0mg    When will the patient be out of this medication?: Less than 3 days (Route high priority)

## 2021-01-27 ENCOUNTER — HOSPITAL ENCOUNTER (EMERGENCY)
Facility: CLINIC | Age: 31
Discharge: HOME | End: 2021-01-27
Attending: EMERGENCY MEDICINE | Admitting: EMERGENCY MEDICINE
Payer: MEDICARE

## 2021-01-27 VITALS
BODY MASS INDEX: 34.23 KG/M2 | SYSTOLIC BLOOD PRESSURE: 137 MMHG | OXYGEN SATURATION: 97 % | WEIGHT: 242 LBS | DIASTOLIC BLOOD PRESSURE: 61 MMHG | TEMPERATURE: 97.5 F | RESPIRATION RATE: 16 BRPM | HEART RATE: 82 BPM

## 2021-01-27 DIAGNOSIS — R45.851 SUICIDAL IDEATION: ICD-10-CM

## 2021-01-27 PROCEDURE — 90791 PSYCH DIAGNOSTIC EVALUATION: CPT

## 2021-01-27 PROCEDURE — 99285 EMERGENCY DEPT VISIT HI MDM: CPT | Performed by: EMERGENCY MEDICINE

## 2021-01-27 PROCEDURE — 99285 EMERGENCY DEPT VISIT HI MDM: CPT | Mod: 25 | Performed by: EMERGENCY MEDICINE

## 2021-01-27 RX ORDER — RISPERIDONE 2 MG/1
2 TABLET ORAL EVERY MORNING
COMMUNITY
End: 2022-06-13

## 2021-01-27 NOTE — ED NOTES
Pt attempted to cut left wrist, pt states unable to because the knife was dull.  Pt has red scratches on left wrist, skin intact.

## 2021-01-27 NOTE — ED NOTES
Bed: ED16A  Expected date: 1/27/21  Expected time: 5:41 AM  Means of arrival: Ambulance  Comments:  [facility]  30M  SI, superficial lacs to forearms

## 2021-01-27 NOTE — ED PROVIDER NOTES
ED Provider Note  [facility]      History     Chief Complaint   Patient presents with     Suicidal     thoughts and attempt     HPI  Barry Mcgrath is a 30 year old male who has past medical history of bipolar type I, depression, renal transplant presenting with suicidal ideation.  Patient has had increasing depression lately.  He is taking all of his medications as appropriate.  No drugs or alcohol.  No hallucinations or voices.  Denies homicidal ideation.  Patient tried to cut himself with a knife however the knife was too dull.  Denies any other injuries or pain at this point.  The patient is not sure if he needs to stay in the hospital.  He does feel safe here in the emergency department.  He still has a little bit of suicidality.    Past Medical History  Past Medical History:   Diagnosis Date     Anxiety      Bipolar 1 disorder (H)      Blind      Depressive disorder      Past Surgical History:   Procedure Laterality Date     TRANSPLANT      Kidney transplant in 2006          acetaminophen (TYLENOL) 325 MG tablet       amoxicillin (AMOXIL) 500 MG capsule       divalproex sodium extended-release (DEPAKOTE ER) 500 MG 24 hr tablet       FLUCELVAX QUADRIVALENT 0.5 ML LAKSHMI       lisinopril (PRINIVIL/ZESTRIL) 10 MG tablet       metFORMIN (GLUCOPHAGE-XR) 500 MG 24 hr tablet       mycophenolate (GENERIC EQUIVALENT) 250 MG capsule       prazosin (MINIPRESS) 1 MG capsule       predniSONE (DELTASONE) 5 MG tablet       risperiDONE (RISPERDAL) 4 MG tablet       tacrolimus (GENERIC EQUIVALENT) 0.5 MG capsule       tacrolimus (GENERIC EQUIVALENT) 1 MG capsule       vitamin D3 (CHOLECALCIFEROL) 69318 UNITS capsule      Allergies   Allergen Reactions     Seasonal Allergies Other (See Comments)     hamster hair     No Known Allergies      Family History  Family History   Problem Relation Age of Onset     Sleep Apnea Father      Diabetes Father      Sleep Apnea Brother      Glaucoma Maternal Grandfather       Macular Degeneration No family hx of      Social History   Social History     Tobacco Use     Smoking status: Never Smoker     Smokeless tobacco: Never Used   Substance Use Topics     Alcohol use: No     Drug use: No      Past medical history, past surgical history, medications, allergies, family history, and social history were reviewed with the patient. No additional pertinent items.       Review of Systems  A complete review of systems was performed with pertinent positives and negatives noted in the HPI, and all other systems negative.    Physical Exam   BP: 126/83  Pulse: 81  Temp: 97.5  F (36.4  C)  Resp: 16  SpO2: 99 %  Physical Exam  Physical Exam   Constitutional: oriented to person, place, and time. appears well-developed and well-nourished.   HENT:   Head: Normocephalic and atraumatic.   Neck: Normal range of motion.   Pulmonary/Chest: Effort normal. No respiratory distress.   Cardiac: No murmurs, rubs, gallops. RRR.  Abdominal: Abdomen soft, nontender, nondistended. No rebound tenderness.  MSK: Long bones without deformity or evidence of trauma.  No lacerations.  Radial pulses 2+ and symmetric.  Mild excoriation over the left anterior wrist.  Neurological: alert and oriented to person, place, and time.   Skin: Skin is warm and dry.   Psychiatric:  normal mood and affect.  behavior is normal. Thought content normal.     ED Course      Procedures             No results found for any visits on 01/27/21.  Medications - No data to display     Assessments & Plan (with Medical Decision Making)   MDM  Patient presenting with suicidal ideation and an attempt to cut himself.  There are no lacerations or cuts.  He does have a minor scratch where he tried to hurt himself.  The patient is still mildly suicidal however he feels safe here in the emergency department.  He will need a back assessment prior to disposition.  He will be sent out to the oncoming physician.    I have reviewed the nursing notes. I have  reviewed the findings, diagnosis, plan and need for follow up with the patient.    New Prescriptions    No medications on file       Final diagnoses:   Suicidal ideation       --  Adriano Blank  [facility] EMERGENCY DEPARTMENT  1/27/2021     Adriano Blank MD  01/27/21 0610

## 2021-01-27 NOTE — DISCHARGE INSTRUCTIONS
Detail Level: Detailed Follow-up with your established providers as an outpatient  Return if you are unable to keep safe at home   Add 31890 Cpt? (Important Note: In 2017 The Use Of 93546 Is Being Tracked By Cms To Determine Future Global Period Reimbursement For Global Periods): no Wound Evaluated By: Carolina Dawson CMA Body Location Override (Optional - Billing Will Still Be Based On Selected Body Map Location If Applicable): left pretibial

## 2021-02-12 VITALS — HEIGHT: 71 IN | BODY MASS INDEX: 33.88 KG/M2 | WEIGHT: 242 LBS

## 2021-02-12 ASSESSMENT — MIFFLIN-ST. JEOR: SCORE: 2076.89

## 2021-02-12 NOTE — PROGRESS NOTES
Barry is a 30 year old who is being evaluated via a billable telephone visit.      What phone number would you like to be contacted at? [phone]  How would you like to obtain your AVS? Mail a copy    CPAP Follow-Up Visit:    Date on this visit: 2/15/2021    Barry Mcgrath has a follow-up visit today to review his CPAP use for RAQUEL. His medical history is significant for complete blindness, Bipolar I, PTSD, HTN, and kidney transplant.     5/17/2018 [facility] Sleep Study (261.0 lbs) - .0/hr, .8, Supine .0, REM AHI -, Low O2% 65.3%, Time Spent ?88% 60.6, Time Spent ?89% 72.5. Treatment was titrated to a pressure of CPAP 11 with an AHI 6.9. Time spent in REM supine at this pressure was - minutes.    For the last year or so, he will be in a pattern when he sleeps 2 weeks with a normal pattern. Then it flips for a couple of weeks where he sleeps in the day and is awake at night. He is trying to get a job and this is going to be a problem. He does have bipolar disorder, and he wonders if that is contributing.   He had been seeing Anca Staley in psychiatry    PAP machine: RespirCasengo. Pressure settings: 12-17 cm    The compliance data shows that the patient used the CPAP for 30/30 nights, 100% of nights for >4 hours.  The 90th% pressure is 16 cm.  The average time in large leak is 6 sec.  The average nightly usage is 9:26.  The average AHI is 2.3/hr.    His usage patter is consistent with a non-24 hour sleep rhythm. His schedule seems to gradually delay until he sleeps in a series of naps throughout the say and night for a couple of weeks. The naps get gradually later until his sleep eventually consolidates at night for a week or so.      Interface:  Mask: Simplus full face mask  Chin strap: No  Leak: No  Using Humidifier: No  Condensation in hose or mask: Yes/No     Difficulties with therapy:    [-] Snoring with CPAP: does not think so  [-] Difficulty tolerating the pressure:  [-] Epistaxis/dry  nose:   [-] Nasal congestion:  [-] Dry mouth:  [-] Mouth breathing:   [-] Pain/skin breakdown:      Improvements noted with CPAP:   [+] waking up more refreshed  [+] sleeping better with less arousals  [+] improved energy level during the day    Weight change since sleep study: He thinks his weight was 242#, was 228 last December.    He would like to sleep 9 PM to 6 AM. When sleeping well, he may wake once per night for the restroom.     Past medical/surgical history, family history, social history, medications and allergies were reviewed.      Problem List:  Patient Active Problem List    Diagnosis Date Noted     ESRD on peritoneal dialysis (H) 12/16/2019     Priority: Medium     Overview:   for 3months from dec/05 to 4/2006        Bipolar 1 disorder (H) 03/13/2019     Priority: Medium     RAQUEL (obstructive sleep apnea) 05/20/2018     Priority: Medium     5/17/2018 [facility] Sleep Study (261.0 lbs) - .0, .8, Supine .0, REM AHI -, Low O2% 65.3%, Time Spent ?88% 60.6, Time Spent ?89% 72.5. Treatment was titrated to a pressure of CPAP 11 with an AHI 6.9. Time spent in REM supine at this pressure was - minutes.       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 His history of complete blindness is consistent with this diagnosis as well. He asks if his bipolar disorder could contribute to this pattern as well. It is hard for me to answer that question. He notes talking fast and being fidgety. Again, it is  unclear if that is his normal baseline or if it represents shaniqua/hypomania. Barry asked that I notify his mental health providers of the non-24 disorder.  Plan: Take 0.5 mg melatonin at about 6 PM. Set an alarm at 6 AM. Bedtime 9 PM every day. Try to avoid napping. If you must nap, limit it to 30 minutes, using an alarm, and keep it in the early afternoon.    (G47.33) RAQUEL (obstructive sleep apnea)   Comment: Barry seems to be doing very well with CPAP.  Plan: Continue auto CPAP 12-17 cm. A prescription was written for new supplies. We reviewed recommendations for cleaning and replacing supplies.        He will follow up with me in about 2 month(s).   Phone call duration: 35 minutes. 9:36 AM-10:11 AM    Bennett Goltz, PA-C    CC: Dr. Noy Staley, [facility] [phone]. He had been seeing Dr. Staley, but when I called her clinic, I was informed that she no longer works there. I left a message for her nurse, Anca Leal, to call me back to let me know whom Barry would be seeing.     Mc Arenas (psychologist), [facility], [phone]  I left him a voicemail as his contact information is not in our system.

## 2021-02-12 NOTE — PATIENT INSTRUCTIONS
Your BMI is Body mass index is 34.23 kg/m .  Weight management is a personal decision.  If you are interested in exploring weight loss strategies, the following discussion covers the approaches that may be successful. Body mass index (BMI) is one way to tell whether you are at a healthy weight, overweight, or obese. It measures your weight in relation to your height.  A BMI of 18.5 to 24.9 is in the healthy range. A person with a BMI of 25 to 29.9 is considered overweight, and someone with a BMI of 30 or greater is considered obese. More than two-thirds of American adults are considered overweight or obese.  Being overweight or obese increases the risk for further weight gain. Excess weight may lead to heart disease and diabetes.  Creating and following plans for healthy eating and physical activity may help you improve your health.  Weight control is part of healthy lifestyle and includes exercise, emotional health, and healthy eating habits. Careful eating habits lifelong are the mainstay of weight control. Though there are significant health benefits from weight loss, long-term weight loss with diet alone may be very difficult to achieve- studies show long-term success with dietary management in less than 10% of people. Attaining a healthy weight may be especially difficult to achieve in those with severe obesity. In some cases, medications, devices and surgical management might be considered.  What can you do?  If you are overweight or obese and are interested in methods for weight loss, you should discuss this with your provider.     Consider reducing daily calorie intake by 500 calories.     Keep a food journal.     Avoiding skipping meals, consider cutting portions instead.    Diet combined with exercise helps maintain muscle while optimizing fat loss. Strength training is particularly important for building and maintaining muscle mass. Exercise helps reduce stress, increase energy, and improves fitness.  Increasing exercise without diet control, however, may not burn enough calories to loose weight.       Start walking three days a week 10-20 minutes at a time    Work towards walking thirty minutes five days a week     Eventually, increase the speed of your walking for 1-2 minutes at time    In addition, we recommend that you review healthy lifestyles and methods for weight loss available through the National Institutes of Health patient information sites:  http://win.niddk.nih.gov/publications/index.htm    And look into health and wellness programs that may be available through your health insurance provider, employer, local community center, or [employer].    Weight management plan: Patient was referred to their PCP to discuss a diet and exercise plan.

## 2021-02-15 ENCOUNTER — VIRTUAL VISIT (OUTPATIENT)
Dept: SLEEP MEDICINE | Facility: CLINIC | Age: 31
End: 2021-02-15
Payer: MEDICARE

## 2021-02-15 DIAGNOSIS — G47.24 NON-24 HOUR SLEEP WAKE DISORDER: ICD-10-CM

## 2021-02-15 DIAGNOSIS — G47.33 OSA (OBSTRUCTIVE SLEEP APNEA): Primary | ICD-10-CM

## 2021-02-15 PROCEDURE — 99443 PR PHYSICIAN TELEPHONE EVALUATION 21-30 MIN: CPT | Performed by: PHYSICIAN ASSISTANT

## 2021-02-21 DIAGNOSIS — Z94.0 STATUS POST KIDNEY TRANSPLANT: Primary | ICD-10-CM

## 2021-02-22 RX ORDER — PREDNISONE 5 MG/1
5 TABLET ORAL DAILY
Qty: 30 TABLET | Refills: 11 | Status: SHIPPED | OUTPATIENT
Start: 2021-02-22 | End: 2021-05-10

## 2021-03-11 DIAGNOSIS — Z79.899 IMMUNOSUPPRESSIVE MANAGEMENT ENCOUNTER FOLLOWING KIDNEY TRANSPLANT: ICD-10-CM

## 2021-03-11 DIAGNOSIS — Z48.298 AFTERCARE FOLLOWING ORGAN TRANSPLANT: ICD-10-CM

## 2021-03-11 DIAGNOSIS — Z94.0 KIDNEY TRANSPLANTED: ICD-10-CM

## 2021-03-11 DIAGNOSIS — Z94.0 STATUS POST KIDNEY TRANSPLANT: ICD-10-CM

## 2021-03-11 DIAGNOSIS — Z94.0 IMMUNOSUPPRESSIVE MANAGEMENT ENCOUNTER FOLLOWING KIDNEY TRANSPLANT: ICD-10-CM

## 2021-03-12 RX ORDER — TACROLIMUS 1 MG/1
1 CAPSULE ORAL 2 TIMES DAILY
Qty: 60 CAPSULE | Refills: 0 | Status: SHIPPED | OUTPATIENT
Start: 2021-03-12 | End: 2021-05-09

## 2021-03-12 RX ORDER — TACROLIMUS 0.5 MG/1
0.5 CAPSULE ORAL 2 TIMES DAILY
Qty: 60 CAPSULE | Refills: 0 | Status: SHIPPED | OUTPATIENT
Start: 2021-03-12 | End: 2021-05-10

## 2021-03-23 ENCOUNTER — HOSPITAL ENCOUNTER (OUTPATIENT)
Facility: CLINIC | Age: 31
Setting detail: OBSERVATION
Discharge: HOME | End: 2021-03-24
Attending: FAMILY MEDICINE | Admitting: EMERGENCY MEDICINE
Payer: MEDICARE

## 2021-03-23 DIAGNOSIS — Z20.822 CONTACT WITH AND (SUSPECTED) EXPOSURE TO COVID-19: ICD-10-CM

## 2021-03-23 DIAGNOSIS — Z94.0 KIDNEY REPLACED BY TRANSPLANT: ICD-10-CM

## 2021-03-23 DIAGNOSIS — R45.851 SUICIDAL IDEATION: ICD-10-CM

## 2021-03-23 DIAGNOSIS — L73.2 HIDRADENITIS: ICD-10-CM

## 2021-03-23 DIAGNOSIS — E11.9 TYPE 2 DIABETES MELLITUS WITHOUT COMPLICATION, WITH LONG-TERM CURRENT USE OF INSULIN (H): ICD-10-CM

## 2021-03-23 DIAGNOSIS — Z79.4 TYPE 2 DIABETES MELLITUS WITHOUT COMPLICATION, WITH LONG-TERM CURRENT USE OF INSULIN (H): ICD-10-CM

## 2021-03-23 DIAGNOSIS — F31.9 BIPOLAR AFFECTIVE DISORDER, REMISSION STATUS UNSPECIFIED (H): ICD-10-CM

## 2021-03-23 DIAGNOSIS — L02.411 ABSCESS OF AXILLA, RIGHT: ICD-10-CM

## 2021-03-23 PROCEDURE — 250N000012 HC RX MED GY IP 250 OP 636 PS 637: Performed by: EMERGENCY MEDICINE

## 2021-03-23 PROCEDURE — 250N000013 HC RX MED GY IP 250 OP 250 PS 637: Performed by: EMERGENCY MEDICINE

## 2021-03-23 PROCEDURE — 99285 EMERGENCY DEPT VISIT HI MDM: CPT | Mod: 25 | Performed by: FAMILY MEDICINE

## 2021-03-23 PROCEDURE — C9803 HOPD COVID-19 SPEC COLLECT: HCPCS | Performed by: FAMILY MEDICINE

## 2021-03-23 PROCEDURE — 99220 PR INITIAL OBSERVATION CARE,LEVEL III: CPT | Performed by: NURSE PRACTITIONER

## 2021-03-23 PROCEDURE — 250N000013 HC RX MED GY IP 250 OP 250 PS 637: Performed by: FAMILY MEDICINE

## 2021-03-23 RX ORDER — TACROLIMUS 0.5 MG/1
0.5 CAPSULE ORAL
Status: DISCONTINUED | OUTPATIENT
Start: 2021-03-23 | End: 2021-03-24 | Stop reason: HOSPADM

## 2021-03-23 RX ORDER — OXYCODONE HYDROCHLORIDE 5 MG/1
5 TABLET ORAL ONCE
Status: COMPLETED | OUTPATIENT
Start: 2021-03-23 | End: 2021-03-23

## 2021-03-23 RX ORDER — MYCOPHENOLATE MOFETIL 250 MG/1
750 CAPSULE ORAL 2 TIMES DAILY
Status: DISCONTINUED | OUTPATIENT
Start: 2021-03-23 | End: 2021-03-24 | Stop reason: HOSPADM

## 2021-03-23 RX ORDER — TACROLIMUS 1 MG/1
1 CAPSULE ORAL
Status: DISCONTINUED | OUTPATIENT
Start: 2021-03-23 | End: 2021-03-24 | Stop reason: HOSPADM

## 2021-03-23 RX ORDER — METFORMIN HCL 500 MG
1000 TABLET, EXTENDED RELEASE 24 HR ORAL 2 TIMES DAILY
Status: DISCONTINUED | OUTPATIENT
Start: 2021-03-23 | End: 2021-03-24 | Stop reason: HOSPADM

## 2021-03-23 RX ADMIN — MYCOPHENOLATE MOFETIL 750 MG: 250 CAPSULE ORAL at 23:48

## 2021-03-23 RX ADMIN — TACROLIMUS 0.5 MG: 0.5 CAPSULE ORAL at 23:47

## 2021-03-23 RX ADMIN — Medication 1 MG: at 18:37

## 2021-03-23 RX ADMIN — TACROLIMUS 1 MG: 1 CAPSULE ORAL at 23:48

## 2021-03-23 RX ADMIN — OXYCODONE HYDROCHLORIDE 5 MG: 5 TABLET ORAL at 22:38

## 2021-03-23 RX ADMIN — METFORMIN HYDROCHLORIDE 1000 MG: 500 TABLET, EXTENDED RELEASE ORAL at 23:47

## 2021-03-23 NOTE — LETTER
From:  LOLA Lynne, [facility] ED/Observation  [facility]  Phone: [phone]  Pager: [phone]    Confidentiality Notice: The document(s) accompanying this fax may contain protected health information under state and federal law and is legally privileged. The information is only for the use of the intended recipient named above. The recipient or person responsible for delivering this information is prohibited by law from disclosing this information without proper authorization to any other party, unless required to do so by law or regulation. If you are not the intended recipient, you are hereby notified that any review, dissemination, distribution, or copying of this message is strictly prohibited. If you have received this communication in error, please notify us immediately by telephone to arrange for the return or destruction of the faxed document. Thank you

## 2021-03-23 NOTE — ED PROVIDER NOTES
[facility] EMERGENCY DEPARTMENT ([facility])     March 23, 2021    History     Chief Complaint   Patient presents with     Arm Pain     pt has had rt axilla pain 3 days now severe pain, (affecting his ability to use white cane, Pt is blind)     HPI  Barry Mcgrath is a 30 year old male with a past medical history significant for blindness, depression, anxiety, bipolar, PTSD, renal transplant (2006) who presents here to the Emergency Department due to right armpit pain.  Patient notes that he has been having extensive pain and swelling in the right axilla for nearly 1 week he is not certain as to whether or not he has had any sort of underlying fevers but notes that the pain has become excruciating and he is no longer able to manage it at home.    PAST MEDICAL HISTORY:   Past Medical History:   Diagnosis Date     Anxiety      Bipolar 1 disorder (H)      Bipolar 2 disorder (H)      Blind      Depressive disorder      PTSD (post-traumatic stress disorder)      Sleep apnea     uses cpap       PAST SURGICAL HISTORY:   Past Surgical History:   Procedure Laterality Date     TRANSPLANT      Kidney transplant in 2006       Past medical history, past surgical history, medications, and allergies were reviewed with the patient. Additional pertinent items: None    FAMILY HISTORY:   Family History   Problem Relation Age of Onset     Sleep Apnea Father      Diabetes Father      Sleep Apnea Brother      Glaucoma Maternal Grandfather      Macular Degeneration No family hx of        SOCIAL HISTORY:   Social History     Tobacco Use     Smoking status: Never Smoker     Smokeless tobacco: Never Used   Substance Use Topics     Alcohol use: No     Social history was reviewed with the patient. Additional pertinent items: None      Patient's Medications   New Prescriptions    No medications on file   Previous Medications    ACETAMINOPHEN (TYLENOL) 325 MG TABLET    Take 2 tablets (650 mg) by mouth every 6 hours as needed for mild  pain    AMOXICILLIN (AMOXIL) 500 MG CAPSULE    Take 1 capsule (500 mg) by mouth 3 times daily    DIVALPROEX SODIUM EXTENDED-RELEASE (DEPAKOTE ER) 500 MG 24 HR TABLET    Take 1 tablet by mouth for 5 days after finishing the smaller dose, then take 2 tablets each day.    FLUCELVAX QUADRIVALENT 0.5 ML LAKSHMI    Pharmacist to administer IM    LISINOPRIL (PRINIVIL/ZESTRIL) 10 MG TABLET    Take 10 mg by mouth daily    MELATONIN 1 MG TABS TABLET    Take 1/2 tab at 6 PM    METFORMIN (GLUCOPHAGE-XR) 500 MG 24 HR TABLET    Take 1,000 mg by mouth 2 times daily    MYCOPHENOLATE (GENERIC EQUIVALENT) 250 MG CAPSULE    Take 3 capsules (750 mg) by mouth 2 times daily    PRAZOSIN (MINIPRESS) 1 MG CAPSULE    Take 1 capsule (1 mg) by mouth 2 times daily    PREDNISONE (DELTASONE) 5 MG TABLET    Take 1 tablet (5 mg) by mouth daily Labs needed for further refils    RISPERIDONE (RISPERDAL) 2 MG TABLET    Take 2 mg by mouth every morning Take in the am    RISPERIDONE (RISPERDAL) 4 MG TABLET    Take 4 mg by mouth daily    TACROLIMUS (GENERIC EQUIVALENT) 0.5 MG CAPSULE    Take 1 capsule (0.5 mg) by mouth 2 times daily TOTAL DOSE: 1.5 mg twice daily. Labs needed for further refills    TACROLIMUS (GENERIC EQUIVALENT) 1 MG CAPSULE    Take 1 capsule (1 mg) by mouth 2 times daily TOTAL DOSE: 1.5 mg twice daily. Labs needed for further refills.    VITAMIN D3 (CHOLECALCIFEROL) 70310 UNITS CAPSULE    Take 50,000 Units by mouth once a week   Modified Medications    No medications on file   Discontinued Medications    No medications on file          Allergies   Allergen Reactions     Seasonal Allergies Other (See Comments)     hamster hair     Cats      Itching, nasal congestion     Dogs      Itching, nasal congestion     No Known Allergies         Review of Systems  A complete review of systems was performed with pertinent positives and negatives noted in the HPI, and all other systems negative.    Physical Exam   BP: 137/87  Pulse: 102  Temp: 98.9  F  (37.2  C)  Resp: 16  Weight: 109.8 kg (242 lb)  SpO2: 95 %      Physical Exam  Constitutional:       General: He is not in acute distress.     Appearance: He is not diaphoretic.   HENT:      Head: Atraumatic.   Eyes:      General: No scleral icterus.     Pupils: Pupils are equal, round, and reactive to light.   Cardiovascular:      Heart sounds: Normal heart sounds.   Pulmonary:      Effort: No respiratory distress.      Breath sounds: Normal breath sounds.   Abdominal:      General: Bowel sounds are normal.      Palpations: Abdomen is soft.      Tenderness: There is no abdominal tenderness.   Musculoskeletal:         General: No tenderness.   Skin:     General: Skin is warm.      Comments: Patient has large several centimeter in diameter abscess in the right axilla erythematous and warm to touch unclear as to the boundaries and how deep this goes into the axillary tissues.   Neurological:      General: No focal deficit present.      Mental Status: He is oriented to person, place, and time.      Motor: No weakness.      Coordination: Coordination normal.      Comments: Patient has legal blindness.         ED Course        Procedures        Patient was discussed with the on-call surgical staff with concerns about the possibility of involvement of the deeper axilla tissues they will be able to examine him on the [place] emergency room for further evaluation and possible treatment.         Assessments & Plan (with Medical Decision Making)         I have reviewed the nursing notes.    I have reviewed the findings, diagnosis, plan and need for follow up with the patient.    Patient with significant abscess of the right axilla at this time in light of the fact that there may be involvement of deeper tissues in the axilla I consulted surgery on-call and asked that they see and evaluate the patient in the emergency room on the [place] to determine whether or not they were willing to I&D the abscess.  Patient was  discussed briefly with emergency room staff and will be transported over for further evaluation and treatment.    Final diagnoses:   Abscess of axilla, right     --  William Luciano MD  3/23/2021   [facility] EMERGENCY DEPARTMENT     William Luciano MD  03/23/21 1932

## 2021-03-24 ENCOUNTER — APPOINTMENT (OUTPATIENT)
Dept: CT IMAGING | Facility: CLINIC | Age: 31
End: 2021-03-24
Attending: EMERGENCY MEDICINE
Payer: MEDICARE

## 2021-03-24 VITALS
BODY MASS INDEX: 34.23 KG/M2 | SYSTOLIC BLOOD PRESSURE: 130 MMHG | TEMPERATURE: 98.6 F | WEIGHT: 242 LBS | RESPIRATION RATE: 16 BRPM | DIASTOLIC BLOOD PRESSURE: 83 MMHG | HEART RATE: 78 BPM | OXYGEN SATURATION: 94 %

## 2021-03-24 PROBLEM — L02.411 ABSCESS OF AXILLA, RIGHT: Status: ACTIVE | Noted: 2021-03-24

## 2021-03-24 LAB
ANION GAP SERPL CALCULATED.3IONS-SCNC: 7 MMOL/L (ref 3–14)
ANION GAP SERPL CALCULATED.3IONS-SCNC: 8 MMOL/L (ref 3–14)
BASOPHILS # BLD AUTO: 0.1 10E9/L (ref 0–0.2)
BASOPHILS NFR BLD AUTO: 0.7 %
BUN SERPL-MCNC: 13 MG/DL (ref 7–30)
BUN SERPL-MCNC: 14 MG/DL (ref 7–30)
CALCIUM SERPL-MCNC: 9 MG/DL (ref 8.5–10.1)
CALCIUM SERPL-MCNC: 9.1 MG/DL (ref 8.5–10.1)
CHLORIDE SERPL-SCNC: 108 MMOL/L (ref 94–109)
CHLORIDE SERPL-SCNC: 108 MMOL/L (ref 94–109)
CO2 SERPL-SCNC: 23 MMOL/L (ref 20–32)
CO2 SERPL-SCNC: 24 MMOL/L (ref 20–32)
CREAT SERPL-MCNC: 0.93 MG/DL (ref 0.66–1.25)
CREAT SERPL-MCNC: 0.94 MG/DL (ref 0.66–1.25)
DIFFERENTIAL METHOD BLD: ABNORMAL
EOSINOPHIL # BLD AUTO: 0.8 10E9/L (ref 0–0.7)
EOSINOPHIL NFR BLD AUTO: 5.8 %
ERYTHROCYTE [DISTWIDTH] IN BLOOD BY AUTOMATED COUNT: 13.2 % (ref 10–15)
ERYTHROCYTE [DISTWIDTH] IN BLOOD BY AUTOMATED COUNT: 13.2 % (ref 10–15)
GFR SERPL CREATININE-BSD FRML MDRD: >90 ML/MIN/{1.73_M2}
GFR SERPL CREATININE-BSD FRML MDRD: >90 ML/MIN/{1.73_M2}
GLUCOSE BLDC GLUCOMTR-MCNC: 90 MG/DL (ref 70–99)
GLUCOSE SERPL-MCNC: 107 MG/DL (ref 70–99)
GLUCOSE SERPL-MCNC: 118 MG/DL (ref 70–99)
HCT VFR BLD AUTO: 39.2 % (ref 40–53)
HCT VFR BLD AUTO: 41.2 % (ref 40–53)
HGB BLD-MCNC: 13 G/DL (ref 13.3–17.7)
HGB BLD-MCNC: 13.4 G/DL (ref 13.3–17.7)
IMM GRANULOCYTES # BLD: 0.1 10E9/L (ref 0–0.4)
IMM GRANULOCYTES NFR BLD: 0.4 %
LABORATORY COMMENT REPORT: NORMAL
LYMPHOCYTES # BLD AUTO: 3.2 10E9/L (ref 0.8–5.3)
LYMPHOCYTES NFR BLD AUTO: 24.6 %
MCH RBC QN AUTO: 27.6 PG (ref 26.5–33)
MCH RBC QN AUTO: 27.8 PG (ref 26.5–33)
MCHC RBC AUTO-ENTMCNC: 32.5 G/DL (ref 31.5–36.5)
MCHC RBC AUTO-ENTMCNC: 33.2 G/DL (ref 31.5–36.5)
MCV RBC AUTO: 84 FL (ref 78–100)
MCV RBC AUTO: 85 FL (ref 78–100)
MONOCYTES # BLD AUTO: 1.2 10E9/L (ref 0–1.3)
MONOCYTES NFR BLD AUTO: 9 %
NEUTROPHILS # BLD AUTO: 7.7 10E9/L (ref 1.6–8.3)
NEUTROPHILS NFR BLD AUTO: 59.5 %
NRBC # BLD AUTO: 0 10*3/UL
NRBC BLD AUTO-RTO: 0 /100
PLATELET # BLD AUTO: 283 10E9/L (ref 150–450)
PLATELET # BLD AUTO: 304 10E9/L (ref 150–450)
POTASSIUM SERPL-SCNC: 3.6 MMOL/L (ref 3.4–5.3)
POTASSIUM SERPL-SCNC: 3.7 MMOL/L (ref 3.4–5.3)
RBC # BLD AUTO: 4.67 10E12/L (ref 4.4–5.9)
RBC # BLD AUTO: 4.85 10E12/L (ref 4.4–5.9)
SARS-COV-2 RNA RESP QL NAA+PROBE: NEGATIVE
SODIUM SERPL-SCNC: 137 MMOL/L (ref 133–144)
SODIUM SERPL-SCNC: 140 MMOL/L (ref 133–144)
SPECIMEN SOURCE: NORMAL
WBC # BLD AUTO: 13 10E9/L (ref 4–11)
WBC # BLD AUTO: 13.3 10E9/L (ref 4–11)

## 2021-03-24 PROCEDURE — 96366 THER/PROPH/DIAG IV INF ADDON: CPT | Performed by: FAMILY MEDICINE

## 2021-03-24 PROCEDURE — 99217 PR OBSERVATION CARE DISCHARGE: CPT | Performed by: EMERGENCY MEDICINE

## 2021-03-24 PROCEDURE — 99203 OFFICE O/P NEW LOW 30 MIN: CPT | Performed by: SURGERY

## 2021-03-24 PROCEDURE — 80048 BASIC METABOLIC PNL TOTAL CA: CPT | Performed by: NURSE PRACTITIONER

## 2021-03-24 PROCEDURE — U0005 INFEC AGEN DETEC AMPLI PROBE: HCPCS | Performed by: EMERGENCY MEDICINE

## 2021-03-24 PROCEDURE — 99223 1ST HOSP IP/OBS HIGH 75: CPT

## 2021-03-24 PROCEDURE — 250N000013 HC RX MED GY IP 250 OP 250 PS 637: Performed by: NURSE PRACTITIONER

## 2021-03-24 PROCEDURE — 999N001017 HC STATISTIC GLUCOSE BY METER IP

## 2021-03-24 PROCEDURE — 250N000012 HC RX MED GY IP 250 OP 636 PS 637: Performed by: EMERGENCY MEDICINE

## 2021-03-24 PROCEDURE — 250N000012 HC RX MED GY IP 250 OP 636 PS 637: Performed by: NURSE PRACTITIONER

## 2021-03-24 PROCEDURE — 96367 TX/PROPH/DG ADDL SEQ IV INF: CPT | Performed by: FAMILY MEDICINE

## 2021-03-24 PROCEDURE — G0378 HOSPITAL OBSERVATION PER HR: HCPCS

## 2021-03-24 PROCEDURE — 99207 PR CONSULT E&M CHANGED TO INITIAL LEVEL: CPT

## 2021-03-24 PROCEDURE — 80048 BASIC METABOLIC PNL TOTAL CA: CPT | Performed by: EMERGENCY MEDICINE

## 2021-03-24 PROCEDURE — 85027 COMPLETE CBC AUTOMATED: CPT | Mod: 91 | Performed by: NURSE PRACTITIONER

## 2021-03-24 PROCEDURE — 71250 CT THORAX DX C-: CPT

## 2021-03-24 PROCEDURE — 36415 COLL VENOUS BLD VENIPUNCTURE: CPT | Performed by: NURSE PRACTITIONER

## 2021-03-24 PROCEDURE — 250N000011 HC RX IP 250 OP 636: Performed by: EMERGENCY MEDICINE

## 2021-03-24 PROCEDURE — 96365 THER/PROPH/DIAG IV INF INIT: CPT | Performed by: FAMILY MEDICINE

## 2021-03-24 PROCEDURE — 250N000013 HC RX MED GY IP 250 OP 250 PS 637: Performed by: EMERGENCY MEDICINE

## 2021-03-24 PROCEDURE — U0003 INFECTIOUS AGENT DETECTION BY NUCLEIC ACID (DNA OR RNA); SEVERE ACUTE RESPIRATORY SYNDROME CORONAVIRUS 2 (SARS-COV-2) (CORONAVIRUS DISEASE [COVID-19]), AMPLIFIED PROBE TECHNIQUE, MAKING USE OF HIGH THROUGHPUT TECHNOLOGIES AS DESCRIBED BY CMS-2020-01-R: HCPCS | Performed by: EMERGENCY MEDICINE

## 2021-03-24 PROCEDURE — 250N000011 HC RX IP 250 OP 636: Performed by: NURSE PRACTITIONER

## 2021-03-24 PROCEDURE — 250N000009 HC RX 250

## 2021-03-24 PROCEDURE — 85025 COMPLETE CBC W/AUTO DIFF WBC: CPT | Performed by: EMERGENCY MEDICINE

## 2021-03-24 PROCEDURE — 250N000009 HC RX 250: Performed by: PHYSICIAN ASSISTANT

## 2021-03-24 PROCEDURE — 71250 CT THORAX DX C-: CPT | Mod: 26 | Performed by: RADIOLOGY

## 2021-03-24 RX ORDER — DOXYCYCLINE 100 MG/1
100 CAPSULE ORAL 2 TIMES DAILY
Qty: 20 CAPSULE | Refills: 0 | Status: SHIPPED | OUTPATIENT
Start: 2021-03-24 | End: 2021-04-03

## 2021-03-24 RX ORDER — PREDNISONE 5 MG/1
5 TABLET ORAL DAILY
Status: DISCONTINUED | OUTPATIENT
Start: 2021-03-24 | End: 2021-03-24 | Stop reason: HOSPADM

## 2021-03-24 RX ORDER — OXYCODONE HYDROCHLORIDE 5 MG/1
5 TABLET ORAL EVERY 4 HOURS PRN
Status: DISCONTINUED | OUTPATIENT
Start: 2021-03-24 | End: 2021-03-24 | Stop reason: HOSPADM

## 2021-03-24 RX ORDER — ONDANSETRON 2 MG/ML
4 INJECTION INTRAMUSCULAR; INTRAVENOUS EVERY 6 HOURS PRN
Status: DISCONTINUED | OUTPATIENT
Start: 2021-03-24 | End: 2021-03-24 | Stop reason: HOSPADM

## 2021-03-24 RX ORDER — OXYCODONE HYDROCHLORIDE 5 MG/1
5 TABLET ORAL EVERY 4 HOURS PRN
Status: DISCONTINUED | OUTPATIENT
Start: 2021-03-24 | End: 2021-03-24

## 2021-03-24 RX ORDER — RISPERIDONE 2 MG/1
2 TABLET ORAL EVERY MORNING
Status: DISCONTINUED | OUTPATIENT
Start: 2021-03-24 | End: 2021-03-24 | Stop reason: HOSPADM

## 2021-03-24 RX ORDER — RISPERIDONE 4 MG/1
4 TABLET ORAL AT BEDTIME
Status: DISCONTINUED | OUTPATIENT
Start: 2021-03-24 | End: 2021-03-24 | Stop reason: HOSPADM

## 2021-03-24 RX ORDER — AMPICILLIN AND SULBACTAM 2; 1 G/1; G/1
3 INJECTION, POWDER, FOR SOLUTION INTRAMUSCULAR; INTRAVENOUS ONCE
Status: COMPLETED | OUTPATIENT
Start: 2021-03-24 | End: 2021-03-24

## 2021-03-24 RX ORDER — LIDOCAINE HYDROCHLORIDE AND EPINEPHRINE 10; 10 MG/ML; UG/ML
INJECTION, SOLUTION INFILTRATION; PERINEURAL
Status: COMPLETED
Start: 2021-03-24 | End: 2021-03-24

## 2021-03-24 RX ORDER — LIDOCAINE 40 MG/G
CREAM TOPICAL
Status: DISCONTINUED | OUTPATIENT
Start: 2021-03-24 | End: 2021-03-24 | Stop reason: HOSPADM

## 2021-03-24 RX ORDER — PRAZOSIN HYDROCHLORIDE 2 MG/1
2 CAPSULE ORAL AT BEDTIME
Status: DISCONTINUED | OUTPATIENT
Start: 2021-03-24 | End: 2021-03-24 | Stop reason: HOSPADM

## 2021-03-24 RX ORDER — AMPICILLIN AND SULBACTAM 2; 1 G/1; G/1
3 INJECTION, POWDER, FOR SOLUTION INTRAMUSCULAR; INTRAVENOUS EVERY 6 HOURS
Status: DISCONTINUED | OUTPATIENT
Start: 2021-03-24 | End: 2021-03-24 | Stop reason: HOSPADM

## 2021-03-24 RX ORDER — LISINOPRIL 10 MG/1
10 TABLET ORAL DAILY
Status: DISCONTINUED | OUTPATIENT
Start: 2021-03-24 | End: 2021-03-24 | Stop reason: HOSPADM

## 2021-03-24 RX ORDER — DIVALPROEX SODIUM 500 MG/1
2000 TABLET, EXTENDED RELEASE ORAL EVERY EVENING
Status: DISCONTINUED | OUTPATIENT
Start: 2021-03-24 | End: 2021-03-24 | Stop reason: HOSPADM

## 2021-03-24 RX ORDER — ACETAMINOPHEN 325 MG/1
650 TABLET ORAL EVERY 4 HOURS PRN
Status: DISCONTINUED | OUTPATIENT
Start: 2021-03-24 | End: 2021-03-24 | Stop reason: HOSPADM

## 2021-03-24 RX ORDER — ONDANSETRON 4 MG/1
4 TABLET, ORALLY DISINTEGRATING ORAL EVERY 6 HOURS PRN
Status: DISCONTINUED | OUTPATIENT
Start: 2021-03-24 | End: 2021-03-24 | Stop reason: HOSPADM

## 2021-03-24 RX ORDER — LIDOCAINE HYDROCHLORIDE 10 MG/ML
5 INJECTION, SOLUTION EPIDURAL; INFILTRATION; INTRACAUDAL; PERINEURAL ONCE
Status: COMPLETED | OUTPATIENT
Start: 2021-03-24 | End: 2021-03-24

## 2021-03-24 RX ORDER — ACETAMINOPHEN 650 MG/1
650 SUPPOSITORY RECTAL EVERY 4 HOURS PRN
Status: DISCONTINUED | OUTPATIENT
Start: 2021-03-24 | End: 2021-03-24 | Stop reason: HOSPADM

## 2021-03-24 RX ADMIN — AMPICILLIN SODIUM AND SULBACTAM SODIUM 3 G: 2; 1 INJECTION, POWDER, FOR SOLUTION INTRAMUSCULAR; INTRAVENOUS at 01:33

## 2021-03-24 RX ADMIN — LISINOPRIL 10 MG: 10 TABLET ORAL at 07:44

## 2021-03-24 RX ADMIN — LIDOCAINE HYDROCHLORIDE AND EPINEPHRINE: 10; 10 INJECTION, SOLUTION INFILTRATION; PERINEURAL at 10:19

## 2021-03-24 RX ADMIN — AMPICILLIN SODIUM AND SULBACTAM SODIUM 3 G: 2; 1 INJECTION, POWDER, FOR SOLUTION INTRAMUSCULAR; INTRAVENOUS at 06:58

## 2021-03-24 RX ADMIN — TACROLIMUS 1 MG: 1 CAPSULE ORAL at 07:44

## 2021-03-24 RX ADMIN — RISPERIDONE 2 MG: 2 TABLET ORAL at 07:45

## 2021-03-24 RX ADMIN — OXYCODONE HYDROCHLORIDE 5 MG: 5 TABLET ORAL at 04:11

## 2021-03-24 RX ADMIN — METFORMIN HYDROCHLORIDE 1000 MG: 500 TABLET, EXTENDED RELEASE ORAL at 07:44

## 2021-03-24 RX ADMIN — TACROLIMUS 0.5 MG: 0.5 CAPSULE ORAL at 07:45

## 2021-03-24 RX ADMIN — MYCOPHENOLATE MOFETIL 750 MG: 250 CAPSULE ORAL at 07:44

## 2021-03-24 RX ADMIN — AMPICILLIN SODIUM AND SULBACTAM SODIUM 3 G: 2; 1 INJECTION, POWDER, FOR SOLUTION INTRAMUSCULAR; INTRAVENOUS at 12:52

## 2021-03-24 RX ADMIN — PREDNISONE 5 MG: 5 TABLET ORAL at 07:45

## 2021-03-24 RX ADMIN — OXYCODONE HYDROCHLORIDE 5 MG: 5 TABLET ORAL at 08:10

## 2021-03-24 RX ADMIN — LIDOCAINE HYDROCHLORIDE 5 ML: 10 INJECTION, SOLUTION EPIDURAL; INFILTRATION; INTRACAUDAL; PERINEURAL at 10:18

## 2021-03-24 NOTE — DISCHARGE SUMMARY
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    RBC Count 4.85 4.4 - 5.9 10e12/L    Hemoglobin 13.4 13.3 - 17.7 g/dL    Hematocrit 41.2 40.0 - 53.0 %    MCV 85 78 - 100 fl    MCH 27.6 26.5 - 33.0 pg    MCHC 32.5 31.5 - 36.5 g/dL    RDW 13.2 10.0 - 15.0 %    Platelet Count 304 150 - 450 10e9/L   Basic metabolic panel    Collection Time: 03/24/21  6:00 AM   Result Value Ref Range    Sodium 137 133 - 144 mmol/L    Potassium 3.7 3.4 - 5.3 mmol/L    Chloride 108 94 - 109 mmol/L    Carbon Dioxide 23 20 - 32 mmol/L    Anion Gap 7 3 - 14 mmol/L    Glucose 107 (H) 70 - 99 mg/dL    Urea Nitrogen 13 7 - 30 mg/dL    Creatinine 0.93 0.66 - 1.25 mg/dL    GFR Estimate >90 >60 mL/min/[1.73_m2]    GFR Estimate If Black >90 >60 mL/min/[1.73_m2]    Calcium 9.0 8.5 - 10.1 mg/dL   Glucose by meter    Collection Time: 03/24/21  7:37 AM   Result Value Ref Range    Glucose 90 70 - 99 mg/dL          Physical and Psychiatric Examination:     /83   Pulse 78   Temp 98.6  F (37  C) (Oral)   Resp 16     Wt 109.8 kg (242 lb)   SpO2 94%   BMI 34.23 kg/m    Weight is 242 lbs 0 oz  Body mass index is 34.23 kg/m .    Physical Exam:  I have reviewed the physical exam as documented by by the medical   team and agree with findings and assessment and have no   additional findings to add at this time.    Mental Status Exam:  Lying quietly in the hospital  "bed, is well groomed, pleasant,   cooperative. Speech fluent, at times somewhat dysarthric.   Associations tight. Mood is \"ok\".  Affect congruent. Thought   process logical, linear. Thought content negative for suicidal   thoughts or delusions. Oriented x3.  Recent and remote memory,   attention span and concentration are intact. Fund of knowledge,   use of language appropriate. Insight and judgment good.              DSM-5 Diagnosis:     ASD  PTSD  Mood disorder - bipolar I vs bipolar II per hx.          Assessment:   Patient with hx of ASD, mood disorder, PTSD. He had a painful   procedure and had a brief period of feeling overwhelmed and   having suicidal ideation without a plan. He now is feeling   better, denies suicidal ideation and is future oriented with   appropriate plans for discharge. He has regular outpatient care   and a good support system per his report.          Summary of Recommendations:     1. Confirm plans and support system with ILS worker and   roommates.    2. Continue current medications and follow up with outpatient   clinicians.    3. Patient may be discharged after above.  Call if any questions   or concerns.  Discussed with ER TK Hanson MD      \"This dictation was performed with voice recognition software and   may contain errors,  omissions and inadvertent word   substitution.\"             Recent Results (from the past 48 hour(s))   Chest CT w/o contrast    Narrative    EXAM: CT CHEST W/O CONTRAST  LOCATION: [facility]  DATE/TIME: 3/24/2021 12:58 AM    INDICATION: Right axillary abscess.  COMPARISON: None.  TECHNIQUE: CT chest without IV contrast. Multiplanar reformats were obtained. Dose reduction techniques were used.  CONTRAST: None.    FINDINGS:   LUNGS AND PLEURA: No infiltrate or pleural effusion.    MEDIASTINUM/AXILLAE: No adenopathy or pericardial effusion.    CORONARY ARTERY CALCIFICATION: None visualized.    UPPER ABDOMEN: Hepatic " steatosis.    MUSCULOSKELETAL: Soft tissue thickening with stranding and small amount of fluid subcutaneous tissues of the right axilla series 2 images 16-22. The fluid measures proximally 2.6 x 2 cm on image 18 but does not appear well organized on this noncontrast   exam.       Impression    IMPRESSION:   1.  Skin thickening right axilla with adjacent superficial soft tissue edema and small amount of fluid which does not appear well organized. Evolving abscess not excluded.                  Pending Results:   Unresulted Labs Ordered in the Past 30 Days of this Admission     No orders found for last 31 day(s).                  Discharge Instructions and Follow-Up:     Discharge Procedure Orders   Home care nursing referral   Referral Priority: Routine Referral Type: Home Health Therapies & Aides   Number of Visits Requested: 1     Follow Up ([facility])   Order Comments: Follow up with Dr. Dos Santos or other general surgeon , at (location with clinic name or city) [facility] general surgery clinic, within 1 week  regarding new diagnosis. No follow up labs or test are needed.    Appointments on [place] and/or [facility] (with [facility] or [facility] provider or service). Call [phone] if you haven't heard regarding these appointments within 7 days of discharge.     Brief Discharge Instructions   Order Comments: Discharge Instructions  Activity  - No activity restrictions    Incisions  - Pack your wound twice daily with gauze and cover with tape. Please shower daily and let soapy water run over wound, pat to dry. Do NOT submerge underwater until cleared by general surgery.    Follow-Up:  - Call your primary care provider to touch base regarding your recent admission.     - Call or return sooner than your regularly scheduled visit if you develop any of the following: fever >101.5, uncontrolled pain, uncontrolled nausea or vomiting, as well as increased redness, swelling, or drainage from your wound.   -  A nurse from the  General Surgery Clinic will contact you 24 hours, or next business day, after your discharge from the hospital.  If you have questions or to schedule a follow up appointment please call the General Surgery Clinic at [phone].  Call [phone] and ask to speak with the Surgery resident on call if you are having troubles in the evenings, at night, or on the weekend.  -  If you are receiving home care please inform your home care nurse of our contact number.     MD face to face encounter   Order Comments: Documentation of Face to Face and Certification for Home Health Services    I certify that patient: Barry Mcgrath is under my care and that I, or a nurse practitioner or physician's assistant working with me, had a face-to-face encounter that meets the physician face-to-face encounter requirements with this patient on: 3/24/2021.    This encounter with the patient was in whole, or in part, for the following medical condition, which is the primary reason for home health care: Abscess.    I certify that, based on my findings, the following services are medically necessary home health services: Nursing.    My clinical findings support the need for the above services because: Nurse is needed: wound care.    Further, I certify that my clinical findings support that this patient is homebound (i.e. absences from home require considerable and taxing effort and are for medical reasons or Zoroastrian services or infrequently or of short duration when for other reasons) because: Requires assistance of another person or specialized equipment to access medical services because patient is legally blind..    Based on the above findings. I certify that this patient is confined to the home and needs intermittent skilled nursing care, physical therapy and/or speech therapy.  The patient is under my care, and I have initiated the establishment of the plan of care.  This patient will be followed by a physician who will periodically  review the plan of care.  Physician/Provider to provide follow up care: Carly Agustin    Attending hospital physician (the Medicare certified [facility] provider):  Deysi Mckinney PA-C  Physician Signature: See electronic signature associated with these discharge orders.  Date: 3/24/2021     Reason for your hospital stay   Order Comments: Abscess     Follow Up and recommended labs and tests   Order Comments: Follow up with surgery outpatient as discussed     Activity   Order Comments: Your activity upon discharge: activity as tolerated     Order Specific Question Answer Comments   Is discharge order? Yes      When to contact your care team   Order Comments: Return to the ED with fever, uncontrolled nausea, vomiting, unrelieved pain,   dizziness, chest pain, shortness of breath, loss of consciousness, and any new or concerning symptoms.     Discharge Instructions   Order Comments: You were admitted for right armpit abscess. You were seen by surgery. They recommend Doxycycline, an oral antibiotic for 10 days. Please complete course as directed by pharmacy. Also pack your wound twice a day with 4x4 guaze and cover with tape. Take shower daily. Follow up with surgery in 1 week. This appointment will be arranged by surgery. Return to the emergency department if you develop any fever, nausea, vomiting, abdominal pain, increased right arm pain, redness or swelling that is not improving. Resume other home medications as before. Follow up with your primary as needed.     Full Code     Order Specific Question Answer Comments   Code status determined by: Discussion with patient/ legal decision maker      Diet   Order Comments: Follow this diet upon discharge: Regular     Order Specific Question Answer Comments   Is discharge order? Yes           Attestation:  eDysi Mckinney PA-C.

## 2021-03-24 NOTE — ED NOTES
Pt states he would like his father, Dave, to be contacted with any updates in plan of care. [phone]

## 2021-03-24 NOTE — ED NOTES
"When writer went in to check on pt he stated to staff that he is feeling suicidal, when staff asked if pt had a plan he stated, \"No, but I just want to end it all.\" Observation Provider aware, will consult psych. See new orders.   "

## 2021-03-24 NOTE — CONSULTS
Care Management Initial Consult    General Information  Assessment completed with: Patient, Care Team Member, (patient, bedside nurse, Julia)  Type of CM/SW Visit: CM Role Introduction    Primary Care Provider verified and updated as needed: Yes   Readmission within the last 30 days:        Reason for Consult: discharge planning  Advance Care Planning:       General Information Comments: wound care needs    Communication Assessment  Patient's communication style: spoken language (English or Bilingual)    Hearing Difficulty or Deaf: no   Wear Glasses or Blind: yes    Cognitive  Cognitive/Neuro/Behavioral: WDL                      Living Environment:   People in home: friend(s)  (Tod Charles and Eder ([place]) [place])  Current living Arrangements: condominium      Able to return to prior arrangements: yes       Family/Social Support:  Care provided by: friend, homecare agency,(pt has worked with [facility] in the past)  Provides care for: no one, other (is able to provide most self-cares)  Marital Status: Single             Description of Support System: Supportive, Involved    Support Assessment: Adequate family and caregiver support    Current Resources:   Patient receiving home care services:       Employment/Financial:  Employment Status: disabled, looking for volunteer work    Employment/ Comments: (seeking volunteer work, plans on starting school this fall)  Financial Concerns: No concerns identified   Referral to Financial Counselor: No       Lifestyle & Psychosocial Needs:        Socioeconomic History     Marital status: Single     Spouse name: Not on file     Number of children: Not on file     Years of education: Not on file     Highest education level: Not on file     Tobacco Use     Smoking status: Never Smoker     Smokeless tobacco: Never Used   Substance and Sexual Activity     Alcohol use: No     Drug use: No     Sexual activity: Never            Mental Health Status:  Mental Health  Status: Other (see comment)(earlier expressed SI, but denied current thoughts)  Mental Health Management: Medication    Chemical Dependency Status:  Chemical Dependency Status: No (family hx of [place] abuse, denies use)             Values/Beliefs:  Spiritual, Cultural Beliefs, Rastafarian Practices, Values that affect care: no               Additional Information:    NICHOLAS met with patient at bedside to introduce self and SW role. Pt was alert and available to discuss his situation. Pt inquired about the cost of his ED visit and whether his insurance would cover it.  SW reviewed the PINO form with him at that time. Pt accepted the information, signed the form as acknowledgement of receipt, but declined a copy due to his blindness.     Pt indicated that he'd received HHC services from [facility] ([facility]), was happy with them, and was interested in utilizing their services for his would care. NICHOLAS explained that HHC services generally are limited to once per day, 1-2 times per week, and that his wound requires twice daily cares.    Pt gave NICHOLAS permission to call his roommate, Tod ([phone]), and ask him if he was willing to perform the necessary post-discharge wound care, and if so, if he would also be willing to come into [facility] to learn how to perform the care.    NICHOLAS attempted to contact Tod @12:48 pm, and left a voicemail asking him to return the call. NICHOLAS then contacted [facility] ([phone]) and left a voicemail with the intake staff, Claudia, and asking for a return call regarding services for pt.    Per NICHOLAS Hardy, Tod returned writer's call and indicated that he would be willing to perform the wound care and come to [facility] @5 p.m. today for instruction. He also indicated that he would also be able to transport pt home. Tod reported that he spoke with pt earlier and was surprised pt requested him to do the wound care due to the wound's location. He reported that pt reassured him that pt was comfortable with Tod  "performing the care.    NICHOLAS received a call back from Claudia at [facility] and requested that NICHOLAS fax orders, pt information, discharge summary, \"Face to Face\", and all relevant information to [phone]. Claudia also indicated that pt's services were transferred from their [place] site and that they actually already had an appointment in place for him on this Friday, 3/26/2021.    NICHOLAS faxed pt facesheet, provider notes, surgery notes, IATF, AVS to [facility]. Writer requested colleague fax Discharge Summary to [facility] when it is completed.      LOLA Painter, [facility]  ED/OBS   [facility]  Phone: [phone]  Pager: [phone]    "
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
## 2021-03-24 NOTE — ED NOTES
Discharge instructions gone over with Barry and his roommate Tod at the bedside, went over how to do dressing change, pt and friend agreed to teaching.

## 2021-03-24 NOTE — ED NOTES
Bed: ED16  Expected date:   Expected time:   Means of arrival:   Comments:  Barry Mcgrath  Coming from [facility] for flank abscess   Needs surgery consult     Mynor Samson MD  03/23/21 1940

## 2021-03-24 NOTE — H&P
[facility]    History and Physical - ED Observation       Date of Admission:  3/23/2021    Assessment & Plan   Barry Mcgrath is a 30 year old male admitted on 3/23/2021. He has a past medical history significant for blindness, depression, anxiety, bipolar, PTSD, renal transplant (2006) who presents here to the Emergency Department due to right armpit pain.     #Axillary Abscess  30yr old male with immunosuppression secondary to kidney transplant, as well as diabetes, blidness, and bipolar who presents to the ED with axillary pain. In the ED, VSS. CT shows signs of abscess. Labs unremarkable but white count of 13, Hgb 13, and hematocrit 39.2. Pt was transferred from [place] for evaluation by Surgery. Purulent fluid spontaneously began to drain from right axillary.  Surgery recommends observation stay for further monitoring and management of this soft tissue infection. Medications: In the ED, oxycodone 5mg PO, Unasyn 3g IV x1.   -[place] to ED Observation  -VS Q4hrs  -Unasyn Q6hrs  -Oxycodone 5mg PRN pain  -CBC in AM    #Hx of Kidney Transplant  #Immunosuppressed status  Patient underwent kidney transplant in 2006. Baseline Cr 0.7-0.9, at baseline: Cr 0.94.   -Continue PTA tacrolimus, mycophenolate, prednisone    #T2DM: Continue PTA metformin    #RAQUEL: Continue PTA CPAP    #Bipolar: Continue PTA prazosin, risperdone, depakote    #HTN: Continue PTA lisinopril, prazosin     Diet:   moderate carbohydrate  DVT Prophylaxis: Low Risk/Ambulatory with no VTE prophylaxis indicated  Barroso Catheter: not present  Code Status:   full         Disposition Plan   Expected discharge: Tomorrow, recommended to prior living arrangement once antibiotic plan established.  Entered: Stephy Mar CNP 03/24/2021, 3:30 AM     The patient's care was discussed with the Bedside Nurse, Patient and ED MD.    Stephy Mar CNP  [facility]  ED  "Observation, Ascom:[id number]  ______________________________________________________________________    Chief Complaint   Arm pain    History is obtained from the patient  And review of the medical record    History of Present Illness   Per ED, \"Barry Mcgrath is a 30 year old male with a past medical history significant for blindness, depression, anxiety, bipolar, PTSD, renal transplant (2006) who presents here to the Emergency Department due to right armpit pain.  Patient notes that he has been having extensive pain and swelling in the right axilla for nearly 1 week he is not certain as to whether or not he has had any sort of underlying fevers but notes that the pain has become excruciating and he is no longer able to manage it at home.\"    Review of Systems    A complete review of systems was performed with pertinent positives and negatives noted in the HPI, and all other systems negative.    Past Medical History    I have reviewed this patient's medical history and updated it with pertinent information if needed.   Past Medical History:   Diagnosis Date     Anxiety      Bipolar 1 disorder (H)      Bipolar 2 disorder (H)      Blind      Depressive disorder      PTSD (post-traumatic stress disorder)      Sleep apnea     uses cpap       Past Surgical History   I have reviewed this patient's surgical history and updated it with pertinent information if needed.  Past Surgical History:   Procedure Laterality Date     TRANSPLANT      Kidney transplant in 2006       Social History   I have reviewed this patient's social history and updated it with pertinent information if needed.  Social History     Tobacco Use     Smoking status: Never Smoker     Smokeless tobacco: Never Used   Substance Use Topics     Alcohol use: No     Drug use: No       Family History   I have reviewed this patient's family history and updated it with pertinent information if needed.  Family History   Problem Relation Age of Onset     Sleep Apnea " Father      Diabetes Father      Sleep Apnea Brother      Glaucoma Maternal Grandfather      Macular Degeneration No family hx of        Prior to Admission Medications   Prior to Admission Medications   Prescriptions Last Dose Informant Patient Reported? Taking?   FLUCELVAX QUADRIVALENT 0.5 ML LAKSHMI   Yes No   Sig: Pharmacist to administer IM   acetaminophen (TYLENOL) 325 MG tablet Unknown at Unknown time  No No   Sig: Take 2 tablets (650 mg) by mouth every 6 hours as needed for mild pain   amoxicillin (AMOXIL) 500 MG capsule Unknown at Unknown time  No No   Sig: Take 1 capsule (500 mg) by mouth 3 times daily   divalproex sodium extended-release (DEPAKOTE ER) 500 MG 24 hr tablet 3/23/2021 at Unknown time  Yes Yes   Sig: Take 1 tablet by mouth for 5 days after finishing the smaller dose, then take 2 tablets each day.   lisinopril (PRINIVIL/ZESTRIL) 10 MG tablet 3/23/2021 at Unknown time  Yes Yes   Sig: Take 10 mg by mouth daily   melatonin 1 MG TABS tablet   No No   Sig: Take 1/2 tab at 6 PM   metFORMIN (GLUCOPHAGE-XR) 500 MG 24 hr tablet 3/23/2021 at Unknown time  Yes Yes   Sig: Take 1,000 mg by mouth 2 times daily   mycophenolate (GENERIC EQUIVALENT) 250 MG capsule 3/23/2021 at Unknown time  No Yes   Sig: Take 3 capsules (750 mg) by mouth 2 times daily   prazosin (MINIPRESS) 1 MG capsule 3/22/2021 at Unknown time  No Yes   Sig: Take 1 capsule (1 mg) by mouth 2 times daily   Patient taking differently: Take 2 mg by mouth At Bedtime    predniSONE (DELTASONE) 5 MG tablet 3/23/2021 at Unknown time  No Yes   Sig: Take 1 tablet (5 mg) by mouth daily Labs needed for further refils   risperiDONE (RISPERDAL) 2 MG tablet 3/23/2021 at Unknown time  Yes Yes   Sig: Take 2 mg by mouth every morning Take in the am   risperiDONE (RISPERDAL) 4 MG tablet 3/23/2021 at Unknown time  Yes Yes   Sig: Take 4 mg by mouth daily   tacrolimus (GENERIC EQUIVALENT) 0.5 MG capsule 3/23/2021 at Unknown time  No Yes   Sig: Take 1 capsule (0.5 mg)  by mouth 2 times daily TOTAL DOSE: 1.5 mg twice daily. Labs needed for further refills   tacrolimus (GENERIC EQUIVALENT) 1 MG capsule 3/23/2021 at Unknown time  No Yes   Sig: Take 1 capsule (1 mg) by mouth 2 times daily TOTAL DOSE: 1.5 mg twice daily. Labs needed for further refills.   vitamin D3 (CHOLECALCIFEROL) 09076 UNITS capsule 3/23/2021 at Unknown time  Yes Yes   Sig: Take 50,000 Units by mouth once a week      Facility-Administered Medications: None     Allergies   Allergies   Allergen Reactions     Seasonal Allergies Other (See Comments)     hamster hair     Cats      Itching, nasal congestion     Dogs      Itching, nasal congestion     No Known Allergies        Physical Exam   Vital Signs: Temp: 98.6  F (37  C) Temp src: Oral BP: 130/68 Pulse: 80   Resp: 16 SpO2: 98 % O2 Device: None (Room air)    Weight: 242 lbs 0 oz    Constitutional: awake, alert, cooperative, no apparent distress, and appears stated age  Eyes: Lids and lashes normal, pupils equal, round and reactive to light, extra ocular muscles intact, sclera clear, conjunctiva normal  ENT: Normocephalic,  atraumatic, external ears without lesions, oral pharynx with moist mucous membranes,  gums normal and good dentition.  Respiratory: No increased work of breathing, good air exchange, clear to auscultation bilaterally, no crackles or wheezing  Cardiovascular: Regular rate and rhythm, normal S1 and S2, no S3 or S4, and no murmur noted  Abdomen: Normal bowel sounds, soft, non-distended, non-tender, no masses palpated, no hepatosplenomegally  Skin: Large abscess in the right axilla, ~5-6cm, erythematous and warm to touch. Unclear as to the boundaries and how deep this goes into the axillary tissues. Pt unwilling and/or unable to keep arm up for prolonged exam, limited by pain.   Musculoskeletal: There is no redness, warmth, or swelling of the joints.  Full range of motion noted.  Motor strength is 5 out of 5 all extremities bilaterally.  Tone is  normal.  Neurologic: Awake, alert, oriented to name, place and time. Legally blind. Cranial nerves II-XII are grossly intact.  Motor is 5 out of 5 bilaterally.  Gait is normal.  Psychiatric: Affect appropriate, cooperative,mentation appears normal.     Data     Recent Labs   Lab 03/24/21  0048   WBC 13.0*   HGB 13.0*   MCV 84         POTASSIUM 3.6   CHLORIDE 108   CO2 24   BUN 14   CR 0.94   ANIONGAP 8   KANNAN 9.1   *     Recent Results (from the past 24 hour(s))   Chest CT w/o contrast    Narrative    EXAM: CT CHEST W/O CONTRAST  LOCATION: [facility]  DATE/TIME: 3/24/2021 12:58 AM    INDICATION: Right axillary abscess.  COMPARISON: None.  TECHNIQUE: CT chest without IV contrast. Multiplanar reformats were obtained. Dose reduction techniques were used.  CONTRAST: None.    FINDINGS:   LUNGS AND PLEURA: No infiltrate or pleural effusion.    MEDIASTINUM/AXILLAE: No adenopathy or pericardial effusion.    CORONARY ARTERY CALCIFICATION: None visualized.    UPPER ABDOMEN: Hepatic steatosis.    MUSCULOSKELETAL: Soft tissue thickening with stranding and small amount of fluid subcutaneous tissues of the right axilla series 2 images 16-22. The fluid measures proximally 2.6 x 2 cm on image 18 but does not appear well organized on this noncontrast   exam.       Impression    IMPRESSION:   1.  Skin thickening right axilla with adjacent superficial soft tissue edema and small amount of fluid which does not appear well organized. Evolving abscess not excluded.

## 2021-03-24 NOTE — CONSULTS
[facility] Surgery Consultation    Barry Mcgrath MRN# [id number]   Age: 30 year old YOB: 1990     Date of Admission:  3/23/2021    Date of Consult:   3/23/21    Reason for consult: Axillary abscess        Requesting service: ED; requesting provider: Dr. Samson                    Assessment and Plan:   Assessment:   Mr. Mcgrath is a 30 yr old male with PMHx of blindness, depression, PTSD, bipolar, s/p kidney transplant in 2006 who presented to the [facility] ED with concern for right armpit pain and was transferred to the [place] ED for surgical consultation. He has a small area of fluctuance consistent and associated spontaneously draining purulent fluid. Given leukocytosis and immunosuppressed state, seems reasonable to admit for period of IV antibiotics. Unclear if area of spontaneous drainage is contiguous with the fluctuance which is more anterior in the axillary crease. Afebrile, hemodynamically stable.         Plan:   -Agree with ED obs status and IV abx given leukocytosis and immunosuppression.   -Surgery will reevaluate in the AM for possible intervention.     Discussed with chief resident, Dr. Alonzo and staff, Dr. Zac Marshall.     Madelyn Kimbrough MD  General Surgery, PGY2  [phone]            Chief Complaint:            History of Present Illness:   Mr. Mcgrath is a 30 yr old male with PMHx of blindness, depression, PTSD, bipolar, s/p kidney transplant in 2006 who presented to the [facility] ED with concern for right armpit pain and was transferred to the [place] ED for surgical consultation.     He reports pain started a few days ago, along with swelling and has gotten worse. He felt that pain was unbearable yesterday which is why he came to the ED. He denies fevers, chills, chest pain or SOB. Has not noticed any drainage from the area. He reports history of abscess or infection in the groin area in the past but not in the axilla. He does not think he has had surgery for  "these.    Overall, somewhat difficulty to obtain specifics as he repeatedly responds to questions regarding history with \"why do you need to know that\" and directs me back to his chart.     In the ED, he is afebrile and hemodynamically stable. Labs were significant for a leukocytosis of 13. CT scan demonstrated soft tissue thickening in the axilla, with small amount of fluid (~2.6cm x 2cm) in the subcutaneous tissue.                                                                         Past Medical History:     Past Medical History:   Diagnosis Date     Anxiety      Bipolar 1 disorder (H)      Bipolar 2 disorder (H)      Blind      Depressive disorder      PTSD (post-traumatic stress disorder)      Sleep apnea     uses cpap             Past Surgical History:     Past Surgical History:   Procedure Laterality Date     TRANSPLANT      Kidney transplant in 2006             Social History:     Social History     Tobacco Use     Smoking status: Never Smoker     Smokeless tobacco: Never Used   Substance Use Topics     Alcohol use: No             Family History:     Family History   Problem Relation Age of Onset     Sleep Apnea Father      Diabetes Father      Sleep Apnea Brother      Glaucoma Maternal Grandfather      Macular Degeneration No family hx of                 Allergies:     Allergies   Allergen Reactions     Seasonal Allergies Other (See Comments)     hamster hair     Cats      Itching, nasal congestion     Dogs      Itching, nasal congestion     No Known Allergies              Medications:     Current Facility-Administered Medications   Medication     ampicillin-sulbactam (UNASYN) 3 g vial to attach to  mL bag     metFORMIN (GLUCOPHAGE-XR) 24 hr tablet 1,000 mg     mycophenolate (GENERIC EQUIVALENT) capsule 750 mg     tacrolimus (GENERIC EQUIVALENT) capsule 0.5 mg     tacrolimus (GENERIC EQUIVALENT) capsule 1 mg     Current Outpatient Medications   Medication Sig     divalproex sodium extended-release " (DEPAKOTE ER) 500 MG 24 hr tablet Take 1 tablet by mouth for 5 days after finishing the smaller dose, then take 2 tablets each day.     lisinopril (PRINIVIL/ZESTRIL) 10 MG tablet Take 10 mg by mouth daily     metFORMIN (GLUCOPHAGE-XR) 500 MG 24 hr tablet Take 1,000 mg by mouth 2 times daily     mycophenolate (GENERIC EQUIVALENT) 250 MG capsule Take 3 capsules (750 mg) by mouth 2 times daily     prazosin (MINIPRESS) 1 MG capsule Take 1 capsule (1 mg) by mouth 2 times daily (Patient taking differently: Take 2 mg by mouth At Bedtime )     predniSONE (DELTASONE) 5 MG tablet Take 1 tablet (5 mg) by mouth daily Labs needed for further refils     risperiDONE (RISPERDAL) 2 MG tablet Take 2 mg by mouth every morning Take in the am     risperiDONE (RISPERDAL) 4 MG tablet Take 4 mg by mouth daily     tacrolimus (GENERIC EQUIVALENT) 0.5 MG capsule Take 1 capsule (0.5 mg) by mouth 2 times daily TOTAL DOSE: 1.5 mg twice daily. Labs needed for further refills     tacrolimus (GENERIC EQUIVALENT) 1 MG capsule Take 1 capsule (1 mg) by mouth 2 times daily TOTAL DOSE: 1.5 mg twice daily. Labs needed for further refills.     vitamin D3 (CHOLECALCIFEROL) 14357 UNITS capsule Take 50,000 Units by mouth once a week     acetaminophen (TYLENOL) 325 MG tablet Take 2 tablets (650 mg) by mouth every 6 hours as needed for mild pain     amoxicillin (AMOXIL) 500 MG capsule Take 1 capsule (500 mg) by mouth 3 times daily     FLUCELVAX QUADRIVALENT 0.5 ML LAKSHMI Pharmacist to administer IM     melatonin 1 MG TABS tablet Take 1/2 tab at 6 PM               Review of Systems:   Negative except as noted in HPI           Physical Exam:   All vitals have been reviewed  Temp:  [98.6  F (37  C)-98.9  F (37.2  C)] 98.6  F (37  C)  Pulse:  [] 80  Resp:  [14-16] 16  BP: (128-137)/(68-87) 130/68  SpO2:  [94 %-98 %] 98 %    Physical Exam:    Gen: awake, sitting up at bedside, NAD  HEENT: NCAT  CV: RRR to palpation   Resp: unlabored breathing on RA  Abd: obese,  soft, non distended  Ext: warm, well perfused. Right axilla with chronic appearing skin thickening. Palpable fluctuance in the anterior crease with spontaneous purulent drainage from ~1-2 inches posterior to area of fluctuance. Small pustule just posteroinferior to area of purulent drainage. Some mild erythema of the area. Exam somewhat limited by patient's pain and refusal to keep his arm elevated.   Neuro: alert, moving all extremities spontaneously. Pt blind at baseline.           Data:   All laboratory data reviewed    Results:  BMP  Recent Labs   Lab 03/24/21  0048      POTASSIUM 3.6   CHLORIDE 108   CO2 24   BUN 14   CR 0.94   *     CBC  Recent Labs   Lab 03/24/21 0048   WBC 13.0*   HGB 13.0*        LFTNo lab results found in last 7 days.  Recent Labs   Lab 03/24/21 0048   *       Imaging:  CT 3/24/2021  IMPRESSION:   1.  Skin thickening right axilla with adjacent superficial soft tissue edema and small amount of fluid which does not appear well organized. Evolving abscess not excluded.

## 2021-03-24 NOTE — PROGRESS NOTES
General Surgery Progress Note    Pt seen at bedside, oral consent obtained for incision and drainage of his hydradenitis. The field was prepped and draped in a sterile fashion using chlorhexadine. Sterile gloves were donned. 15cc of lidocaine w/ epinephrine was injected into the skin overlying the sinus tract and infiltrated into the subcutaneous tissue. After confirmation of adequate anesthesia, a 15 blade was used to make a cruciate incision ~2cm x 2cm in size overlying the sinus tract. The fluctuant areas of skin surrounding this orifice were expressed with good return of purulent material. Adequate hemostasis was obtained using pressure. The orifice was then packed with sterile 4x4 gauze and covered and taped. The patient tolerated the procedure well.    The hydradenitis appears to extend ~10cm medially, though it is connected through the sinus opening as material is expressible through palpation of the medial edge.      Plan:     -Recommend discharging on a course of doxycycline (10 days) and follow up in general surgery clinic in 1 week.  -Pt requires bid packing with gauze. Discussed how to do this but I do recommend the ED review his home situation to make sure he has adequate assistance given his blindness. Please send with packing supplies.  -Informed patient he needs to shower daily. He states he has been showering once every 2 weeks.

## 2021-03-24 NOTE — PROGRESS NOTES
The patient has hx of blindness, ASD, depression, anxiety, PTSD, renal tx on immunosuppressionwas admitted to the ED observation unit for right axillary pain.  He had a CT in the ED which showed:  IMPRESSION:   1.  Skin thickening right axilla with adjacent superficial soft tissue edema and small amount of fluid which does not appear well organized. Evolving abscess not excluded.    He ws admitted to ED obs and placed on unasyn.  Surgery was consulted and came to see the patient this am.  They made a small incision and said that they got out some pus.  They did not feel it was necessary to send wound cultures.  They placed gauze packing and recommended packing twice daily.  They recommended augmentin or doxycycline for antibiotics.  They recommended discharge home and would arrange follow up in their clinic in 1 week.  They said they also discussed precautions for returning including fever/chills, increasing pain.  He says he has a friend who will help him with dressing changes.     The patient was getting ready for discharge and expressed to staff that he is feeling suicidal without a plan.  He says he is overwhelmed with this life.  He has hx of blindness, ASD, depression, anxiety, PTSD, renal tx on immunosuppression.  Psychiatry was consulted and will await their recommendations.

## 2021-03-25 ENCOUNTER — NURSE TRIAGE (OUTPATIENT)
Dept: NURSING | Facility: CLINIC | Age: 31
End: 2021-03-25

## 2021-03-25 NOTE — PROGRESS NOTES
Brief Social Work Progress Note    Information Obtained: Writer spoke with Claudia at [facility] (P: [phone]; F: [phone]). Claudia reports she never received orders and requested orders and discharge summary be sent. Writer explained there was no completed discharge summary but would send orders.     Follow-Up Plan: Orders faxed. No other follow up at this time.     LOLA Lynne, [facility]  ED/Observation   [facility]  Phone: [phone]  Pager: [phone]    On-call pager, [phone], 4:00pm to midnight

## 2021-03-25 NOTE — TELEPHONE ENCOUNTER
Barry was into Ed with abscess and today has questions on showering and also needs to know when his next appointment is.      COVID 19 Nurse Triage Plan/Patient Instructions    Please be aware that novel coronavirus (COVID-19) may be circulating in the community. If you develop symptoms such as fever, cough, or SOB or if you have concerns about the presence of another infection including coronavirus (COVID-19), please contact your health care provider or visit [email or web address].     Disposition/Instructions    Home care recommended. Follow home care protocol based instructions.    Thank you for taking steps to prevent the spread of this virus.  o Limit your contact with others.  o Wear a simple mask to cover your cough.  o Wash your hands well and often.    Resources    [facility]: About COVID-19: [email or web address]    CDC: What to Do If You're Sick: www.cdc.gov/coronavirus/2019-ncov/about/steps-when-sick.html    CDC: Ending Home Isolation: www.cdc.gov/coronavirus/2019-ncov/hcp/disposition-in-home-patients.html     CDC: Caring for Someone: www.cdc.gov/coronavirus/2019-ncov/if-you-are-sick/care-for-someone.html     [facility]: Interim Guidance for Hospital Discharge to Home: www.health.[facility].mn.us/diseases/coronavirus/hcp/hospdischarge.pdf    [facility] clinical trials (COVID-19 research studies): [email or web address]-clinical-trials     Below are the COVID-19 hotlines at the [facility] ([facility]). Interpreters are available.   o For health questions: Call 370-489-2295 or 1-187.383.2223 (7 a.m. to 7 p.m.)  o For questions about schools and childcare: Call 747-387-2794 or 1-336.480.2151 (7 a.m. to 7 p.m.)                     Additional Information    Negative: Nursing judgment    Negative: Nursing judgment    Negative: Nursing judgment    Negative: Nursing judgment    Information only question and nurse able to answer    Protocols used: INFORMATION ONLY CALL  - NO TRIAGE-A-OH

## 2021-03-26 NOTE — TELEPHONE ENCOUNTER
"Abscess in right armpit. He did express it tonight 1/2 hr ago. Yesterday it was expressed in the [facility] ER and it was lanced and pus came out. No fever. The pain has decreased significantly, currently =\"2-3\". He has not taken anything for pain.    Follow up appointment on Weds. 3/31/2021.    Caller was concerned that when he tried to express it again tonight that no pus came out. He wanted to know if this was OK. Swelling has not returned, and sx have improved. He is taking Doxycycline as ordered.  Advised to follow up with surgeon as scheduled on 3/31/2021.    Jacqueline Sen RN Triage Nurse Advisor 8:40 PM 3/25/2021     Reason for Disposition    Nursing judgment or information in reference    Protocols used: NO GUIDELINE [id number]      "

## 2021-03-29 ENCOUNTER — RECORDS - HEALTHEAST (OUTPATIENT)
Dept: LAB | Facility: HOSPITAL | Age: 31
End: 2021-03-29

## 2021-03-29 LAB
ANION GAP SERPL CALCULATED.3IONS-SCNC: 15 MMOL/L (ref 5–18)
BASOPHILS # BLD AUTO: 0.1 THOU/UL (ref 0–0.2)
BASOPHILS NFR BLD AUTO: 1 % (ref 0–2)
BUN SERPL-MCNC: 12 MG/DL (ref 8–22)
CALCIUM SERPL-MCNC: 9.5 MG/DL (ref 8.5–10.5)
CHLORIDE BLD-SCNC: 102 MMOL/L (ref 98–107)
CO2 SERPL-SCNC: 24 MMOL/L (ref 22–31)
CREAT SERPL-MCNC: 0.85 MG/DL (ref 0.7–1.3)
EOSINOPHIL # BLD AUTO: 0.9 THOU/UL (ref 0–0.4)
EOSINOPHIL NFR BLD AUTO: 9 % (ref 0–6)
ERYTHROCYTE [DISTWIDTH] IN BLOOD BY AUTOMATED COUNT: 13 % (ref 11–14.5)
GFR SERPL CREATININE-BSD FRML MDRD: >60 ML/MIN/1.73M2
GLUCOSE BLD-MCNC: 90 MG/DL (ref 70–125)
HCT VFR BLD AUTO: 44.9 % (ref 40–54)
HGB BLD-MCNC: 14.3 G/DL (ref 14–18)
IMM GRANULOCYTES # BLD: 0.1 THOU/UL
IMM GRANULOCYTES NFR BLD: 1 %
LYMPHOCYTES # BLD AUTO: 3.2 THOU/UL (ref 0.8–4.4)
LYMPHOCYTES NFR BLD AUTO: 32 % (ref 20–40)
MCH RBC QN AUTO: 27.9 PG (ref 27–34)
MCHC RBC AUTO-ENTMCNC: 31.8 G/DL (ref 32–36)
MCV RBC AUTO: 88 FL (ref 80–100)
MONOCYTES # BLD AUTO: 0.7 THOU/UL (ref 0–0.9)
MONOCYTES NFR BLD AUTO: 7 % (ref 2–10)
NEUTROPHILS # BLD AUTO: 5.2 THOU/UL (ref 2–7.7)
NEUTROPHILS NFR BLD AUTO: 51 % (ref 50–70)
PLATELET # BLD AUTO: 307 THOU/UL (ref 140–440)
PMV BLD AUTO: 10.7 FL (ref 8.5–12.5)
POTASSIUM BLD-SCNC: 4 MMOL/L (ref 3.5–5)
RBC # BLD AUTO: 5.12 MILL/UL (ref 4.4–6.2)
SODIUM SERPL-SCNC: 141 MMOL/L (ref 136–145)
TACROLIMUS BLD-MCNC: 5.3 UG/L (ref 5–15)
TACROLIMUS LAST DOSE: NORMAL
VALPROATE SERPL-MCNC: 68 UG/ML (ref 50–150)
VIT B12 SERPL-MCNC: 637 PG/ML (ref 213–816)
WBC: 10.1 THOU/UL (ref 4–11)

## 2021-03-30 ENCOUNTER — NURSE TRIAGE (OUTPATIENT)
Dept: NURSING | Facility: CLINIC | Age: 31
End: 2021-03-30

## 2021-03-30 ENCOUNTER — PATIENT OUTREACH (OUTPATIENT)
Dept: SURGERY | Facility: CLINIC | Age: 31
End: 2021-03-30

## 2021-03-30 NOTE — PROGRESS NOTES
Patient Telephone Reminder Call    Date of call:  03/30/21  Phone numbers:  Cell number on file:    Telephone Information:   Mobile [phone]       Reached patient/confirmed appointment:  Yes  Appointment with:   Dr. Jewel Santiago  Reason for visit:  Wound check

## 2021-03-30 NOTE — TELEPHONE ENCOUNTER
Pt is calling.    Pt stated that he has an appointment tomorrow for his abscess that was lanced.  No pain x 4 days, trying to express pus but there is not any pus to express. Whole area is healing well. Starting to itch. Denies fever, spreading redness. Healing well.   He has been packing the area and is doing well. He stated that he wants to cancel that appointment. I encouraged him to keep his appointment tomorrow. He stated that he would keep his appointment tomorrow. Care advice reviewed.    Additional Information    Negative: Major abdominal surgical incision and wound gaping open with visible internal organs    Negative: Sounds like a life-threatening emergency to the triager    Negative: Bleeding from incision and won't stop after 10 minutes of direct pressure    Negative: Widespread rash and bright red, sunburn-like    Negative: Severe pain in the incision    Negative: Incision gaping open and < 2 days (48 hours) since wound re-opened    Negative: Incision gaping open and length of opening > 2 inches (5 cm)    Negative: Patient sounds very sick or weak to the triager    Negative: Sounds like a serious complication to the triager    Negative: Fever > 100.4 F (38.0 C)    Negative: Incision looks infected (spreading redness, pain)    Negative: Red streak runs from the incision and longer than 1 inch (2.5 cm)    Negative: Pus or bad-smelling fluid draining from incision    Negative: Dressing soaked with blood or body fluid (e.g., drainage)    Negative: Raised bruise and size > 2 inches (5 cm) and expanding    Negative: Caller has URGENT question and triager unable to answer question    Negative: Incision gaping open and length of opening > 1/4 inch (6 mm) and on the face and over 2 days since wound re-opened    Negative: Incision gaping open and length of opening > 1/2 inch (1 cm) and over 2 days since wound re-opened    Negative: Clear or blood-tinged fluid draining from wound and no fever    Negative: Suture  or staple removal is overdue    Negative: Patient wants to be seen    Negative: INCREASING pain in incision and over 2 days (48 hours) since surgery    Negative: Suture or staple came out early and caller wants wound checked    Negative: Caller has NON-URGENT question and triager unable to answer question    Negative: Pimple where a stitch comes through the skin    Surgical incision after sutures or staples removed, questions about    Negative: Suture or staple came out early    Negative: Suture removal date, questions about    Negative: Skin tape (e.g., Steri-strips) removal, questions about    Negative: Sutured or stapled surgical incision, questions about    Protocols used: POST-OP INCISION SYMPTOMS AND [id number]    Melissa Howe RN  [facility] Nurse Advisor  3/30/2021 at 4:20 PM

## 2021-03-31 ENCOUNTER — OFFICE VISIT (OUTPATIENT)
Dept: SURGERY | Facility: CLINIC | Age: 31
End: 2021-03-31
Payer: MEDICARE

## 2021-03-31 VITALS
BODY MASS INDEX: 33.88 KG/M2 | SYSTOLIC BLOOD PRESSURE: 124 MMHG | HEART RATE: 68 BPM | DIASTOLIC BLOOD PRESSURE: 78 MMHG | HEIGHT: 71 IN | RESPIRATION RATE: 18 BRPM | OXYGEN SATURATION: 99 % | WEIGHT: 242 LBS

## 2021-03-31 DIAGNOSIS — L02.411 ABSCESS OF AXILLA, RIGHT: Primary | ICD-10-CM

## 2021-03-31 PROCEDURE — 99202 OFFICE O/P NEW SF 15 MIN: CPT | Performed by: SURGERY

## 2021-03-31 ASSESSMENT — PAIN SCALES - GENERAL: PAINLEVEL: NO PAIN (0)

## 2021-03-31 ASSESSMENT — MIFFLIN-ST. JEOR: SCORE: 2076.89

## 2021-03-31 NOTE — LETTER
"3/31/2021       RE: Barry Mcgrath  [address]  [address]  [place] [address]     Dear Colleague,    Thank you for referring your patient, Barry Mcgrath, to the [facility] GENERAL SURGERY CLINIC [place] [facility]. Please see a copy of my visit note below.    I saw Barry Mcgrath in clinic today in follow-up of an I+D of a right axillary abscess in the ED last week.  He has a history of superficial boils/infections.  He has a transplanted kidney and is pre-diabetic. Does not smoke.  He is blind.    He reports that since the I+D was performed he has been doing well.  No fevers/chills/nausea or vomiting.  He is covering the site with dry gauze. He has been protecting the site during bathing out of concern. No pain.    PE:  /78 (BP Location: Right arm, Patient Position: Sitting, Cuff Size: Adult Regular)   Pulse 68   Resp 18   Ht 1.791 m (5' 10.5\")   Wt 109.8 kg (242 lb)   SpO2 99%   BMI 34.23 kg/m      Alert, oriented, pleasant  RRR  No resp distress or wheezing  His right axilla has an 8mm area of raw skin, no tunneling, no surrounding induration or erythema, nontender.  No drainage able to be expressed.    A/P:  Doing well post I+D, no persistent infection seen on exam and clinically doing well.  I told him to shower and allow soap/water to wash over the area.  I gave him dressings and he will change them daily or twice daily.  He may follow-up prn     Again, thank you for allowing me to participate in the care of your patient.      Sincerely,    Jewel Santiago MD      "

## 2021-03-31 NOTE — PROGRESS NOTES
"I saw Barry Mcgrath in clinic today in follow-up of an I+D of a right axillary abscess in the ED last week.  He has a history of superficial boils/infections.  He has a transplanted kidney and is pre-diabetic. Does not smoke.  He is blind.    He reports that since the I+D was performed he has been doing well.  No fevers/chills/nausea or vomiting.  He is covering the site with dry gauze. He has been protecting the site during bathing out of concern. No pain.    PE:  /78 (BP Location: Right arm, Patient Position: Sitting, Cuff Size: Adult Regular)   Pulse 68   Resp 18   Ht 1.791 m (5' 10.5\")   Wt 109.8 kg (242 lb)   SpO2 99%   BMI 34.23 kg/m      Alert, oriented, pleasant  RRR  No resp distress or wheezing  His right axilla has an 8mm area of raw skin, no tunneling, no surrounding induration or erythema, nontender.  No drainage able to be expressed.    A/P:  Doing well post I+D, no persistent infection seen on exam and clinically doing well.  I told him to shower and allow soap/water to wash over the area.  I gave him dressings and he will change them daily or twice daily.  He may follow-up prn   "

## 2021-03-31 NOTE — NURSING NOTE
"Chief Complaint   Patient presents with     Post-Op - General Surgery     Pt here for post op       Vitals:    03/31/21 1225   BP: 124/78   BP Location: Right arm   Patient Position: Sitting   Cuff Size: Adult Regular   Pulse: 68   Resp: 18   TempSrc: Oral   SpO2: 99%   Weight: 109.8 kg (242 lb)   Height: 1.791 m (5' 10.5\")       Body mass index is 34.23 kg/m .      ANGEL LoweT                      "

## 2021-03-31 NOTE — PATIENT INSTRUCTIONS
You met with Dr. Jewel Santiago.      Today's visit instructions:    Return to the Surgery Clinic on an as needed basis.        If you have questions please contact Monika STOLL during regular clinic hours, Monday through Friday 7:30 AM - 4:00 PM, or you can contact us via [employer] at anytime.       If you have urgent needs after-hours, weekends, or holidays please call the hospital at [phone] and ask to speak with our on-call General Surgery Team.    Appointment [phone], option #1   Nurse Advice (Monika): [phone]   Surgery Scheduler (Alexa): [phone]  Fax: [phone]

## 2021-04-05 ENCOUNTER — VIRTUAL VISIT (OUTPATIENT)
Dept: NEPHROLOGY | Facility: CLINIC | Age: 31
End: 2021-04-05
Attending: INTERNAL MEDICINE
Payer: MEDICARE

## 2021-04-05 DIAGNOSIS — R73.03 PREDIABETES: ICD-10-CM

## 2021-04-05 DIAGNOSIS — R11.11 NON-INTRACTABLE VOMITING WITHOUT NAUSEA, UNSPECIFIED VOMITING TYPE: ICD-10-CM

## 2021-04-05 DIAGNOSIS — D84.9 IMMUNOSUPPRESSION (H): ICD-10-CM

## 2021-04-05 DIAGNOSIS — Z94.0 KIDNEY REPLACED BY TRANSPLANT: Primary | ICD-10-CM

## 2021-04-05 PROCEDURE — 99442 PR PHYSICIAN TELEPHONE EVALUATION 11-20 MIN: CPT | Mod: 95 | Performed by: INTERNAL MEDICINE

## 2021-04-05 ASSESSMENT — PAIN SCALES - GENERAL: PAINLEVEL: NO PAIN (0)

## 2021-04-05 NOTE — PROGRESS NOTES
Barry is a 30 year old who is being evaluated via a billable telephone visit.      What phone number would you like to be contacted at? [phone]  How would you like to obtain your AVS? Mail a copy  Phone call duration: 16 minutes    CHRONIC TRANSPLANT NEPHROLOGY VISIT    Assessment & Plan   # DDKT: Stable   - Baseline Creatinine:  ~ 0.8-0.9   - Proteinuria: Normal (<0.2 grams)   - Date DSA Last Checked: Not Known      Latest DSA: Not checked recently due to time from transplant   - BK Viremia: Not checked recently due to time from transplant   - Kidney Tx Biopsy: No    # Immunosuppression: Tacrolimus immediate release (goal 4-6), Mycophenolate mofetil (dose 750 mg every 12 hours) and Prednisone (dose 5 mg daily)             - Continue with intensive monitoring of immunosuppression for efficacy and    toxicity.             - Changes: Not at this time    # Infection Prophylaxis:   - PJP: None    # Hypertension: Controlled;  Goal BP: < 130/80   - Changes: Not at this time. Continue lisinopril 10mg daily, prazosin 2mg qhs    # Diabetes: Controlled. A1c 5.9, on metformin 1g BID   - Management as per primary care.    # Relative Erythrocytosis: Hgb: Stable;  On ACEI/ARB: Yes  Imaging: Not at this time    # Suicidal ideation:   -Has had ED presentation x2 for suicidal ideation. Seen by psychiatry and thought recently due to stressful experience with axillary abscess. Discharged from ED      # GI Upset:   -Would ask NP primary care to decrease metformin to see if this helps his vomiting    # R axillary abscess:   -S/p I+D by surgery. Friend is performing dressing changes. Had follow up with surgery on 3/31/21 with continued improvement    # Skin Cancer Risk:    - Discussed sun protection and recommend regular follow up with Dermatology.    # COVID-19 Virus Review: Discussed COVID-19 virus and the potential medical risks.  Reviewed preventative health recommendations, which includes washing hands for 20 seconds, avoid  touching your face, and social distancing.  Asked patient to inform the transplant center if they are exposed or diagnosed with this virus.    # COVID Vaccine Completed: Yes      # Medical Compliance: Yes    # Transplant History:  Etiology of Kidney Failure: Congenital dysplasia  Tx: DDKT  Transplant: 2/28/2006 (Kidney)  Significant changes in immunosuppression: None  Significant transplant-related complications: None    Transplant Office Phone Number: [phone]    Assessment and plan was discussed with the patient and he voiced his understanding and agreement.    Return visit: No follow-ups on file.      Dayton Garcia MD    Chief Complaint   Mr. Mcgrath is a 30 year old here for routine follow up, kidney transplant and immunosuppression management.    History of Present Illness   The patient feels ok. He has occasional vomiting due to a hypersensitive gag reflex and typically vomits once a week. He occasionally has diarrhea.  He denies SOB, chest pain, LE edema.     Home BP: Not checked    Problem List   Patient Active Problem List   Diagnosis     Suicidal ideation     Hypertension, unspecified type     RAQUEL (obstructive sleep apnea)     Bipolar 1 disorder (H)     Immunodeficiency (H)     ESRD on peritoneal dialysis (H)     End stage renal disease (H)     Abscess of axilla, right       Allergies   Allergies   Allergen Reactions     Seasonal Allergies Other (See Comments)     hamster hair     Cats      Itching, nasal congestion     Dogs      Itching, nasal congestion     No Known Allergies        Medications   Current Outpatient Medications   Medication Sig     acetaminophen (TYLENOL) 325 MG tablet Take 2 tablets (650 mg) by mouth every 6 hours as needed for mild pain     divalproex sodium extended-release (DEPAKOTE ER) 500 MG 24 hr tablet Take 1 tablet by mouth for 5 days after finishing the smaller dose, then take 2 tablets each day.     FLUCELVAX QUADRIVALENT 0.5 ML LAKSHMI Pharmacist to administer IM     lisinopril  (PRINIVIL/ZESTRIL) 10 MG tablet Take 10 mg by mouth daily     melatonin 1 MG TABS tablet Take 1/2 tab at 6 PM     metFORMIN (GLUCOPHAGE-XR) 500 MG 24 hr tablet Take 1,000 mg by mouth 2 times daily     mycophenolate (GENERIC EQUIVALENT) 250 MG capsule Take 3 capsules (750 mg) by mouth 2 times daily     prazosin (MINIPRESS) 1 MG capsule Take 1 capsule (1 mg) by mouth 2 times daily (Patient taking differently: Take 2 mg by mouth At Bedtime )     predniSONE (DELTASONE) 5 MG tablet Take 1 tablet (5 mg) by mouth daily Labs needed for further refils     risperiDONE (RISPERDAL) 2 MG tablet Take 2 mg by mouth every morning Take in the am     risperiDONE (RISPERDAL) 4 MG tablet Take 4 mg by mouth daily     tacrolimus (GENERIC EQUIVALENT) 0.5 MG capsule Take 1 capsule (0.5 mg) by mouth 2 times daily TOTAL DOSE: 1.5 mg twice daily. Labs needed for further refills     tacrolimus (GENERIC EQUIVALENT) 1 MG capsule Take 1 capsule (1 mg) by mouth 2 times daily TOTAL DOSE: 1.5 mg twice daily. Labs needed for further refills.     vitamin D3 (CHOLECALCIFEROL) 96831 UNITS capsule Take 50,000 Units by mouth once a week     No current facility-administered medications for this visit.      There are no discontinued medications.    Physical Exam   Vital Signs: There were no vitals taken for this visit.    Physical exam was deferred for this telemedicine visit.    Data     Renal Latest Ref Rng & Units 3/29/2021 3/24/2021 3/24/2021   Na 133 - 144 mmol/L - 137 140   Na (external) 136 - 145 mmol/L 141 - -   K 3.4 - 5.3 mmol/L - 3.7 3.6   K (external) 3.5 - 5.0 mmol/L 4.0 - -   Cl 94 - 109 mmol/L - 108 108   Cl (external) 98 - 107 mmol/L 102 - -   CO2 20 - 32 mmol/L - 23 24   CO2 (external) 22 - 31 mmol/L 24 - -   BUN 7 - 30 mg/dL - 13 14   BUN (external) 8 - 22 mg/dL 12 - -   Cr 0.66 - 1.25 mg/dL - 0.93 0.94   Cr (external) 0.70 - 1.30 mg/dL 0.85 - -   Glucose 70 - 99 mg/dL - 107(H) 118(H)   Glucose (external) 70 - 125 mg/dL 90 - -   Ca  8.5 -  10.1 mg/dL - 9.0 9.1   Ca (external) 8.5 - 10.5 mg/dL 9.5 - -     Bone Health Latest Ref Rng & Units 6/18/2020 6/8/2020 2/13/2018   Phos 2.5 - 4.5 mg/dL - - 4.6(H)   Vit D Def (external) 30 - 90 ng/mL 56 57 -     Heme Latest Ref Rng & Units 3/29/2021 3/24/2021 3/24/2021   WBC 4.0 - 11.0 10e9/L - 13.3(H) 13.0(H)   WBC (external) 4.0 - 11.0 thou/ul 10.1 - -   Hgb 13.3 - 17.7 g/dL - 13.4 13.0(L)   Hgb (external) 14.0 - 18.0 g/dL 14.3 - -   Plt 150 - 450 10e9/L - 304 283   Plt (external) 140 - 440 thou/ul 307 - -   ABSOLUTE NEUTROPHIL 1.6 - 8.3 10e9/L - - 7.7   ABSOLUTE NEUTROPHILS (EXTERNAL) 2.0 - 7.7 thou/ul 5.2 - -   ABSOLUTE LYMPHOCYTES 0.8 - 5.3 10e9/L - - 3.2   ABSOLUTE LYMPHOCYTES (EXTERNAL) 0.8 - 4.4 thou/ul 3.2 - -   ABSOLUTE MONOCYTES 0.0 - 1.3 10e9/L - - 1.2   ABSOLUTE MONOCYTES (EXTERNAL) 0.0 - 0.9 thou/ul 0.7 - -   ABSOLUTE EOSINOPHILS 0.0 - 0.7 10e9/L - - 0.8(H)   ABSOLUTE EOSINOPHILS (EXTERNAL) 0.0 - 0.4 thou/ul 0.9(H) - -   ABSOLUTE BASOPHILS 0.0 - 0.2 10e9/L - - 0.1   ABSOLUTE BASOPHILS (EXTERNAL) 0.0 - 0.2 thou/ul 0.1 - -   ABS IMMATURE GRANULOCYTES 0 - 0.4 10e9/L - - 0.1   ABSOLUTE NUCLEATED RBC - - - 0.0     Liver Latest Ref Rng & Units 4/1/2021 6/18/2020 2/20/2020   AP 40 - 150 U/L - - -   AP (external) 36 - 130 U/L 58 69 66   TBili 0.2 - 1.3 mg/dL - - -   TBili (external) 0.2 - 1.2 mg/dL 0.6 0.4 0.6   DBili (external) <=0.2 mg/dL 0.1 - -   ALT 0 - 70 U/L - - -   ALT (external) 9 - 46 U/L 35 39 27   AST 0 - 45 U/L - - -   AST (external) 10 - 40 U/L 25 18 24   Tot Protein 6.8 - 8.8 g/dL - - -   Tot Protein (external) 6.1 - 8.1 g/dL 7.0 7.4 6.7   Albumin 3.4 - 5.0 g/dL - - -   Albumin (external) 3.6 - 5.1 g/dL 4.2 3.6 4.1     Pancreas Latest Ref Rng & Units 8/24/2020 4/2/2020 12/13/2018   A1C (external) <5.7 % 5.9(H) 5.8(H) 5.6   Amylase (external) 21 - 101 U/L - - -   Lipase 73 - 393 U/L - - -   Lipase (external) 21 - 101 U/L - - -     Iron studies Latest Ref Rng & Units 6/18/2020 6/1/2018 6/1/2018    Iron (external) 7 - 60 U/L - 21 21   Ferritin (external) 8 - 388 ng/mL 123 - -           Recent Labs   Lab Test 03/01/19  1114 03/14/19  0842 07/15/19  0915   DOSTA 1017 02/28/19 Not Provided Not Provided   TACROL 5.6 5.9 5.9

## 2021-04-05 NOTE — LETTER
4/5/2021       RE: Barry Mcgrath  [address]  [address]  [facility] [address]     Dear Colleague,    Thank you for referring your patient, Barry Mcgrath, to the [facility] [place] [facility]. Please see a copy of my visit note below.    Barry is a 30 year old who is being evaluated via a billable telephone visit.      What phone number would you like to be contacted at? [phone]  How would you like to obtain your AVS? Mail a copy  Phone call duration: 16 minutes    CHRONIC TRANSPLANT NEPHROLOGY VISIT    Assessment & Plan   # DDKT: Stable   - Baseline Creatinine:  ~ 0.8-0.9   - Proteinuria: Normal (<0.2 grams)   - Date DSA Last Checked: Not Known      Latest DSA: Not checked recently due to time from transplant   - BK Viremia: Not checked recently due to time from transplant   - Kidney Tx Biopsy: No    # Immunosuppression: Tacrolimus immediate release (goal 4-6), Mycophenolate mofetil (dose 750 mg every 12 hours) and Prednisone (dose 5 mg daily)             - Continue with intensive monitoring of immunosuppression for efficacy and    toxicity.             - Changes: Not at this time    # Infection Prophylaxis:   - PJP: None    # Hypertension: Controlled;  Goal BP: < 130/80   - Changes: Not at this time. Continue lisinopril 10mg daily, prazosin 2mg qhs    # Diabetes: Controlled. A1c 5.9, on metformin 1g BID   - Management as per primary care.    # Relative Erythrocytosis: Hgb: Stable;  On ACEI/ARB: Yes  Imaging: Not at this time    # Suicidal ideation:   -Has had ED presentation x2 for suicidal ideation. Seen by psychiatry and thought recently due to stressful experience with axillary abscess. Discharged from ED      # GI Upset:   -Would ask NP primary care to decrease metformin to see if this helps his vomiting    # R axillary abscess:   -S/p I+D by surgery. Friend is performing dressing changes. Had follow up with surgery on  3/31/21 with continued improvement    # Skin Cancer Risk:    - Discussed sun protection and recommend regular follow up with Dermatology.    # COVID-19 Virus Review: Discussed COVID-19 virus and the potential medical risks.  Reviewed preventative health recommendations, which includes washing hands for 20 seconds, avoid touching your face, and social distancing.  Asked patient to inform the transplant center if they are exposed or diagnosed with this virus.    # COVID Vaccine Completed: Yes      # Medical Compliance: Yes    # Transplant History:  Etiology of Kidney Failure: Congenital dysplasia  Tx: DDKT  Transplant: 2/28/2006 (Kidney)  Significant changes in immunosuppression: None  Significant transplant-related complications: None    Transplant Office Phone Number: [phone]    Assessment and plan was discussed with the patient and he voiced his understanding and agreement.    Return visit: No follow-ups on file.      Dayton Garcia MD    Chief Complaint   Mr. Mcgrath is a 30 year old here for routine follow up, kidney transplant and immunosuppression management.    History of Present Illness   The patient feels ok. He has occasional vomiting due to a hypersensitive gag reflex and typically vomits once a week. He occasionally has diarrhea.  He denies SOB, chest pain, LE edema.     Home BP: Not checked    Problem List   Patient Active Problem List   Diagnosis     Suicidal ideation     Hypertension, unspecified type     RAQUEL (obstructive sleep apnea)     Bipolar 1 disorder (H)     Immunodeficiency (H)     ESRD on peritoneal dialysis (H)     End stage renal disease (H)     Abscess of axilla, right       Allergies   Allergies   Allergen Reactions     Seasonal Allergies Other (See Comments)     hamster hair     Cats      Itching, nasal congestion     Dogs      Itching, nasal congestion     No Known Allergies        Medications   Current Outpatient Medications   Medication Sig     acetaminophen (TYLENOL) 325 MG tablet  Take 2 tablets (650 mg) by mouth every 6 hours as needed for mild pain     divalproex sodium extended-release (DEPAKOTE ER) 500 MG 24 hr tablet Take 1 tablet by mouth for 5 days after finishing the smaller dose, then take 2 tablets each day.     FLUCELVAX QUADRIVALENT 0.5 ML LAKSHMI Pharmacist to administer IM     lisinopril (PRINIVIL/ZESTRIL) 10 MG tablet Take 10 mg by mouth daily     melatonin 1 MG TABS tablet Take 1/2 tab at 6 PM     metFORMIN (GLUCOPHAGE-XR) 500 MG 24 hr tablet Take 1,000 mg by mouth 2 times daily     mycophenolate (GENERIC EQUIVALENT) 250 MG capsule Take 3 capsules (750 mg) by mouth 2 times daily     prazosin (MINIPRESS) 1 MG capsule Take 1 capsule (1 mg) by mouth 2 times daily (Patient taking differently: Take 2 mg by mouth At Bedtime )     predniSONE (DELTASONE) 5 MG tablet Take 1 tablet (5 mg) by mouth daily Labs needed for further refils     risperiDONE (RISPERDAL) 2 MG tablet Take 2 mg by mouth every morning Take in the am     risperiDONE (RISPERDAL) 4 MG tablet Take 4 mg by mouth daily     tacrolimus (GENERIC EQUIVALENT) 0.5 MG capsule Take 1 capsule (0.5 mg) by mouth 2 times daily TOTAL DOSE: 1.5 mg twice daily. Labs needed for further refills     tacrolimus (GENERIC EQUIVALENT) 1 MG capsule Take 1 capsule (1 mg) by mouth 2 times daily TOTAL DOSE: 1.5 mg twice daily. Labs needed for further refills.     vitamin D3 (CHOLECALCIFEROL) 72725 UNITS capsule Take 50,000 Units by mouth once a week     No current facility-administered medications for this visit.      There are no discontinued medications.    Physical Exam   Vital Signs: There were no vitals taken for this visit.    Physical exam was deferred for this telemedicine visit.    Data     Renal Latest Ref Rng & Units 3/29/2021 3/24/2021 3/24/2021   Na 133 - 144 mmol/L - 137 140   Na (external) 136 - 145 mmol/L 141 - -   K 3.4 - 5.3 mmol/L - 3.7 3.6   K (external) 3.5 - 5.0 mmol/L 4.0 - -   Cl 94 - 109 mmol/L - 108 108   Cl (external) 98 -  107 mmol/L 102 - -   CO2 20 - 32 mmol/L - 23 24   CO2 (external) 22 - 31 mmol/L 24 - -   BUN 7 - 30 mg/dL - 13 14   BUN (external) 8 - 22 mg/dL 12 - -   Cr 0.66 - 1.25 mg/dL - 0.93 0.94   Cr (external) 0.70 - 1.30 mg/dL 0.85 - -   Glucose 70 - 99 mg/dL - 107(H) 118(H)   Glucose (external) 70 - 125 mg/dL 90 - -   Ca  8.5 - 10.1 mg/dL - 9.0 9.1   Ca (external) 8.5 - 10.5 mg/dL 9.5 - -     Bone Health Latest Ref Rng & Units 6/18/2020 6/8/2020 2/13/2018   Phos 2.5 - 4.5 mg/dL - - 4.6(H)   Vit D Def (external) 30 - 90 ng/mL 56 57 -     Heme Latest Ref Rng & Units 3/29/2021 3/24/2021 3/24/2021   WBC 4.0 - 11.0 10e9/L - 13.3(H) 13.0(H)   WBC (external) 4.0 - 11.0 thou/ul 10.1 - -   Hgb 13.3 - 17.7 g/dL - 13.4 13.0(L)   Hgb (external) 14.0 - 18.0 g/dL 14.3 - -   Plt 150 - 450 10e9/L - 304 283   Plt (external) 140 - 440 thou/ul 307 - -   ABSOLUTE NEUTROPHIL 1.6 - 8.3 10e9/L - - 7.7   ABSOLUTE NEUTROPHILS (EXTERNAL) 2.0 - 7.7 thou/ul 5.2 - -   ABSOLUTE LYMPHOCYTES 0.8 - 5.3 10e9/L - - 3.2   ABSOLUTE LYMPHOCYTES (EXTERNAL) 0.8 - 4.4 thou/ul 3.2 - -   ABSOLUTE MONOCYTES 0.0 - 1.3 10e9/L - - 1.2   ABSOLUTE MONOCYTES (EXTERNAL) 0.0 - 0.9 thou/ul 0.7 - -   ABSOLUTE EOSINOPHILS 0.0 - 0.7 10e9/L - - 0.8(H)   ABSOLUTE EOSINOPHILS (EXTERNAL) 0.0 - 0.4 thou/ul 0.9(H) - -   ABSOLUTE BASOPHILS 0.0 - 0.2 10e9/L - - 0.1   ABSOLUTE BASOPHILS (EXTERNAL) 0.0 - 0.2 thou/ul 0.1 - -   ABS IMMATURE GRANULOCYTES 0 - 0.4 10e9/L - - 0.1   ABSOLUTE NUCLEATED RBC - - - 0.0     Liver Latest Ref Rng & Units 4/1/2021 6/18/2020 2/20/2020   AP 40 - 150 U/L - - -   AP (external) 36 - 130 U/L 58 69 66   TBili 0.2 - 1.3 mg/dL - - -   TBili (external) 0.2 - 1.2 mg/dL 0.6 0.4 0.6   DBili (external) <=0.2 mg/dL 0.1 - -   ALT 0 - 70 U/L - - -   ALT (external) 9 - 46 U/L 35 39 27   AST 0 - 45 U/L - - -   AST (external) 10 - 40 U/L 25 18 24   Tot Protein 6.8 - 8.8 g/dL - - -   Tot Protein (external) 6.1 - 8.1 g/dL 7.0 7.4 6.7   Albumin 3.4 - 5.0 g/dL - - -    Albumin (external) 3.6 - 5.1 g/dL 4.2 3.6 4.1     Pancreas Latest Ref Rng & Units 8/24/2020 4/2/2020 12/13/2018   A1C (external) <5.7 % 5.9(H) 5.8(H) 5.6   Amylase (external) 21 - 101 U/L - - -   Lipase 73 - 393 U/L - - -   Lipase (external) 21 - 101 U/L - - -     Iron studies Latest Ref Rng & Units 6/18/2020 6/1/2018 6/1/2018   Iron (external) 7 - 60 U/L - 21 21   Ferritin (external) 8 - 388 ng/mL 123 - -           Recent Labs   Lab Test 03/01/19  1114 03/14/19  0842 07/15/19  0915   DOSTAC 1017 02/28/19 Not Provided Not Provided   TACROL 5.6 5.9 5.9          Again, thank you for allowing me to participate in the care of your patient.      Sincerely,    Dayton Garcia MD

## 2021-04-07 ENCOUNTER — NURSE TRIAGE (OUTPATIENT)
Dept: NURSING | Facility: CLINIC | Age: 31
End: 2021-04-07

## 2021-04-08 ENCOUNTER — TELEPHONE (OUTPATIENT)
Dept: SURGERY | Facility: CLINIC | Age: 31
End: 2021-04-08

## 2021-04-08 NOTE — TELEPHONE ENCOUNTER
[facility] Call Center    Phone Message    May a detailed message be left on voicemail: yes     Reason for Call: Other: Per pt is calling in wondering if he still need to keep the bandages on since the hole is not visible anymore. Per pt would also like to know when is it okay for him to go swimming or take a bath? Please call pt back asap.      Action Taken: Message routed to:  [facility] ([facility] Gen  Surg    Travel Screening: Not Applicable

## 2021-04-08 NOTE — TELEPHONE ENCOUNTER
Contacted patient to discuss his questions. He said the hole is closed and there is no drainage. He wanted to know if he still needs to cover the area. Discussed he should continue to clean the are with soap and water. There is no need to cover the area unless it is rubbing against his clothes. He may swim and take a bath now that the area is closed. He is aware he can call with any further questions.

## 2021-04-08 NOTE — TELEPHONE ENCOUNTER
Patient calling - says 2 weeks ago he was in the ED for an abscess in right armpit.  They cut a hole the size of dime to drain the abscess.  He has been covering it with a bandage as advised to do.  He had a follow up last Wednesday and they said it looked good.  He has not had any pain or drainage from the site for the last week and a half.  Today he noticed the hole is closed.  He had his roommate look too, and they could not find any hole.  Patient is asking if he needs to continue wearing bandage on the area if the hole is closed.  Says he only has 2 bandages left.      Advised patient that if there is no longer a hole, no drainage and no pain there he should not have to continue with the bandages.  Advised to call back if symptoms return or with further questions.    Jolly Manriquez RN  Triage Nurse Advisor    COVID 19 Nurse Triage Plan/Patient Instructions    Please be aware that novel coronavirus (COVID-19) may be circulating in the community. If you develop symptoms such as fever, cough, or SOB or if you have concerns about the presence of another infection including coronavirus (COVID-19), please contact your health care provider or visit [email or web address].     Disposition/Instructions    Home care recommended. Follow home care protocol based instructions.    Thank you for taking steps to prevent the spread of this virus.  o Limit your contact with others.  o Wear a simple mask to cover your cough.  o Wash your hands well and often.    Resources    [facility]: About COVID-19: [email or web address]    CDC: What to Do If You're Sick: www.cdc.gov/coronavirus/2019-ncov/about/steps-when-sick.html    CDC: Ending Home Isolation: www.cdc.gov/coronavirus/2019-ncov/hcp/disposition-in-home-patients.html     CDC: Caring for Someone: www.cdc.gov/coronavirus/2019-ncov/if-you-are-sick/care-for-someone.html     [facility]: Interim Guidance for Hospital Discharge to Home:  [email or web address]/hcp/hospdischarge.pdf    [facility] clinical trials (COVID-19 research studies): [email or web address]/[email or web address]-clinical-trials     Below are the COVID-19 hotlines at the [place] [facility] ([facility]). Interpreters are available.   o For health questions: Call 637-369-7692 or 1-640.586.3578 (7 a.m. to 7 p.m.)  o For questions about schools and childcare: Call 400-896-7711 or 1-730.565.9000 (7 a.m. to 7 p.m.)       Additional Information    Wet to dry dressing (packing), questions about    Protocols used: POST-OP INCISION SYMPTOMS-A-AH

## 2021-04-14 VITALS — HEIGHT: 71 IN | WEIGHT: 242 LBS | BODY MASS INDEX: 33.88 KG/M2

## 2021-04-14 ASSESSMENT — MIFFLIN-ST. JEOR: SCORE: 2077.08

## 2021-04-14 NOTE — PROGRESS NOTES
"Barry Mcgrath is a 30 year old male being evaluated via a billable telephone visit.     \"This telephone visit will be conducted via a call between you and your physician/provider. We have found that certain health care needs can be provided without the need for an in-person visit or physical exam.  This service lets us provide the care you need with a telephone conversation.  If a prescription is necessary we can send it directly to your pharmacy.  If lab work is needed we can place an order for that and you can then stop by our lab to have the test done at a later time.\"    Telephone visits are billed at different rates depending on your insurance coverage.  Please reach out to your insurance provider with any questions.    Patient has given verbal consent for  a Telephone visit? Yes    What telephone number would you like your provider to contact at at:  [phone]    How would you like to obtain your AVS? Abena Rosa MA    Telephone Visit Details:     Telephone Visit Start Time: 9:43 AM    Telephone Visit End Time:10:02 AM      CPAP Follow-Up Visit:    Date on this visit: 4/15/2021    Barry Mcgrath has a follow-up visit today to review his management of non-24 sleep disorder. He is also followed by me for CPAP use for RAQUEL. His medical history is significant for complete blindness, Bipolar I, PTSD, HTN, and kidney transplant.     5/17/2018 [facility] Sleep Study (261.0 lbs) - .0/hr, .8, Supine .0, REM AHI -, Low O2% 65.3%, Time Spent ?88% 60.6, Time Spent ?89% 72.5. Treatment was titrated to a pressure of CPAP 11 with an AHI 6.9. Time spent in REM supine at this pressure was - minutes.    For non-24 hour sleep disorder, he was given the following recommendations: Take 1 mg melatonin at about 6 PM. Set an alarm at 6 AM. Bedtime 9 PM every day. Try to avoid napping. If you must nap, limit it to 30 minutes, using an alarm, and keep it in the early afternoon. He feels things " are working great.     The download on his CPAP shows a more regular sleep pattern. It does continue to show periods brief naps in the afternoon as well as some days of sleeping in or going to bed a little earlier than usual at times. Those may be remnants of his fluctuating circadian rhythms. Overall, he seems to consistently be in bed between 10 PM to 8-9 AM.     PAP machine: RespirLikelii. Pressure settings: 12-17 cm    The compliance data shows that the patient used the CPAP for 29/30 nights, 96.7% of nights for >4 hours.  The 90th% pressure is 16 cm.  The average time in large leak is 3 min.  The average nightly usage is 10:15.  The average AHI is 2.4/hr.       Interface:  Mask: Simplus mask  Chin strap: No  Leak: No, had a tear in the mask a few weeks ago, replaced it and it is working well now.  Using Humidifier: no  Condensation in hose or mask: No, does drool though     Difficulties with therapy:    [-] Snoring with CPAP:  [-] Difficulty tolerating the pressure:  [-] Epistaxis/dry nose:   [-] Nasal congestion:  [-] Dry mouth:  [-] Mouth breathing:   [-] Pain/skin breakdown:      Improvements noted with CPAP:   [+] waking up more refreshed  [+] sleeping better with less arousals  [+] nocturia improved   [+] improved energy level during the day    Weight change since sleep study:     Bedtime: 9-11 PM. Melatonin at 6 PM.  Wake time: 6:30-8 AM. He has not been using an alarm. He will be using an alarm when he goes back to school at the [employer] next fall. Denies any difficulty falling asleep. Wakes 0-1 times per month for  minutes.  Naps: he says he quit napping. He sometimes lays down with the CPAP, but does not fall asleep.  His energy is good.    He is upset because he has a friend who was sick from the COVID virus. His friend won't call him back to let him know he is alright.     Past medical/surgical history, family history, social history, medications and allergies were reviewed.      Problem  List:  Patient Active Problem List    Diagnosis Date Noted     Abscess of axilla, right 03/24/2021     Priority: Medium     ESRD on peritoneal dialysis (H) 12/16/2019     Priority: Medium     Overview:   for 3months from dec/05 to 4/2006        Bipolar 1 disorder (H) 03/13/2019     Priority: Medium     RAQUEL (obstructive sleep apnea) 05/20/2018     Priority: Medium     5/17/2018 [facility] Sleep Study (261.0 lbs) - .0, .8, Supine .0, REM AHI -, Low O2% 65.3%, Time Spent ?88% 60.6, Time Spent ?89% 72.5. Treatment was titrated to a pressure of CPAP 11 with an AHI 6.9. Time spent in REM supine at this pressure was - minutes.       Hypertension, unspecified type 04/04/2018     Priority: Medium     Suicidal ideation 08/28/2017     Priority: Medium     End stage renal disease (H) 07/14/2017     Priority: Medium     Overview:   Overview:   for 3months from dec/05 to 4/2006        Immunodeficiency (H) 09/13/2012     Priority: Medium        Impression/Plan:  (G47.24) Non-24 hour sleep wake disorder  Comment: His sleep schedule has normalized and he has no concerns about being able to fall asleep at night or get up in the morning.  Plan: melatonin 1 MG TABS tablet        Melatonin prescription was refilled. Continue 1 tab at 6 PM. We talked about the importance of keeping a consistent wake time with an alarm to keep the entire sleep schedule on track.    (G47.33) RAQUEL (obstructive sleep apnea)  Comment: doing well with CPAP, no concerns. Usage is very regular and AHI is in the normal range.  Plan: Continue auto CPAP 12-17 cm. A prescription was written for new supplies.        He will follow up with me in about 1 year(s).     Phone visit duration: 19 min  Bennett Goltz, PA-C    CC: No ref. provider found

## 2021-04-15 ENCOUNTER — VIRTUAL VISIT (OUTPATIENT)
Dept: SLEEP MEDICINE | Facility: CLINIC | Age: 31
End: 2021-04-15
Payer: MEDICARE

## 2021-04-15 DIAGNOSIS — G47.24 NON-24 HOUR SLEEP WAKE DISORDER: ICD-10-CM

## 2021-04-15 DIAGNOSIS — G47.33 OSA (OBSTRUCTIVE SLEEP APNEA): ICD-10-CM

## 2021-04-15 PROCEDURE — 99442 PR PHYSICIAN TELEPHONE EVALUATION 11-20 MIN: CPT | Mod: 95 | Performed by: PHYSICIAN ASSISTANT

## 2021-04-29 ENCOUNTER — RECORDS - HEALTHEAST (OUTPATIENT)
Dept: LAB | Facility: HOSPITAL | Age: 31
End: 2021-04-29

## 2021-04-29 LAB
ANION GAP SERPL CALCULATED.3IONS-SCNC: 15 MMOL/L (ref 5–18)
BASOPHILS # BLD AUTO: 0.1 THOU/UL (ref 0–0.2)
BASOPHILS NFR BLD AUTO: 1 % (ref 0–2)
BUN SERPL-MCNC: 13 MG/DL (ref 8–22)
CALCIUM SERPL-MCNC: 8.7 MG/DL (ref 8.5–10.5)
CHLORIDE BLD-SCNC: 103 MMOL/L (ref 98–107)
CO2 SERPL-SCNC: 22 MMOL/L (ref 22–31)
CREAT SERPL-MCNC: 0.72 MG/DL (ref 0.7–1.3)
CREAT UR-MCNC: 119.7 MG/DL
EOSINOPHIL # BLD AUTO: 0.7 THOU/UL (ref 0–0.4)
EOSINOPHIL NFR BLD AUTO: 7 % (ref 0–6)
ERYTHROCYTE [DISTWIDTH] IN BLOOD BY AUTOMATED COUNT: 13.5 % (ref 11–14.5)
GFR SERPL CREATININE-BSD FRML MDRD: >60 ML/MIN/1.73M2
GLUCOSE BLD-MCNC: 88 MG/DL (ref 70–125)
HBA1C MFR BLD: 5.8 %
HCT VFR BLD AUTO: 44.2 % (ref 40–54)
HGB BLD-MCNC: 14 G/DL (ref 14–18)
IMM GRANULOCYTES # BLD: 0.1 THOU/UL
IMM GRANULOCYTES NFR BLD: 1 %
IRON SATN MFR SERPL: 15 % (ref 20–50)
IRON SERPL-MCNC: 56 UG/DL (ref 42–175)
LYMPHOCYTES # BLD AUTO: 3.2 THOU/UL (ref 0.8–4.4)
LYMPHOCYTES NFR BLD AUTO: 30 % (ref 20–40)
MCH RBC QN AUTO: 27.3 PG (ref 27–34)
MCHC RBC AUTO-ENTMCNC: 31.7 G/DL (ref 32–36)
MCV RBC AUTO: 86 FL (ref 80–100)
MONOCYTES # BLD AUTO: 0.8 THOU/UL (ref 0–0.9)
MONOCYTES NFR BLD AUTO: 8 % (ref 2–10)
NEUTROPHILS # BLD AUTO: 5.7 THOU/UL (ref 2–7.7)
NEUTROPHILS NFR BLD AUTO: 54 % (ref 50–70)
PLATELET # BLD AUTO: 272 THOU/UL (ref 140–440)
PMV BLD AUTO: 10.6 FL (ref 8.5–12.5)
POTASSIUM BLD-SCNC: 3.8 MMOL/L (ref 3.5–5)
PROTEIN, RANDOM URINE - HISTORICAL: 31 MG/DL
PROTEIN/CREAT RATIO, RANDOM UR: 0.26
RBC # BLD AUTO: 5.13 MILL/UL (ref 4.4–6.2)
SODIUM SERPL-SCNC: 140 MMOL/L (ref 136–145)
TACROLIMUS BLD-MCNC: 6.1 UG/L (ref 5–15)
TACROLIMUS LAST DOSE: NORMAL
TIBC SERPL-MCNC: 367 UG/DL (ref 313–563)
TRANSFERRIN SERPL-MCNC: 294 MG/DL (ref 212–360)
VIT B12 SERPL-MCNC: 610 PG/ML (ref 213–816)
WBC: 10.6 THOU/UL (ref 4–11)

## 2021-05-08 DIAGNOSIS — Z94.0 STATUS POST KIDNEY TRANSPLANT: Primary | ICD-10-CM

## 2021-05-10 ENCOUNTER — TELEPHONE (OUTPATIENT)
Dept: TRANSPLANT | Facility: CLINIC | Age: 31
End: 2021-05-10

## 2021-05-10 DIAGNOSIS — Z94.0 IMMUNOSUPPRESSIVE MANAGEMENT ENCOUNTER FOLLOWING KIDNEY TRANSPLANT: ICD-10-CM

## 2021-05-10 DIAGNOSIS — Z79.899 IMMUNOSUPPRESSIVE MANAGEMENT ENCOUNTER FOLLOWING KIDNEY TRANSPLANT: ICD-10-CM

## 2021-05-10 DIAGNOSIS — Z94.0 KIDNEY TRANSPLANTED: ICD-10-CM

## 2021-05-10 DIAGNOSIS — Z94.0 STATUS POST KIDNEY TRANSPLANT: ICD-10-CM

## 2021-05-10 DIAGNOSIS — Z48.298 AFTERCARE FOLLOWING ORGAN TRANSPLANT: ICD-10-CM

## 2021-05-10 RX ORDER — TACROLIMUS 1 MG/1
1 CAPSULE ORAL 2 TIMES DAILY
Qty: 60 CAPSULE | Refills: 11 | Status: SHIPPED | OUTPATIENT
Start: 2021-05-10 | End: 2021-07-05

## 2021-05-10 RX ORDER — TACROLIMUS 0.5 MG/1
0.5 CAPSULE ORAL 2 TIMES DAILY
Qty: 60 CAPSULE | Refills: 11 | Status: SHIPPED | OUTPATIENT
Start: 2021-05-10 | End: 2021-07-05

## 2021-05-10 RX ORDER — MYCOPHENOLATE MOFETIL 250 MG/1
750 CAPSULE ORAL 2 TIMES DAILY
Qty: 180 CAPSULE | Refills: 11 | Status: SHIPPED | OUTPATIENT
Start: 2021-05-10 | End: 2021-06-23

## 2021-05-10 RX ORDER — PREDNISONE 5 MG/1
5 TABLET ORAL DAILY
Qty: 30 TABLET | Refills: 11 | Status: SHIPPED | OUTPATIENT
Start: 2021-05-10 | End: 2021-07-05

## 2021-05-10 RX ORDER — TACROLIMUS 1 MG/1
1 CAPSULE ORAL 2 TIMES DAILY
Qty: 60 CAPSULE | Refills: 11 | Status: SHIPPED | OUTPATIENT
Start: 2021-05-10 | End: 2021-05-10

## 2021-05-10 NOTE — TELEPHONE ENCOUNTER
Returned a call to Barry to discuss prescription refills. He is looking for a way to go to the pharmacy less and would like 3 month prescriptions with 3 refills on his IS meds.  Called [facility], per pharmacy tech Barry's medicare coverage will only allow a month month supply at a time.   Called Barry back and let him know. I advised him to call his insurance company to see if his 1 month supply limit can be changed. Barry verbalized understanding.

## 2021-05-10 NOTE — TELEPHONE ENCOUNTER
Patient Call: Medication Refill  Barry stated he is going to be traveling a lot this summer and would like a 6 month refill for his IS meds     Please call Barry #[phone]    Pharmacy Name: [facility] [place] [id number]     Name of Medication: MPA/Prednisone/ Tacrolimus    When will the patient be out of this medication?: Greater than 3 days (Route standard priority)

## 2021-05-27 ENCOUNTER — HOSPITAL ENCOUNTER (EMERGENCY)
Facility: CLINIC | Age: 31
Discharge: HOME | End: 2021-05-28
Attending: EMERGENCY MEDICINE | Admitting: EMERGENCY MEDICINE
Payer: MEDICARE

## 2021-05-27 ENCOUNTER — NURSE TRIAGE (OUTPATIENT)
Dept: NURSING | Facility: CLINIC | Age: 31
End: 2021-05-27

## 2021-05-27 VITALS
OXYGEN SATURATION: 98 % | WEIGHT: 240 LBS | RESPIRATION RATE: 18 BRPM | HEART RATE: 91 BPM | DIASTOLIC BLOOD PRESSURE: 84 MMHG | BODY MASS INDEX: 34.36 KG/M2 | HEIGHT: 70 IN | SYSTOLIC BLOOD PRESSURE: 139 MMHG | TEMPERATURE: 98.1 F

## 2021-05-27 DIAGNOSIS — L73.9 FOLLICULITIS: ICD-10-CM

## 2021-05-27 DIAGNOSIS — L02.419 AXILLARY ABSCESS: ICD-10-CM

## 2021-05-27 DIAGNOSIS — H54.8 LEGALLY BLIND: ICD-10-CM

## 2021-05-27 LAB
ANION GAP SERPL CALCULATED.3IONS-SCNC: 4 MMOL/L (ref 3–14)
BASOPHILS # BLD AUTO: 0.1 10E9/L (ref 0–0.2)
BASOPHILS NFR BLD AUTO: 0.6 %
BUN SERPL-MCNC: 15 MG/DL (ref 7–30)
CALCIUM SERPL-MCNC: 8.4 MG/DL (ref 8.5–10.1)
CHLORIDE SERPL-SCNC: 104 MMOL/L (ref 94–109)
CO2 SERPL-SCNC: 29 MMOL/L (ref 20–32)
CREAT SERPL-MCNC: 0.73 MG/DL (ref 0.66–1.25)
DIFFERENTIAL METHOD BLD: ABNORMAL
EOSINOPHIL # BLD AUTO: 0.8 10E9/L (ref 0–0.7)
EOSINOPHIL NFR BLD AUTO: 5.9 %
ERYTHROCYTE [DISTWIDTH] IN BLOOD BY AUTOMATED COUNT: 13.2 % (ref 10–15)
GFR SERPL CREATININE-BSD FRML MDRD: >90 ML/MIN/{1.73_M2}
GLUCOSE SERPL-MCNC: 146 MG/DL (ref 70–99)
HCT VFR BLD AUTO: 41 % (ref 40–53)
HGB BLD-MCNC: 13 G/DL (ref 13.3–17.7)
IMM GRANULOCYTES # BLD: 0.1 10E9/L (ref 0–0.4)
IMM GRANULOCYTES NFR BLD: 0.8 %
LYMPHOCYTES # BLD AUTO: 3.1 10E9/L (ref 0.8–5.3)
LYMPHOCYTES NFR BLD AUTO: 23.8 %
MCH RBC QN AUTO: 27.1 PG (ref 26.5–33)
MCHC RBC AUTO-ENTMCNC: 31.7 G/DL (ref 31.5–36.5)
MCV RBC AUTO: 85 FL (ref 78–100)
MONOCYTES # BLD AUTO: 0.9 10E9/L (ref 0–1.3)
MONOCYTES NFR BLD AUTO: 7.1 %
NEUTROPHILS # BLD AUTO: 8 10E9/L (ref 1.6–8.3)
NEUTROPHILS NFR BLD AUTO: 61.8 %
NRBC # BLD AUTO: 0 10*3/UL
NRBC BLD AUTO-RTO: 0 /100
PLATELET # BLD AUTO: 280 10E9/L (ref 150–450)
POTASSIUM SERPL-SCNC: 3.5 MMOL/L (ref 3.4–5.3)
RBC # BLD AUTO: 4.8 10E12/L (ref 4.4–5.9)
SODIUM SERPL-SCNC: 138 MMOL/L (ref 133–144)
WBC # BLD AUTO: 13 10E9/L (ref 4–11)

## 2021-05-27 PROCEDURE — 85025 COMPLETE CBC W/AUTO DIFF WBC: CPT | Performed by: EMERGENCY MEDICINE

## 2021-05-27 PROCEDURE — 99283 EMERGENCY DEPT VISIT LOW MDM: CPT | Mod: 25 | Performed by: EMERGENCY MEDICINE

## 2021-05-27 PROCEDURE — 10060 I&D ABSCESS SIMPLE/SINGLE: CPT | Performed by: EMERGENCY MEDICINE

## 2021-05-27 PROCEDURE — 80048 BASIC METABOLIC PNL TOTAL CA: CPT | Performed by: EMERGENCY MEDICINE

## 2021-05-27 RX ORDER — CLINDAMYCIN PHOSPHATE 11.9 MG/ML
SOLUTION TOPICAL 2 TIMES DAILY
Qty: 30 ML | Refills: 0 | Status: SHIPPED | OUTPATIENT
Start: 2021-05-27 | End: 2022-06-13

## 2021-05-27 RX ORDER — LIDOCAINE HYDROCHLORIDE AND EPINEPHRINE 10; 10 MG/ML; UG/ML
1 INJECTION, SOLUTION INFILTRATION; PERINEURAL ONCE
Status: DISCONTINUED | OUTPATIENT
Start: 2021-05-27 | End: 2021-05-28 | Stop reason: HOSPADM

## 2021-05-27 ASSESSMENT — ENCOUNTER SYMPTOMS
VOMITING: 0
NAUSEA: 0
FEVER: 0

## 2021-05-27 ASSESSMENT — MIFFLIN-ST. JEOR: SCORE: 2059.88

## 2021-05-28 ENCOUNTER — RECORDS - HEALTHEAST (OUTPATIENT)
Dept: LAB | Facility: HOSPITAL | Age: 31
End: 2021-05-28

## 2021-05-28 LAB
ANION GAP SERPL CALCULATED.3IONS-SCNC: 13 MMOL/L (ref 5–18)
BASOPHILS # BLD AUTO: 0.1 THOU/UL (ref 0–0.2)
BASOPHILS NFR BLD AUTO: 1 % (ref 0–2)
BUN SERPL-MCNC: 14 MG/DL (ref 8–22)
CALCIUM SERPL-MCNC: 8.5 MG/DL (ref 8.5–10.5)
CHLORIDE BLD-SCNC: 106 MMOL/L (ref 98–107)
CO2 SERPL-SCNC: 21 MMOL/L (ref 22–31)
CREAT SERPL-MCNC: 0.77 MG/DL (ref 0.7–1.3)
EOSINOPHIL # BLD AUTO: 0.9 THOU/UL (ref 0–0.4)
EOSINOPHIL NFR BLD AUTO: 7 % (ref 0–6)
ERYTHROCYTE [DISTWIDTH] IN BLOOD BY AUTOMATED COUNT: 13.2 % (ref 11–14.5)
GFR SERPL CREATININE-BSD FRML MDRD: >60 ML/MIN/1.73M2
GLUCOSE BLD-MCNC: 96 MG/DL (ref 70–125)
HCT VFR BLD AUTO: 40.4 % (ref 40–54)
HGB BLD-MCNC: 13.3 G/DL (ref 14–18)
IMM GRANULOCYTES # BLD: 0.1 THOU/UL
IMM GRANULOCYTES NFR BLD: 1 %
LYMPHOCYTES # BLD AUTO: 3.4 THOU/UL (ref 0.8–4.4)
LYMPHOCYTES NFR BLD AUTO: 27 % (ref 20–40)
MCH RBC QN AUTO: 27.8 PG (ref 27–34)
MCHC RBC AUTO-ENTMCNC: 32.9 G/DL (ref 32–36)
MCV RBC AUTO: 85 FL (ref 80–100)
MONOCYTES # BLD AUTO: 1.1 THOU/UL (ref 0–0.9)
MONOCYTES NFR BLD AUTO: 9 % (ref 2–10)
NEUTROPHILS # BLD AUTO: 7.1 THOU/UL (ref 2–7.7)
NEUTROPHILS NFR BLD AUTO: 56 % (ref 50–70)
PLATELET # BLD AUTO: 283 THOU/UL (ref 140–440)
PMV BLD AUTO: 10.7 FL (ref 8.5–12.5)
POTASSIUM BLD-SCNC: 4.1 MMOL/L (ref 3.5–5)
RBC # BLD AUTO: 4.78 MILL/UL (ref 4.4–6.2)
SODIUM SERPL-SCNC: 140 MMOL/L (ref 136–145)
VIT B12 SERPL-MCNC: 648 PG/ML (ref 213–816)
WBC: 12.7 THOU/UL (ref 4–11)

## 2021-05-28 PROCEDURE — 10060 I&D ABSCESS SIMPLE/SINGLE: CPT | Performed by: EMERGENCY MEDICINE

## 2021-05-28 NOTE — DISCHARGE INSTRUCTIONS
Please make an appointment to follow up with Dermatology Clinic (phone: [phone]) in 7-14 days.    Apply clindamycin solution to affected areas twice a day to help treat and prevent infection.     You were given a referral to dermatology clinic. Someone should call you to set up this appointment, but please call the number listed above, if you do not hear from someone within 1-2 business days.     Keep your left armpit clean and dry (remove wet or sweaty clothing).  You should soak or rinse the wound once or twice a day to help clean of the drainage.     The wound will continue to drain for the next few days, this is normal.      Take 600 mg ibuprofen every 8 hours, for pain and inflammation.  Take this on schedule, for approximately 1 week or until resolution of  symptoms. Take this medication with food to prevent stomach upset.  If you taking a chronic antacid medication, make sure to take this while you are taking this medication.    Follow-up with your primary care doctor in 2-3 days for a recheck of the wound.    Return to the emergency department if you develop worsening swelling, pain, fever.

## 2021-05-28 NOTE — ED PROVIDER NOTES
ED Provider Note  [facility]      History     Chief Complaint   Patient presents with     Wound Infection     HPI  Barry Mcgrath is a 30 year old male with a PMH of blindness, DM2, ESRD on peritoneal dialysis, S/P kidney transplant (2006), hydradenitis, HTN, RAQUEL, immunodeficiency and right axillary abscess who presents to the ED today complaining of an abscess in the left armpit.  Patient states he has an abscess in his left armpit.  He states he had an abscess in his right armpit 2 months ago and was seen here to have it drained.  He also states he had an abscess in his gluteal area a few years ago.  He states he is never had surgery for his abscesses before, but simply had them drained.  Patient states his PCP is through the [facility] clinic.  Patient denies fever, nausea or vomiting.    Past Medical History  Past Medical History:   Diagnosis Date     Anxiety      Bipolar 1 disorder (H)      Bipolar 2 disorder (H)      Blind      Depressive disorder      PTSD (post-traumatic stress disorder)      Sleep apnea     uses cpap     Past Surgical History:   Procedure Laterality Date     TRANSPLANT      Kidney transplant in 2006     clindamycin (CLEOCIN T) 1 % external solution  acetaminophen (TYLENOL) 325 MG tablet  divalproex sodium extended-release (DEPAKOTE ER) 500 MG 24 hr tablet  FLUCELVAX QUADRIVALENT 0.5 ML LAKSHMI  lisinopril (PRINIVIL/ZESTRIL) 10 MG tablet  melatonin 1 MG TABS tablet  metFORMIN (GLUCOPHAGE-XR) 500 MG 24 hr tablet  mycophenolate (GENERIC EQUIVALENT) 250 MG capsule  prazosin (MINIPRESS) 1 MG capsule  predniSONE (DELTASONE) 5 MG tablet  risperiDONE (RISPERDAL) 2 MG tablet  risperiDONE (RISPERDAL) 4 MG tablet  tacrolimus (GENERIC EQUIVALENT) 0.5 MG capsule  tacrolimus (GENERIC EQUIVALENT) 1 MG capsule  vitamin D3 (CHOLECALCIFEROL) 45834 UNITS capsule      Allergies   Allergen Reactions     Seasonal Allergies Other (See Comments)     hamster hair     Cats       "Itching, nasal congestion     Dogs      Itching, nasal congestion     No Known Allergies      Family History  Family History   Problem Relation Age of Onset     Sleep Apnea Father      Diabetes Father      Sleep Apnea Brother      Glaucoma Maternal Grandfather      Macular Degeneration No family hx of      Social History   Social History     Tobacco Use     Smoking status: Never Smoker     Smokeless tobacco: Never Used   Substance Use Topics     Alcohol use: No     Drug use: No      Past medical history, past surgical history, medications, allergies, family history, and social history were reviewed with the patient. No additional pertinent items.       Review of Systems   Constitutional: Negative for fever.   Gastrointestinal: Negative for nausea and vomiting.   Skin:        (left axillary redness, pain and swelling)   All other systems reviewed and are negative.    A complete review of systems was performed with pertinent positives and negatives noted in the HPI, and all other systems negative.    Physical Exam   BP: 139/84  Pulse: 91  Temp: 98.1  F (36.7  C)  Resp: 18  Height: 177.8 cm (5' 10\")  Weight: 108.9 kg (240 lb)  SpO2: 98 %  Physical Exam  Vitals signs and nursing note reviewed.   Constitutional:       General: He is not in acute distress.     Appearance: He is well-developed. He is obese. He is not ill-appearing or diaphoretic.   HENT:      Head: Normocephalic and atraumatic.      Nose: Nose normal.   Eyes:      General: No scleral icterus.     Extraocular Movements:      Right eye: Abnormal extraocular motion present.      Left eye: Abnormal extraocular motion present.      Conjunctiva/sclera: Conjunctivae normal.   Neck:      Musculoskeletal: Normal range of motion and neck supple. No neck rigidity.   Cardiovascular:      Rate and Rhythm: Normal rate.   Pulmonary:      Effort: Pulmonary effort is normal. No respiratory distress.      Breath sounds: No stridor.   Abdominal:      General: There is no " distension.   Musculoskeletal: Normal range of motion.         General: No deformity or signs of injury.   Skin:     General: Skin is warm and dry.      Coloration: Skin is not jaundiced.      Findings: Abscess and rash present. No laceration. Rash is papular and pustular.      Comments: There is a 2 x 3 cm area of erythema and fluctuance long the anterior axillary line of the left axilla.  This is tender to palpation. There are numerous surrounding red papules and pustules within the left axilla.   Neurological:      General: No focal deficit present.      Mental Status: He is alert and oriented to person, place, and time.   Psychiatric:         Behavior: Behavior normal.         Thought Content: Thought content normal.           ED Course     10:27 PM  The patient was seen and examined by Jolly Li MD in Room ED19[facility]    PROCEDURE: -Incision/Drainage    Date/Time: 5/28/2021 5:33 AM  Performed by: Jolly Li MD  Authorized by: Jolly Li MD       LOCATION:      Type:  Abscess    Size:  3x2cm    Location:  Upper extremity    Upper extremity location: left axilla.    PRE-PROCEDURE DETAILS:     Skin preparation:  Chloraprep    ANESTHESIA (see MAR for exact dosages):     Anesthesia method:  Local infiltration    Local anesthetic:  Lidocaine 1% WITH epi    PROCEDURE TYPE:     Complexity:  Simple    PROCEDURE DETAILS:     Needle aspiration: no      Incision types:  Single straight    Incision depth:  Subcutaneous    Scalpel blade:  11    Wound management:  Probed and deloculated    Drainage:  Purulent and bloody    Drainage amount:  Scant    Wound treatment:  Wound left open  Post-procedure details:     PROCEDURE   Patient Tolerance:  Patient tolerated the procedure well with no immediate complications                     Results for orders placed or performed during the hospital encounter of 05/27/21   CBC with platelets differential     Status:  Abnormal   Result Value Ref Range    WBC 13.0 (H) 4.0 - 11.0 10e9/L    RBC Count 4.80 4.4 - 5.9 10e12/L    Hemoglobin 13.0 (L) 13.3 - 17.7 g/dL    Hematocrit 41.0 40.0 - 53.0 %    MCV 85 78 - 100 fl    MCH 27.1 26.5 - 33.0 pg    MCHC 31.7 31.5 - 36.5 g/dL    RDW 13.2 10.0 - 15.0 %    Platelet Count 280 150 - 450 10e9/L    Diff Method Automated Method     % Neutrophils 61.8 %    % Lymphocytes 23.8 %    % Monocytes 7.1 %    % Eosinophils 5.9 %    % Basophils 0.6 %    % Immature Granulocytes 0.8 %    Nucleated RBCs 0 0 /100    Absolute Neutrophil 8.0 1.6 - 8.3 10e9/L    Absolute Lymphocytes 3.1 0.8 - 5.3 10e9/L    Absolute Monocytes 0.9 0.0 - 1.3 10e9/L    Absolute Eosinophils 0.8 (H) 0.0 - 0.7 10e9/L    Absolute Basophils 0.1 0.0 - 0.2 10e9/L    Abs Immature Granulocytes 0.1 0 - 0.4 10e9/L    Absolute Nucleated RBC 0.0    Basic metabolic panel     Status: Abnormal   Result Value Ref Range    Sodium 138 133 - 144 mmol/L    Potassium 3.5 3.4 - 5.3 mmol/L    Chloride 104 94 - 109 mmol/L    Carbon Dioxide 29 20 - 32 mmol/L    Anion Gap 4 3 - 14 mmol/L    Glucose 146 (H) 70 - 99 mg/dL    Urea Nitrogen 15 7 - 30 mg/dL    Creatinine 0.73 0.66 - 1.25 mg/dL    GFR Estimate >90 >60 mL/min/[1.73_m2]    GFR Estimate If Black >90 >60 mL/min/[1.73_m2]    Calcium 8.4 (L) 8.5 - 10.1 mg/dL     Medications - No data to display     Assessments & Plan (with Medical Decision Making)   Barry Mcgrath is a 30 year old male with a PMH of blindness, DM2, ESRD on peritoneal dialysis, S/P kidney transplant (2006), hydradenitis, HTN, RAQUEL, immunodeficiency and right axillary abscess who presents to the ED today complaining of an abscess in the left armpit.     Ddx: Left axillary abscess, hidradenitis suppurativa, MRSA colonization, folliculitis    Patient has a small abscess in the left axilla on exam.  No cellulitis associated.  There is scattered Pap pules and pustules consistent with a folliculitis.  He has had a history of previous abscesses  in the contralateral axilla, groin, buttock region.  Suspect an underlying diagnosis of hidradenitis suppurativa.  Patient was not familiar with this condition when I spoke about it with him.  I encouraged him to follow-up with dermatology for further evaluation to discuss preventative measures for recurrent abscesses.  Performed a bedside drainage of the abscess with output of scant amount of purulent drainage with blood.  Patient tolerated the procedure very well.  Left it open with a dressing on top to continue draining.  No associated cellulitis so no oral antibiotics were prescribed.  I prescribe clindamycin topical solution.  I gave him a formal referral to dermatology for ongoing management of what is appearing to be a chronic condition.  Patient verbalized understanding.  He is legally blind and requested that I email him the results of his work-up and his AVS instructions.  However, I was not able to figure out a way to do this securely through the medical record system.  I did try to have him activate his [employer] and had several employees try to help him complete this but the program was not working for him.  I have placed a consult for case management to follow-up with him to help assist him and ensure that he is able to arrange the appropriate follow-up.      I have reviewed the nursing notes. I have reviewed the findings, diagnosis, plan and need for follow up with the patient.    Discharge Medication List as of 5/27/2021 11:48 PM      START taking these medications    Details   clindamycin (CLEOCIN T) 1 % external solution Apply topically 2 times dailyDisp-30 mL, [id number]             Final diagnoses:   Axillary abscess   Folliculitis   Legally blind   IJhon, am serving as a trained medical scribe to document services personally performed by Jolly Li MD, based on the provider's statements to me.     IJolly MD, was physically present and have reviewed and verified the  accuracy of this note documented by Jhon Flood.      --  Jolly Li MD  [facility] EMERGENCY DEPARTMENT  5/27/2021     Jolly Li MD  05/28/21 0507

## 2021-05-28 NOTE — PROGRESS NOTES
Pt refused discharge paper work since he is completely blind. Cab called for this pt by charge nurse. Discharge education provided.

## 2021-05-28 NOTE — ED NOTES
Pt has small abscess under left arm pit. Pt has not taken any medication for this and has not seen a provider for this issue. Pt states that it started 3 days ago and has grown since then.

## 2021-05-28 NOTE — TELEPHONE ENCOUNTER
"Patient calling reporting he had Abscess on his right axillary 2 months ago. Patient states he feels he developed another abscess on his left axillary. States the swelling is about \"marble size\". Denies difficulty of breathing and denies fever.  Patient has a history of having abscess on his left axillary and needed it to be drained. Per guideline, advised patient to be seen at the emergency department. Caller verbalized understanding. Denies further questions.      Biju Singh RN  [facility]     COVID 19 Nurse Triage Plan/Patient Instructions    Please be aware that novel coronavirus (COVID-19) may be circulating in the community. If you develop symptoms such as fever, cough, or SOB or if you have concerns about the presence of another infection including coronavirus (COVID-19), please contact your health care provider or visit [email or web address].     Disposition/Instructions    ED Visit recommended. Follow protocol based instructions.     Bring Your Own Device:  Please also bring your smart device(s) (smart phones, tablets, laptops) and their charging cables for your personal use and to communicate with your care team during your visit.    Thank you for taking steps to prevent the spread of this virus.  o Limit your contact with others.  o Wear a simple mask to cover your cough.  o Wash your hands well and often.    Resources    [facility]: About COVID-19: [email or web address]    CDC: What to Do If You're Sick: www.cdc.gov/coronavirus/2019-ncov/about/steps-when-sick.html    CDC: Ending Home Isolation: www.cdc.gov/coronavirus/2019-ncov/hcp/disposition-in-home-patients.html     CDC: Caring for Someone: www.cdc.gov/coronavirus/2019-ncov/if-you-are-sick/care-for-someone.html     [facility]: Interim Guidance for Hospital Discharge to Home: www.[email or web address]/diseases/coronavirus/hcp/hospdischarge.pdf    [facility] clinical trials (COVID-19 research studies): " [email or web address]     Below are the COVID-19 hotlines at the [place] Department of Health ([facility]). Interpreters are available.   o For health questions: Call 010-665-3242 or 1-810.134.3249 (7 a.m. to 7 p.m.)  o For questions about schools and childcare: Call 731-609-1663 or 1-606.824.6526 (7 a.m. to 7 p.m.)                       Reason for Disposition    Patient sounds very sick or weak to the triager    Additional Information    Negative: Severe difficulty breathing (e.g., struggling for each breath, speaks in single words)    Negative: Sounds like a life-threatening emergency to the triager    Negative: SEVERE arm swelling (e.g., all of arm looks swollen)    Negative: [1] MODERATE arm swelling (e.g., puffiness or swollen feeling of entire arm) and [2] PICC Line    Negative: [1] MODERATE arm swelling and [2] central venous catheter (central line, internal jugular line)    Negative: Difficulty breathing at rest    Negative: Looks like a broken bone or dislocated joint (e.g., crooked or deformed)    Negative: Entire hand is cool or blue in comparison to other side    Negative: [1] Can't use arm or can barely use arm AND [2] new onset    Negative: [1] Difficulty breathing with exertion (e.g., walking) AND [2] new onset or worsening    Negative: [1] Red area or streak AND [2] fever    Negative: [1] Swelling is painful to touch AND [2] fever    Negative: [1] Cast on arm AND [2] now increased pain    Protocols used: ARM SWELLING AND EDEMA-A-AH

## 2021-05-29 ENCOUNTER — NURSE TRIAGE (OUTPATIENT)
Dept: NURSING | Facility: CLINIC | Age: 31
End: 2021-05-29

## 2021-05-29 LAB
TACROLIMUS BLD-MCNC: 4.1 UG/L (ref 5–15)
TACROLIMUS LAST DOSE: ABNORMAL

## 2021-05-29 NOTE — TELEPHONE ENCOUNTER
Barry has pain in big toe as he cut it too low.  Denies fever and denies pus.  Denies foot being cool and denies blue foot.  Barry will continue to monitor situation and phone back if necessary.    COVID 19 Nurse Triage Plan/Patient Instructions    Please be aware that novel coronavirus (COVID-19) may be circulating in the community. If you develop symptoms such as fever, cough, or SOB or if you have concerns about the presence of another infection including coronavirus (COVID-19), please contact your health care provider or visit [email or web address].     Disposition/Instructions    Home care recommended. Follow home care protocol based instructions.    Thank you for taking steps to prevent the spread of this virus.  o Limit your contact with others.  o Wear a simple mask to cover your cough.  o Wash your hands well and often.    [facility]: About COVID-19: [email or web address]    CDC: What to Do If You're Sick: www.cdc.gov/coronavirus/2019-ncov/about/steps-when-sick.html    CDC: Ending Home Isolation: www.cdc.gov/coronavirus/2019-ncov/hcp/disposition-in-home-patients.html     CDC: Caring for Someone: www.cdc.gov/coronavirus/2019-ncov/if-you-are-sick/care-for-someone.html     [facility]: Interim Guidance for Hospital Discharge to Home: www.health.[facility].mn.us/diseases/coronavirus/hcp/hospdischarge.pdf    [facility] clinical trials (COVID-19 research studies): clinicalaffairs.[facility]-clinical-trials     Below are the COVID-19 hotlines at the [place] Department of Health ([facility]). Interpreters are available.   o For health questions: Call 249-394-0175 or 1-901.520.5358 (7 a.m. to 7 p.m.)  o For questions about schools and childcare: Call 175-927-9397 or 1-759.274.8099 (7 a.m. to 7 p.m.)                       Reason for Disposition    Toenail trimming, questions about    Additional Information    Negative: Foot is cool or blue in comparison to other foot    Negative:  "Purple or black skin on toe  (Exception: simple recalled injury with bruise)    Negative: [1] Looks infected (e.g., spreading redness, red streak, pus) AND [2] fever    Negative: Patient sounds very sick or weak to the triager    Negative: [1] SEVERE pain (e.g., excruciating, unable to do any normal activities) AND [2] not improved after 2 hours of pain medicine    Negative: [1] Redness spreading into foot or red streak into foot AND [2] no fever    Negative: Looks like a boil, infected sore, or deep ulcer    Negative: [1] Swollen toe AND [2] no fever  (Exceptions: just localized bump from bunion, corns, insect bite, sting)    Negative: Yellow pus seen in skin around toenail (cuticle area), or pus seen under toenail    Negative: Numbness in one foot (i.e., loss of sensation)    Negative: [1] MODERATE pain (e.g., interferes with normal activities, limping) AND [2] present > 3 days    Negative: Localized redness and swelling of skin around nail (i.e., cuticle area or nail fold)    Negative: Pain in the big toe joint    Negative: Diabetes mellitus or peripheral vascular disease (\"poor circulation\")    Negative: [1] MILD pain (e.g., does not interfere with normal activities) AND [2] present > 7 days    Negative: Caused by known bunion(s), corns, claw toes, mallet toes, or hammertoes    Negative: Toe pain is a chronic symptom (recurrent or ongoing AND present > 4 weeks)    Negative: [1] Toe pain or color changes occurs after exposure to cold AND [2] brief (now gone)    Negative: Thickened, ugly-appearing toenail    Negative: Toe pain    Negative: Properly fitting shoes, questions about    Negative: Preventing foot problems, questions about    Protocols used: TOE PAIN-A-AH      "

## 2021-06-02 ENCOUNTER — HOSPITAL ENCOUNTER (EMERGENCY)
Facility: CLINIC | Age: 31
Discharge: HOME | End: 2021-06-03
Attending: EMERGENCY MEDICINE | Admitting: EMERGENCY MEDICINE
Payer: MEDICARE

## 2021-06-02 ENCOUNTER — NURSE TRIAGE (OUTPATIENT)
Dept: NURSING | Facility: CLINIC | Age: 31
End: 2021-06-02

## 2021-06-02 DIAGNOSIS — E87.6 HYPOPOTASSEMIA: ICD-10-CM

## 2021-06-02 DIAGNOSIS — M79.622 PAIN OF LEFT UPPER ARM: ICD-10-CM

## 2021-06-02 DIAGNOSIS — M79.662 PAIN OF LEFT LOWER LEG: ICD-10-CM

## 2021-06-02 PROCEDURE — 93010 ELECTROCARDIOGRAM REPORT: CPT | Performed by: EMERGENCY MEDICINE

## 2021-06-02 PROCEDURE — 99284 EMERGENCY DEPT VISIT MOD MDM: CPT | Mod: 25 | Performed by: EMERGENCY MEDICINE

## 2021-06-02 PROCEDURE — 93005 ELECTROCARDIOGRAM TRACING: CPT | Performed by: EMERGENCY MEDICINE

## 2021-06-02 PROCEDURE — 99284 EMERGENCY DEPT VISIT MOD MDM: CPT | Performed by: EMERGENCY MEDICINE

## 2021-06-03 VITALS
OXYGEN SATURATION: 100 % | TEMPERATURE: 97.7 F | DIASTOLIC BLOOD PRESSURE: 78 MMHG | HEART RATE: 87 BPM | SYSTOLIC BLOOD PRESSURE: 136 MMHG | RESPIRATION RATE: 18 BRPM

## 2021-06-03 LAB
ALBUMIN SERPL-MCNC: 3.2 G/DL (ref 3.4–5)
ALP SERPL-CCNC: 77 U/L (ref 40–150)
ALT SERPL W P-5'-P-CCNC: 38 U/L (ref 0–70)
ANION GAP SERPL CALCULATED.3IONS-SCNC: 5 MMOL/L (ref 3–14)
AST SERPL W P-5'-P-CCNC: 23 U/L (ref 0–45)
BASOPHILS # BLD AUTO: 0.1 10E9/L (ref 0–0.2)
BASOPHILS NFR BLD AUTO: 0.6 %
BILIRUB SERPL-MCNC: 0.2 MG/DL (ref 0.2–1.3)
BUN SERPL-MCNC: 14 MG/DL (ref 7–30)
CALCIUM SERPL-MCNC: 8.2 MG/DL (ref 8.5–10.1)
CHLORIDE SERPL-SCNC: 103 MMOL/L (ref 94–109)
CO2 SERPL-SCNC: 29 MMOL/L (ref 20–32)
CREAT SERPL-MCNC: 0.78 MG/DL (ref 0.66–1.25)
DIFFERENTIAL METHOD BLD: ABNORMAL
EOSINOPHIL # BLD AUTO: 0.8 10E9/L (ref 0–0.7)
EOSINOPHIL NFR BLD AUTO: 5.7 %
ERYTHROCYTE [DISTWIDTH] IN BLOOD BY AUTOMATED COUNT: 13.2 % (ref 10–15)
GFR SERPL CREATININE-BSD FRML MDRD: >90 ML/MIN/{1.73_M2}
GLUCOSE SERPL-MCNC: 133 MG/DL (ref 70–99)
HCT VFR BLD AUTO: 38.3 % (ref 40–53)
HGB BLD-MCNC: 12.6 G/DL (ref 13.3–17.7)
IMM GRANULOCYTES # BLD: 0.1 10E9/L (ref 0–0.4)
IMM GRANULOCYTES NFR BLD: 0.8 %
INTERPRETATION ECG - MUSE: NORMAL
LYMPHOCYTES # BLD AUTO: 4.1 10E9/L (ref 0.8–5.3)
LYMPHOCYTES NFR BLD AUTO: 28.6 %
MCH RBC QN AUTO: 27.7 PG (ref 26.5–33)
MCHC RBC AUTO-ENTMCNC: 32.9 G/DL (ref 31.5–36.5)
MCV RBC AUTO: 84 FL (ref 78–100)
MONOCYTES # BLD AUTO: 1 10E9/L (ref 0–1.3)
MONOCYTES NFR BLD AUTO: 6.8 %
NEUTROPHILS # BLD AUTO: 8.2 10E9/L (ref 1.6–8.3)
NEUTROPHILS NFR BLD AUTO: 57.5 %
NRBC # BLD AUTO: 0 10*3/UL
NRBC BLD AUTO-RTO: 0 /100
PLATELET # BLD AUTO: 285 10E9/L (ref 150–450)
POTASSIUM SERPL-SCNC: 3.2 MMOL/L (ref 3.4–5.3)
PROT SERPL-MCNC: 7.2 G/DL (ref 6.8–8.8)
RBC # BLD AUTO: 4.55 10E12/L (ref 4.4–5.9)
SODIUM SERPL-SCNC: 137 MMOL/L (ref 133–144)
TROPONIN I SERPL-MCNC: <0.015 UG/L (ref 0–0.04)
WBC # BLD AUTO: 14.4 10E9/L (ref 4–11)

## 2021-06-03 PROCEDURE — 80053 COMPREHEN METABOLIC PANEL: CPT | Performed by: EMERGENCY MEDICINE

## 2021-06-03 PROCEDURE — 85025 COMPLETE CBC W/AUTO DIFF WBC: CPT | Performed by: EMERGENCY MEDICINE

## 2021-06-03 PROCEDURE — 250N000013 HC RX MED GY IP 250 OP 250 PS 637: Performed by: EMERGENCY MEDICINE

## 2021-06-03 PROCEDURE — 84484 ASSAY OF TROPONIN QUANT: CPT | Performed by: EMERGENCY MEDICINE

## 2021-06-03 RX ORDER — POTASSIUM CHLORIDE 750 MG/1
40 TABLET, EXTENDED RELEASE ORAL ONCE
Status: COMPLETED | OUTPATIENT
Start: 2021-06-03 | End: 2021-06-03

## 2021-06-03 RX ADMIN — POTASSIUM CHLORIDE 40 MEQ: 750 TABLET, EXTENDED RELEASE ORAL at 01:35

## 2021-06-03 ASSESSMENT — ENCOUNTER SYMPTOMS
BRUISES/BLEEDS EASILY: 0
COLOR CHANGE: 0
ARTHRALGIAS: 0
DIFFICULTY URINATING: 0
NUMBNESS: 0
DIZZINESS: 0
TROUBLE SWALLOWING: 0
EYE REDNESS: 0
DIARRHEA: 0
ABDOMINAL PAIN: 0
VOMITING: 0
NECK STIFFNESS: 0
CONFUSION: 0
WEAKNESS: 1
SHORTNESS OF BREATH: 0
FEVER: 0
NAUSEA: 0
HEADACHES: 0

## 2021-06-03 NOTE — ED TRIAGE NOTES
BIBA concern for stroke-like symptoms. Pain in L arm, L leg. Stroke scale normal for medics. First noticed sx yesterday evening, throughout day today noticed pains. No progression in intensity. States pain is a throbbing that occurs every few minutes, 5/10. No significant medical history, though pt is legally blind. Neuros intact. VSS RA.

## 2021-06-03 NOTE — DISCHARGE INSTRUCTIONS
Thank you for your patience today.  Please follow-up with your regular doctor in the next 2-3 days for further evaluation and follow-up care.  Please call to schedule an appointment.  Please continue your own medications.  Please return to the ER if you develop chest pain, shortness of breath, weakness, tingling, numbness, or any worsening of your current symptoms.  It was a pleasure taking care of you today.  We hope you feel better soon.

## 2021-06-03 NOTE — ED NOTES
Bed: ED11  Expected date:   Expected time:   Means of arrival:   Comments:  A654  30M pain and weakness for 2 days  Pt is blind

## 2021-06-03 NOTE — ED PROVIDER NOTES
[place] EMERGENCY DEPARTMENT ([facility])  6/02/21    History     Chief Complaint   Patient presents with     Neurologic Problem     The history is provided by the patient and medical records.     Barry Mcgrath is a 30 year old male with a history of blindness, ESRD s/p kidney transplant (2006), HTN, bipolar disorder, depression, and anxiety who presents to the Emergency Department via EMS with left arm and left leg pain. Patient reports intermittent left upper arm and left leg pain and weakness beginning Tuesday evening. He states the weakness has now resolved and left leg pain resolved 4-5 hours ago. Patient reports left arm pain has continued as a dull ache rated 3-4 out of 10. Pain seems to be worse with movement. Patient denies any trauma or injuries.  Patient denies dizziness, headaches, or numbness or tingling in the hands or feet. No difficulty swallowing, drinking, or talking. Patient denies any fever, chills, chest pain, shortness of breath, abdominal pain, nausea, vomiting, or diarrhea. He is taking all his medications. No other complaints.   Past Medical History  Past Medical History:   Diagnosis Date     Anxiety      Bipolar 1 disorder (H)      Bipolar 2 disorder (H)      Blind      Depressive disorder      PTSD (post-traumatic stress disorder)      Sleep apnea     uses cpap     Past Surgical History:   Procedure Laterality Date     TRANSPLANT      Kidney transplant in 2006     acetaminophen (TYLENOL) 325 MG tablet  clindamycin (CLEOCIN T) 1 % external solution  divalproex sodium extended-release (DEPAKOTE ER) 500 MG 24 hr tablet  FLUCELVAX QUADRIVALENT 0.5 ML LAKSHMI  lisinopril (PRINIVIL/ZESTRIL) 10 MG tablet  melatonin 1 MG TABS tablet  metFORMIN (GLUCOPHAGE-XR) 500 MG 24 hr tablet  mycophenolate (GENERIC EQUIVALENT) 250 MG capsule  prazosin (MINIPRESS) 1 MG capsule  predniSONE (DELTASONE) 5 MG tablet  risperiDONE (RISPERDAL) 2 MG tablet  risperiDONE (RISPERDAL) 4 MG tablet  tacrolimus  (GENERIC EQUIVALENT) 0.5 MG capsule  tacrolimus (GENERIC EQUIVALENT) 1 MG capsule  vitamin D3 (CHOLECALCIFEROL) 13971 UNITS capsule      Allergies   Allergen Reactions     Seasonal Allergies Other (See Comments)     hamster hair     Cats      Itching, nasal congestion     Dogs      Itching, nasal congestion     No Known Allergies      Family History  Family History   Problem Relation Age of Onset     Sleep Apnea Father      Diabetes Father      Sleep Apnea Brother      Glaucoma Maternal Grandfather      Macular Degeneration No family hx of      Social History   Social History     Tobacco Use     Smoking status: Never Smoker     Smokeless tobacco: Never Used   Substance Use Topics     Alcohol use: No     Drug use: No      Past medical history, past surgical history, medications, allergies, family history, and social history were reviewed with the patient. No additional pertinent items.       Review of Systems   Constitutional: Negative for fever.   HENT: Negative for congestion and trouble swallowing.    Eyes: Negative for redness.   Respiratory: Negative for shortness of breath.    Cardiovascular: Negative for chest pain.   Gastrointestinal: Negative for abdominal pain, diarrhea, nausea and vomiting.   Endocrine: Negative for polyuria.   Genitourinary: Negative for difficulty urinating.   Musculoskeletal: Negative for arthralgias and neck stiffness.        Positive for left arm pain.  Positive for left leg pain.   Skin: Negative for color change.   Allergic/Immunologic: Negative for immunocompromised state.   Neurological: Positive for weakness (left arm and left leg - resolved). Negative for dizziness, numbness and headaches.   Hematological: Does not bruise/bleed easily.   Psychiatric/Behavioral: Negative for confusion.   All other systems reviewed and are negative.    Physical Exam   BP: 136/82  Pulse: 79  Temp: 97.7  F (36.5  C)  Resp: 18  SpO2: 95 %  Physical Exam  General: Afebrile, no acute distress   HEENT:  Normocephalic, atraumatic, conjunctivae normal. Legally blind in both eyes. MMM  Neck: non-tender, supple  Cardio: regular rate. regular rhythm   Resp: Normal work of breathing, no respiratory distress, lungs clear bilaterally, no wheezing, rhonchi, rales  Chest/Back: no visual signs of trauma, no CVA tenderness   Abdomen: soft, non distension, no tenderness, no peritoneal signs   Neuro: alert and fully oriented. CN II-XII intact. Normal strength and sensation in all four extremities. Normal gait.  MSK: no deformities. No tenderness to palpation of upper and lower extremities bilaterally.  Normal range of motion  Integumentary/Skin: no rash visualized, normal color  Psych: normal affect, normal behavior    ED Course   12:27 AM  The patient was seen and examined by Sarahy Neves MD in Room ED11.      Procedures       EKG Interpretation:      Interpreted by Sarahy Neves MD  Time reviewed: 0109  Symptoms at time of EKG: left arm pain   Rhythm: normal sinus   Rate: normal  Axis: normal  Ectopy: none  Conduction: normal  ST Segments/ T Waves: No acute ischemic ischemic change  Q Waves: none  Comparison to prior: No old EKG available    Clinical Impression: normal sinus rhythm with no acute ischemic change    Results for orders placed or performed during the hospital encounter of 06/02/21   CBC with platelets differential     Status: Abnormal   Result Value Ref Range    WBC 14.4 (H) 4.0 - 11.0 10e9/L    RBC Count 4.55 4.4 - 5.9 10e12/L    Hemoglobin 12.6 (L) 13.3 - 17.7 g/dL    Hematocrit 38.3 (L) 40.0 - 53.0 %    MCV 84 78 - 100 fl    MCH 27.7 26.5 - 33.0 pg    MCHC 32.9 31.5 - 36.5 g/dL    RDW 13.2 10.0 - 15.0 %    Platelet Count 285 150 - 450 10e9/L    Diff Method Automated Method     % Neutrophils 57.5 %    % Lymphocytes 28.6 %    % Monocytes 6.8 %    % Eosinophils 5.7 %    % Basophils 0.6 %    % Immature Granulocytes 0.8 %    Nucleated RBCs 0 0 /100    Absolute Neutrophil 8.2 1.6 - 8.3 10e9/L    Absolute  Lymphocytes 4.1 0.8 - 5.3 10e9/L    Absolute Monocytes 1.0 0.0 - 1.3 10e9/L    Absolute Eosinophils 0.8 (H) 0.0 - 0.7 10e9/L    Absolute Basophils 0.1 0.0 - 0.2 10e9/L    Abs Immature Granulocytes 0.1 0 - 0.4 10e9/L    Absolute Nucleated RBC 0.0    Comprehensive metabolic panel     Status: Abnormal   Result Value Ref Range    Sodium 137 133 - 144 mmol/L    Potassium 3.2 (L) 3.4 - 5.3 mmol/L    Chloride 103 94 - 109 mmol/L    Carbon Dioxide 29 20 - 32 mmol/L    Anion Gap 5 3 - 14 mmol/L    Glucose 133 (H) 70 - 99 mg/dL    Urea Nitrogen 14 7 - 30 mg/dL    Creatinine 0.78 0.66 - 1.25 mg/dL    GFR Estimate >90 >60 mL/min/[1.73_m2]    GFR Estimate If Black >90 >60 mL/min/[1.73_m2]    Calcium 8.2 (L) 8.5 - 10.1 mg/dL    Bilirubin Total 0.2 0.2 - 1.3 mg/dL    Albumin 3.2 (L) 3.4 - 5.0 g/dL    Protein Total 7.2 6.8 - 8.8 g/dL    Alkaline Phosphatase 77 40 - 150 U/L    ALT 38 0 - 70 U/L    AST 23 0 - 45 U/L   Troponin I     Status: None   Result Value Ref Range    Troponin I ES <0.015 0.000 - 0.045 ug/L   EKG 12-lead, tracing only     Status: None (Preliminary result)   Result Value Ref Range    Interpretation ECG Click View Image link to view waveform and result      Medications   potassium chloride ER (KLOR-CON M) CR tablet 40 mEq (has no administration in time range)        Assessments & Plan (with Medical Decision Making)   Barry Mcgrath is a 30 year old male with a history of blindness, ESRD s/p kidney transplant (2006), HTN, bipolar disorder, depression, and anxiety who presents to the Emergency Department via EMS with left arm and left leg pain x 2 days.  Upon arrival patient is well-appearing, afebrile, no distress.  Patient hemodynamically stable vital signs within normal limits blood pressure 136/82, heart rate 79, oxygen 95% on room air.  Patient here with some left arm and left leg pain as well as some reported weakness that is now resolved.  Patient denies any fever, chest pain, shortness of breath, weakness,  dizziness, headache, paresthesias.  On examination cranial nerves II through XII intact with no focal motor, sensory, speech, gait deficit.  No motor or sensory deficits in bilateral upper and lower extremities.  Patient reported a dull achy pain in his left upper arm as well as his left thigh that seems to be worse with movement however is now resolved.    Unclear etiology of patient's symptoms, I have low suspicions for stroke as patient has no neurological deficits, resolution of the symptoms, and reported that the pain was worse with movement.  Will obtain EKG given the left arm pain as well has basic labs to rule out any metabolic electrolyte normality.    I reviewed EKG which demonstrates normal sinus rhythm with a ventricular rate of 74 bpm, normal axis, no acute ischemic change, no prior EKG to compare to.  I reviewed comprehensive labs which remarkable for white blood cell count of 14.4 (per chart review patient always has elevation between 13-14), hemoglobin 12.6, potassium mildly low at 3.2, no other acute metabolic electro abnormality, negative troponin.  Potassium replaced in the emergency department.  On reevaluation patient continues to be resting comfortably.  Denies any pain, weakness, and neurologically intact. No clinical signs of acute infection.  I discussed results with patient, at this time unclear etiology of patient's symptoms, I do encourage patient to follow-up closely with his primary care provider for further evaluation and follow-up; patient instructed to return immediately to the emergency department if any recurrent or worsening symptoms, weakness, tingling, numbness, chest pain, shortness of breath.  Patient understands agrees the plan.    I have reviewed the nursing notes. I have reviewed the findings, diagnosis, plan and need for follow up with the patient.    New Prescriptions    No medications on file       Final diagnoses:   Pain of left upper arm   Pain of left lower leg     I,  Zenobia Araujo, am serving as a trained medical scribe to document services personally performed by Sarahy Neves MD, based on the provider's statements to me.      I, Sarahy Neves MD, was physically present and have reviewed and verified the accuracy of this note documented by Zenobia Araujo.    --  Sarahy Neves MD  [facility] EMERGENCY DEPARTMENT  6/2/2021       Sarahy Neves MD  06/03/21 0135

## 2021-06-03 NOTE — TELEPHONE ENCOUNTER
Pt called reporting he had all day today and yesterday left side weakness and pain in his upper middle arm and left leg. Pt also reported mild to moderate weakness, muscle spasms. Pt reported pain was worse in the morning, and now only has pain on his left upper arm constant rated 3 or 4/10. Pt reported weakness on the left leg while walking, and when using the left arm.  Pt reported no injury.    Pt also stated he is mild to moderate obese, and  Blind.    Per protocal RN advised pt to go to the emergency room, and he stated his  is sleeping and he is blind. RN advised pt to wake his  up to take him to the ER, just to make sure it is not stroke related. Pt stated okay. No pcp to call for second level triage.       Dayton Mckinney RN  [facility]       COVID 19 Nurse Triage Plan/Patient Instructions    Please be aware that novel coronavirus (COVID-19) may be circulating in the community. If you develop symptoms such as fever, cough, or SOB or if you have concerns about the presence of another infection including coronavirus (COVID-19), please contact your health care provider or visit [email or web address].     Disposition/Instructions    ED Visit recommended. Follow protocol based instructions.     Bring Your Own Device:  Please also bring your smart device(s) (smart phones, tablets, laptops) and their charging cables for your personal use and to communicate with your care team during your visit.    Thank you for taking steps to prevent the spread of this virus.  o Limit your contact with others.  o Wear a simple mask to cover your cough.  o Wash your hands well and often.    Resources    [facility]: About COVID-19: [email or web address]    CDC: What to Do If You're Sick: www.cdc.gov/coronavirus/2019-ncov/about/steps-when-sick.html    CDC: Ending Home Isolation: www.cdc.gov/coronavirus/2019-ncov/hcp/disposition-in-home-patients.html     CDC: Caring for Someone:  "www.cdc.gov/coronavirus/2019-ncov/if-you-are-sick/care-for-someone.html     [facility]: Interim Guidance for Hospital Discharge to Home: www.health.[facility].[place]/diseases/coronavirus/hcp/hospdischarge.pdf    [facility] clinical trials (COVID-19 research studies): clinicalaffairs.[facility]/umn-clinical-trials     Below are the COVID-19 hotlines at the [place] Department of Health ([facility]). Interpreters are available.   o For health questions: Call 096-823-1782 or 1-863.598.7701 (7 a.m. to 7 p.m.)  o For questions about schools and childcare: Call 427-551-3966 or 1-254.761.3506 (7 a.m. to 7 p.m.)       Reason for Disposition    Patient sounds very sick or weak to the triager    Additional Information    Negative: Shock suspected (e.g., cold/pale/clammy skin, too weak to stand, low BP, rapid pulse)    Negative: [1] Similar pain previously AND [2] it was from \"heart attack\"    Negative: [1] Similar pain previously AND [2] it was from \"angina\" AND [3] not relieved by nitroglycerin    Negative: Sounds like a life-threatening emergency to the triager    Negative: Followed an arm injury    Negative: Chest pain    Negative: Pain, redness, or swelling at intravenous (IV) site or along course of vein    Negative: Wound looks infected    Negative: Elbow pain is main symptom    Negative: Wrist pain is main symptom    Negative: Difficulty breathing or unusual sweating (e.g., sweating without exertion)    Negative: [1] Age > 40 AND [2] associated chest or jaw pain AND [3] pain lasts > 5 minutes    Negative: [1] Age > 40 AND [2] no obvious cause AND [3] pain even when not moving the arm    (Exception: pain is clearly made worse by moving arm or bending neck)    Negative: [1] SEVERE pain AND [2] not improved 2 hours after pain medicine    Negative: [1] Red area or streak AND [2] fever    Negative: [1] Swollen joint AND [2] fever    Protocols used: ARM PAIN-A-AH      "

## 2021-06-07 ENCOUNTER — TELEPHONE (OUTPATIENT)
Dept: TRANSPLANT | Facility: CLINIC | Age: 31
End: 2021-06-07

## 2021-06-07 NOTE — TELEPHONE ENCOUNTER
Provider Call: General  Route to LPN2  Reason for call: Call from Nurses from the clinic has questions re his labs  Faxed over this Am  High immature granulytes  Has an infection  When done with treatment do we want him send to a specialist      Call back needed? Yes    Return Call Needed  Same as documented in contacts section  When to return call?: Greater than one day: Route standard priority

## 2021-06-23 DIAGNOSIS — Z94.0 STATUS POST KIDNEY TRANSPLANT: Primary | ICD-10-CM

## 2021-06-23 RX ORDER — MYCOPHENOLATE MOFETIL 250 MG/1
750 CAPSULE ORAL 2 TIMES DAILY
Qty: 180 CAPSULE | Refills: 3 | Status: SHIPPED | OUTPATIENT
Start: 2021-06-23 | End: 2021-07-08

## 2021-06-23 NOTE — TELEPHONE ENCOUNTER
[facility] Call Center    Phone Message    May a detailed message be left on voicemail: yes     Reason for Call: Medication Refill Request    Has the patient contacted the pharmacy for the refill? Yes   Name of medication being requested: mycophenolate (GENERIC EQUIVALENT) 250 MG capsule [[id number]] (Order [id number])  Provider who prescribed the medication: Dr Beckett  Pharmacy: [facility] Mail order - [phone] fax  Date medication is needed: ASAP        Action Taken: Message routed to:  [facility] Nephrology    Travel Screening: Not Applicable                                                                       Alert and oriented to person, place, time/situation. normal mood and affect. no apparent risk to self or others.

## 2021-06-24 ENCOUNTER — RECORDS - HEALTHEAST (OUTPATIENT)
Dept: LAB | Facility: HOSPITAL | Age: 31
End: 2021-06-24

## 2021-06-24 LAB
BASOPHILS # BLD AUTO: 0.1 THOU/UL (ref 0–0.2)
BASOPHILS NFR BLD AUTO: 1 % (ref 0–2)
EOSINOPHIL # BLD AUTO: 0.5 THOU/UL (ref 0–0.4)
EOSINOPHIL NFR BLD AUTO: 6 % (ref 0–6)
ERYTHROCYTE [DISTWIDTH] IN BLOOD BY AUTOMATED COUNT: 13.4 % (ref 11–14.5)
HCT VFR BLD AUTO: 41 % (ref 40–54)
HGB BLD-MCNC: 13.3 G/DL (ref 14–18)
IMM GRANULOCYTES # BLD: 0.1 THOU/UL
IMM GRANULOCYTES NFR BLD: 1 %
LYMPHOCYTES # BLD AUTO: 2.9 THOU/UL (ref 0.8–4.4)
LYMPHOCYTES NFR BLD AUTO: 30 % (ref 20–40)
MCH RBC QN AUTO: 27.4 PG (ref 27–34)
MCHC RBC AUTO-ENTMCNC: 32.4 G/DL (ref 32–36)
MCV RBC AUTO: 85 FL (ref 80–100)
MONOCYTES # BLD AUTO: 0.7 THOU/UL (ref 0–0.9)
MONOCYTES NFR BLD AUTO: 7 % (ref 2–10)
NEUTROPHILS # BLD AUTO: 5.4 THOU/UL (ref 2–7.7)
NEUTROPHILS NFR BLD AUTO: 56 % (ref 50–70)
PLATELET # BLD AUTO: 260 THOU/UL (ref 140–440)
PMV BLD AUTO: 11 FL (ref 8.5–12.5)
RBC # BLD AUTO: 4.85 MILL/UL (ref 4.4–6.2)
WBC: 9.6 THOU/UL (ref 4–11)

## 2021-07-05 DIAGNOSIS — Z94.0 IMMUNOSUPPRESSIVE MANAGEMENT ENCOUNTER FOLLOWING KIDNEY TRANSPLANT: ICD-10-CM

## 2021-07-05 DIAGNOSIS — Z94.0 STATUS POST KIDNEY TRANSPLANT: Primary | ICD-10-CM

## 2021-07-05 DIAGNOSIS — Z48.298 AFTERCARE FOLLOWING ORGAN TRANSPLANT: ICD-10-CM

## 2021-07-05 DIAGNOSIS — Z79.899 IMMUNOSUPPRESSIVE MANAGEMENT ENCOUNTER FOLLOWING KIDNEY TRANSPLANT: ICD-10-CM

## 2021-07-05 DIAGNOSIS — Z94.0 KIDNEY TRANSPLANTED: ICD-10-CM

## 2021-07-05 RX ORDER — PREDNISONE 5 MG/1
5 TABLET ORAL DAILY
Qty: 30 TABLET | Refills: 11 | Status: SHIPPED | OUTPATIENT
Start: 2021-07-05 | End: 2022-06-13

## 2021-07-05 RX ORDER — TACROLIMUS 0.5 MG/1
0.5 CAPSULE ORAL 2 TIMES DAILY
Qty: 60 CAPSULE | Refills: 11 | Status: SHIPPED | OUTPATIENT
Start: 2021-07-05 | End: 2022-05-24

## 2021-07-05 RX ORDER — TACROLIMUS 1 MG/1
1 CAPSULE ORAL 2 TIMES DAILY
Qty: 60 CAPSULE | Refills: 11 | Status: SHIPPED | OUTPATIENT
Start: 2021-07-05 | End: 2022-05-24

## 2021-07-06 ENCOUNTER — TELEPHONE (OUTPATIENT)
Dept: TRANSPLANT | Facility: CLINIC | Age: 31
End: 2021-07-06

## 2021-07-06 NOTE — TELEPHONE ENCOUNTER
Provider Call: General  Route to LPN    Reason for call: Clinic needs a call to discuss his CBC is abnormal     Call back needed? Yes    Return Call Needed  Same as documented in contacts section  When to return call?: Same day: Route High Priority

## 2021-07-08 ENCOUNTER — TELEPHONE (OUTPATIENT)
Dept: NEPHROLOGY | Facility: CLINIC | Age: 31
End: 2021-07-08

## 2021-07-08 DIAGNOSIS — Z48.298 AFTERCARE FOLLOWING ORGAN TRANSPLANT: ICD-10-CM

## 2021-07-08 DIAGNOSIS — D84.9 IMMUNOSUPPRESSION (H): ICD-10-CM

## 2021-07-08 DIAGNOSIS — Z94.0 STATUS POST KIDNEY TRANSPLANT: ICD-10-CM

## 2021-07-08 DIAGNOSIS — Z94.0 KIDNEY REPLACED BY TRANSPLANT: Primary | ICD-10-CM

## 2021-07-08 RX ORDER — MYCOPHENOLATE MOFETIL 250 MG/1
750 CAPSULE ORAL 2 TIMES DAILY
Qty: 540 CAPSULE | Refills: 3 | Status: SHIPPED | OUTPATIENT
Start: 2021-07-08 | End: 2022-05-17

## 2021-07-08 NOTE — TELEPHONE ENCOUNTER
Returned a call to the [facility] RN line (Nurses at [facility]  [phone] ).  RN states NP there had concerns regarding Shahnaz elevated immature granulocytes on his CBC.     The following message was sent to the provider.  Hi Dr. Garcia,  I got a call from Barry's NP at the [facility]. She had concerns about his immature granulocytes being consistently elevated.  She wanted our opinion on if she should refer to Heme?  Of note, Barry was seen by [facility] here in Dec 2019 for chronically chronic leukocytosis and mild neutrophilia. They ran a blood morphology and did not see any abnormal cells.  They suggested that if it was a concern to transplant then we should consider taking him off of his daily prednisone, but that was never done.  He is on Tac 4-6, Mycophenolate 750 mg BID and prednisone 5 mg daily.  Do you think he needs further work-up?    ADDENDUM:  Dayton Garcia MD Harris, Kathleen, RN  Sure. He can go to [facility] again. DO NOT stop prednisone as that can precipitate rejection.        Called [facility] nurse line, left a voice message with recommendations above.

## 2021-07-08 NOTE — TELEPHONE ENCOUNTER
Returned a call to Barry regarding med refills. He states he wants a 90 day supply of Mycophenolate sent to [facility] Mail order pharmacy. Prescription sent. No further needs.

## 2021-07-08 NOTE — TELEPHONE ENCOUNTER
Patient called asking that Dr Garcia refills one of his meds and I wasn't able to understand the rest of what he was saying before he hung up.  Please call him back.

## 2021-07-13 ENCOUNTER — TELEPHONE (OUTPATIENT)
Dept: DERMATOLOGY | Facility: CLINIC | Age: 31
End: 2021-07-13
Payer: MEDICARE

## 2021-07-13 ENCOUNTER — TELEPHONE (OUTPATIENT)
Dept: TRANSPLANT | Facility: CLINIC | Age: 31
End: 2021-07-13

## 2021-07-13 NOTE — TELEPHONE ENCOUNTER
"[facility] Call Center    Phone Message    May a detailed message be left on voicemail: yes     Reason for Call: Symptoms or Concerns     If patient has red-flag symptoms, warm transfer to triage line    Current symptom or concern:   Recurring abcesses on body including buttox, abcesses size of \"BB\"    Symptoms have been present for:   years(s)    Has patient previously been seen for this? Yes    By    Date:     Are there any new or worsening symptoms? Yes: recurring    Action Taken: Message routed to:  Adult Clinics: Dermatology p [phone]    Travel Screening: Not Applicable                                                                        "

## 2021-07-13 NOTE — TELEPHONE ENCOUNTER
Rebekah Chilel, RN  [facility] Procedure Coordinator Just now (4:05 PM)   TA  New patient with possible hidradenitis suppurativa.  Schedule with any general derm

## 2021-07-13 NOTE — TELEPHONE ENCOUNTER
July 13, 2021 11:07 AM -  AIVERSE1: pt called to speak w cord about refills and 90 day supply , has question s about difference  between [facility] and [facility] ,gave number to pharmacy to find out , messaged cord via teams also encounter

## 2021-07-14 NOTE — TELEPHONE ENCOUNTER
Patient has been scheduled.      Ana Turcios  Surgical Specialties Procedure   [employer] [place]  11/17/2020 8:33 AM

## 2021-07-15 ENCOUNTER — TRANSCRIBE ORDERS (OUTPATIENT)
Dept: OTHER | Age: 31
End: 2021-07-15

## 2021-07-15 DIAGNOSIS — R79.89 ABNORMAL CBC: Primary | ICD-10-CM

## 2021-07-20 ENCOUNTER — TELEPHONE (OUTPATIENT)
Dept: NEPHROLOGY | Facility: CLINIC | Age: 31
End: 2021-07-20

## 2021-07-20 NOTE — TELEPHONE ENCOUNTER
Prior Authorization Retail Medication Request    Medication/Dose: predniSONE (DELTASONE) 5 MG tablet  ICD code (if different than what is on RX):  Status post kidney transplant [Z94.0], Kidney transplanted [Z94.0], Aftercare following organ transplant [Z48.298], Immunosuppressive management encounter following kidney transplant [Z79.899, Z94.0]   Previously Tried and Failed:   Rationale:     Insurance Name:[employer]  Insurance ID: [id number]    Pharmacy Information (if different than what is on RX)  Name:  [facility] - [facility] [address]  Phone: [phone]

## 2021-07-21 NOTE — TELEPHONE ENCOUNTER
PA Initiation    Medication: predniSONE (DELTASONE) 5 MG tablet  Insurance Company: [employer] - Phone [phone] Fax [phone]  Pharmacy Filling the Rx: [facility] - [place] [id number]  Filling Pharmacy Phone: [phone]  Filling Pharmacy Fax: [phone]  Start Date: 7/21/2021

## 2021-07-21 NOTE — TELEPHONE ENCOUNTER
PRIOR AUTHORIZATION DENIED    Medication: predniSONE (DELTASONE) 5 MG tablet--DENIED    Denial Date: 7/21/2021    Denial Rational: Not covered under Medicare Part D, possible coverage under Medicare Part B.  Attempted to contact pharmacy to see if they could process under Part B. They just ended up transferring me to [employer] insurance plan.  PA team doesn't handle Part B requests.     Appeal Information:

## 2021-07-21 NOTE — TELEPHONE ENCOUNTER
Per call to Medicare coordination and benefits recovery call center phone [phone], rep Eder ID [id number] confirmed patient is entitled to Medicare disability Part A, B, D effective 11/1/13.  Confirmed no previous Medicare or Medicare ESRD entitlement back in 2006 or at the time of transplant 2/28/2006. Therefore, immunos should be billed to Part D benefits.  Will appeal denial.

## 2021-07-22 NOTE — TELEPHONE ENCOUNTER
Medication Appeal Initiation    We have initiated an appeal for the requested medication:  Medication: predniSONE 5mg - appeal initiated  Appeal Start Date:  7/22/2021  Insurance Company: [employer] - Phone [phone] Fax [phone]  Comments:

## 2021-07-23 NOTE — TELEPHONE ENCOUNTER
MEDICATION APPEAL DENIED    Medication: predniSONE 5mg - appeal denied    Denial Date: 7/22/2021    Denial Rational:         Second Level Appeal Information:

## 2021-07-25 ENCOUNTER — NURSE TRIAGE (OUTPATIENT)
Dept: NURSING | Facility: CLINIC | Age: 31
End: 2021-07-25

## 2021-07-25 NOTE — TELEPHONE ENCOUNTER
"Caller states he has an Infection in his tooth, and a toothache. Antibiotic ordered for one twice a day. He took 1 on Friday, Saturday and today. He is blind, he did not realize he was to take it twice a day. He took it last just before this call.   Prescribed by [facility]. He is in a lot of pain=\"12\"/10.   He is taking Ibuprofen 600mg alternating every 4 hrs with Tylenol 1000mg. \"It helps.\"  He wants to know what effect the antibiotic will have on his infection if he was not taking it correctly, and should he take more ? Advised to take twice daily as prescribed, and contact oncall provider to discuss his concerns regarding not having taken as much as was prescribed.      Reason for Disposition    [1] Caller has URGENT medication question about med that PCP or specialist prescribed AND [2] triager unable to answer question    Additional Information    Negative: Drug overdose and triager unable to answer question    Negative: Caller requesting information unrelated to medicine    Negative: Caller requesting a prescription for Strep throat and has a positive culture result    Negative: Rash while taking a medication or within 3 days of stopping it    Negative: Immunization reaction suspected    Negative: [1] Asthma and [2] having symptoms of asthma (cough, wheezing, etc.)    Negative: [1] Influenza symptoms AND [2] anti-viral med prescription request, such as Tamiflu    Negative: [1] Symptom of illness (e.g., headache, abdominal pain, earache, vomiting) AND [2] more than mild    Negative: MORE THAN A DOUBLE DOSE of a prescription or over-the-counter (OTC) drug    Negative: [1] DOUBLE DOSE (an extra dose or lesser amount) of over-the-counter (OTC) drug AND [2] any symptoms (e.g., dizziness, nausea, pain, sleepiness)    Negative: [1] DOUBLE DOSE (an extra dose or lesser amount) of prescription drug AND [2] any symptoms (e.g., dizziness, nausea, pain, sleepiness)    Negative: Took another person's " "prescription drug    Negative: [1] DOUBLE DOSE (an extra dose or lesser amount) of prescription drug AND [2] NO symptoms (Exception: a double dose of antibiotics)    Negative: Diabetes drug error or overdose (e.g., took wrong type of insulin or took extra dose)    Negative: [1] Request for URGENT new prescription or refill of \"essential\" medication (i.e., likelihood of harm to patient if not taken) AND [2] triager unable to fill per unit policy    Negative: [1] Prescription not at pharmacy AND [2] was prescribed by PCP recently    Negative: [1] Pharmacy calling with prescription questions AND [2] triager unable to answer question    Protocols used: MEDICATION QUESTION CALL-A-AH      "

## 2021-07-28 ENCOUNTER — OFFICE VISIT (OUTPATIENT)
Dept: DERMATOLOGY | Facility: CLINIC | Age: 31
End: 2021-07-28
Payer: MEDICARE

## 2021-07-28 DIAGNOSIS — L73.2 HIDRADENITIS SUPPURATIVA: ICD-10-CM

## 2021-07-28 DIAGNOSIS — L02.213 ABSCESS OF CHEST WALL: Primary | ICD-10-CM

## 2021-07-28 DIAGNOSIS — L02.419 AXILLARY ABSCESS: ICD-10-CM

## 2021-07-28 PROCEDURE — 87070 CULTURE OTHR SPECIMN AEROBIC: CPT | Performed by: DERMATOLOGY

## 2021-07-28 PROCEDURE — 99202 OFFICE O/P NEW SF 15 MIN: CPT | Performed by: DERMATOLOGY

## 2021-07-28 RX ORDER — AMOXICILLIN 875 MG
TABLET ORAL
COMMUNITY
Start: 2021-07-23 | End: 2022-06-13

## 2021-07-28 RX ORDER — CLINDAMYCIN PHOSPHATE 10 UG/ML
LOTION TOPICAL
Qty: 60 ML | Refills: 3 | Status: SHIPPED | OUTPATIENT
Start: 2021-07-28 | End: 2021-12-08

## 2021-07-28 ASSESSMENT — PAIN SCALES - GENERAL: PAINLEVEL: NO PAIN (0)

## 2021-07-28 NOTE — LETTER
7/28/2021         RE: Yaz Mcgrath  [address]  [place] [address]        Dear Colleague,    Thank you for referring your patient, Yaz Mcgrath, to the [facility]. Please see a copy of my visit note below.    Visit Date: 07/28/2021    SUBJECTIVE:  This is a first visit  for Yaz Mcgrath who comes in for an evaluation of lesions on his right breast and upper body.  Yaz is a kidney transplant recipient.  He is completely blind and is a .  He recently saw his dentist, Dr. Galvez and was placed on amoxicillin due to some dental problems.  His nephrologist is Dr. Salcedo. During the visit he had Yaz's cellphone contact with a support person from his group home and he entered the discussion.     OBJECTIVE:  shows a pleasant gentleman in no distress, but on his right breast, there is an abscess like lesion, about a centimeter in diameter with some pus exuding from it and  examination of the axillae revealed numerous sinuses and firm papules.  Likewise, the groin shows numerous sinus like lesions and lesions suggesting hidradenitis suppurativa.  He has had some upper gluteal cleft lesions, but they are present not present  today.    ASSESSMENT:    1.  Untreated hidradenitis suppurativa.  The lesion from his right breast was cultured for bacteria.  2.  I discussed with him the nature of hidradenitis suppurativa.  His phone friend   asked very appropriate questions about what this is all about.  I advised yaz this was a chronic condition and it had to do with the blockage of apocrine glands and it was often associated with acne.  He did have some mild acne lesions, but nothing too severe.  The distribution and the nature of the lesions fits to me with hidradenitis suppurativa. This may have been diagnosed in the past but I could not find those records if they exist.     PLAN:    1.  Topical clindamycin to all affected sites axilla, groin, breast areas  b.i.d.  2.  We will change antibiotics if the culture shows that the organism was sensitive to a different antibiotic than amoxicillin.  3.  Would have him return here in a few months to see one of our dermatology colleagues for other considerations for hidradenitis.  I did advise there are many new treatments and that Dr. Simpson at the  is an expert in this area, so he may end up seeing Dr. Simpson depending on how he does with some simple measures at this time.      Return in 2 to 3 months.  We will get the results of the culture from the right chest abscess to him when we have it available.  MEDS AND ALLERGIES REVIEWED.    JOAO Sierra MD        D: 2021   T: 2021   MT: omar    Name:     DYAN GARCIA  MRN:      [id number]        Account:    [id number]   :      1990           Visit Date: 2021     Document: [id number]        Again, thank you for allowing me to participate in the care of your patient.        Sincerely,        JOAO Sierra MD

## 2021-07-28 NOTE — NURSING NOTE
Barry Mcgrath's goals for this visit include:   Chief Complaint   Patient presents with     Derm Problem     8 years ago, pt developed abcess above gluteal cleft, had it for quite awhile, would come and go. every few months. It did self resolve. This March pt developed abcess to R armpit. Pt states it was as big as his hand, had it lanced. It resolved then in May developed one in left armpit now there is one to R chest. No personal or family hx of SC. Hx of immunosuppresion.      He requests these members of his care team be copied on today's visit information:     PCP: Carly Agustin    Referring Provider:  Referred Self, MD  No address on file    There were no vitals taken for this visit.    Do you need any medication refills at today's visit? No    Pia Sanders LPN on 7/28/2021 at 8:53 AM

## 2021-07-28 NOTE — PROGRESS NOTES
Visit Date: 07/28/2021    SUBJECTIVE:  This is a first visit  for Yaz Mcgrath who comes in for an evaluation of lesions on his right breast and upper body.  Yaz is a kidney transplant recipient.  He is completely blind and is a .  He recently saw his dentist, Dr. Galvez and was placed on amoxicillin due to some dental problems.  His nephrologist is Dr. Salcedo. During the visit he had Yaz's cellphone contact with a support person from his group home and he entered the discussion.     OBJECTIVE:  shows a pleasant gentleman in no distress, but on his right breast, there is an abscess like lesion, about a centimeter in diameter with some pus exuding from it and  examination of the axillae revealed numerous sinuses and firm papules.  Likewise, the groin shows numerous sinus like lesions and lesions suggesting hidradenitis suppurativa.  He has had some upper gluteal cleft lesions, but they are present not present  today.    ASSESSMENT:    1.  Untreated hidradenitis suppurativa.  The lesion from his right breast was cultured for bacteria.  2.  I discussed with him the nature of hidradenitis suppurativa.  His phone friend   asked very appropriate questions about what this is all about.  I advised yaz this was a chronic condition and it had to do with the blockage of apocrine glands and it was often associated with acne.  He did have some mild acne lesions, but nothing too severe.  The distribution and the nature of the lesions fits to me with hidradenitis suppurativa. This may have been diagnosed in the past but I could not find those records if they exist.     PLAN:    1.  Topical clindamycin to all affected sites axilla, groin, breast areas b.i.d.  2.  We will change antibiotics if the culture shows that the organism was sensitive to a different antibiotic than amoxicillin.  3.  Would have him return here in a few months to see one of our dermatology colleagues for other considerations for  hidradenitis.  I did advise there are many new treatments and that Dr. Simpson at the  is an expert in this area, so he may end up seeing Dr. Simpson depending on how he does with some simple measures at this time.      Return in 2 to 3 months.  We will get the results of the culture from the right chest abscess to him when we have it available.  MEDS AND ALLERGIES REVIEWED.    H Madhu Sierra MD        D: 2021   T: 2021   MT: omar    Name:     DYAN GARCIA LENORE  MRN:      [id number]        Account:    [id number]   :      1990           Visit Date: 2021     Document: [id number]

## 2021-07-28 NOTE — PATIENT INSTRUCTIONS
I believe that your condition is called hidradenitis suppurativa.  It is an affliction of sweat glands found in the armpits, groin, buttocks and  breast areas.    Today we cultured some pus from your right breast abscess. We will call you when the culture comes back in a few days - and may change  Your antibiotic if indicated.    I will prescribe a topical antibiotic for you to apply to all sites of previous abscesses - armpits, groin, breasts.

## 2021-07-29 ENCOUNTER — TELEPHONE (OUTPATIENT)
Dept: DERMATOLOGY | Facility: CLINIC | Age: 31
End: 2021-07-29

## 2021-07-29 NOTE — TELEPHONE ENCOUNTER
No STEFANY on file. Chart CC'd to Carly Agustin NP from yesterday. Left general message for clinic to call back, If they need records faxed they will need to have pt complete STEFANY and fax to Medical Records @[phone]. Pt notified and states he thinks the CC'd note should be all that is needed.    Alexa Linares, CMA on 7/29/2021 at 3:26 PM

## 2021-07-29 NOTE — TELEPHONE ENCOUNTER
[facility] Call Center    Phone Message    May a detailed message be left on voicemail: no     Reason for Call: Other: Pt needs his office summary/notes faxed to [facility].  Attn: Carly Agustin, Nurse Practionier.   Phone: [phone]. Pt did  not have fax number.   Office visit from 7.28.21 with Dr Sierra in derm.       Action Taken: Message routed to:  Adult Clinics: Dermatology p [phone]    Travel Screening: Not Applicable

## 2021-07-30 LAB — BACTERIA ABSC ANAEROBE+AEROBE CULT: NORMAL

## 2021-08-01 ENCOUNTER — NURSE TRIAGE (OUTPATIENT)
Dept: NURSING | Facility: CLINIC | Age: 31
End: 2021-08-01

## 2021-08-01 NOTE — TELEPHONE ENCOUNTER
Patient calling wondering what type of soap he is to use pre op for his Derm procedure on 8/3/21.  Is is Hibiclens.    Rosalind Ellsworth, RN MAN   Triage Nurse Advisor [facility]    '    Reason for Disposition    Caller has medication question only, adult not sick, and triager answers question    Protocols used: MEDICATION QUESTION CALL-A-AH

## 2021-08-19 ENCOUNTER — NURSE TRIAGE (OUTPATIENT)
Dept: NURSING | Facility: CLINIC | Age: 31
End: 2021-08-19

## 2021-08-20 NOTE — TELEPHONE ENCOUNTER
Patient calling for more information about the Lambda variant . Given [facility] and [facility] as good resources   Rosalind Ellsworth RN MAN   Triage Nurse Advisor [facility]      Reason for Disposition    COVID-19 Disease, questions about    Protocols used: CORONAVIRUS (COVID-19) DIAGNOSED OR [id number] 3.25

## 2021-08-21 ENCOUNTER — NURSE TRIAGE (OUTPATIENT)
Dept: NURSING | Facility: CLINIC | Age: 31
End: 2021-08-21

## 2021-08-21 NOTE — TELEPHONE ENCOUNTER
Patient's PCP is at the [facility].     For the last 2-3 days experiencing pain and sensitivity in lower abdomen, near bladder    Downward pain when pushing on it.   Centralized   Below belly button  Hurts more in abdomen when he urinates  Rates pain 3-4/10  Constant but waves of worsening pain  Started suddenly    Has intermittent diarrhea that has been coming and going due to diet     Worse when he sits on the toilet to have BM, if he leans forward  -goes away if he leans back    Similar to when he had kidney stones or when he was on dialysis and they had to flush his catheter    Feels like he is emptying his bladder ok.     Has not had a catheter for 15 years    No burning in penis when urinating  No fever  No frequency  No shooting pain  No nausea/vomiting  No constipation   No blood in urine/stool that he is aware (pt is blind)  No pain in scrotum or testicle    Triaged to a disposition of See HCP within 4 hours. Patient is agreeable. May wait to go to .     COVID 19 Nurse Triage Plan/Patient Instructions    Please be aware that novel coronavirus (COVID-19) may be circulating in the community. If you develop symptoms such as fever, cough, or SOB or if you have concerns about the presence of another infection including coronavirus (COVID-19), please contact your health care provider or visit [email or web address].     Disposition/Instructions    In-Person Visit with provider recommended. Reference Visit Selection Guide.  ED Visit recommended. Follow protocol based instructions.     Bring Your Own Device:  Please also bring your smart device(s) (smart phones, tablets, laptops) and their charging cables for your personal use and to communicate with your care team during your visit.    Thank you for taking steps to prevent the spread of this virus.  o Limit your contact with others.  o Wear a simple mask to cover your cough.  o Wash your hands well and often.    Resources     [facility]:  "About COVID-19: [email or web address]    CDC: What to Do If You're Sick: www.cdc.gov/coronavirus/2019-ncov/about/steps-when-sick.html    CDC: Ending Home Isolation: www.cdc.gov/coronavirus/2019-ncov/hcp/disposition-in-home-patients.html     CDC: Caring for Someone: www.cdc.gov/coronavirus/2019-ncov/if-you-are-sick/care-for-someone.html     [facility]: Interim Guidance for Hospital Discharge to Home: www.health.[facility].[place]/diseases/coronavirus/hcp/hospdischarge.pdf    [facility] clinical trials (COVID-19 research studies): clinicalaffairs.[facility]-clinical-trials     Below are the COVID-19 hotlines at the [place] [facility] ([facility]). Interpreters are available.   o For health questions: Call 383-979-4509 or 1-632.707.9513 (7 a.m. to 7 p.m.)  o For questions about schools and childcare: Call 216-805-2401 or 1-329.565.9279 (7 a.m. to 7 p.m.)    Reason for Disposition    [1] MILD-MODERATE pain AND [2] constant AND [3] present > 2 hours    Additional Information    Negative: Shock suspected (e.g., cold/pale/clammy skin, too weak to stand, low BP, rapid pulse)    Negative: Difficult to awaken or acting confused (e.g., disoriented, slurred speech)    Negative: Passed out (i.e., lost consciousness, collapsed and was not responding)    Negative: Sounds like a life-threatening emergency to the triager    Negative: Chest pain    Negative: Pain is mainly in upper abdomen  (if needed ask: \"is it mainly above the belly button?\")    Negative: Followed an abdomen (stomach) injury    Negative: [1] SEVERE pain (e.g., excruciating) AND [2] present > 1 hour    Negative: [1] SEVERE pain AND [2] age > 60    Negative: [1] Vomiting AND [2] contains red blood or black (\"coffee ground\") material  (Exception: few red streaks in vomit that only happened once)    Negative: Blood in bowel movements  (Exception: Blood on surface of BM with constipation)    Negative: Black or tarry bowel movements  (Exception: " chronic-unchanged  black-grey bowel movements AND is taking iron pills or Pepto-bismol)    Negative: [1] Unable to urinate (or only a few drops) > 4 hours AND [2] bladder feels very full (e.g., palpable bladder or strong urge to urinate)    Negative: [1] Pain in the scrotum or testicle AND [2] present > 1 hour    Negative: Patient sounds very sick or weak to the triager    Protocols used: ABDOMINAL PAIN - MALE-A-    Lisa Manley RN on 8/21/2021 at 3:26 AM

## 2021-08-23 ENCOUNTER — LAB (OUTPATIENT)
Dept: INFUSION THERAPY | Facility: HOSPITAL | Age: 31
End: 2021-08-23
Attending: INTERNAL MEDICINE
Payer: MEDICARE

## 2021-08-23 ENCOUNTER — ONCOLOGY VISIT (OUTPATIENT)
Dept: ONCOLOGY | Facility: HOSPITAL | Age: 31
End: 2021-08-23
Attending: INTERNAL MEDICINE
Payer: MEDICARE

## 2021-08-23 ENCOUNTER — HOSPITAL ENCOUNTER (EMERGENCY)
Facility: HOSPITAL | Age: 31
Discharge: LEFT BEFORE DISCHARGE | End: 2021-08-23
Attending: EMERGENCY MEDICINE
Payer: MEDICARE

## 2021-08-23 VITALS
DIASTOLIC BLOOD PRESSURE: 68 MMHG | HEIGHT: 71 IN | WEIGHT: 259 LBS | SYSTOLIC BLOOD PRESSURE: 137 MMHG | BODY MASS INDEX: 36.26 KG/M2 | HEART RATE: 69 BPM | OXYGEN SATURATION: 96 % | TEMPERATURE: 98 F

## 2021-08-23 VITALS
OXYGEN SATURATION: 94 % | RESPIRATION RATE: 18 BRPM | HEART RATE: 85 BPM | TEMPERATURE: 98.2 F | HEIGHT: 70 IN | SYSTOLIC BLOOD PRESSURE: 130 MMHG | BODY MASS INDEX: 35.5 KG/M2 | DIASTOLIC BLOOD PRESSURE: 73 MMHG | WEIGHT: 248 LBS

## 2021-08-23 DIAGNOSIS — R79.89 ABNORMAL CBC: ICD-10-CM

## 2021-08-23 LAB
ALBUMIN SERPL-MCNC: 3.4 G/DL (ref 3.5–5)
ALP SERPL-CCNC: 88 U/L (ref 45–120)
ALT SERPL W P-5'-P-CCNC: 39 U/L (ref 0–45)
ANION GAP SERPL CALCULATED.3IONS-SCNC: 9 MMOL/L (ref 5–18)
AST SERPL W P-5'-P-CCNC: 23 U/L (ref 0–40)
BASOPHILS # BLD AUTO: 0.1 10E3/UL (ref 0–0.2)
BASOPHILS NFR BLD AUTO: 1 %
BILIRUB SERPL-MCNC: 0.4 MG/DL (ref 0–1)
BUN SERPL-MCNC: 9 MG/DL (ref 8–22)
CALCIUM SERPL-MCNC: 9 MG/DL (ref 8.5–10.5)
CHLORIDE BLD-SCNC: 105 MMOL/L (ref 98–107)
CO2 SERPL-SCNC: 26 MMOL/L (ref 22–31)
CREAT SERPL-MCNC: 0.79 MG/DL (ref 0.7–1.3)
EOSINOPHIL # BLD AUTO: 0.5 10E3/UL (ref 0–0.7)
EOSINOPHIL NFR BLD AUTO: 6 %
ERYTHROCYTE [DISTWIDTH] IN BLOOD BY AUTOMATED COUNT: 13.3 % (ref 10–15)
GFR SERPL CREATININE-BSD FRML MDRD: >90 ML/MIN/1.73M2
GLUCOSE BLD-MCNC: 144 MG/DL (ref 70–125)
HCT VFR BLD AUTO: 39.1 % (ref 40–53)
HGB BLD-MCNC: 12.7 G/DL (ref 13.3–17.7)
IMM GRANULOCYTES # BLD: 0.1 10E3/UL
IMM GRANULOCYTES NFR BLD: 1 %
LYMPHOCYTES # BLD AUTO: 2.2 10E3/UL (ref 0.8–5.3)
LYMPHOCYTES NFR BLD AUTO: 22 %
MCH RBC QN AUTO: 27.1 PG (ref 26.5–33)
MCHC RBC AUTO-ENTMCNC: 32.5 G/DL (ref 31.5–36.5)
MCV RBC AUTO: 83 FL (ref 78–100)
MONOCYTES # BLD AUTO: 0.8 10E3/UL (ref 0–1.3)
MONOCYTES NFR BLD AUTO: 9 %
NEUTROPHILS # BLD AUTO: 6.1 10E3/UL (ref 1.6–8.3)
NEUTROPHILS NFR BLD AUTO: 61 %
NRBC # BLD AUTO: 0 10E3/UL
NRBC BLD AUTO-RTO: 0 /100
PLATELET # BLD AUTO: 238 10E3/UL (ref 150–450)
POTASSIUM BLD-SCNC: 3.4 MMOL/L (ref 3.5–5)
PROT SERPL-MCNC: 7.3 G/DL (ref 6–8)
RBC # BLD AUTO: 4.69 10E6/UL (ref 4.4–5.9)
SODIUM SERPL-SCNC: 140 MMOL/L (ref 136–145)
WBC # BLD AUTO: 9.7 10E3/UL (ref 4–11)

## 2021-08-23 PROCEDURE — 82040 ASSAY OF SERUM ALBUMIN: CPT | Performed by: INTERNAL MEDICINE

## 2021-08-23 PROCEDURE — G0463 HOSPITAL OUTPT CLINIC VISIT: HCPCS

## 2021-08-23 PROCEDURE — 36415 COLL VENOUS BLD VENIPUNCTURE: CPT | Performed by: INTERNAL MEDICINE

## 2021-08-23 PROCEDURE — 85004 AUTOMATED DIFF WBC COUNT: CPT | Performed by: INTERNAL MEDICINE

## 2021-08-23 PROCEDURE — 99204 OFFICE O/P NEW MOD 45 MIN: CPT | Performed by: INTERNAL MEDICINE

## 2021-08-23 ASSESSMENT — PAIN SCALES - GENERAL: PAINLEVEL: NO PAIN (0)

## 2021-08-23 ASSESSMENT — MIFFLIN-ST. JEOR
SCORE: 2096.17
SCORE: 2154.01

## 2021-08-23 NOTE — LETTER
"    8/23/2021         RE: Barry Mcgrath  [address]  [facility] [address]        Dear Colleague,    Thank you for referring your patient, Barry Mcgrath, to the [facility]. Please see a copy of my visit note below.    Oncology Rooming Note    August 23, 2021 9:41 AM   Barry Mcgrath is a 30 year old male who presents for:    Chief Complaint   Patient presents with     Oncology Clinic Visit     NEW - abmornal CBC     Initial Vitals: /68 (BP Location: Right arm, Patient Position: Sitting, Cuff Size: Adult Large)   Pulse 69   Temp 98  F (36.7  C) (Oral)   Ht 1.791 m (5' 10.5\")   Wt 117.5 kg (259 lb)   SpO2 96%   BMI 36.64 kg/m   Estimated body mass index is 36.64 kg/m  as calculated from the following:    Height as of this encounter: 1.791 m (5' 10.5\").    Weight as of this encounter: 117.5 kg (259 lb). Body surface area is 2.42 meters squared.  No Pain (0) Comment: Data Unavailable   No LMP for male patient.  Allergies reviewed: Yes  Medications reviewed: Yes    Medications: Medication refills not needed today.  Pharmacy name entered into [facility]:    [id number] [[place]], [address] PHARMACY MAIL DELIVERY - [address]    Clinical concerns:  New - abnormal CBC      Edith Fair, [facility] Hematology and Oncology Consult Note    Patient: Barry Mcgrath  MRN: [id number]  Date of Service: Aug 23, 2021       Reason for Visit    I was consulted for 'abnormal blood counts'    Assessment/Plan    Borderline and non-progressing normochromic normocytic anemia  Prior leucocytosis and eosinophilia, now resolved; borderline increase in immature granulocytes,, stable.  Normal platelet count    Asymptomatic from the above.  No indication for additional diagnostic testing at this point and does not require active involvement by our service unless counts change substantially.    ECOG " Performance = 0    0 - Independent    Encounter Diagnoses:    Problem List Items Addressed This Visit     None      Visit Diagnoses     Abnormal CBC        Relevant Orders    CBC with platelets and differential    **UA reflex to Microscopic FUTURE 2mo    **Comprehensive metabolic panel FUTURE 2mo              ______________________________________________________________________________    Staging History  Cancer Staging  No matching staging information was found for the patient.      History    Mr. Barry Mcgrath is a 30 year old  Who is referred here to assess alterations in CBC.  The patient himself is not experiencing any overall change in health.    He does have a number of baseline health concerns, including blindness since infancy, post renal transplant (>1 decade out), generalized anxiety disorder (visit with psychiatrist this afternoon), prediabetes.    He has not had a hematologic evaluation in the past.  He's had no bleeding, weight change or other concerns related to blood counts.    He reports some suprapubic soreness, thinks he may have a bladder infection and wants a UA sent.    Review of systems.  No fever or night sweats.  No loss of weight.  No lumps or bumps anywhere.  No unusual headaches or eyesight issues.  No dizziness.  No bleeding from the nose.  No sores in the mouth. No problems with swallowing.  No chest pain. No shortness of breath. No cough.  No abdominal pain. No nausea or vomiting.  No diarrhea or constipation.  No blood in stool or black colored stools.  No problems passing urine.  No numbness or tingling in hands or feet.  No skin rashes.  A 14 point review of systems is otherwise negative.    Past History    Past Medical History:   Diagnosis Date     Anxiety      Bipolar 1 disorder (H)      Bipolar 2 disorder (H)      Blind      Depressive disorder      Diabetes (H)     pre diabetic     Hypertension      PTSD (post-traumatic stress disorder)      Sleep apnea     uses cpap     Past  Surgical History:   Procedure Laterality Date     TRANSPLANT      Kidney transplant in 2006     Family History   Problem Relation Age of Onset     Sleep Apnea Father      Diabetes Father      Sleep Apnea Brother      Glaucoma Maternal Grandfather      Macular Degeneration No family hx of      Social History     Socioeconomic History     Marital status: Single     Spouse name: None     Number of children: None     Years of education: None     Highest education level: None   Occupational History     None   Tobacco Use     Smoking status: Never Smoker     Smokeless tobacco: Never Used   Substance and Sexual Activity     Alcohol use: No     Drug use: No     Sexual activity: Never   Other Topics Concern     Parent/sibling w/ CABG, MI or angioplasty before 65F 55M? Not Asked   Social History Narrative     None     Social Determinants of Health     Financial Resource Strain:      Difficulty of Paying Living Expenses:    Food Insecurity:      Worried About Running Out of Food in the Last Year:      Ran Out of Food in the Last Year:    Transportation Needs:      Lack of Transportation (Medical):      Lack of Transportation (Non-Medical):    Physical Activity:      Days of Exercise per Week:      Minutes of Exercise per Session:    Stress:      Feeling of Stress :    Social Connections:      Frequency of Communication with Friends and Family:      Frequency of Social Gatherings with Friends and Family:      Attends Rastafari Services:      Active Member of Clubs or Organizations:      Attends Club or Organization Meetings:      Marital Status:    Intimate Partner Violence:      Fear of Current or Ex-Partner:      Emotionally Abused:      Physically Abused:      Sexually Abused:      Has a personal care attendant who joins by phone.    Allergies    Allergies   Allergen Reactions     Seasonal Allergies Other (See Comments)     hamster hair     Cats      Itching, nasal congestion     Dogs      Itching, nasal congestion     No  "Known Allergies            Physical Exam    /68 (BP Location: Right arm, Patient Position: Sitting, Cuff Size: Adult Large)   Pulse 69   Temp 98  F (36.7  C) (Oral)   Ht 1.791 m (5' 10.5\")   Wt 117.5 kg (259 lb)   SpO2 96%   BMI 36.64 kg/m      Obese white male, obviously blind;     GENERAL: Alert and oriented to time place and person. Seated comfortably. In no distress.    HEAD: Atraumatic and normocephalic.  There is a deep scar in the middle of the forehead from prior trauma    EYES: WESLEY, EOMI.  Eyes are disconjugate and obviously blind  No pallor.  No icterus.    NECK: supple. JVP normal.  No thyroid enlargement.    LYMPH NODES: No palpable, cervical, axillary or inguinal lymphadenopathy.    CHEST: clear to auscultation bilaterally.  Resonant to percussion throughout bilaterally.  Symmetrical breath movements bilaterally.    CVS: S1 and S2 are heard. Regular rate and rhythm.  No murmur or gallop or rub heard.  No peripheral edema.    ABDOMEN: Soft. Not tender. Not distended.  No palpable hepatomegaly or splenomegaly.  No other mass palpable.  Bowel sounds heard.  Scars on left abdomen from earlier renal transplant.    EXTREMITIES: Warm.    SKIN: no rash, or bruising or purpura.  Has a full head of hair.    Lab Results    Recent Results (from the past 720 hour(s))   Abscess Aerobic Bacterial Culture Routine    Collection Time: 07/28/21  9:05 AM    Specimen: Chest; Abscess   Result Value Ref Range    Culture 1+ Normal ranjith    **Comprehensive metabolic panel FUTURE 2mo    Collection Time: 08/23/21 10:12 AM   Result Value Ref Range    Sodium 140 136 - 145 mmol/L    Potassium 3.4 (L) 3.5 - 5.0 mmol/L    Chloride 105 98 - 107 mmol/L    Carbon Dioxide (CO2) 26 22 - 31 mmol/L    Anion Gap 9 5 - 18 mmol/L    Urea Nitrogen 9 8 - 22 mg/dL    Creatinine 0.79 0.70 - 1.30 mg/dL    Calcium 9.0 8.5 - 10.5 mg/dL    Glucose 144 (H) 70 - 125 mg/dL    Alkaline Phosphatase 88 45 - 120 U/L    AST 23 0 - 40 U/L    ALT " 39 0 - 45 U/L    Protein Total 7.3 6.0 - 8.0 g/dL    Albumin 3.4 (L) 3.5 - 5.0 g/dL    Bilirubin Total 0.4 0.0 - 1.0 mg/dL    GFR Estimate >90 >60 mL/min/1.73m2   CBC with platelets and differential    Collection Time: 08/23/21 10:12 AM   Result Value Ref Range    WBC Count 9.7 4.0 - 11.0 10e3/uL    RBC Count 4.69 4.40 - 5.90 10e6/uL    Hemoglobin 12.7 (L) 13.3 - 17.7 g/dL    Hematocrit 39.1 (L) 40.0 - 53.0 %    MCV 83 78 - 100 fL    MCH 27.1 26.5 - 33.0 pg    MCHC 32.5 31.5 - 36.5 g/dL    RDW 13.3 10.0 - 15.0 %    Platelet Count 238 150 - 450 10e3/uL    % Neutrophils 61 %    % Lymphocytes 22 %    % Monocytes 9 %    % Eosinophils 6 %    % Basophils 1 %    % Immature Granulocytes 1 %    NRBCs per 100 WBC 0 <1 /100    Absolute Neutrophils 6.1 1.6 - 8.3 10e3/uL    Absolute Lymphocytes 2.2 0.8 - 5.3 10e3/uL    Absolute Monocytes 0.8 0.0 - 1.3 10e3/uL    Absolute Eosinophils 0.5 0.0 - 0.7 10e3/uL    Absolute Basophils 0.1 0.0 - 0.2 10e3/uL    Absolute Immature Granulocytes 0.1 (H) <=0.0 10e3/uL    Absolute NRBCs 0.0 10e3/uL         Imaging Results    No results found.      Signed by: Reva Alberts MD        Again, thank you for allowing me to participate in the care of your patient.        Sincerely,        Reva Alberts MD

## 2021-08-23 NOTE — PROGRESS NOTES
"Oncology Rooming Note    August 23, 2021 9:41 AM   Barry Mcgrath is a 30 year old male who presents for:    Chief Complaint   Patient presents with     Oncology Clinic Visit     NEW - abmornal CBC     Initial Vitals: /68 (BP Location: Right arm, Patient Position: Sitting, Cuff Size: Adult Large)   Pulse 69   Temp 98  F (36.7  C) (Oral)   Ht 1.791 m (5' 10.5\")   Wt 117.5 kg (259 lb)   SpO2 96%   BMI 36.64 kg/m   Estimated body mass index is 36.64 kg/m  as calculated from the following:    Height as of this encounter: 1.791 m (5' 10.5\").    Weight as of this encounter: 117.5 kg (259 lb). Body surface area is 2.42 meters squared.  No Pain (0) Comment: Data Unavailable   No LMP for male patient.  Allergies reviewed: Yes  Medications reviewed: Yes    Medications: Medication refills not needed today.  Pharmacy name entered into [facility]:    [facility] #[id number] [facility] [[address] PHARMACY MAIL DELIVERY - [place] [id number] [address]    Clinical concerns:  New - abnormal CBC      Edith Fair CMA            "

## 2021-08-23 NOTE — PROGRESS NOTES
[facility] Hematology and Oncology Consult Note    Patient: Barry Mcgrath  MRN: [id number]  Date of Service: Aug 23, 2021       Reason for Visit    I was consulted for 'abnormal blood counts'    Assessment/Plan    Borderline and non-progressing normochromic normocytic anemia  Prior leucocytosis and eosinophilia, now resolved; borderline increase in immature granulocytes,, stable.  Normal platelet count    Asymptomatic from the above.  No indication for additional diagnostic testing at this point and does not require active involvement by our service unless counts change substantially.    ECOG Performance = 0    0 - Independent    Encounter Diagnoses:    Problem List Items Addressed This Visit     None      Visit Diagnoses     Abnormal CBC        Relevant Orders    CBC with platelets and differential    **UA reflex to Microscopic FUTURE 2mo    **Comprehensive metabolic panel FUTURE 2mo              ______________________________________________________________________________    Staging History  Cancer Staging  No matching staging information was found for the patient.      History    Mr. Barry Mcgrath is a 30 year old  Who is referred here to assess alterations in CBC.  The patient himself is not experiencing any overall change in health.    He does have a number of baseline health concerns, including blindness since infancy, post renal transplant (>1 decade out), generalized anxiety disorder (visit with psychiatrist this afternoon), prediabetes.    He has not had a hematologic evaluation in the past.  He's had no bleeding, weight change or other concerns related to blood counts.    He reports some suprapubic soreness, thinks he may have a bladder infection and wants a UA sent.    Review of systems.  No fever or night sweats.  No loss of weight.  No lumps or bumps anywhere.  No unusual headaches or eyesight issues.  No dizziness.  No bleeding from the nose.  No sores in the mouth. No problems with  swallowing.  No chest pain. No shortness of breath. No cough.  No abdominal pain. No nausea or vomiting.  No diarrhea or constipation.  No blood in stool or black colored stools.  No problems passing urine.  No numbness or tingling in hands or feet.  No skin rashes.  A 14 point review of systems is otherwise negative.    Past History    Past Medical History:   Diagnosis Date     Anxiety      Bipolar 1 disorder (H)      Bipolar 2 disorder (H)      Blind      Depressive disorder      Diabetes (H)     pre diabetic     Hypertension      PTSD (post-traumatic stress disorder)      Sleep apnea     uses cpap     Past Surgical History:   Procedure Laterality Date     TRANSPLANT      Kidney transplant in 2006     Family History   Problem Relation Age of Onset     Sleep Apnea Father      Diabetes Father      Sleep Apnea Brother      Glaucoma Maternal Grandfather      Macular Degeneration No family hx of      Social History     Socioeconomic History     Marital status: Single     Spouse name: None     Number of children: None     Years of education: None     Highest education level: None   Occupational History     None   Tobacco Use     Smoking status: Never Smoker     Smokeless tobacco: Never Used   Substance and Sexual Activity     Alcohol use: No     Drug use: No     Sexual activity: Never   Other Topics Concern     Parent/sibling w/ CABG, MI or angioplasty before 65F 55M? Not Asked   Social History Narrative     None     Social Determinants of Health     Financial Resource Strain:      Difficulty of Paying Living Expenses:    Food Insecurity:      Worried About Running Out of Food in the Last Year:      Ran Out of Food in the Last Year:    Transportation Needs:      Lack of Transportation (Medical):      Lack of Transportation (Non-Medical):    Physical Activity:      Days of Exercise per Week:      Minutes of Exercise per Session:    Stress:      Feeling of Stress :    Social Connections:      Frequency of Communication  "with Friends and Family:      Frequency of Social Gatherings with Friends and Family:      Attends Spiritism Services:      Active Member of Clubs or Organizations:      Attends Club or Organization Meetings:      Marital Status:    Intimate Partner Violence:      Fear of Current or Ex-Partner:      Emotionally Abused:      Physically Abused:      Sexually Abused:      Has a personal care attendant who joins by phone.    Allergies    Allergies   Allergen Reactions     Seasonal Allergies Other (See Comments)     hamster hair     Cats      Itching, nasal congestion     Dogs      Itching, nasal congestion     No Known Allergies            Physical Exam    /68 (BP Location: Right arm, Patient Position: Sitting, Cuff Size: Adult Large)   Pulse 69   Temp 98  F (36.7  C) (Oral)   Ht 1.791 m (5' 10.5\")   Wt 117.5 kg (259 lb)   SpO2 96%   BMI 36.64 kg/m      Obese white male, obviously blind;     GENERAL: Alert and oriented to time place and person. Seated comfortably. In no distress.    HEAD: Atraumatic and normocephalic.  There is a deep scar in the middle of the forehead from prior trauma    EYES: WESLEY, EOMI.  Eyes are disconjugate and obviously blind  No pallor.  No icterus.    NECK: supple. JVP normal.  No thyroid enlargement.    LYMPH NODES: No palpable, cervical, axillary or inguinal lymphadenopathy.    CHEST: clear to auscultation bilaterally.  Resonant to percussion throughout bilaterally.  Symmetrical breath movements bilaterally.    CVS: S1 and S2 are heard. Regular rate and rhythm.  No murmur or gallop or rub heard.  No peripheral edema.    ABDOMEN: Soft. Not tender. Not distended.  No palpable hepatomegaly or splenomegaly.  No other mass palpable.  Bowel sounds heard.  Scars on left abdomen from earlier renal transplant.    EXTREMITIES: Warm.    SKIN: no rash, or bruising or purpura.  Has a full head of hair.    Lab Results    Recent Results (from the past 720 hour(s))   Abscess Aerobic Bacterial " Culture Routine    Collection Time: 07/28/21  9:05 AM    Specimen: Chest; Abscess   Result Value Ref Range    Culture 1+ Normal ranjith    **Comprehensive metabolic panel FUTURE 2mo    Collection Time: 08/23/21 10:12 AM   Result Value Ref Range    Sodium 140 136 - 145 mmol/L    Potassium 3.4 (L) 3.5 - 5.0 mmol/L    Chloride 105 98 - 107 mmol/L    Carbon Dioxide (CO2) 26 22 - 31 mmol/L    Anion Gap 9 5 - 18 mmol/L    Urea Nitrogen 9 8 - 22 mg/dL    Creatinine 0.79 0.70 - 1.30 mg/dL    Calcium 9.0 8.5 - 10.5 mg/dL    Glucose 144 (H) 70 - 125 mg/dL    Alkaline Phosphatase 88 45 - 120 U/L    AST 23 0 - 40 U/L    ALT 39 0 - 45 U/L    Protein Total 7.3 6.0 - 8.0 g/dL    Albumin 3.4 (L) 3.5 - 5.0 g/dL    Bilirubin Total 0.4 0.0 - 1.0 mg/dL    GFR Estimate >90 >60 mL/min/1.73m2   CBC with platelets and differential    Collection Time: 08/23/21 10:12 AM   Result Value Ref Range    WBC Count 9.7 4.0 - 11.0 10e3/uL    RBC Count 4.69 4.40 - 5.90 10e6/uL    Hemoglobin 12.7 (L) 13.3 - 17.7 g/dL    Hematocrit 39.1 (L) 40.0 - 53.0 %    MCV 83 78 - 100 fL    MCH 27.1 26.5 - 33.0 pg    MCHC 32.5 31.5 - 36.5 g/dL    RDW 13.3 10.0 - 15.0 %    Platelet Count 238 150 - 450 10e3/uL    % Neutrophils 61 %    % Lymphocytes 22 %    % Monocytes 9 %    % Eosinophils 6 %    % Basophils 1 %    % Immature Granulocytes 1 %    NRBCs per 100 WBC 0 <1 /100    Absolute Neutrophils 6.1 1.6 - 8.3 10e3/uL    Absolute Lymphocytes 2.2 0.8 - 5.3 10e3/uL    Absolute Monocytes 0.8 0.0 - 1.3 10e3/uL    Absolute Eosinophils 0.5 0.0 - 0.7 10e3/uL    Absolute Basophils 0.1 0.0 - 0.2 10e3/uL    Absolute Immature Granulocytes 0.1 (H) <=0.0 10e3/uL    Absolute NRBCs 0.0 10e3/uL         Imaging Results    No results found.      Signed by: Reva Alberts MD

## 2021-08-24 ENCOUNTER — TELEPHONE (OUTPATIENT)
Dept: SLEEP MEDICINE | Facility: CLINIC | Age: 31
End: 2021-08-24

## 2021-08-25 NOTE — TELEPHONE ENCOUNTER
Reason for call:  Other   Patient called regarding (reason for call): Peng CPAP recall  Additional comments: Patient is blind, declined calling phone number on the CPAP machine and wants a call from the sleep clinic on what to do next to get a new machine.     Phone number to reach patient:  Cell number on file:    Telephone Information:   Mobile [phone]       Best Time:  Morning    Can we leave a detailed message on this number?  Not Applicable    Travel screening: Not Applicable

## 2021-08-28 ENCOUNTER — TELEPHONE (OUTPATIENT)
Dept: NURSING | Facility: CLINIC | Age: 31
End: 2021-08-28

## 2021-08-28 NOTE — TELEPHONE ENCOUNTER
Patient called to request pressure machine settings clarification. [facility] Patient unable to recall information and needed to be repeated. Please give contact information again to patient.     Irma Ventura RN   08/28/21 3:26 AM  [facility] Nurse Advisor

## 2021-08-30 ENCOUNTER — HOSPITAL ENCOUNTER (EMERGENCY)
Facility: CLINIC | Age: 31
Discharge: HOME | End: 2021-08-31
Attending: EMERGENCY MEDICINE | Admitting: EMERGENCY MEDICINE
Payer: MEDICARE

## 2021-08-30 DIAGNOSIS — F31.9 BIPOLAR 1 DISORDER (H): ICD-10-CM

## 2021-08-30 DIAGNOSIS — R45.851 SUICIDAL IDEATION: ICD-10-CM

## 2021-08-30 DIAGNOSIS — T07.XXXA ABRASIONS OF MULTIPLE SITES: ICD-10-CM

## 2021-08-30 PROCEDURE — 99284 EMERGENCY DEPT VISIT MOD MDM: CPT | Performed by: EMERGENCY MEDICINE

## 2021-08-30 PROCEDURE — 99285 EMERGENCY DEPT VISIT HI MDM: CPT | Mod: 25 | Performed by: EMERGENCY MEDICINE

## 2021-08-31 ENCOUNTER — TELEPHONE (OUTPATIENT)
Dept: NURSING | Facility: CLINIC | Age: 31
End: 2021-08-31

## 2021-08-31 VITALS
RESPIRATION RATE: 16 BRPM | OXYGEN SATURATION: 93 % | DIASTOLIC BLOOD PRESSURE: 83 MMHG | SYSTOLIC BLOOD PRESSURE: 152 MMHG | TEMPERATURE: 97.3 F | HEART RATE: 71 BPM

## 2021-08-31 LAB
AMPHETAMINES UR QL SCN: NORMAL
BARBITURATES UR QL: NORMAL
BENZODIAZ UR QL: NORMAL
CANNABINOIDS UR QL SCN: NORMAL
COCAINE UR QL: NORMAL
OPIATES UR QL SCN: NORMAL

## 2021-08-31 PROCEDURE — 80307 DRUG TEST PRSMV CHEM ANLYZR: CPT | Performed by: EMERGENCY MEDICINE

## 2021-08-31 PROCEDURE — 90791 PSYCH DIAGNOSTIC EVALUATION: CPT
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
## 2021-08-31 NOTE — TELEPHONE ENCOUNTER
Patient reports that he did received the new device from [facility], but has not yet started using therapy.  He will start tonight.    He also states that he does not use humidity with the device.
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
## 2021-08-31 NOTE — ED NOTES
The patient was accepted at shift change sign out with plan for me to f/u with [facility]  regarding assessment and recommendations. Per  the patient reports that he attempted to kill himself today by cutting himself with a butter knife. He has a h/o depression, bipolar, autism, kidney transplant, blindness, amongst others. He reports that he found out that he has diabetes today, which triggered him to feel suicidal. He denies HI or any psychotic symptoms. He states he feels all alone and overwhelmed. He [place] friends and lost track of them, so sat on a bench all alone. He states that now that he's been able to talk and had some time to decompress, he's no longer feeling suicidal, feels he can keep himself safe when discharged. He feels like he would like to be able to talk to someone more regularly - preferably every day. [facility]  feels he's safe to discharge and will refer to day treatment.     Dictation Disclaimer: Some of this Note has been completed with voice-recognition dictation software. Although errors are generally corrected real-time, there is the potential for a rare error to be present in the completed chart.       Madelyn Matt MD  08/31/21 9022

## 2021-08-31 NOTE — DISCHARGE INSTRUCTIONS
"TODAY'S VISIT:  You were seen today for suicidal thoughts  -   - If you had any labs or imaging/radiology tests performed today, you should also discuss these tests with your usual provider.     FOLLOW-UP:  Please make an appointment to follow up with:  - Your Primary Care Provider. If you do not have a PCP, please call the Primary Care Center (phone: [phone] for an appointment  -  with mental health resources as directed    - Have your provider review the results from today's visit with you again to make sure no further follow-up or additional testing is needed based on those results.     RETURN TO THE EMERGENCY DEPARTMENT  Return to the Emergency Department at any time for any new or worsening symptoms or any concerns.    If I am feeling unsafe or I am in a crisis, I will:   Contact my established care providers   Call the National Suicide Prevention Lifeline: 718.403.3114   Go to the nearest emergency room   Call 674          Warning signs that I or other people might notice when a crisis is developing for me: \"I get crabby.\"    Things I am able to do on my own to cope or help me feel better: Talk to others, call the suicide hotline.    Things that I am able to do with others to cope or help me better: Vent, talk things out.    Things I can use or do for distraction: School studies    Changes I can make to support my mental health and wellness: Attend day treatment    People in my life that I can ask for help: roommates, Gabriel Wilde, Therapist    Your [facility] has a mental health crisis team you can call 24/7: Already known    Other things that are important when I m in crisis: \"I can't be alone.\"    Additional resources and information: [facility]  [phone]       "

## 2021-08-31 NOTE — TELEPHONE ENCOUNTER
Spoke with Barry, he spoke to staff at [facility] his concerns have been taken care of.   Jacqueline Martini MA

## 2021-08-31 NOTE — ED TRIAGE NOTES
Feeling suicidal at home (condo in [place]), attempted to cut self with knife. Pt is blind.

## 2021-08-31 NOTE — ED NOTES
Bed: HW01  Expected date:   Expected time:   Means of arrival:   Comments:  Peewee  29 y/o M  TIKA messer

## 2021-08-31 NOTE — TELEPHONE ENCOUNTER
"Barry calls after being in the ED 12 hours ago for suicide ideation. He states he is \"safe\" now. He wanted \"us\" ([facility]) to know that he is going to get therapy at [facility]. He told writer it was \"illegal to have two therapists\". He stated right after he got off phone with writer he was going to call Dr Mc Arenas to make therapy appointments, minimum of once a week, \"probably more\".  Patient was speaking very fast, but seemed cheerful and hopeful. He stated he just wanted to call to share this information. Writer encouraged patient to call back if felt \"unsafe\".    Nellie Aaron RN  [facility] Nurse Advisor  4:27 PM 8/31/2021  COVID 19 Nurse Triage Plan/Patient Instructions    Please be aware that novel coronavirus (COVID-19) may be circulating in the community. If you develop symptoms such as fever, cough, or SOB or if you have concerns about the presence of another infection including coronavirus (COVID-19), please contact your health care provider or visit [email or web address].     Disposition/Instructions    Home care recommended. Follow home care protocol based instructions.    Thank you for taking steps to prevent the spread of this virus.  o Limit your contact with others.  o Wear a simple mask to cover your cough.  o Wash your hands well and often.    Resources    [facility]: About COVID-19: [email or web address]    CDC: What to Do If You're Sick: www.cdc.gov/coronavirus/2019-ncov/about/steps-when-sick.html    CDC: Ending Home Isolation: www.cdc.gov/coronavirus/2019-ncov/hcp/disposition-in-home-patients.html     CDC: Caring for Someone: www.cdc.gov/coronavirus/2019-ncov/if-you-are-sick/care-for-someone.html     [facility]: Interim Guidance for Hospital Discharge to Home: www.health.[facility]/diseases/coronavirus/hcp/hospdischarge.pdf    [facility] clinical trials (COVID-19 research studies): clinicalaffairs.[facility]-clinical-trials "     Below are the COVID-19 hotlines at the [place] Department of [facility] ([facility]). Interpreters are available.   o For health questions: Call 762-580-4622 or 1-631.285.6393 (7 a.m. to 7 p.m.)  o For questions about schools and childcare: Call 908-822-3407 or 1-400.561.8126 (7 a.m. to 7 p.m.)

## 2021-09-05 ENCOUNTER — TELEPHONE (OUTPATIENT)
Dept: NURSING | Facility: CLINIC | Age: 31
End: 2021-09-05

## 2021-09-05 NOTE — TELEPHONE ENCOUNTER
Patient calling, last Monday learned he was diabetic. Patient asking if he is type 1 or type 2 diabetic. Informed that he has type 2 diabetes. Patient reports that he has followed up with his PCP and had his metformin dose increased.   No further questions.   Amy Cain RN   09/05/21 4:08 PM  [facility] Nurse Advisor

## 2021-10-05 NOTE — ED TRIAGE NOTES
Pt c/o tooth pain on L side of face that started yesterday. Has been mangeing w/ PRN tylenol. Was unable to see dentist today.  
Gentle Skin Care Counseling: I recommended the use of a gentle cleanser when washing the skin such as Dove Sensitive Skin or Vanicream Line. Unscented moisturizer should be applied at least twice daily, with one application being within 3 minutes after bathing. Products with fragrances, preservatives and dyes should be avoided. Hot showers and hot tubs should be avoided or kept to a minimum. Moisturizers should be applied generously after exposure to chlorine, salt water, or sun exposure.
Detail Level: Generalized

## 2021-10-08 ENCOUNTER — LAB REQUISITION (OUTPATIENT)
Dept: LAB | Facility: HOSPITAL | Age: 31
End: 2021-10-08
Payer: MEDICARE

## 2021-10-08 DIAGNOSIS — E11.22 TYPE 2 DIABETES MELLITUS WITH DIABETIC CHRONIC KIDNEY DISEASE (H): ICD-10-CM

## 2021-10-08 DIAGNOSIS — D84.9 IMMUNODEFICIENCY, UNSPECIFIED (H): ICD-10-CM

## 2021-10-08 DIAGNOSIS — I12.0 HYPERTENSIVE CHRONIC KIDNEY DISEASE WITH STAGE 5 CHRONIC KIDNEY DISEASE OR END STAGE RENAL DISEASE (H): ICD-10-CM

## 2021-10-08 DIAGNOSIS — Z74.01 BED CONFINEMENT STATUS: ICD-10-CM

## 2021-10-08 LAB
ALBUMIN MFR UR ELPH: 76.1 MG/DL
ALBUMIN SERPL-MCNC: 3.7 G/DL (ref 3.5–5)
ALP SERPL-CCNC: 68 U/L (ref 45–120)
ALT SERPL W P-5'-P-CCNC: 50 U/L (ref 0–45)
ANION GAP SERPL CALCULATED.3IONS-SCNC: 10 MMOL/L (ref 5–18)
AST SERPL W P-5'-P-CCNC: 28 U/L (ref 0–40)
BASOPHILS # BLD AUTO: 0.1 10E3/UL (ref 0–0.2)
BASOPHILS NFR BLD AUTO: 1 %
BILIRUB SERPL-MCNC: 0.5 MG/DL (ref 0–1)
BUN SERPL-MCNC: 12 MG/DL (ref 8–22)
CALCIUM SERPL-MCNC: 9.2 MG/DL (ref 8.5–10.5)
CHLORIDE BLD-SCNC: 107 MMOL/L (ref 98–107)
CO2 SERPL-SCNC: 24 MMOL/L (ref 22–31)
CREAT SERPL-MCNC: 0.83 MG/DL (ref 0.7–1.3)
CREAT UR-MCNC: 181 MG/DL
EOSINOPHIL # BLD AUTO: 0.8 10E3/UL (ref 0–0.7)
EOSINOPHIL NFR BLD AUTO: 9 %
ERYTHROCYTE [DISTWIDTH] IN BLOOD BY AUTOMATED COUNT: 14 % (ref 10–15)
GFR SERPL CREATININE-BSD FRML MDRD: >90 ML/MIN/1.73M2
GLUCOSE BLD-MCNC: 117 MG/DL (ref 70–125)
HCT VFR BLD AUTO: 43.2 % (ref 40–53)
HGB BLD-MCNC: 13.9 G/DL (ref 13.3–17.7)
IMM GRANULOCYTES # BLD: 0.1 10E3/UL
IMM GRANULOCYTES NFR BLD: 1 %
LYMPHOCYTES # BLD AUTO: 2.9 10E3/UL (ref 0.8–5.3)
LYMPHOCYTES NFR BLD AUTO: 31 %
MCH RBC QN AUTO: 27.4 PG (ref 26.5–33)
MCHC RBC AUTO-ENTMCNC: 32.2 G/DL (ref 31.5–36.5)
MCV RBC AUTO: 85 FL (ref 78–100)
MONOCYTES # BLD AUTO: 0.7 10E3/UL (ref 0–1.3)
MONOCYTES NFR BLD AUTO: 7 %
NEUTROPHILS # BLD AUTO: 4.9 10E3/UL (ref 1.6–8.3)
NEUTROPHILS NFR BLD AUTO: 51 %
NRBC # BLD AUTO: 0 10E3/UL
NRBC BLD AUTO-RTO: 0 /100
PLATELET # BLD AUTO: 273 10E3/UL (ref 150–450)
POTASSIUM BLD-SCNC: 3.6 MMOL/L (ref 3.5–5)
PROT SERPL-MCNC: 7.3 G/DL (ref 6–8)
PROT/CREAT 24H UR: 0.42 MG/MG CR
RBC # BLD AUTO: 5.07 10E6/UL (ref 4.4–5.9)
SODIUM SERPL-SCNC: 141 MMOL/L (ref 136–145)
TACROLIMUS BLD-MCNC: 5.4 UG/L (ref 5–15)
TME LAST DOSE: NORMAL H
TME LAST DOSE: NORMAL H
TSH SERPL DL<=0.005 MIU/L-ACNC: 1.67 UIU/ML (ref 0.3–5)
VIT B12 SERPL-MCNC: 534 PG/ML (ref 213–816)
WBC # BLD AUTO: 9.4 10E3/UL (ref 4–11)

## 2021-10-08 PROCEDURE — 80053 COMPREHEN METABOLIC PANEL: CPT | Mod: ORL | Performed by: NURSE PRACTITIONER

## 2021-10-08 PROCEDURE — 84156 ASSAY OF PROTEIN URINE: CPT | Mod: ORL | Performed by: NURSE PRACTITIONER

## 2021-10-08 PROCEDURE — 85025 COMPLETE CBC W/AUTO DIFF WBC: CPT | Mod: ORL | Performed by: NURSE PRACTITIONER

## 2021-10-08 PROCEDURE — 36415 COLL VENOUS BLD VENIPUNCTURE: CPT | Mod: ORL | Performed by: NURSE PRACTITIONER

## 2021-10-08 PROCEDURE — 84443 ASSAY THYROID STIM HORMONE: CPT | Mod: ORL | Performed by: NURSE PRACTITIONER

## 2021-10-08 PROCEDURE — 80197 ASSAY OF TACROLIMUS: CPT | Mod: ORL | Performed by: NURSE PRACTITIONER

## 2021-10-08 PROCEDURE — 82607 VITAMIN B-12: CPT | Mod: ORL | Performed by: NURSE PRACTITIONER

## 2021-10-08 PROCEDURE — 82306 VITAMIN D 25 HYDROXY: CPT | Mod: ORL | Performed by: NURSE PRACTITIONER

## 2021-10-11 LAB — DEPRECATED CALCIDIOL+CALCIFEROL SERPL-MC: 37 UG/L (ref 30–80)

## 2021-11-08 NOTE — TELEPHONE ENCOUNTER
It could have been possible that patient had Medicare under his parents back in 2006, that is why [facility].   Liaison has requested patient to contact their local social security or Medicare to verify/confirm if they did have Medicare back in 2006 through his parents before submitting 2nd level of appeal.    IF patient did have Medicare in 2006, then medications should be billed to Medicare Part B and a 2nd level of appeal with [employer] Part D insurance would not be needed.    Advised patient to call clinic or liaison back if they have trouble refilling medication at local pharmacy while we work on getting insurance issues straightened out.      
MEDICATION APPEAL DENIED    Medication: tacrolimus 1mg & 0.5mg - Appeal Denied    Denial Date: 7/22/2021    Denial Rational: Liaison called Medicare benefits and recovery call center and was advised patient did not have Medicare in 2006 and did not have ESRD entitlement. 2nd level of appeal denied by [employer]. [employer] records state that patient did have Medicare at time of transplant 2/28/2006 and to bill Medicare Part B not [employer] Part D. Routing to coordinator and sw to double check if there were any documentation else where that patient DID have Medicare in 2006. Patient did not recall since his transplant was years ago. In the meantime, patient states that they will call their local pharmacy for fill of their immunos.             Second Level Appeal Information:   
Medication Appeal Initiation    We have initiated an appeal for the requested medication:  Medication: tacrolimus 1mg & 0.5mg - Appeal Initiated  Appeal Start Date:  7/22/2021  Insurance Company: [employer] - Phone [phone] Fax [phone]  Comments:         
PA Initiation    Medication: tacrolimus (GENERIC EQUIVALENT) 0.5 MG capsule   Insurance Company: [employer] - Phone [phone] Fax [phone]  Pharmacy Filling the Rx: [facility] - [place] [id number]  Filling Pharmacy Phone: [phone]  Filling Pharmacy Fax: [phone]  Start Date: 7/21/2021        
PRIOR AUTHORIZATION DENIED    Medication: tacrolimus (GENERIC EQUIVALENT) 0.5 MG capsule--DENIED    Denial Date: 7/21/2021    Denial Rational: Not covered under Medicare Part D, possible coverage under Medicare Part B.  Attempted to contact pharmacy to see if they could process under Part B. They just ended up transferring me to [employer] insurance plan.  PA team doesn't handle Part B requests.     Appeal Information:         
Prior Authorization Not Needed per Insurance    Medication: tacrolimus 1mg & 0.5mg - No PA Needed/Part B covered  Insurance Company: Medicare Part B and Health [facility] Medicaid  Expected CoPay:    $0 at time of services  Pharmacy Filling the Rx: [facility]   Pharmacy Notified:    Patient Notified:  Yes    Prior authorization is not required due to coverage is available under Medicare Part-B benefits.      Patient contacted Medicare to obtain original effective dates.  Original Medicare dates effective 2/2006 termed on 4/2009.     Then Medicare reactivated on 12/2013 to present.  Patient aware they should continue filling at their local [facility]'s pharmacy and immunos should be billed to Medicare Part B and [facility] PMAP.        
Prior Authorization Retail Medication Request    Medication/Dose: tacrolimus (GENERIC EQUIVALENT) 0.5 MG capsule  ICD code (if different than what is on RX):  Status post kidney transplant [Z94.0], Kidney transplanted [Z94.0], Aftercare following organ transplant [Z48.298], Immunosuppressive management encounter following kidney transplant [Z79.899, Z94.0]   Previously Tried and Failed:    Rationale:      Insurance Name:  [employer]  Insurance ID:  [id number]    Pharmacy Information (if different than what is on RX)  Name: [facility] - [facility] [address] [facility]  Phone: [phone]    
Alert and oriented to person, place and time

## 2021-11-11 NOTE — TELEPHONE ENCOUNTER
Call placed to patient. Patient v\u to complete transplant labs this month and schedule annual transplant appointment for 2/2019. Task sent to admin and .   ED Provider Note    Scribed for Gloria Hanson M.D. by Nakia Vasquez. 11/11/2021  10:42 AM    Primary care provider: Pcp Pt States None  Means of arrival: Walk in  History obtained from: Patient  History limited by: None    CHIEF COMPLAINT  Chief Complaint   Patient presents with   • Foot Pain     stiffness, swelling, pain x4 days to LT foot. denies wound. denies injury. possibly from wearing steel toe boots that were too small.       HPI  Jacky Covarrubias is a 31 y.o. male who presents to the Emergency Department for left foot pain with an onset of 6 days ago. His pain is localized to the top of his foot at the base of his fourth and fifth toes. He denies any radiating pain to his ankles. He notes he wears steel toed boots for work that were too small for him. His steel toed boots have been rubbing on the lateral top of his foot for 3 weeks. Patient denies any proceeding falls, or injuries. He describes his left foot had some purple and reddish bruises starting one week ago. He reports associated right foot redness starting one week ago, worsening foot swelling x2 days, bruising, fever tmax 99 °F 6 days ago (now resolved), chills 6 days ago (now resolved), and generalized muscle aches (6 does ago now resolved). He denies any associated extremity numbness, tingling, or weakness. He denies any proceeding trauma, injuries, or falls. Patient adds he was seen at [facility] clinic yesterday for his symptoms. He was not placed on antibiotics, but he was advised to elevate and ice his foot. He is not vaccinated against COVID. Patient denies any history of cancer, diabetes, or history of being immunocompromised. He denies having crutches at home.  Patient reports that the area of dark red erythema and slight purplish discoloration is exactly the area in which the still toed boots were rubbing for the last 3 weeks.    REVIEW OF SYSTEMS  Positives include left foot pain, redness, swelling, bruising, fever  "(resolved), chills, and generalized muscle aches. Negatives include no extremity numbness, tingling, or weakness., falls, or trauma.    See HPI for further details.    PAST MEDICAL HISTORY   Denies any chronic medical history.     SURGICAL HISTORY  patient denies any surgical history    SOCIAL HISTORY  Social History     Tobacco Use   • Smoking status: Never Smoker   • Smokeless tobacco: Never Used   Substance Use Topics   • Alcohol use: Yes     Comment: Socially   • Drug use: No      Social History     Substance and Sexual Activity   Drug Use No       FAMILY HISTORY  None pertinent.     CURRENT MEDICATIONS  Home Medications     Reviewed by Stu Bright R.N. (Registered Nurse) on 11/11/21 at 0900  Med List Status: Partial   Medication Last Dose Status   ondansetron (ZOFRAN ODT) 4 MG TABLET DISPERSIBLE  Active                ALLERGIES  No Known Allergies    PHYSICAL EXAM  VITAL SIGNS: /81   Pulse 96   Temp 36.7 °C (98.1 °F) (Temporal)   Resp 16   Ht 1.905 m (6' 3\")   Wt 107 kg (235 lb)   SpO2 98%   BMI 29.37 kg/m²   Constitutional: Alert in no apparent distress.  HENT: Normocephalic, Bilateral external ears normal. Nose normal.   Eyes: Conjunctiva normal, non-icteric.   Thorax & Lungs: Easy unlabored respirations  Skin: Visualized skin warm and dry, No erythema, No rash.   Extremities:   Full range of motion, No asymmetry  Musculoskeletal: Left foot: Patient has erythema to the dorsal surface of the foot right at the base of the fourth and fifth digits.  There is a slight purplish discoloration without bullae or blistering.  No crepitus.  No purulence.  There is no lesions or ulcerations between the toes.  The foot itself is diffusely swollen.  There is some erythema which is extending from the darker portion of the erythema noted to the area at the base of the fourth and fifth digits which extends up to the proximal aspect of the foot.  This area of erythema was marked with a pen.  There is no " lymphangitis.  Nontender ankle.  No pretibial edema.  Nontender calf.  2+ DP and PT pulse equal bilaterally.  Full range of motion digits.  Sensation is intact light touch.  No crepitus or subcu air.  No plantar puncture wounds or signs of plantar trauma.  Neurologic: Alert, clear speech  Psychiatric: Affect and Mood normal    LABS  Results for orders placed or performed during the hospital encounter of 11/11/21   CBC WITH DIFFERENTIAL   Result Value Ref Range    WBC 7.9 4.8 - 10.8 K/uL    RBC 5.08 4.70 - 6.10 M/uL    Hemoglobin 15.4 14.0 - 18.0 g/dL    Hematocrit 44.0 42.0 - 52.0 %    MCV 86.6 81.4 - 97.8 fL    MCH 30.3 27.0 - 33.0 pg    MCHC 35.0 33.7 - 35.3 g/dL    RDW 38.4 35.9 - 50.0 fL    Platelet Count 222 164 - 446 K/uL    MPV 10.0 9.0 - 12.9 fL    Neutrophils-Polys 65.50 44.00 - 72.00 %    Lymphocytes 23.10 22.00 - 41.00 %    Monocytes 5.00 0.00 - 13.40 %    Eosinophils 5.40 0.00 - 6.90 %    Basophils 0.60 0.00 - 1.80 %    Immature Granulocytes 0.40 0.00 - 0.90 %    Nucleated RBC 0.00 /100 WBC    Neutrophils (Absolute) 5.19 1.82 - 7.42 K/uL    Lymphs (Absolute) 1.83 1.00 - 4.80 K/uL    Monos (Absolute) 0.40 0.00 - 0.85 K/uL    Eos (Absolute) 0.43 0.00 - 0.51 K/uL    Baso (Absolute) 0.05 0.00 - 0.12 K/uL    Immature Granulocytes (abs) 0.03 0.00 - 0.11 K/uL    NRBC (Absolute) 0.00 K/uL   BASIC METABOLIC PANEL   Result Value Ref Range    Sodium 139 135 - 145 mmol/L    Potassium 4.2 3.6 - 5.5 mmol/L    Chloride 105 96 - 112 mmol/L    Co2 25 20 - 33 mmol/L    Glucose 89 65 - 99 mg/dL    Bun 13 8 - 22 mg/dL    Creatinine 0.85 0.50 - 1.40 mg/dL    Calcium 9.3 8.5 - 10.5 mg/dL    Anion Gap 9.0 7.0 - 16.0   LACTIC ACID   Result Value Ref Range    Lactic Acid 1.4 0.5 - 2.0 mmol/L   ESTIMATED GFR   Result Value Ref Range    GFR If African American >60 >60 mL/min/1.73 m 2    GFR If Non African American >60 >60 mL/min/1.73 m 2     All labs reviewed by me.    RADIOLOGY  DX-FOOT-COMPLETE 3+ LEFT   Final Result      No  significant osteoarthrosis. No acute osseous abnormality.        The radiologist's interpretation of all radiological studies have been reviewed by me.    COURSE & MEDICAL DECISION MAKING  Nursing notes and vital signs were reviewed. (See chart for details)    10:42 AM - Patient seen and examined at bedside. Discussed plan of care with the patient. I informed him labs and imaging will be obtained to rule out acute processes including infection. He is understanding and agreeable to plan. Patient will be treated with Unasyn 3 g in  IVPB. Ordered labs and DX Foot Left to evaluate his symptoms.     12:52 PM - I spoke with pharmacist (Parul) who recommended Augmentin for his infection.     12:59 PM - Patient was reevaluated at bedside. He is resting in bed. Discussed lab and radiology results with the patient as outlined above. He will be provided crutches for stabilization and follow up with his PCP. The patient was discharged home with an information sheet on cellulitis and told to return immediately for any signs or symptoms listed, but specifically for any worsening at all.  The patient verbally agreed to the discharge precautions and follow-up plan which is documented in EPIC.    Decision Making:  The patient presents to the Emergency Department with complaint of pain and swelling to the left foot.  The patient states for the last 3 weeks he has been wearing some steel toed shoes.  He believes they are too small for him.  There is an area of the steel toe component that has been rubbing persistently on the dorsal surface of the foot right at the base of the fourth and fifth toes.  6 days ago he noticed some redness and slight purplish discoloration in the area in which the steel toe part of the boot was rubbing.  He started develop more redness over the last few days and 2 days ago he noticed swelling to the foot.  Patient does have warmth and erythema overlying the dorsal surface of the foot.  There is a  dark area of erythema with a slight purplish discoloration at the base of the fourth and fifth toes along the dorsal surface of the foot.  There is no obvious plantar puncture wounds.  There is no bullae or blistering.  No crepitus.  No purulence.  X-rays negative for soft tissue gas.  Patient's white count is normal.  Lactic acid levels normal.  He has not had fever in the last 6 days.  Pain is not out of proportion to examination or expected pain due to condition.  At this time I do not think he has necrotizing fasciitis or deep space abscess.  He is afebrile here in the ER.  Patient is well-appearing.  He is not appear septic or toxic.  The area of erythema was marked with a pen.  He was given a dose of Unasyn.  He will be sent home on augmentin.  At this time I think he is safe and stable for outpatient management discharge home for treatment of the cellulitis, but he has been asked to return tomorrow for mandatory recheck.  He is also been given very strict return precautions and discharge instructions.  He understands if he develops any worsening redness, worsening swelling, worsening pain, lymphangitis, redness expanding beyond the pen markings, or for any concerns he must return to the ER immediately.  Otherwise he is to follow-up with the [facility] clinic within the next several days.  He is to call for an appointment.  Patient is to ice and elevate his leg.  He is given crutches to avoid weightbearing.  He understands treatment plan and follow-up.    Discharge Medications: Augmentin 875-125 mg tab.     DISPOSITION:  Patient will be discharged home in stable condition.    FOLLOW UP:  [place] [address] [facility] [address]  [facility] [id number] [phone]  Schedule an appointment as soon as possible for a visit in 1 day  If symptoms worsen return to ER      OUTPATIENT MEDICATIONS:  New Prescriptions    AMOXICILLIN-CLAVULANATE (AUGMENTIN) 875-125 MG TAB    Take 1 Tablet by mouth  2 times a day for 7 days.       FINAL IMPRESSION  1. Cellulitis of left lower extremity Acute        This dictation has been created using voice recognition software. The accuracy of the dictation is limited by the abilities of the software. I expect there may be some errors of grammar and possibly content. I made every attempt to manually correct the errors within my dictation. However, errors related to voice recognition software may still exist and should be interpreted within the appropriate context.      I, Nakia Vasquez (Scribe), am scribing for, and in the presence of, Gloria Hanson M.D..    Electronically signed by: Nakia Vasquez (Scribe), 11/11/2021    I, Gloria Hanson M.D. personally performed the services described in this documentation, as scribed by Nakia Vasquez in my presence, and it is both accurate and complete.    E    The note accurately reflects work and decisions made by me.  Gloria Hanson M.D.  11/11/2021  6:23 PM

## 2021-11-17 ENCOUNTER — TELEPHONE (OUTPATIENT)
Dept: TRANSPLANT | Facility: CLINIC | Age: 31
End: 2021-11-17
Payer: MEDICARE

## 2021-11-17 NOTE — TELEPHONE ENCOUNTER
ISSUE  Covid Positive: 11/16/21  Baseline IS: Tac 4-6,  mg BID, Pred 5  No history of rejection/DSA/virology issues.    OUTCOME  Called Barry to discuss Covid. No answer. Left a voice message reminding him to use Tylenol, NOT ibuprofen, for aches/pains/fever, to stay well hydrated, to get up and walk/sit up right and take deep breathes every hour.  Requested a call back to discuss symptoms.

## 2021-11-17 NOTE — TELEPHONE ENCOUNTER
Barry reports testing positive for Covid on 11/15. Symptoms began about 10 days ago with congestion, sneezing and fatigue. He had an intermittent dry cough, no SOB. No fever. Some baseline diarrhea, no worse than baseline.  Today he feels well, reports he is almost back to baseline with just an intermittent cough and some congestion.  He is not interested in monoclonal antibodies. He does not feel he needs them.  No IS change at this time.  He would like to have labs drawn to make sure kidney function is stable. Orders sent to homecare agency.  Barry reports he has a call out to his PCP to update her.  I encouraged him to continue to hydrate well, to use Tylenol and NOT ibuprofen, if needed, and to report to the ED if he experiences SOB, chest pain, inability to tolerate his meds or keep himself hydrated.  Will check in on Monday.

## 2021-11-17 NOTE — LETTER
PHYSICIAN ORDERS      DATE & TIME ISSUED: 2021 2:13 PM  PATIENT NAME: Barry Mcgrath   : 1990     [facility] MR# [if applicable]: [id number]     DIAGNOSIS / ICD - 10 CODES    Kidney Transplanted (Z94.0)    After Care Following Organ Transplant (Z48.298)    Long Term Use of Medication (Z79.899)    Immunosuppressed Status (Z92.25)    Complications Kidney Transplant (T86.10)      Please collect the following labs next Monday or Tuesday:    Tacrolimus level (  12 hour trough: indicate date and time of last dose)    BMP    CBC      Patient should release information to the [facility].   Please fax results to the Transplant Center at [phone].  Any questions please call [phone].      .

## 2021-11-18 ENCOUNTER — TELEPHONE (OUTPATIENT)
Dept: TRANSPLANT | Facility: CLINIC | Age: 31
End: 2021-11-18
Payer: MEDICARE

## 2021-11-18 NOTE — TELEPHONE ENCOUNTER
Patient Call: General  Route to LPN    Reason for call: Patient states him & his household have tested positive for COVID. They will need to be in quarantine until 11/28 so he needs new home care lab orders.     Call back needed? Yes    Return Call Needed  Same as documented in contacts section  When to return call?: Greater than one day: Route standard priority

## 2021-11-19 ENCOUNTER — TELEPHONE (OUTPATIENT)
Dept: TRANSPLANT | Facility: CLINIC | Age: 31
End: 2021-11-19
Payer: MEDICARE

## 2021-11-19 NOTE — TELEPHONE ENCOUNTER
Return call to Hiwot, home care RN who is stating patient is refusing to have labs drawn until after quarantine is up in another two weeks because everyone in the household has COVID. She states she has appropriate PPE to safely draw labs.    Call to Barry, left message requesting return call. Stressed importance of monitoring transplant labs, especially during illness.     Awaiting return call to discuss further.

## 2021-11-19 NOTE — TELEPHONE ENCOUNTER
Patient asked Jo from the [facility] to call and inform us that his labs will not be drawn until 11/28.    No call back to Jo required

## 2021-11-19 NOTE — TELEPHONE ENCOUNTER
Provider Call: Home Care  Nika from [facility], H/[facility]  #[phone]  Barry will not let her in to do lab draw (the whole family and including Barry all are covid positive)    Callback needed? Yes    Return Call Needed  Same as documented in contacts section  When to return call?: Same day: Route High Priority   Nika is wondering what she can do?

## 2021-11-19 NOTE — TELEPHONE ENCOUNTER
"Message received from Isabela PEARCE   \"Patient asked Jo from the [facility] to call and inform us that his labs will not be drawn until 11/28.     No call back to Jo required\"  "

## 2021-11-29 ENCOUNTER — LAB REQUISITION (OUTPATIENT)
Dept: LAB | Facility: HOSPITAL | Age: 31
End: 2021-11-29
Payer: MEDICARE

## 2021-11-29 ENCOUNTER — NURSE TRIAGE (OUTPATIENT)
Dept: NURSING | Facility: CLINIC | Age: 31
End: 2021-11-29

## 2021-11-29 DIAGNOSIS — D84.9 IMMUNODEFICIENCY, UNSPECIFIED (H): ICD-10-CM

## 2021-11-29 LAB
ANION GAP SERPL CALCULATED.3IONS-SCNC: 11 MMOL/L (ref 5–18)
BUN SERPL-MCNC: 12 MG/DL (ref 8–22)
CALCIUM SERPL-MCNC: 9 MG/DL (ref 8.5–10.5)
CHLORIDE BLD-SCNC: 105 MMOL/L (ref 98–107)
CO2 SERPL-SCNC: 23 MMOL/L (ref 22–31)
CREAT SERPL-MCNC: 0.74 MG/DL (ref 0.7–1.3)
ERYTHROCYTE [DISTWIDTH] IN BLOOD BY AUTOMATED COUNT: 13.5 % (ref 10–15)
GFR SERPL CREATININE-BSD FRML MDRD: >90 ML/MIN/1.73M2
GLUCOSE BLD-MCNC: 96 MG/DL (ref 70–125)
HCT VFR BLD AUTO: 42.2 % (ref 40–53)
HGB BLD-MCNC: 13.7 G/DL (ref 13.3–17.7)
MCH RBC QN AUTO: 27.8 PG (ref 26.5–33)
MCHC RBC AUTO-ENTMCNC: 32.5 G/DL (ref 31.5–36.5)
MCV RBC AUTO: 86 FL (ref 78–100)
PLATELET # BLD AUTO: 280 10E3/UL (ref 150–450)
POTASSIUM BLD-SCNC: 3.8 MMOL/L (ref 3.5–5)
RBC # BLD AUTO: 4.93 10E6/UL (ref 4.4–5.9)
SODIUM SERPL-SCNC: 139 MMOL/L (ref 136–145)
TACROLIMUS BLD-MCNC: 5.8 UG/L (ref 5–15)
TME LAST DOSE: NORMAL H
TME LAST DOSE: NORMAL H
WBC # BLD AUTO: 11 10E3/UL (ref 4–11)

## 2021-11-29 PROCEDURE — 36415 COLL VENOUS BLD VENIPUNCTURE: CPT | Mod: ORL | Performed by: NURSE PRACTITIONER

## 2021-11-29 PROCEDURE — 80197 ASSAY OF TACROLIMUS: CPT | Mod: ORL | Performed by: NURSE PRACTITIONER

## 2021-11-29 PROCEDURE — 80048 BASIC METABOLIC PNL TOTAL CA: CPT | Mod: ORL | Performed by: NURSE PRACTITIONER

## 2021-11-29 PROCEDURE — 85027 COMPLETE CBC AUTOMATED: CPT | Mod: ORL | Performed by: NURSE PRACTITIONER

## 2021-11-29 NOTE — TELEPHONE ENCOUNTER
"Patient is calling to check if it's safe to come out of isolation.  Patient tested positive for covid on 11-11, and has been without symptoms for 10 days.  He has plans with friends today and wants to make sure \"it's okay\".  Patient verbalized understanding and agrees with plan.     Nellie Aaron RN  [facility] Nurse Advisor  10:54 AM 11/29/2021    Reason for Disposition    COVID-19 lab test positive    COVID-19 Home Isolation, questions about    Protocols used: CORONAVIRUS (COVID-19) EXPOSURE-- 8.25.2021, CORONAVIRUS (COVID-19) DIAGNOSED OR [id number]-IN 8.25.2021    COVID 19 Nurse Triage Plan/Patient Instructions    Please be aware that novel coronavirus (COVID-19) may be circulating in the community. If you develop symptoms such as fever, cough, or SOB or if you have concerns about the presence of another infection including coronavirus (COVID-19), please contact your health care provider or visit [email or web address].     Disposition/Instructions    Home care recommended. Follow home care protocol based instructions.    Thank you for taking steps to prevent the spread of this virus.  o Limit your contact with others.  o Wear a simple mask to cover your cough.  o Wash your hands well and often.    Resources    [facility]: About COVID-19: [email or web address]    CDC: What to Do If You're Sick: www.cdc.gov/coronavirus/2019-ncov/about/steps-when-sick.html    CDC: Ending Home Isolation: www.cdc.gov/coronavirus/2019-ncov/hcp/disposition-in-home-patients.html     CDC: Caring for Someone: www.cdc.gov/coronavirus/2019-ncov/if-you-are-sick/care-for-someone.html     [facility]: Interim Guidance for Hospital Discharge to Home: www.health.[facility].[place]/diseases/coronavirus/hcp/hospdischarge.pdf    [facility] clinical trials (COVID-19 research studies): clinicalaffairs.[facility]-clinical-trials     Below are the COVID-19 hotlines at the [place] Department of Health ([facility]). " Interpreters are available.   o For health questions: Call 579-781-2724 or 1-133.408.4504 (7 a.m. to 7 p.m.)  o For questions about schools and childcare: Call 121-901-4926 or 1-905.805.6409 (7 a.m. to 7 p.m.)

## 2021-12-01 ENCOUNTER — TELEPHONE (OUTPATIENT)
Dept: TRANSPLANT | Facility: CLINIC | Age: 31
End: 2021-12-01
Payer: MEDICARE

## 2021-12-01 NOTE — TELEPHONE ENCOUNTER
ISSUE  Transplant labs stable and tac at goal after Covid infection.    OUTCOME  Called Barry left a voice message letting him know labs are stable. Next set of labs due in Feb.  Requested a call back with questions/concerns.

## 2021-12-04 ENCOUNTER — HEALTH MAINTENANCE LETTER (OUTPATIENT)
Age: 31
End: 2021-12-04

## 2021-12-07 PROBLEM — N18.6 END STAGE RENAL DISEASE (H): Status: ACTIVE | Noted: 2017-07-14

## 2021-12-07 NOTE — PROGRESS NOTES
[facility] Dermatology Note  Encounter Date: Dec 8, 2021  Office Visit     Dermatology Problem List:  1. History of kidney transplant in 2017: current immunosuppression mycophenolate mofetil, prednisone, and tacrolimus  2. Hidradenitis suppurativa  - Culture 7/28/21 showed normal ranjith  - current tx: clindamycin BID    Social History: Legally blind.  ____________________________________________    Assessment & Plan:    # Hidradenitis suppurativa. Chronic, stable. No active lesions today. Will continue maintenance plan as below:  - Continue hibiclens as body wash in the shower.  - Continue clindamycin 1% lotion daily.    # Immunosuppressed with mycophenolate mofetil, prednisone, and tacrolimus for kidney transplant. Discussed increased risk of skin cancers with immunosuppressing medications.   - Low risk for skin cancer. SUNTRAC estimated risk 1.01% at 5 years, 2.33% at 10 years post transplant. Recommendations are for skin cancer screening within 10 years of transplant. Will keep this in mind for the future.    Procedures Performed:   None.    Follow-up: yearly for refills, sooner for concerns.    Staff and Scribe:     Scribe Disclosure:   I, Clarice Bender, am serving as a scribe to document services personally performed by this physician, Dr. Marian Marinelli, based on data collection and the provider's statements to me.     Provider Disclosure:   The documentation recorded by the scribe accurately reflects the services I personally performed and the decisions made by me.    Marian Marinelli MD    Department of Dermatology  [facility]: Phone: [phone], Fax:[phone]  [facility]: Phone: [phone], Fax: [phone]    ____________________________________________    CC: Derm Problem (abcesses in armpits, breasts. Patient has been hibiclens and clindamycin.  Patient states improvement)    HPI:  Mr. Barry Mcgrath is a(n) 31 year old male who presents today as a return patient for hidradenitis suppurativa.    Last seen 7/28/21 by Dr. Sierra. Plan was to start clindamycin BID.    Today, Barry notes improvement in the armpits, breasts, and groin area. The hibiclens and clindamycin is working well. Showers every other day.    Patient is otherwise feeling well, without additional skin concerns.    Labs Reviewed:  Bacterial culture 7/28/21: 1+ Normal ranjith.    Physical Exam:  Vitals: There were no vitals taken for this visit.  SKIN: Focused examination of the chest and underarms was performed.  - Atrophic scars in the bilateral axillae   - No inflammatory papules and nodules.  - No other lesions of concern on areas examined.     Medications:  Current Outpatient Medications   Medication     acetaminophen (TYLENOL) 325 MG tablet     amoxicillin (AMOXIL) 875 MG tablet     clindamycin (CLEOCIN T) 1 % external lotion     clindamycin (CLEOCIN T) 1 % external solution     divalproex sodium extended-release (DEPAKOTE ER) 500 MG 24 hr tablet     FLUCELVAX QUADRIVALENT 0.5 ML LAKSHMI     lisinopril (PRINIVIL/ZESTRIL) 10 MG tablet     melatonin 1 MG TABS tablet     metFORMIN (GLUCOPHAGE-XR) 500 MG 24 hr tablet     mycophenolate (GENERIC EQUIVALENT) 250 MG capsule     prazosin (MINIPRESS) 1 MG capsule     predniSONE (DELTASONE) 5 MG tablet     risperiDONE (RISPERDAL) 2 MG tablet     risperiDONE (RISPERDAL) 4 MG tablet     tacrolimus (GENERIC EQUIVALENT) 0.5 MG capsule     tacrolimus (GENERIC EQUIVALENT) 1 MG capsule     vitamin D3 (CHOLECALCIFEROL) 67307 UNITS capsule     No current facility-administered medications for this visit.      Past Medical History:   Patient Active Problem List   Diagnosis     Suicidal ideation     Hypertension, unspecified type     RAQUEL (obstructive sleep apnea)     Bipolar 1 disorder (H)     Immunodeficiency (H)     ESRD on peritoneal dialysis (H)     End stage  renal disease (H)     Abscess of axilla, right     Past Medical History:   Diagnosis Date     Anxiety      Bipolar 1 disorder (H)      Bipolar 2 disorder (H)      Blind      Depressive disorder      Diabetes (H)     pre diabetic     Hypertension      PTSD (post-traumatic stress disorder)      Sleep apnea     uses cpap        CC Referred Self, MD  No address on file on close of this encounter.

## 2021-12-08 ENCOUNTER — OFFICE VISIT (OUTPATIENT)
Dept: DERMATOLOGY | Facility: CLINIC | Age: 31
End: 2021-12-08
Payer: MEDICARE

## 2021-12-08 DIAGNOSIS — L73.2 HIDRADENITIS SUPPURATIVA: Primary | ICD-10-CM

## 2021-12-08 PROCEDURE — 99213 OFFICE O/P EST LOW 20 MIN: CPT | Performed by: DERMATOLOGY

## 2021-12-08 RX ORDER — CLINDAMYCIN PHOSPHATE 10 UG/ML
LOTION TOPICAL
Qty: 60 ML | Refills: 11 | Status: SHIPPED | OUTPATIENT
Start: 2021-12-08 | End: 2023-04-04

## 2021-12-08 NOTE — NURSING NOTE
Barry Mcgrath's goals for this visit include:   Chief Complaint   Patient presents with     Derm Problem     abcesses in armpits, breasts. Patient has been hibiclens and clindamycin. Patient states improvement       He requests these members of his care team be copied on today's visit information: no    PCP: Carly Agustin    Referring Provider:  Referred Self, MD  No address on file    There were no vitals taken for this visit.    Do you need any medication refills at today's visit? No    Geraldine Martinez LPN

## 2021-12-08 NOTE — LETTER
12/8/2021         RE: Barry Mcgrath  [address]  [facility] [address]        Dear Colleague,    Thank you for referring your patient, Barry Mcgrath, to the [facility]. Please see a copy of my visit note below.    [facility] Dermatology Note  Encounter Date: Dec 8, 2021  Office Visit     Dermatology Problem List:  1. History of kidney transplant in 2017: current immunosuppression mycophenolate mofetil, prednisone, and tacrolimus  2. Hidradenitis suppurativa  - Culture 7/28/21 showed normal ranjith  - current tx: clindamycin BID    Social History: Legally blind.  ____________________________________________    Assessment & Plan:    # Hidradenitis suppurativa. Chronic, stable. No active lesions today. Will continue maintenance plan as below:  - Continue hibiclens as body wash in the shower.  - Continue clindamycin 1% lotion daily.    # Immunosuppressed with mycophenolate mofetil, prednisone, and tacrolimus for kidney transplant. Discussed increased risk of skin cancers with immunosuppressing medications.   - Low risk for skin cancer. SUNTRAC estimated risk 1.01% at 5 years, 2.33% at 10 years post transplant. Recommendations are for skin cancer screening within 10 years of transplant. Will keep this in mind for the future.    Procedures Performed:   None.    Follow-up: yearly for refills, sooner for concerns.    Staff and Scribe:     Scribe Disclosure:   I, Clarice Bender, am serving as a scribe to document services personally performed by this physician, Dr. Marian Marinelli, based on data collection and the provider's statements to me.     Provider Disclosure:   The documentation recorded by the scribe accurately reflects the services I personally performed and the decisions made by me.    Marian Marinelli MD    Department of Dermatology  [facility]: Phone: [phone],  Fax:[phone]  [facility]: Phone: [phone], Fax: [phone]    ____________________________________________    CC: Derm Problem (abcesses in armpits, breasts. Patient has been hibiclens and clindamycin. Patient states improvement)    HPI:  Mr. Barry Mcgrath is a(n) 31 year old male who presents today as a return patient for hidradenitis suppurativa.    Last seen 7/28/21 by Dr. Sierra. Plan was to start clindamycin BID.    Today, Barry notes improvement in the armpits, breasts, and groin area. The hibiclens and clindamycin is working well. Showers every other day.    Patient is otherwise feeling well, without additional skin concerns.    Labs Reviewed:  Bacterial culture 7/28/21: 1+ Normal ranjith.    Physical Exam:  Vitals: There were no vitals taken for this visit.  SKIN: Focused examination of the chest and underarms was performed.  - Atrophic scars in the bilateral axillae   - No inflammatory papules and nodules.  - No other lesions of concern on areas examined.     Medications:  Current Outpatient Medications   Medication     acetaminophen (TYLENOL) 325 MG tablet     amoxicillin (AMOXIL) 875 MG tablet     clindamycin (CLEOCIN T) 1 % external lotion     clindamycin (CLEOCIN T) 1 % external solution     divalproex sodium extended-release (DEPAKOTE ER) 500 MG 24 hr tablet     FLUCELVAX QUADRIVALENT 0.5 ML LAKSHMI     lisinopril (PRINIVIL/ZESTRIL) 10 MG tablet     melatonin 1 MG TABS tablet     metFORMIN (GLUCOPHAGE-XR) 500 MG 24 hr tablet     mycophenolate (GENERIC EQUIVALENT) 250 MG capsule     prazosin (MINIPRESS) 1 MG capsule     predniSONE (DELTASONE) 5 MG tablet     risperiDONE (RISPERDAL) 2 MG tablet     risperiDONE (RISPERDAL) 4 MG tablet     tacrolimus (GENERIC EQUIVALENT) 0.5 MG capsule     tacrolimus (GENERIC EQUIVALENT) 1 MG capsule     vitamin D3 (CHOLECALCIFEROL) 48531 UNITS capsule     No current facility-administered medications for this visit.       Past Medical History:   Patient Active Problem List   Diagnosis     Suicidal ideation     Hypertension, unspecified type     RAQUEL (obstructive sleep apnea)     Bipolar 1 disorder (H)     Immunodeficiency (H)     ESRD on peritoneal dialysis (H)     End stage renal disease (H)     Abscess of axilla, right     Past Medical History:   Diagnosis Date     Anxiety      Bipolar 1 disorder (H)      Bipolar 2 disorder (H)      Blind      Depressive disorder      Diabetes (H)     pre diabetic     Hypertension      PTSD (post-traumatic stress disorder)      Sleep apnea     uses cpap        CC Referred Self, MD  No address on file on close of this encounter.       Again, thank you for allowing me to participate in the care of your patient.        Sincerely,        Marian Marinelli MD

## 2021-12-29 ENCOUNTER — DOCUMENTATION ONLY (OUTPATIENT)
Dept: SLEEP MEDICINE | Facility: CLINIC | Age: 31
End: 2021-12-29
Payer: MEDICARE

## 2021-12-29 DIAGNOSIS — G47.33 OSA (OBSTRUCTIVE SLEEP APNEA): Primary | ICD-10-CM

## 2021-12-29 NOTE — PROGRESS NOTES
STM Recheck:  Patient looking to have his CPAP pressure increased 18cm H20  Patient 90% pressure is 16.9.  Will put in for a pressure increase.

## 2022-01-07 ENCOUNTER — TELEPHONE (OUTPATIENT)
Dept: TRANSPLANT | Facility: CLINIC | Age: 32
End: 2022-01-07
Payer: MEDICARE

## 2022-01-07 NOTE — LETTER
[facility]  Room 2-200  [facility],  [address]  [address]  Tel [phone]  Toll Free [phone]                OUTPATIENT LABORATORY TEST ORDER    Patient Name: Barry Mcgrath  Transplant Date: 2/28/2006 (Kidney)  YOB: 1990  Issue Date & Time:January 7, 20221:11 PM    [facility] MR:  [id number]  Exp. Date (1 year after date issued)      Diagnoses: Kidney Transplant (ICD-10 Z94.0)   Long term use of medications (ICD-10 Z79.899)     Lab results to be available on the same day drawn.   Patient should release information to the [facility] [place].  Please fax to the Transplant Center at [phone].    Every 3 Months  ?Hemogram and Platelet  ?Basic Metabolic Panel   ?/Tacrolimus/Prograf drug level             Every 6 Months                                          ?Urine for protein/creatinine      If you have any questions, please call The [facility] at [phone] or [phone].    Please fax labs to [phone]    Dayton Garcia MD

## 2022-01-29 NOTE — TELEPHONE ENCOUNTER
FUTURE VISIT INFORMATION      FUTURE VISIT INFORMATION:    Date: 4.14.22    Time: 2:45    [place] [facility] [place] INFORMATION:    Referring provider:  Dr. Sierra    Referring providers clinic:  [facility] Derm    Reason for visit/diagnosis  New HS    RECORDS REQUESTED FROM:       Clinic name Comments Records Status Imaging Status   [facility] Derm 12.8.21  Dr. Marinelli    7.28.21  Dr. Rigo Palma   [facility] ER 5.27.21  Dr. Li    3.23.21  Dr. Edinson Palma [facility] Gen Surgery 3.31.21  Dr. Jewel Santiago Epic na

## 2022-02-07 ENCOUNTER — TELEPHONE (OUTPATIENT)
Dept: TRANSPLANT | Facility: CLINIC | Age: 32
End: 2022-02-07
Payer: MEDICARE

## 2022-02-07 NOTE — TELEPHONE ENCOUNTER
Patient's clinic called stated the patient is asking if he can delay his lab for another month? When calling the clinic press option 4 for the nurses.

## 2022-02-08 NOTE — TELEPHONE ENCOUNTER
Returning call to patient.  Patient has labs done this morning.      Rowan Webber RN   Transplant Coordinator  [phone]

## 2022-02-10 ENCOUNTER — OFFICE VISIT (OUTPATIENT)
Dept: DERMATOLOGY | Facility: CLINIC | Age: 32
End: 2022-02-10
Payer: MEDICARE

## 2022-02-10 ENCOUNTER — TELEPHONE (OUTPATIENT)
Dept: TRANSPLANT | Facility: CLINIC | Age: 32
End: 2022-02-10

## 2022-02-10 VITALS — SYSTOLIC BLOOD PRESSURE: 133 MMHG | DIASTOLIC BLOOD PRESSURE: 80 MMHG

## 2022-02-10 DIAGNOSIS — Z79.84 LONG TERM (CURRENT) USE OF ORAL HYPOGLYCEMIC DRUGS: ICD-10-CM

## 2022-02-10 DIAGNOSIS — E13.69 OTHER SPECIFIED DIABETES MELLITUS WITH OTHER SPECIFIED COMPLICATION, UNSPECIFIED WHETHER LONG TERM INSULIN USE (H): Primary | ICD-10-CM

## 2022-02-10 DIAGNOSIS — L73.2 HIDRADENITIS SUPPURATIVA: ICD-10-CM

## 2022-02-10 PROCEDURE — 99214 OFFICE O/P EST MOD 30 MIN: CPT | Performed by: DERMATOLOGY

## 2022-02-10 RX ORDER — CLINDAMYCIN PHOSPHATE 10 MG/G
GEL TOPICAL 2 TIMES DAILY
Qty: 60 G | Refills: 11 | Status: SHIPPED | OUTPATIENT
Start: 2022-02-10 | End: 2023-04-04
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
## 2022-02-10 NOTE — LETTER
The Transplant Center  Room 2-200  [facility],  [address]  [address]  Tel [phone]  Toll Free [phone]                OUTPATIENT LABORATORY TEST ORDER    Patient Name: Barry Mcgrath  Transplant Date: 2/28/2006 (Kidney)  YOB: 1990  Issue Date & Time:2/10/2022 [facility]   [facility] MR:  [id number]  Exp. Date (1 year after date issued)      Diagnoses: Kidney Transplant (ICD-10 Z94.0)   Long term use of medications (ICD-10 Z79.899)     Lab results to be available on the same day drawn.   [facility] [place]    Please obtain following lab in 1-2 weeks:    ?/Tacrolimus/Prograf drug level                      ?Urine for protein/creatinine      If you have any questions, please call The Transplant Center at [phone] or [phone].    Please fax labs to [phone]    Dayton Garcia MD

## 2022-02-10 NOTE — TELEPHONE ENCOUNTER
ISSUE:   Tacrolimus IR level 6.4 on 2/8/2022, goal 4-6, dose 1.5 mg BID.  Elevated urine protein: 0.611 on 2/8/2022    PLAN:   Please call patient and confirm this was an accurate 12-hour trough. Verify Tacrolimus IR dose 1.5 mg BID. Confirm no new medications or illness. Confirm no missed doses. If accurate trough and accurate dose, decrease Tacrolimus IR dose to 1.5 mg in the morning and 1 mg in the evening  and repeat labs in 1-2 weeks    Assess  BP:  BG:  Patient not monitoring, RNCC discussed importance of monitoring BP.  Patient will look into getting a BP cuff    OUTCOME:   Spoke with patient, they confirm accurate trough level and current dose 1.5 mg BID. Patient confirmed dose change to 1.5/1 mg BID and to repeat labs in 1-2 weeks. Orders sent to preferred pharmacy for dose change and lab for repeat labs. Patient voiced understanding of plan.     Will repeat UPC as well    Rowan Webber RN   Transplant Coordinator  [phone]
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
## 2022-02-14 ENCOUNTER — TELEPHONE (OUTPATIENT)
Dept: TRANSPLANT | Facility: CLINIC | Age: 32
End: 2022-02-14
Payer: MEDICARE

## 2022-02-14 ENCOUNTER — TELEPHONE (OUTPATIENT)
Dept: SLEEP MEDICINE | Facility: CLINIC | Age: 32
End: 2022-02-14
Payer: MEDICARE

## 2022-02-14 NOTE — TELEPHONE ENCOUNTER
Need f/u sleep appt asap, his sleep patterns changed, he is sleeping durung day and does not want that.Need to change medication.

## 2022-02-14 NOTE — TELEPHONE ENCOUNTER
Provider Call: General  Route to LPN    Reason for call: Call from Pt';s providers office- Soumya- informed labs were faxed from 2/8/2022 and Univa 6.4  Wonders if there would be a dose change before they call the pt with is results     Call back needed? Yes    Return Call Needed  Same as documented in contacts section  When to return call?: Greater than one day: Route standard priority

## 2022-02-14 NOTE — TELEPHONE ENCOUNTER
RNCC returning call to PCP office.  Left detailed message that tacrolimus dose was changed to 1.5 mg in the AM and 1 mg int the PM with a tac redraw in 1-2 weeks for tac level above goal.    Rowan Webber RN   Transplant Coordinator  [phone]

## 2022-02-15 ENCOUNTER — VIRTUAL VISIT (OUTPATIENT)
Dept: SLEEP MEDICINE | Facility: CLINIC | Age: 32
End: 2022-02-15
Payer: MEDICARE

## 2022-02-15 VITALS
BODY MASS INDEX: 33.6 KG/M2 | DIASTOLIC BLOOD PRESSURE: 80 MMHG | HEIGHT: 71 IN | WEIGHT: 240 LBS | SYSTOLIC BLOOD PRESSURE: 133 MMHG

## 2022-02-15 DIAGNOSIS — G47.33 OSA (OBSTRUCTIVE SLEEP APNEA): Primary | ICD-10-CM

## 2022-02-15 DIAGNOSIS — G47.24 NON-24 HOUR SLEEP WAKE DISORDER: ICD-10-CM

## 2022-02-15 PROCEDURE — 99443 PR PHYSICIAN TELEPHONE EVALUATION 21-30 MIN: CPT | Performed by: PHYSICIAN ASSISTANT

## 2022-02-15 ASSESSMENT — SLEEP AND FATIGUE QUESTIONNAIRES
HOW LIKELY ARE YOU TO NOD OFF OR FALL ASLEEP WHILE SITTING AND TALKING TO SOMEONE: SLIGHT CHANCE OF DOZING
HOW LIKELY ARE YOU TO NOD OFF OR FALL ASLEEP IN A CAR, WHILE STOPPED FOR A FEW MINUTES IN TRAFFIC: HIGH CHANCE OF DOZING
HOW LIKELY ARE YOU TO NOD OFF OR FALL ASLEEP WHILE SITTING AND READING: HIGH CHANCE OF DOZING
HOW LIKELY ARE YOU TO NOD OFF OR FALL ASLEEP WHILE SITTING INACTIVE IN A PUBLIC PLACE: HIGH CHANCE OF DOZING
HOW LIKELY ARE YOU TO NOD OFF OR FALL ASLEEP WHILE LYING DOWN TO REST IN THE AFTERNOON WHEN CIRCUMSTANCES PERMIT: HIGH CHANCE OF DOZING
HOW LIKELY ARE YOU TO NOD OFF OR FALL ASLEEP WHILE SITTING QUIETLY AFTER LUNCH WITHOUT ALCOHOL: HIGH CHANCE OF DOZING
HOW LIKELY ARE YOU TO NOD OFF OR FALL ASLEEP WHEN YOU ARE A PASSENGER IN A CAR FOR AN HOUR WITHOUT A BREAK: HIGH CHANCE OF DOZING
HOW LIKELY ARE YOU TO NOD OFF OR FALL ASLEEP WHILE WATCHING TV: HIGH CHANCE OF DOZING

## 2022-02-15 ASSESSMENT — MIFFLIN-ST. JEOR: SCORE: 2062.82

## 2022-02-15 NOTE — PATIENT INSTRUCTIONS
Bedtime 10 PM. Melatonin at 6 PM. Start moving your wake time about 30 minutes earlier until you are waking to an alarm at 7 AM. Try to stay active in the day to avoid sleeping in the daytime.  Avoid the   Your BMI is Body mass index is 33.95 kg/m .  Weight management is a personal decision.  If you are interested in exploring weight loss strategies, the following discussion covers the approaches that may be successful. Body mass index (BMI) is one way to tell whether you are at a healthy weight, overweight, or obese. It measures your weight in relation to your height.  A BMI of 18.5 to 24.9 is in the healthy range. A person with a BMI of 25 to 29.9 is considered overweight, and someone with a BMI of 30 or greater is considered obese. More than two-thirds of American adults are considered overweight or obese.  Being overweight or obese increases the risk for further weight gain. Excess weight may lead to heart disease and diabetes.  Creating and following plans for healthy eating and physical activity may help you improve your health.  Weight control is part of healthy lifestyle and includes exercise, emotional health, and healthy eating habits. Careful eating habits lifelong are the mainstay of weight control. Though there are significant health benefits from weight loss, long-term weight loss with diet alone may be very difficult to achieve- studies show long-term success with dietary management in less than 10% of people. Attaining a healthy weight may be especially difficult to achieve in those with severe obesity. In some cases, medications, devices and surgical management might be considered.  What can you do?  If you are overweight or obese and are interested in methods for weight loss, you should discuss this with your provider.     Consider reducing daily calorie intake by 500 calories.     Keep a food journal.     Avoiding skipping meals, consider cutting portions instead.    Diet combined with exercise  helps maintain muscle while optimizing fat loss. Strength training is particularly important for building and maintaining muscle mass. Exercise helps reduce stress, increase energy, and improves fitness. Increasing exercise without diet control, however, may not burn enough calories to loose weight.       Start walking three days a week 10-20 minutes at a time    Work towards walking thirty minutes five days a week     Eventually, increase the speed of your walking for 1-2 minutes at time    And look into health and wellness programs that may be available through your health insurance provider, employer, local community center, or marilyn club.    Weight management plan: Patient was referred to their PCP to discuss a diet and exercise plan.

## 2022-02-15 NOTE — PROGRESS NOTES
CPAP Follow-Up Visit:    Date on this visit: 2/15/2022    Barry Mcgrath has a follow-up visit today to review his management of non-24 sleep disorder. He is also followed by me for CPAP use for RAQUEL. His medical history is significant for complete blindness, Bipolar I, PTSD, HTN, and kidney transplant.     5/17/2018 [facility] Sleep Study (261.0 lbs) - .0/hr, .8, Supine .0, REM AHI -, Low O2% 65.3%, Time Spent ?88% 60.6, Time Spent ?89% 72.5. Treatment was titrated to a pressure of CPAP 11 with an AHI 6.9. Time spent in REM supine at this pressure was - minutes.     For non-24 hour sleep disorder, he was given the following recommendations: Take 1 mg melatonin at about 6 PM. Set an alarm at 6 AM. Bedtime 9 PM every day. Try to avoid napping. If you must nap, limit it to 30 minutes, using an alarm, and keep it in the early afternoon.      He has been taking melatonin earlier, about 4 PM. He falls asleep around 8 PM and then wakes around midnight until 4-5 AM and then sometimes sleeps until noon to 2 PM. If he takes the melatonin later, he does not get tired until midnight.    He feels his CPAP pressure is not high enough. He notices waking himself with his own snoring as he is just falling asleep.    PAP machine: [employer]pirNet[employer]itys. Pressure settings: 12-18 cm    The compliance data shows that the patient used the CPAP for 92% of nights for >4 hours.  The 90th% pressure is 17.9 cm.  The average time in large leak is 0.  The average nightly usage is 559 min.  The average AHI is 2.4/hr.       Interface:  Mask: full mask. Replaces it every 3 months  Chin strap: No  Leak: No  Using Humidifier: No  Condensation in hose or mask: No     Difficulties with therapy:    [+] Snoring with CPAP:  [-] Difficulty tolerating the pressure: feels too low  [-] Epistaxis/dry nose:   [-] Nasal congestion:  [-] Dry mouth: maybe slight  [-] Mouth breathing:   [-] Pain/skin breakdown:      Improvements noted with CPAP:   [+]  waking up more refreshed  [+] sleeping better with less arousals  [+] nocturia improved   [+] improved energy level during the day    Weight change since sleep study: ? lbs guesses between 240-265#.      Naps: about every other day for 90 minutes.       He has been using his phone before bed, texting and getting into political and Islam debates, looking at [employer] and [employer].     Past medical/surgical history, family history, social history, medications and allergies were reviewed.      Problem List:  Patient Active Problem List    Diagnosis Date Noted     Abscess of axilla, right 03/24/2021     Priority: Medium     ESRD on peritoneal dialysis (H) 12/16/2019     Priority: Medium     Overview:   for 3months from dec/05 to 4/2006        Bipolar 1 disorder (H) 03/13/2019     Priority: Medium     RAQUEL (obstructive sleep apnea) 05/20/2018     Priority: Medium     5/17/2018 [facility] Sleep Study (261.0 lbs) - .0, .8, Supine .0, REM AHI -, Low O2% 65.3%, Time Spent ?88% 60.6, Time Spent ?89% 72.5. Treatment was titrated to a pressure of CPAP 11 with an AHI 6.9. Time spent in REM supine at this pressure was - minutes.       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 He had been sleeping from about midnight to sometimes as late as noon, but then having some awakenings in the early morning. He then overcorrected by taking the melatonin around 4 PM and falling asleep at 8 PM. He would then wake at midnight and be awake most of the night and sleep a lot in the day. He has been on his phone at  bedtime using [employer] and other social media, getting worked-up about politics and Gnosticism.  Plan: Avoid going to bed until 10 pm. Take the melatonin at 6 PM. Start setting an alarm to get you up at a consistent time. May start around 11:30 AM and move it 30 minutes earlier once a week until your wake time is 7 AM. Try to avoid napping. Find activities you enjoy or stay physically active to avoid dozing/napping. Avoid your phone or political discourse in the hour before bedtime.     (G47.33) RAQUEL (obstructive sleep apnea)    Comment: He feels the pressure is too low. He notes waking himself with his snoring as he is just dozing.   Plan: I remotely changed his pressures to 14-18 cm and placed a prescription for new supplies.      He will follow up with me in about 2 month(s).     29 minutes were spent on the date of the encounter doing chart review, history and exam, documentation and further activities as noted above.     Bennett Goltz, PA-C    CC: No ref. provider found

## 2022-02-15 NOTE — PROGRESS NOTES
"Barry Mcgrath is a 31 year old male being evaluated via a billable telephone visit.     \"This telephone visit will be conducted via a call between you and your physician/provider. We have found that certain health care needs can be provided without the need for an in-person visit or physical exam.  This service lets us provide the care you need with a telephone conversation.  If a prescription is necessary we can send it directly to your pharmacy.  If lab work is needed we can place an order for that and you can then stop by our lab to have the test done at a later time.\"    Telephone visits are billed at different rates depending on your insurance coverage.  Please reach out to your insurance provider with any questions.    Patient has given verbal consent for  a Telephone visit? Yes    What telephone number would you like your provider to contact at at:      How would you like to obtain your AVS? Abena Dempsey    Telephone Visit Details:     Telephone Visit Start Time: 4:08 PM    Telephone Visit End Time:4:37 PM      "

## 2022-02-16 ENCOUNTER — TELEPHONE (OUTPATIENT)
Dept: SLEEP MEDICINE | Facility: CLINIC | Age: 32
End: 2022-02-16
Payer: MEDICARE

## 2022-02-24 ENCOUNTER — TELEPHONE (OUTPATIENT)
Dept: TRANSPLANT | Facility: CLINIC | Age: 32
End: 2022-02-24
Payer: MEDICARE

## 2022-02-24 NOTE — LETTER
[facility]  Room 2-200  [facility],  [address]  [address]  Tel [phone]  Toll Free [phone]                OUTPATIENT LABORATORY TEST ORDER    Patient Name: Barry Mcgrath  Transplant Date: 2/28/2006 (Kidney)  YOB: 1990  Issue Date & Time:2/28.2022 at 1525   [facility] MR:  [id number]  Exp. Date (1 year after date issued)      Diagnoses: Kidney Transplant (ICD-10 Z94.0)   Long term use of medications (ICD-10 Z79.899)     Lab results to be available on the same day drawn.   Patient should release information to the [facility], [place]    Please obtain following lab in 1-2 weeks:    ?/Tacrolimus/Prograf drug level                      ?BMP      If you have any questions, please call [facility] at [phone] or [phone].    Please fax labs to [phone]    Dayton Garcia MD

## 2022-02-24 NOTE — TELEPHONE ENCOUNTER
"ISSUE:   Tacrolimus IR level 7.8 on 2/22/2022, goal 4-6, dose 1.5/1 mg BID.    PLAN:   Please call patient and confirm this was an accurate 12-hour trough. Verify Tacrolimus IR dose 1.5/1 mg BID. Confirm no new medications or illness. Confirm no missed doses. If accurate trough and accurate dose, decrease Tacrolimus IR dose to 1 mg BID and repeat labs in 1-2 weeks    OUTCOME:   Spoke with patient, they confirm accurate trough level and current dose 1.5/1 mg BID. Patient confirmed dose change to 1 mg BID and to repeat labs in 1-2 weeks. Orders sent to preferred pharmacy for dose change and lab for repeat labs. Patient voiced understanding of plan.     Patient upset on the phone with coordinator when asked how he was doing.  Patient expressed to RNCC that \"he was not okay\" and that he was trying to get an [facility].  RNCC asked patient if he was safe and responded that he was safe and no plan to harm himself.  Will reach out to ensure he made apt with psyc tomorrow 3/1    Rowan Webber RN   Transplant Coordinator  [phone]      "

## 2022-02-28 ENCOUNTER — TELEPHONE (OUTPATIENT)
Dept: TRANSPLANT | Facility: CLINIC | Age: 32
End: 2022-02-28
Payer: MEDICARE

## 2022-02-28 NOTE — TELEPHONE ENCOUNTER
Patient's call returned.  See phone encounter from 2/24    Rowan Webber RN   Transplant Coordinator  [phone]

## 2022-03-01 ENCOUNTER — TELEPHONE (OUTPATIENT)
Dept: TRANSPLANT | Facility: CLINIC | Age: 32
End: 2022-03-01
Payer: MEDICARE

## 2022-03-01 NOTE — TELEPHONE ENCOUNTER
ISSUE: Patient upset on the phone yesterday 2/28 with RNCC.  Calling to check in with patient.    OUTCOME: Patient calm on the phone today.  Patient states he is doing better today and was able to get in touch with psyc provider.  Patient had no questions for RNCC.  Will look out for tac drug levels in 2 weeks.    Rowan Webber RN   Transplant Coordinator  [phone]

## 2022-03-01 NOTE — TELEPHONE ENCOUNTER
Patient paged triage nurse to verify new tacrolimus dose. Verified that he should start taking 1mg twice daily per notes from his transplant coordinator. Patient had someone with him that verified with him and will set up meds. Reminded patient to have labs repeated in 1-2 weeks per transplant coordinator.

## 2022-03-08 ENCOUNTER — TELEPHONE (OUTPATIENT)
Dept: TRANSPLANT | Facility: CLINIC | Age: 32
End: 2022-03-08
Payer: MEDICARE

## 2022-03-08 NOTE — TELEPHONE ENCOUNTER
ISSUE: patient due for tac and UPC repeat.      OUTCOME: left detailed message for patient to please obtain labs. Asked for CB with any questions    Rowan Webber RN   Transplant Coordinator  [phone]

## 2022-03-10 ENCOUNTER — TELEPHONE (OUTPATIENT)
Dept: TRANSPLANT | Facility: CLINIC | Age: 32
End: 2022-03-10
Payer: MEDICARE

## 2022-03-10 NOTE — TELEPHONE ENCOUNTER
ISSUE: Patient calling to report some blood in the stool a week ago. He states he does not believe he has had any blood in his stool the past few days, however patient being blind he cannot be certain.  Will get in touch with Tod COONEY to inquire. Patient to have labs done on Monday 3/14/2022    OUTCOME:  Left message for CB from Tod.  Let patient know that I am waiting to hear back from Tod and will touch base with patient when I do so.    Rowan Webber RN   Transplant Coordinator  [phone]    ADDENDUM: received call back from Shahnaz Baron.  Tod reports that he has on a couple occasion seen a small amount of red blood on the toilet seat when Barry is finished using the restroom.  Tod states he noted blood on the toilet seat X1 last week.  Tod assumed it was possibly from hemorrhids  as he states Barry does not get enough fiber in his diet. Tod unsure if there has been blood in the stool.  Will reach out to discuss with patient.    Rowan Webber RN   Transplant Coordinator  [phone]      ADDENDUM:  Patient denies any pain with BM and denies hemorrhoids.  Denies recent blood with having BM.  Patient informed to call RNCC should it occur again.    Rowan Webber RN   Transplant Coordinator  [phone]

## 2022-03-10 NOTE — TELEPHONE ENCOUNTER
Patient Called:   Stated he has blood in his stool  Was not  Sure when asked  if it was dark or bright red       Call back needed? Yes    Return Call Needed  Same as documented in contacts section  When to return call?: Same day: Route High Priority

## 2022-03-15 ENCOUNTER — TELEPHONE (OUTPATIENT)
Dept: NURSING | Facility: CLINIC | Age: 32
End: 2022-03-15
Payer: MEDICARE

## 2022-03-15 NOTE — TELEPHONE ENCOUNTER
Telephone call    Patient calling to report that he took his depakote last night instead of this morning and he was wanting to know what to do.  After looking at the chart the medication order was from 2019 and was not the same dose that he is taking now.  It was ordered by nurse practitioner  Rahel Wagner CNP I recommended that  They call her office to straighten this out as I did not have any information  About the medication as it is now prescribed.  He agreed and will  Call her office.    Jesica Vera RN   [facility] Nurse Advisor  7:28 AM 3/15/2022    COVID 19 Nurse Triage Plan/Patient Instructions    Please be aware that novel coronavirus (COVID-19) may be circulating in the community. If you develop symptoms such as fever, cough, or SOB or if you have concerns about the presence of another infection including coronavirus (COVID-19), please contact your health care provider or visit [email or web address].     Disposition/Instructions    Home care recommended. Follow home care protocol based instructions.    Thank you for taking steps to prevent the spread of this virus.  o Limit your contact with others.  o Wear a simple mask to cover your cough.  o Wash your hands well and often.    Resources    [facility]: About COVID-19: [email or web address]    CDC: What to Do If You're Sick: www.cdc.gov/coronavirus/2019-ncov/about/steps-when-sick.html    CDC: Ending Home Isolation: www.cdc.gov/coronavirus/2019-ncov/hcp/disposition-in-home-patients.html     CDC: Caring for Someone: www.cdc.gov/coronavirus/2019-ncov/if-you-are-sick/care-for-someone.html     [facility]: Interim Guidance for Hospital Discharge to Home: www.health.state.[place]/diseases/coronavirus/hcp/hospdischarge.pdf    [facility] clinical trials (COVID-19 research studies): [email or web address]     Below are the COVID-19 hotlines at the [place] Department of Health ([facility]). Interpreters  are available.   o For health questions: Call 247-176-8419 or 1-975.847.9067 (7 a.m. to 7 p.m.)  o For questions about schools and childcare: Call 333-227-9954 or 1-605.233.2113 (7 a.m. to 7 p.m.)

## 2022-03-16 ENCOUNTER — TELEPHONE (OUTPATIENT)
Dept: TRANSPLANT | Facility: CLINIC | Age: 32
End: 2022-03-16
Payer: MEDICARE

## 2022-03-16 NOTE — TELEPHONE ENCOUNTER
Provider Call: General  Route to LPN    Reason for call: Call from clinic re his last Tacro level  Wonders what the goal for his results are and if there is a med change needed and when needs to be re-checked next  Also give pt a call with his results     Call back needed? Yes- [phone] opt 4  OK to leave a vm     Return Call Needed  Same as documented in contacts section  When to return call?: Greater than one day: Route standard priority

## 2022-03-16 NOTE — TELEPHONE ENCOUNTER
ISSUE:   Tacrolimus IR level 6.7 on 3/14/2022, goal 4-6, dose 1.5 mg BID.    PLAN:   Please call patient and confirm this was an accurate 12-hour trough. Verify Tacrolimus IR dose 1.5 mg BID. Confirm no new medications or illness. Confirm no missed doses. If accurate trough and accurate dose, decrease Tacrolimus IR dose to 1.5 mg in the AM and 1 mg in the PM and repeat labs in 2 weeks    OUTCOME:   Spoke with patient, they confirm accurate trough level and current dose 1.5 mg BID. Patient confirmed dose change to 1.5 mg in the AM and 1 mg in the PM  and to repeat labs in 2 weeks. Orders sent to preferred pharmacy for dose change and lab for repeat labs. Patient voiced understanding of plan.     Rowan Webber RN   Transplant Coordinator  [phone]

## 2022-03-16 NOTE — LETTER
[facility]  Room 2-200  [facility],  [address]  [address]  Tel [phone]  Toll Free [phone]                OUTPATIENT LABORATORY TEST ORDER    Patient Name: Barry Mcgrath  Transplant Date: 2/28/2006 (Kidney)  YOB: 1990  Issue Date & Time: 3/16/2022 at 1700   [facility] MR:  [id number]  Exp. Date (1 year after date issued)      Diagnoses: Kidney Transplant (ICD-10 Z94.0)   Long term use of medications (ICD-10 Z79.899)     Lab results to be available on the same day drawn.   [facility]    Please obtain following lab in 2 weeks:    ?/Tacrolimus/Prograf drug level                      ?BMP      If you have any questions, please call [facility] at [phone] or [phone].    Please fax labs to [phone]    Dayton Garcia MD

## 2022-03-16 NOTE — TELEPHONE ENCOUNTER
See note from Rowan Webber 3/16/2022. Spoke with patient. Duplicate encounter    Rowan Webber RN   Transplant Coordinator  [phone]

## 2022-03-21 ENCOUNTER — TELEPHONE (OUTPATIENT)
Dept: TRANSPLANT | Facility: CLINIC | Age: 32
End: 2022-03-21
Payer: MEDICARE

## 2022-03-21 NOTE — TELEPHONE ENCOUNTER
ISSUE: patient reports one instance of pain around groin when pushing to ensure he has emptied his bladder when he has finished voiding.    Patient denies pain at graft site, states the pain is near his legs. When asked why he needs to push, he states he 'wants to make sure every this out.'    Patient is otherwise feeling well.  Instructed patient to continue to monitor and to call [facility] should the pain continue.    Rowan Webber RN   Transplant Coordinator  [phone]    ADDENDUM:  Called to check in regarding the groin pain patient experienced on 3/21.  Patient denies any further pain and reports it was just on instance and has not occurred again.    Rowan Webber RN   Transplant Coordinator  [phone]

## 2022-03-26 ENCOUNTER — HEALTH MAINTENANCE LETTER (OUTPATIENT)
Age: 32
End: 2022-03-26

## 2022-04-05 ENCOUNTER — TRANSFERRED RECORDS (OUTPATIENT)
Dept: HEALTH INFORMATION MANAGEMENT | Facility: CLINIC | Age: 32
End: 2022-04-05
Payer: MEDICARE

## 2022-04-05 ENCOUNTER — TELEPHONE (OUTPATIENT)
Dept: TRANSPLANT | Facility: CLINIC | Age: 32
End: 2022-04-05
Payer: MEDICARE

## 2022-04-05 NOTE — TELEPHONE ENCOUNTER
Returning call to lab to confirm we have received the lab results from 3/30/2022    Rowan Webber RN   Transplant Coordinator  [phone]

## 2022-04-05 NOTE — TELEPHONE ENCOUNTER
Patient Call: Voicemail  Date/Time: 04/04/22 2:19 pm  Reason for call: Soumya called on behalf of Vonda Beebe to verify we received the labs they sent over. No dates provided

## 2022-04-14 ENCOUNTER — PRE VISIT (OUTPATIENT)
Dept: DERMATOLOGY | Facility: CLINIC | Age: 32
End: 2022-04-14

## 2022-04-21 NOTE — TELEPHONE ENCOUNTER
"Updated prescriptions for a full year. Deleted \"labs needed for refills comment.\"  "
8-year-old female with no medical history presents with seizure-like episode that occurred just prior to arrival.  Mom reports child called out for help when she ran into child and had generalized shaking nonresponsive that lasted about 6 minutes.  They reported she was sleepy after now currently getting back to baseline.  Reports 2 episodes happened in the past but not with obvious shaking like today's.  Positive family history of seizures.  No recent illnesses.  No fever or URI symptoms.  Denies any head trauma.  Vital signs reviewed general well-appearing no acute distress HEENT PERRLA EOMI TMs clear pharynx clear moist mucous membranes CVS S1-S2 no murmurs lungs clear to auscultation bilaterally abdomen soft nontender nondistended extremities full range of motion x4 skin no rashes warm well perfused neuro no focal deficits normal gait motor and sensation intact equally bilaterally.  Assessment: Seizure-like episode.  Plan: Labs, neurology consult.  Patient to be admitted for video EEG.

## 2022-05-17 DIAGNOSIS — Z48.298 AFTERCARE FOLLOWING ORGAN TRANSPLANT: ICD-10-CM

## 2022-05-17 DIAGNOSIS — Z94.0 STATUS POST KIDNEY TRANSPLANT: ICD-10-CM

## 2022-05-17 DIAGNOSIS — Z94.0 KIDNEY REPLACED BY TRANSPLANT: Primary | ICD-10-CM

## 2022-05-17 DIAGNOSIS — D84.9 IMMUNOSUPPRESSION (H): ICD-10-CM

## 2022-05-17 RX ORDER — MYCOPHENOLATE MOFETIL 250 MG/1
750 CAPSULE ORAL 2 TIMES DAILY
Qty: 540 CAPSULE | Refills: 3 | Status: SHIPPED | OUTPATIENT
Start: 2022-05-17 | End: 2022-06-13

## 2022-05-17 NOTE — TELEPHONE ENCOUNTER
Patient Call: Medication Refill  Route to LPN  Instruct the patient to first contact their pharmacy. If they have called their pharmacy and require further assistance, route to LPN.    Pharmacy Name: [facility]  Pharmacy Location: [place], [place]  Name of Medication: mycophenolate (GENERIC EQUIVALENT) 250 MG capsule    When will the patient be out of this medication?: Less than 3 days (Route high priority)

## 2022-05-24 DIAGNOSIS — Z94.0 IMMUNOSUPPRESSIVE MANAGEMENT ENCOUNTER FOLLOWING KIDNEY TRANSPLANT: ICD-10-CM

## 2022-05-24 DIAGNOSIS — Z94.0 STATUS POST KIDNEY TRANSPLANT: ICD-10-CM

## 2022-05-24 DIAGNOSIS — Z94.0 KIDNEY TRANSPLANTED: ICD-10-CM

## 2022-05-24 DIAGNOSIS — Z79.899 IMMUNOSUPPRESSIVE MANAGEMENT ENCOUNTER FOLLOWING KIDNEY TRANSPLANT: ICD-10-CM

## 2022-05-24 DIAGNOSIS — Z48.298 AFTERCARE FOLLOWING ORGAN TRANSPLANT: ICD-10-CM

## 2022-05-24 RX ORDER — TACROLIMUS 0.5 MG/1
0.5 CAPSULE ORAL 2 TIMES DAILY
Qty: 60 CAPSULE | Refills: 11 | Status: SHIPPED | OUTPATIENT
Start: 2022-05-24 | End: 2022-06-13

## 2022-05-24 RX ORDER — TACROLIMUS 1 MG/1
1 CAPSULE ORAL 2 TIMES DAILY
Qty: 60 CAPSULE | Refills: 11 | Status: SHIPPED | OUTPATIENT
Start: 2022-05-24 | End: 2022-06-13

## 2022-05-24 NOTE — TELEPHONE ENCOUNTER
Patient Call: Medication Refill  Route to LPN  Instruct the patient to first contact their pharmacy. If they have called their pharmacy and require further assistance, route to LPN.    tacrolimus (GENERIC EQUIVALENT) 1 MG capsule    tacrolimus (GENERIC EQUIVALENT) 0.5 MG capsule    [id number]][address]    When will the patient be out of this medication?: Less than 3 days (Route high priority)

## 2022-05-26 NOTE — PROGRESS NOTES
Barry is a 31 year old who is being evaluated via a billable telephone visit.      What phone number would you like to be contacted at? [phone]  How would you like to obtain your AVS? ConnectionPlus   Phone start time: 9:52 AM  Phone end time: 10:11 AM  Phone call duration: 19 minutes    Ciara Guaman, Virtual Facilitator/LPN        CPAP Follow-Up Visit:    Date on this visit: 5/27/2022    Barry Mcgrath has a follow-up visit today to review his management of non-24 sleep disorder and CPAP use for RAQUEL. His medical history is significant for complete blindness, Bipolar I, PTSD, HTN, and kidney transplant.     5/17/2018 [facility] Sleep Study (261.0 lbs) - .0/hr, .8, Supine .0, REM AHI -, Low O2% 65.3%, Time Spent ?88% 60.6, Time Spent ?89% 72.5. Treatment was titrated to a pressure of CPAP 11 with an AHI 6.9. Time spent in REM supine at this pressure was - minutes.      He feels he is doing well. He feels the pressure could still be higher. He notes a little leak but feels he could tighten the straps.  He has been exercising more and has been sleeping a lot more. Sometimes he is just in bed resting.    PAP machine: RespirShoot Extremes. Pressure settings: 14-18 cm    The compliance data shows that the patient used the CPAP for 30/30 nights, 100% of nights for >4 hours.  The 90th% pressure is 18 cm.  The average time in large leak is 36 sec.  The average nightly usage is 12:31.  The average AHI is 3.2/hr.         Interface:  Mask: full face mask  Chin strap: No  Leak: Yes a little  Using Humidifier: no  Condensation in hose or mask: No     Difficulties with therapy:    [-] Snoring with CPAP:   [-] Difficulty tolerating the pressure:  [-] Epistaxis/dry nose:   [-] Nasal congestion:  [-] Dry mouth:  [-] Mouth breathing: occasionally, but not bad  [-] Pain/skin breakdown:      Improvements noted with CPAP:   [+] waking up more refreshed  [+] sleeping better with less arousals  [+] nocturia improved   [+]  improved energy level during the day    Weight change since sleep study: 242 lbs    Bedtime: 9-10 PM.  Wake time: 7-9 AM. Falls asleep in 10-20 minutes. Wakes 1-2 times per night, usually does not have difficulty getting back to sleep. Reason for waking: uncertain, sometimes restroom  Naps: daily, sometimes can be most of the day. Sometimes he naps out of boredom or naps when one of his roommates naps.  He takes caffeine tablets occasionally.     He takes 1 mg melatonin at 6 PM.    Past medical/surgical history, family history, social history, medications and allergies were reviewed.      Problem List:  Patient Active Problem List    Diagnosis Date Noted     Abscess of axilla, right 03/24/2021     Priority: Medium     ESRD on peritoneal dialysis (H) 12/16/2019     Priority: Medium     Overview:   for 3months from dec/05 to 4/2006        Bipolar 1 disorder (H) 03/13/2019     Priority: Medium     RAQUEL (obstructive sleep apnea) 05/20/2018     Priority: Medium     5/17/2018 [facility] Sleep Study (261.0 lbs) - .0, .8, Supine .0, REM AHI -, Low O2% 65.3%, Time Spent ?88% 60.6, Time Spent ?89% 72.5. Treatment was titrated to a pressure of CPAP 11 with an AHI 6.9. Time spent in REM supine at this pressure was - minutes.       Hypertension, unspecified type 04/04/2018     Priority: Medium     Suicidal ideation 08/28/2017     Priority: Medium     End stage renal disease (H) 07/14/2017     Priority: Medium     Overview:   Overview:   for 3months from dec/05 to 4/2006        Immunodeficiency (H) 09/13/2012     Priority: Medium        Impression/Plan:    (G47.33) RAQUEL (obstructive sleep apnea)  (primary encounter diagnosis)  Comment: Barry seems to be doing well with CPAP. His apnea is well treated and his mask leak is low.  Plan: Comprehensive DME        Continue auto CPAP 14-18 cm. A prescription was written for new supplies.        (G47.24) Non-24 hour sleep wake disorder  Comment: The bulk of his  sleep occurs at night, but lately, he has been napping a lot in the daytime. He attributes that to increased exercise wearing him out and also to boredom. He says he naps sometimes when his roommate naps just to try to be on the dame schedule.  Plan: He was encouraged to try to limit naps to no more than 2 hours in the early afternoon to help keep his sleep drive high at night. Continue 1 mg melatonin at 6 PM.        He will follow up with me in about 1 year(s).     Phone visit duration: 19 min     Bennett Goltz, PA-C    CC: No ref. provider found

## 2022-05-27 ENCOUNTER — VIRTUAL VISIT (OUTPATIENT)
Dept: SLEEP MEDICINE | Facility: CLINIC | Age: 32
End: 2022-05-27
Payer: MEDICARE

## 2022-05-27 VITALS — WEIGHT: 242 LBS | BODY MASS INDEX: 33.88 KG/M2 | HEIGHT: 71 IN

## 2022-05-27 DIAGNOSIS — G47.24 NON-24 HOUR SLEEP WAKE DISORDER: ICD-10-CM

## 2022-05-27 DIAGNOSIS — G47.33 OSA (OBSTRUCTIVE SLEEP APNEA): Primary | ICD-10-CM

## 2022-05-27 PROCEDURE — 99442 PR PHYSICIAN TELEPHONE EVALUATION 11-20 MIN: CPT | Performed by: PHYSICIAN ASSISTANT

## 2022-05-27 ASSESSMENT — SLEEP AND FATIGUE QUESTIONNAIRES
HOW LIKELY ARE YOU TO NOD OFF OR FALL ASLEEP IN A CAR, WHILE STOPPED FOR A FEW MINUTES IN TRAFFIC: WOULD NEVER DOZE
HOW LIKELY ARE YOU TO NOD OFF OR FALL ASLEEP WHILE SITTING AND TALKING TO SOMEONE: WOULD NEVER DOZE
HOW LIKELY ARE YOU TO NOD OFF OR FALL ASLEEP WHILE WATCHING TV: WOULD NEVER DOZE
HOW LIKELY ARE YOU TO NOD OFF OR FALL ASLEEP WHILE SITTING INACTIVE IN A PUBLIC PLACE: WOULD NEVER DOZE
HOW LIKELY ARE YOU TO NOD OFF OR FALL ASLEEP WHILE SITTING QUIETLY AFTER LUNCH WITHOUT ALCOHOL: WOULD NEVER DOZE
HOW LIKELY ARE YOU TO NOD OFF OR FALL ASLEEP WHILE SITTING AND READING: WOULD NEVER DOZE
HOW LIKELY ARE YOU TO NOD OFF OR FALL ASLEEP WHEN YOU ARE A PASSENGER IN A CAR FOR AN HOUR WITHOUT A BREAK: WOULD NEVER DOZE
HOW LIKELY ARE YOU TO NOD OFF OR FALL ASLEEP WHILE LYING DOWN TO REST IN THE AFTERNOON WHEN CIRCUMSTANCES PERMIT: WOULD NEVER DOZE

## 2022-05-27 NOTE — NURSING NOTE
Chief Complaint   Patient presents with     Video Visit     Return Sleep:  2 month RAQUEL follow up     Ciara Guaman, Virtual Facilitator/LPN

## 2022-06-13 ENCOUNTER — OFFICE VISIT (OUTPATIENT)
Dept: NEPHROLOGY | Facility: CLINIC | Age: 32
End: 2022-06-13
Attending: INTERNAL MEDICINE
Payer: MEDICARE

## 2022-06-13 VITALS
HEART RATE: 79 BPM | WEIGHT: 244.6 LBS | DIASTOLIC BLOOD PRESSURE: 80 MMHG | OXYGEN SATURATION: 94 % | BODY MASS INDEX: 34.6 KG/M2 | SYSTOLIC BLOOD PRESSURE: 122 MMHG | TEMPERATURE: 98.2 F

## 2022-06-13 DIAGNOSIS — Z94.0 STATUS POST KIDNEY TRANSPLANT: ICD-10-CM

## 2022-06-13 DIAGNOSIS — Z94.0 KIDNEY REPLACED BY TRANSPLANT: ICD-10-CM

## 2022-06-13 DIAGNOSIS — Z79.899 IMMUNOSUPPRESSIVE MANAGEMENT ENCOUNTER FOLLOWING KIDNEY TRANSPLANT: ICD-10-CM

## 2022-06-13 DIAGNOSIS — E11.9 TYPE 2 DIABETES MELLITUS WITHOUT COMPLICATION, WITHOUT LONG-TERM CURRENT USE OF INSULIN (H): ICD-10-CM

## 2022-06-13 DIAGNOSIS — D84.9 IMMUNOSUPPRESSION (H): ICD-10-CM

## 2022-06-13 DIAGNOSIS — Z94.0 IMMUNOSUPPRESSIVE MANAGEMENT ENCOUNTER FOLLOWING KIDNEY TRANSPLANT: ICD-10-CM

## 2022-06-13 DIAGNOSIS — F43.10 PTSD (POST-TRAUMATIC STRESS DISORDER): ICD-10-CM

## 2022-06-13 DIAGNOSIS — Z94.0 KIDNEY TRANSPLANTED: ICD-10-CM

## 2022-06-13 DIAGNOSIS — Z48.298 AFTERCARE FOLLOWING ORGAN TRANSPLANT: ICD-10-CM

## 2022-06-13 PROBLEM — L73.2 HIDRADENITIS SUPPURATIVA: Status: ACTIVE | Noted: 2021-03-24

## 2022-06-13 PROBLEM — Z99.2 ESRD ON PERITONEAL DIALYSIS (H): Status: RESOLVED | Noted: 2019-12-16 | Resolved: 2022-06-13

## 2022-06-13 PROBLEM — N18.6 ESRD ON PERITONEAL DIALYSIS (H): Status: RESOLVED | Noted: 2019-12-16 | Resolved: 2022-06-13

## 2022-06-13 PROBLEM — N18.6 END STAGE RENAL DISEASE (H): Status: RESOLVED | Noted: 2017-07-14 | Resolved: 2022-06-13

## 2022-06-13 PROBLEM — I15.1 HTN, KIDNEY TRANSPLANT RELATED: Status: ACTIVE | Noted: 2018-04-04

## 2022-06-13 PROBLEM — G47.24 NON-24 HOUR SLEEP WAKE DISORDER: Status: RESOLVED | Noted: 2022-05-27 | Resolved: 2022-06-13

## 2022-06-13 PROCEDURE — 99214 OFFICE O/P EST MOD 30 MIN: CPT | Performed by: INTERNAL MEDICINE

## 2022-06-13 PROCEDURE — G0463 HOSPITAL OUTPT CLINIC VISIT: HCPCS

## 2022-06-13 RX ORDER — DIVALPROEX SODIUM 500 MG/1
2500 TABLET, EXTENDED RELEASE ORAL DAILY
Refills: 0 | COMMUNITY
Start: 2022-06-13

## 2022-06-13 RX ORDER — OLANZAPINE AND SAMIDORPHAN L-MALATE 20; 10 MG/1; MG/1
TABLET, FILM COATED ORAL
COMMUNITY
Start: 2022-06-10

## 2022-06-13 RX ORDER — TACROLIMUS 0.5 MG/1
0.5 CAPSULE ORAL 2 TIMES DAILY
Qty: 60 CAPSULE | Refills: 11 | COMMUNITY
Start: 2022-06-13 | End: 2024-07-26

## 2022-06-13 RX ORDER — CETIRIZINE HYDROCHLORIDE 10 MG/1
10 TABLET ORAL DAILY PRN
COMMUNITY
Start: 2022-06-13

## 2022-06-13 RX ORDER — PREDNISONE 5 MG/1
5 TABLET ORAL DAILY
Qty: 30 TABLET | Refills: 11 | Status: SHIPPED | OUTPATIENT
Start: 2022-06-13 | End: 2022-09-11

## 2022-06-13 RX ORDER — PRAZOSIN HYDROCHLORIDE 1 MG/1
2 CAPSULE ORAL AT BEDTIME
Qty: 60 CAPSULE | Refills: 0 | COMMUNITY
Start: 2022-06-13

## 2022-06-13 RX ORDER — ROSUVASTATIN CALCIUM 10 MG/1
5 TABLET, COATED ORAL DAILY
COMMUNITY
Start: 2022-06-13

## 2022-06-13 RX ORDER — MYCOPHENOLATE MOFETIL 250 MG/1
750 CAPSULE ORAL 2 TIMES DAILY
Qty: 540 CAPSULE | Refills: 3 | Status: SHIPPED | OUTPATIENT
Start: 2022-06-13 | End: 2023-05-16

## 2022-06-13 RX ORDER — TACROLIMUS 1 MG/1
1 CAPSULE ORAL 2 TIMES DAILY
Qty: 60 CAPSULE | Refills: 11 | COMMUNITY
Start: 2022-06-13 | End: 2022-10-18

## 2022-06-13 ASSESSMENT — PAIN SCALES - GENERAL: PAINLEVEL: NO PAIN (0)

## 2022-06-13 NOTE — NURSING NOTE
Chief Complaint   Patient presents with     RECHECK     S/p kidney tx     Blood pressure 122/80, pulse 79, temperature 98.2  F (36.8  C), temperature source Oral, weight 110.9 kg (244 lb 9.6 oz), SpO2 94 %.    Shaggy Davis on 6/13/2022 at 2:39 PM

## 2022-06-13 NOTE — LETTER
6/13/2022       RE: Barry Mcgrath  [address]  [facility] [address]     Dear Colleague,    Thank you for referring your patient, Barry Mcgrath, to the [facility] [place] [facility]. Please see a copy of my visit note below.      CHRONIC TRANSPLANT NEPHROLOGY VISIT    Assessment & Plan   # DDKT: Stable   - Baseline Creatinine:  ~ 0.7-0.9   - Proteinuria: Normal (<0.2 grams)   - Date DSA Last Checked: Not Known      Latest DSA: Not checked recently due to time from transplant   - BK Viremia: Not checked recently due to time from transplant   - Kidney Tx Biopsy: No    -Labs ever 3 months at [facility]. Due now    # Immunosuppression: Tacrolimus immediate release (goal 4-6), Mycophenolate mofetil (dose 750 mg every 12 hours) and Prednisone (dose 5 mg daily)             - Continue with intensive monitoring of immunosuppression for efficacy and    toxicity.             - Changes: Not at this time. Check MPA level    # Infection Prophylaxis:   - PJP: None    # Hypertension: Controlled;  Goal BP: < 130/80   - Changes: Not at this time. Continue lisinopril 10mg daily, prazosin 2mg qhs    # Diabetes: Controlled (HbA1c <7%) Last HbA1c: 6.6%   - Management as per primary care. On metformin 1g BID    # Relative Erythrocytosis: Hgb: Stable;  On ACEI/ARB: Yes  Imaging: Not at this time    # Suicidal ideation:   -Has had ED presentation x3 for suicidal ideation, most recently 8/30/21    # RAQUEL:   -On CPAP. Following up with sleep clinic    # Hidradenitis:   -Recurrent. S/p I+D of L axillary abscess 5/27/21 in ED.    -Follows with dermatology, prescribed clindamycin topical BID and hibiclens body wash    # Skin Cancer Risk:    - Discussed sun protection and recommend regular follow up with Dermatology.    # COVID-19 Virus Review: Discussed COVID-19 virus and the potential medical risks.  Reviewed preventative health  recommendations, including wearing a mask where appropriate.  Recommended COVID vaccination should be up to date with either an initial vaccination or booster shot when appropriate.  Asked the patient to inform the transplant center if they are exposed or diagnosed with this virus.    # COVID Vaccination Up To Date: Yes       # Medical Compliance: Yes    # Transplant History:  Etiology of Kidney Failure: Congenital dysplasia  Tx: DDKT  Transplant: 2/28/2006 (Kidney)  Significant changes in immunosuppression: None  Significant transplant-related complications: None    Transplant Office Phone Number: [phone]    Assessment and plan was discussed with the patient and he voiced his understanding and agreement.    Return visit: Return in about 1 year (around 6/13/2023).      Dayton Garcia MD    Chief Complaint   Mr. Mcgrath is a 31 year old here for routine follow up, kidney transplant and immunosuppression management.    History of Present Illness   Barry recently met the donor family and met the recipient of the heart. He denies feelings of hurting himself but he has rage occasionally towards others. He denies nausea, vomiting. He has occasional diarrhea, unclear how often.     Home BP: Not checked    Problem List   Patient Active Problem List   Diagnosis     Suicidal ideation     Hypertension, unspecified type     RAQUEL (obstructive sleep apnea)     Bipolar 1 disorder (H)     Immunodeficiency (H)     ESRD on peritoneal dialysis (H)     End stage renal disease (H)     Abscess of axilla, right     Non-24 hour sleep wake disorder       Allergies   Allergies   Allergen Reactions     Seasonal Allergies Other (See Comments)     hamster hair     Cats      Itching, nasal congestion     Dogs      Itching, nasal congestion     No Known Allergies        Medications   Current Outpatient Medications   Medication Sig     cetirizine (ZYRTEC) 10 MG tablet Take 1 tablet (10 mg) by mouth daily as needed for allergies     chlorhexidine  (HIBICLENS) 4 % liquid Apply topically daily as needed for wound care     cholecalciferol (VITAMIN D3) 25 mcg (1000 units) capsule Take 1 capsule (25 mcg) by mouth daily     clindamycin (CLEOCIN T) 1 % external lotion Apply thin layer to armpits, chest, groin, and buttocks once daily.     clindamycin (CLINDAMAX) 1 % external gel Apply topically 2 times daily     divalproex sodium extended-release (DEPAKOTE ER) 500 MG 24 hr tablet Take 5 tablets (2,500 mg) by mouth daily     FLUCELVAX QUADRIVALENT 0.5 ML LAKSHMI Pharmacist to administer IM     lisinopril (PRINIVIL/ZESTRIL) 10 MG tablet Take 10 mg by mouth daily     LYBALVI 20-10 MG TABS tablet      melatonin 1 MG TABS tablet Take 1 tab at 6 PM     metFORMIN (GLUCOPHAGE-XR) 500 MG 24 hr tablet Take 1,000 mg by mouth 2 times daily     mycophenolate (GENERIC EQUIVALENT) 250 MG capsule Take 3 capsules (750 mg) by mouth 2 times daily for 90 days     prazosin (MINIPRESS) 1 MG capsule Take 2 capsules (2 mg) by mouth At Bedtime     predniSONE (DELTASONE) 5 MG tablet Take 1 tablet (5 mg) by mouth daily for 90 days     rosuvastatin (CRESTOR) 10 MG tablet Take 0.5 tablets (5 mg) by mouth daily     tacrolimus (GENERIC EQUIVALENT) 0.5 MG capsule Take 1 capsule (0.5 mg) by mouth 2 times daily TOTAL DOSE: 1 mg twice daily.     tacrolimus (GENERIC EQUIVALENT) 1 MG capsule Take 1 capsule (1 mg) by mouth 2 times daily TOTAL DOSE: 1 mg twice daily.     No current facility-administered medications for this visit.     Medications Discontinued During This Encounter   Medication Reason     vitamin D3 (CHOLECALCIFEROL) 29130 UNITS capsule      risperiDONE (RISPERDAL) 2 MG tablet      risperiDONE (RISPERDAL) 4 MG tablet      amoxicillin (AMOXIL) 875 MG tablet      acetaminophen (TYLENOL) 325 MG tablet      clindamycin (CLEOCIN T) 1 % external solution      divalproex sodium extended-release (DEPAKOTE ER) 500 MG 24 hr tablet      predniSONE (DELTASONE) 5 MG tablet Reorder     mycophenolate  (GENERIC EQUIVALENT) 250 MG capsule Reorder     tacrolimus (GENERIC EQUIVALENT) 1 MG capsule      tacrolimus (GENERIC EQUIVALENT) 0.5 MG capsule      prazosin (MINIPRESS) 1 MG capsule        Physical Exam     Blood pressure 122/80, pulse 79, temperature 98.2  F (36.8  C), temperature source Oral, weight 110.9 kg (244 lb 9.6 oz), SpO2 94 %.   GENERAL APPEARANCE: alert and no distress  HENT: mouth without ulcers or lesions  LYMPHATICS: no cervical or supraclavicular nodes  RESP: lungs clear to auscultation - no rales, rhonchi or wheezes  CV: regular rhythm, normal rate, no rub, no murmur  EDEMA: no LE edema bilaterally  ABDOMEN: soft, nondistended, nontender, bowel sounds normal  MS: extremities normal - no gross deformities noted, no evidence of inflammation in joints, no muscle tenderness  SKIN: no rash  NEURO: normal strength and tone, sensory exam grossly normal, mentation intact and speech normal  PSYCH: mentation appears normal and affect normal/bright      Data     Renal Latest Ref Rng & Units 3/14/2022 2/8/2022 11/29/2021   Na 136 - 145 mmol/L - - 139   Na (external) 135 - 146 mmol/L 137 139 -   K 3.5 - 5.0 mmol/L - - 3.8   K (external) 3.5 - 5.3 mmol/L 3.9 3.9 -   Cl 98 - 110 mmol/L 102 105 105   CO2 22 - 31 mmol/L - - 23   CO2 (external) 20 - 32 mmol/L 26 24 -   BUN 8 - 22 mg/dL - - 12   BUN (external) 7 - 25 mg/dL 15 15 -   Cr 0.70 - 1.30 mg/dL - - 0.74   Cr (external) 0.60 - 1.35 mg/dL 0.71 0.73 -   Glucose 70 - 125 mg/dL - - 96   Glucose (external) 65 - 99 mg/dL 156(H) 187(H) -   Ca  8.5 - 10.5 mg/dL - - 9.0   Ca (external) 8.6 - 10.3 mg/dL 8.9 9.4 -     Bone Health Latest Ref Rng & Units 10/8/2021 6/18/2020 6/8/2020   Phos 2.5 - 4.5 mg/dL - - -   Vit D Def 30 - 80 ug/L 37 - -   Vit D Def (external) 30 - 90 ng/mL - 56 57     Heme Latest Ref Rng & Units 3/30/2022 2/8/2022 11/29/2021   WBC 4.0 - 11.0 10e3/uL - - 11.0   WBC (external) 3.8 - 10.8 Thousand/uL 10.8 10.2 -   Hgb 13.3 - 17.7 g/dL - - 13.7   Hgb  (external) 13.2 - 17.1 g/dL 15.0 13.9 -   Plt 150 - 450 10e3/uL - - 280   Plt (external) 140 - 400 Thousand/uL 276 279 -   ABSOLUTE NEUTROPHIL 1.6 - 8.3 10e9/L - - -   ABSOLUTE NEUTROPHILS (EXTERNAL) 1,500 - 7,800 cells/uL 5,713 6,130 -   ABSOLUTE LYMPHOCYTES 0.8 - 5.3 10e9/L - - -   ABSOLUTE LYMPHOCYTES (EXTERNAL) 850 - 3,900 cells/uL 3,154 2,785 -   ABSOLUTE MONOCYTES 0.0 - 1.3 10e9/L - - -   ABSOLUTE MONOCYTES (EXTERNAL) 200 - 950 cells/uL 875 673 -   ABSOLUTE EOSINOPHILS 0.0 - 0.7 10e9/L - - -   ABSOLUTE EOSINOPHILS (EXTERNAL) 15 - 500 cells/uL 972(H) 551(H) -   ABSOLUTE BASOPHILS 0.0 - 0.2 10e9/L - - -   ABSOLUTE BASOPHILS (EXTERNAL) 0 - 200 cells/uL 86 61 -   ABS IMMATURE GRANULOCYTES 0 - 0.4 10e9/L - - -   ABSOLUTE NUCLEATED RBC - - - -     Liver Latest Ref Rng & Units 10/8/2021 8/23/2021 6/3/2021   AP 45 - 120 U/L 68 88 77   AP (external) 36 - 130 U/L - - -   TBili 0.0 - 1.0 mg/dL 0.5 0.4 0.2   TBili (external) 0.2 - 1.2 mg/dL - - -   DBili (external) <=0.2 mg/dL - - -   ALT 0 - 45 U/L 50(H) 39 38   ALT (external) 9 - 46 U/L - - -   AST 0 - 40 U/L 28 23 23   AST (external) 10 - 40 U/L - - -   Tot Protein 6.0 - 8.0 g/dL 7.3 7.3 7.2   Tot Protein (external) 6.1 - 8.1 g/dL - - -   Albumin 3.5 - 5.0 g/dL 3.7 3.4(L) 3.2(L)   Albumin (external) 3.6 - 5.1 g/dL - - -     Pancreas Latest Ref Rng & Units 3/30/2022 4/29/2021 8/24/2020   A1C <=5.6 % - 5.8(H) -   A1C (external) <5.7 % 6.6(H) 5.8(H) 5.9(H)   Amylase (external) 21 - 101 U/L - - -   Lipase 73 - 393 U/L - - -   Lipase (external) 21 - 101 U/L - - -     Iron studies Latest Ref Rng & Units 4/29/2021 6/18/2020 6/1/2018   Iron 42 - 175 ug/dL 56 - -   Iron (external) 7 - 60 U/L - - 21   Ferritin (external) 8 - 388 ng/mL - 123 -           Recent Labs   Lab Test 07/15/19  0915 03/29/21  0850 05/28/21  0855 10/08/21  0940 11/29/21  0910   DOSTAC Not Provided  --   --  10/7/2021 11/28/2021   TACROL 5.9   < > 4.1* 5.4 5.8    < > = values in this interval not displayed.              Again, thank you for allowing me to participate in the care of your patient.      Sincerely,    Dayton Garcia MD

## 2022-06-13 NOTE — PROGRESS NOTES
CHRONIC TRANSPLANT NEPHROLOGY VISIT    Assessment & Plan   # DDKT: Stable   - Baseline Creatinine:  ~ 0.7-0.9   - Proteinuria: Normal (<0.2 grams)   - Date DSA Last Checked: Not Known      Latest DSA: Not checked recently due to time from transplant   - BK Viremia: Not checked recently due to time from transplant   - Kidney Tx Biopsy: No    -Labs ever 3 months at [facility]. Due now    # Immunosuppression: Tacrolimus immediate release (goal 4-6), Mycophenolate mofetil (dose 750 mg every 12 hours) and Prednisone (dose 5 mg daily)             - Continue with intensive monitoring of immunosuppression for efficacy and    toxicity.             - Changes: Not at this time. Check MPA level    # Infection Prophylaxis:   - PJP: None    # Hypertension: Controlled;  Goal BP: < 130/80   - Changes: Not at this time. Continue lisinopril 10mg daily, prazosin 2mg qhs    # Diabetes: Controlled (HbA1c <7%) Last HbA1c: 6.6%   - Management as per primary care. On metformin 1g BID    # Relative Erythrocytosis: Hgb: Stable;  On ACEI/ARB: Yes  Imaging: Not at this time    # Suicidal ideation:   -Has had ED presentation x3 for suicidal ideation, most recently 8/30/21    # RAQUEL:   -On CPAP. Following up with sleep clinic    # Hidradenitis:   -Recurrent. S/p I+D of L axillary abscess 5/27/21 in ED.    -Follows with dermatology, prescribed clindamycin topical BID and hibiclens body wash    # Skin Cancer Risk:    - Discussed sun protection and recommend regular follow up with Dermatology.    # COVID-19 Virus Review: Discussed COVID-19 virus and the potential medical risks.  Reviewed preventative health recommendations, including wearing a mask where appropriate.  Recommended COVID vaccination should be up to date with either an initial vaccination or booster shot when appropriate.  Asked the patient to inform the transplant center if they are exposed or diagnosed with this virus.    # COVID Vaccination Up To Date: Yes       #  Medical Compliance: Yes    # Transplant History:  Etiology of Kidney Failure: Congenital dysplasia  Tx: DDKT  Transplant: 2/28/2006 (Kidney)  Significant changes in immunosuppression: None  Significant transplant-related complications: None    Transplant Office Phone Number: [phone]    Assessment and plan was discussed with the patient and he voiced his understanding and agreement.    Return visit: Return in about 1 year (around 6/13/2023).      Dayton Garcia MD    Chief Complaint   Mr. Mcgrath is a 31 year old here for routine follow up, kidney transplant and immunosuppression management.    History of Present Illness   Barry recently met the donor family and met the recipient of the heart. He denies feelings of hurting himself but he has rage occasionally towards others. He denies nausea, vomiting. He has occasional diarrhea, unclear how often.     Home BP: Not checked    Problem List   Patient Active Problem List   Diagnosis     Suicidal ideation     Hypertension, unspecified type     RAQUEL (obstructive sleep apnea)     Bipolar 1 disorder (H)     Immunodeficiency (H)     ESRD on peritoneal dialysis (H)     End stage renal disease (H)     Abscess of axilla, right     Non-24 hour sleep wake disorder       Allergies   Allergies   Allergen Reactions     Seasonal Allergies Other (See Comments)     hamster hair     Cats      Itching, nasal congestion     Dogs      Itching, nasal congestion     No Known Allergies        Medications   Current Outpatient Medications   Medication Sig     cetirizine (ZYRTEC) 10 MG tablet Take 1 tablet (10 mg) by mouth daily as needed for allergies     chlorhexidine (HIBICLENS) 4 % liquid Apply topically daily as needed for wound care     cholecalciferol (VITAMIN D3) 25 mcg (1000 units) capsule Take 1 capsule (25 mcg) by mouth daily     clindamycin (CLEOCIN T) 1 % external lotion Apply thin layer to armpits, chest, groin, and buttocks once daily.     clindamycin (CLINDAMAX) 1 % external gel  Apply topically 2 times daily     divalproex sodium extended-release (DEPAKOTE ER) 500 MG 24 hr tablet Take 5 tablets (2,500 mg) by mouth daily     FLUCELVAX QUADRIVALENT 0.5 ML LAKSHMI Pharmacist to administer IM     lisinopril (PRINIVIL/ZESTRIL) 10 MG tablet Take 10 mg by mouth daily     LYBALVI 20-10 MG TABS tablet      melatonin 1 MG TABS tablet Take 1 tab at 6 PM     metFORMIN (GLUCOPHAGE-XR) 500 MG 24 hr tablet Take 1,000 mg by mouth 2 times daily     mycophenolate (GENERIC EQUIVALENT) 250 MG capsule Take 3 capsules (750 mg) by mouth 2 times daily for 90 days     prazosin (MINIPRESS) 1 MG capsule Take 2 capsules (2 mg) by mouth At Bedtime     predniSONE (DELTASONE) 5 MG tablet Take 1 tablet (5 mg) by mouth daily for 90 days     rosuvastatin (CRESTOR) 10 MG tablet Take 0.5 tablets (5 mg) by mouth daily     tacrolimus (GENERIC EQUIVALENT) 0.5 MG capsule Take 1 capsule (0.5 mg) by mouth 2 times daily TOTAL DOSE: 1 mg twice daily.     tacrolimus (GENERIC EQUIVALENT) 1 MG capsule Take 1 capsule (1 mg) by mouth 2 times daily TOTAL DOSE: 1 mg twice daily.     No current facility-administered medications for this visit.     Medications Discontinued During This Encounter   Medication Reason     vitamin D3 (CHOLECALCIFEROL) 66704 UNITS capsule      risperiDONE (RISPERDAL) 2 MG tablet      risperiDONE (RISPERDAL) 4 MG tablet      amoxicillin (AMOXIL) 875 MG tablet      acetaminophen (TYLENOL) 325 MG tablet      clindamycin (CLEOCIN T) 1 % external solution      divalproex sodium extended-release (DEPAKOTE ER) 500 MG 24 hr tablet      predniSONE (DELTASONE) 5 MG tablet Reorder     mycophenolate (GENERIC EQUIVALENT) 250 MG capsule Reorder     tacrolimus (GENERIC EQUIVALENT) 1 MG capsule      tacrolimus (GENERIC EQUIVALENT) 0.5 MG capsule      prazosin (MINIPRESS) 1 MG capsule        Physical Exam     Blood pressure 122/80, pulse 79, temperature 98.2  F (36.8  C), temperature source Oral, weight 110.9 kg (244 lb 9.6 oz), SpO2  94 %.   GENERAL APPEARANCE: alert and no distress  HENT: mouth without ulcers or lesions  LYMPHATICS: no cervical or supraclavicular nodes  RESP: lungs clear to auscultation - no rales, rhonchi or wheezes  CV: regular rhythm, normal rate, no rub, no murmur  EDEMA: no LE edema bilaterally  ABDOMEN: soft, nondistended, nontender, bowel sounds normal  MS: extremities normal - no gross deformities noted, no evidence of inflammation in joints, no muscle tenderness  SKIN: no rash  NEURO: normal strength and tone, sensory exam grossly normal, mentation intact and speech normal  PSYCH: mentation appears normal and affect normal/bright      Data     Renal Latest Ref Rng & Units 3/14/2022 2/8/2022 11/29/2021   Na 136 - 145 mmol/L - - 139   Na (external) 135 - 146 mmol/L 137 139 -   K 3.5 - 5.0 mmol/L - - 3.8   K (external) 3.5 - 5.3 mmol/L 3.9 3.9 -   Cl 98 - 110 mmol/L 102 105 105   CO2 22 - 31 mmol/L - - 23   CO2 (external) 20 - 32 mmol/L 26 24 -   BUN 8 - 22 mg/dL - - 12   BUN (external) 7 - 25 mg/dL 15 15 -   Cr 0.70 - 1.30 mg/dL - - 0.74   Cr (external) 0.60 - 1.35 mg/dL 0.71 0.73 -   Glucose 70 - 125 mg/dL - - 96   Glucose (external) 65 - 99 mg/dL 156(H) 187(H) -   Ca  8.5 - 10.5 mg/dL - - 9.0   Ca (external) 8.6 - 10.3 mg/dL 8.9 9.4 -     Bone Health Latest Ref Rng & Units 10/8/2021 6/18/2020 6/8/2020   Phos 2.5 - 4.5 mg/dL - - -   Vit D Def 30 - 80 ug/L 37 - -   Vit D Def (external) 30 - 90 ng/mL - 56 57     Heme Latest Ref Rng & Units 3/30/2022 2/8/2022 11/29/2021   WBC 4.0 - 11.0 10e3/uL - - 11.0   WBC (external) 3.8 - 10.8 Thousand/uL 10.8 10.2 -   Hgb 13.3 - 17.7 g/dL - - 13.7   Hgb (external) 13.2 - 17.1 g/dL 15.0 13.9 -   Plt 150 - 450 10e3/uL - - 280   Plt (external) 140 - 400 Thousand/uL 276 279 -   ABSOLUTE NEUTROPHIL 1.6 - 8.3 10e9/L - - -   ABSOLUTE NEUTROPHILS (EXTERNAL) 1,500 - 7,800 cells/uL 5,713 6,130 -   ABSOLUTE LYMPHOCYTES 0.8 - 5.3 10e9/L - - -   ABSOLUTE LYMPHOCYTES (EXTERNAL) 850 - 3,900 cells/uL  3,154 2,785 -   ABSOLUTE MONOCYTES 0.0 - 1.3 10e9/L - - -   ABSOLUTE MONOCYTES (EXTERNAL) 200 - 950 cells/uL 875 673 -   ABSOLUTE EOSINOPHILS 0.0 - 0.7 10e9/L - - -   ABSOLUTE EOSINOPHILS (EXTERNAL) 15 - 500 cells/uL 972(H) 551(H) -   ABSOLUTE BASOPHILS 0.0 - 0.2 10e9/L - - -   ABSOLUTE BASOPHILS (EXTERNAL) 0 - 200 cells/uL 86 61 -   ABS IMMATURE GRANULOCYTES 0 - 0.4 10e9/L - - -   ABSOLUTE NUCLEATED RBC - - - -     Liver Latest Ref Rng & Units 10/8/2021 8/23/2021 6/3/2021   AP 45 - 120 U/L 68 88 77   AP (external) 36 - 130 U/L - - -   TBili 0.0 - 1.0 mg/dL 0.5 0.4 0.2   TBili (external) 0.2 - 1.2 mg/dL - - -   DBili (external) <=0.2 mg/dL - - -   ALT 0 - 45 U/L 50(H) 39 38   ALT (external) 9 - 46 U/L - - -   AST 0 - 40 U/L 28 23 23   AST (external) 10 - 40 U/L - - -   Tot Protein 6.0 - 8.0 g/dL 7.3 7.3 7.2   Tot Protein (external) 6.1 - 8.1 g/dL - - -   Albumin 3.5 - 5.0 g/dL 3.7 3.4(L) 3.2(L)   Albumin (external) 3.6 - 5.1 g/dL - - -     Pancreas Latest Ref Rng & Units 3/30/2022 4/29/2021 8/24/2020   A1C <=5.6 % - 5.8(H) -   A1C (external) <5.7 % 6.6(H) 5.8(H) 5.9(H)   Amylase (external) 21 - 101 U/L - - -   Lipase 73 - 393 U/L - - -   Lipase (external) 21 - 101 U/L - - -     Iron studies Latest Ref Rng & Units 4/29/2021 6/18/2020 6/1/2018   Iron 42 - 175 ug/dL 56 - -   Iron (external) 7 - 60 U/L - - 21   Ferritin (external) 8 - 388 ng/mL - 123 -           Recent Labs   Lab Test 07/15/19  0915 03/29/21  0850 05/28/21  0855 10/08/21  0940 11/29/21  0910   DOSTAC Not Provided  --   --  10/7/2021 11/28/2021   TACROL 5.9   < > 4.1* 5.4 5.8    < > = values in this interval not displayed.

## 2022-06-20 ENCOUNTER — TELEPHONE (OUTPATIENT)
Dept: TRANSPLANT | Facility: CLINIC | Age: 32
End: 2022-06-20
Payer: MEDICARE

## 2022-06-20 NOTE — TELEPHONE ENCOUNTER
All from pt  Wonders if he needs to come with a family member or a friend for his visit tomorrow with his Evusheld dose or can he come alone with taylor jaimes picking him up

## 2022-06-21 NOTE — PROGRESS NOTES
No orders placed.  Patient to receive Evusheld dose at [facility] today 6/21/2022 at 1350. Patient aware. Address provided.      Rowan Webber RN   Transplant Coordinator  [phone]

## 2022-06-30 ENCOUNTER — INFUSION THERAPY VISIT (OUTPATIENT)
Dept: NURSING | Facility: CLINIC | Age: 32
End: 2022-06-30
Payer: MEDICARE

## 2022-06-30 DIAGNOSIS — Z29.89 PROPHYLACTIC IMMUNOTHERAPY: Primary | ICD-10-CM

## 2022-06-30 PROCEDURE — M0220 PR INJECTION TIXAGEVIMAB & CILGAVIMAB (EVUSHELD): HCPCS | Performed by: FAMILY MEDICINE

## 2022-07-10 ENCOUNTER — HEALTH MAINTENANCE LETTER (OUTPATIENT)
Age: 32
End: 2022-07-10

## 2022-08-18 ENCOUNTER — TELEPHONE (OUTPATIENT)
Dept: SLEEP MEDICINE | Facility: CLINIC | Age: 32
End: 2022-08-18

## 2022-08-18 DIAGNOSIS — G47.33 OSA (OBSTRUCTIVE SLEEP APNEA): Primary | ICD-10-CM

## 2022-08-18 NOTE — TELEPHONE ENCOUNTER
PT STATED HE NEEDS THE GRAY FILTER. TOLD HIM HE WOULD NEED TO COME INTO OUR SHOWROOM AS THOSE DON'T MEET THE SHIPPING MIN. TOLD PT THE [facility] SHOWROOM WOULD BE THE CLOSEST FOR HIM. GAVE HIM THE CORRECT # FOR REORDERING SUPPLIES AS HE CALLED THE # FOR O2 NEEDS. PT THANKED ME FOR THE CALL

## 2022-08-25 ENCOUNTER — VIRTUAL VISIT (OUTPATIENT)
Dept: DERMATOLOGY | Facility: CLINIC | Age: 32
End: 2022-08-25
Payer: MEDICARE

## 2022-08-25 DIAGNOSIS — L73.2 HIDRADENITIS SUPPURATIVA: Primary | ICD-10-CM

## 2022-08-25 PROCEDURE — 99442 PR PHYSICIAN TELEPHONE EVALUATION 11-20 MIN: CPT | Mod: 95 | Performed by: DERMATOLOGY

## 2022-08-25 NOTE — PROGRESS NOTES
[facility] Dermatology Note  Encounter Date: Aug 25, 2022  Telephone  Location of teledermatologist: [facility] DERMATOLOGY [facility]IN[facility] [place]. Start time: 7:37pmEnd time: 748pm    Dermatology Problem List:  1. History of kidney transplant in 2017: current immunosuppression mycophenolate mofetil, prednisone, and tacrolimus  2. Hidradenitis suppurativa  - Culture 7/28/21 showed normal ranjith  - Current tx: Hibiclens  - Prior tx: clindamycin BID     Social History: Legally blind.    ____________________________________________    Assessment & Plan:     # Hidradenitis suppurativa. Chronic, stable. No active lesions today. Will continue maintenance plan as below:  - Continue hibiclens as body wash in the shower (refilled today)  - Continue clindamycin 1% lotion daily PRN (not currently using as well controlled)     # Passive suicidal ideation  # Hx of bipolar I disorder  - Not addressed today   - Follows with his psychiatrist closely      # Immunosuppressed with mycophenolate mofetil, prednisone, and tacrolimus for kidney transplant.  Patient aware of increased risk of skin cancers with immunosuppressing medications.  - Will schedule imn person for full body skin check for transplant    # Type 2 Diabetes  On Metformin. Last A1C in 3/3/22 6.6.  - LDL was good. HDL a little low.  - Labs ordered: HGB A1C, lipid panel reflex, vitamin D     Follow-up: 3-6 month for skin check in person    Staff and Scribe:      Huan Simpson MD, FAAD, FACP     Departments of Internal Medicine and Dermatology  [facility]  [phone]      __________________________________________    CC: f/u HS     HPI:  Mr. Barry Mcgrath is a(n) 31 year old male who presents today as a return patient for HS. Last seen by myself on 2/10/22, at which time no changes were made to patient care.    HS Nurse Assessment    Nurse Assessment Data 2/10/2022 8/25/2022   Over the past 30 days how many old  lesions flared back up? 0 1   Over the past 30 days how many new lesions did you get? 0 2   Over the past week, how many dressing changes do you do each day? 0 0   Over the past week, has your wound drainage been: None None   Rate your HS overall from 0 - 10 (0 = no disease, 10 = worst) over the past week:  0 0   Rate your pain score from 0 - 10 (0 = no disease, 10 = worst) for the most painful/symptomatic lesion in the past week:  0 - No Pain 0 - No Pain   Over the past week, how much has HS influenced your quality of life? not at all not at all       Medications:  Current Outpatient Medications   Medication     cetirizine (ZYRTEC) 10 MG tablet     chlorhexidine (HIBICLENS) 4 % liquid     cholecalciferol (VITAMIN D3) 25 mcg (1000 units) capsule     clindamycin (CLEOCIN T) 1 % external lotion     clindamycin (CLINDAMAX) 1 % external gel     divalproex sodium extended-release (DEPAKOTE ER) 500 MG 24 hr tablet     FLUCELVAX QUADRIVALENT 0.5 ML LAKSHMI     lisinopril (PRINIVIL/ZESTRIL) 10 MG tablet     LYBALVI 20-10 MG TABS tablet     melatonin 1 MG TABS tablet     metFORMIN (GLUCOPHAGE-XR) 500 MG 24 hr tablet     mycophenolate (GENERIC EQUIVALENT) 250 MG capsule     prazosin (MINIPRESS) 1 MG capsule     predniSONE (DELTASONE) 5 MG tablet     rosuvastatin (CRESTOR) 10 MG tablet     tacrolimus (GENERIC EQUIVALENT) 0.5 MG capsule     tacrolimus (GENERIC EQUIVALENT) 1 MG capsule     No current facility-administered medications for this visit.      Past Medical/Surgical History:   Patient Active Problem List   Diagnosis     Suicidal ideation     HTN, kidney transplant related     RAQUEL (obstructive sleep apnea)     Bipolar 1 disorder (H)     Immunosuppression (H)     Hidradenitis suppurativa     Kidney replaced by transplant     Type 2 diabetes mellitus without complication, without long-term current use of insulin (H)     Prophylactic immunotherapy     Past Medical History:   Diagnosis Date     Anxiety      Bipolar 1 disorder  (H)      Bipolar 2 disorder (H)      Blind      Depressive disorder      Diabetes (H)     pre diabetic     Hypertension      PTSD (post-traumatic stress disorder)      Sleep apnea     uses cpap       CC Referred Self, MD  No address on file on close of this encounter.

## 2022-08-25 NOTE — LETTER
8/25/2022       RE: Barry Mcgrath  [address]  [place] [address]     Dear Colleague,    Thank you for referring your patient, Barry Mcgrath, to the [facility]ERMATOLOGY [facility] [place] [facility]. Please see a copy of my visit note below.    [facility] Dermatology Note  Encounter Date: Aug 25, 2022  Telephone  Location of teledermatologist: [facility]MAT[facility] [place]. Start time: 7:37pmEnd time: 748pm    Dermatology Problem List:  1. History of kidney transplant in 2017: current immunosuppression mycophenolate mofetil, prednisone, and tacrolimus  2. Hidradenitis suppurativa  - Culture 7/28/21 showed normal ranjith  - Current tx: Hibiclens  - Prior tx: clindamycin BID     Social History: Legally blind.    ____________________________________________    Assessment & Plan:     # Hidradenitis suppurativa. Chronic, stable. No active lesions today. Will continue maintenance plan as below:  - Continue hibiclens as body wash in the shower (refilled today)  - Continue clindamycin 1% lotion daily PRN (not currently using as well controlled)     # Passive suicidal ideation  # Hx of bipolar I disorder  - Not addressed today   - Follows with his psychiatrist closely      # Immunosuppressed with mycophenolate mofetil, prednisone, and tacrolimus for kidney transplant.  Patient aware of increased risk of skin cancers with immunosuppressing medications.  - Will schedule imn person for full body skin check for transplant    # Type 2 Diabetes  On Metformin. Last A1C in 3/3/22 6.6.  - LDL was good. HDL a little low.  - Labs ordered: HGB A1C, lipid panel reflex, vitamin D     Follow-up: 3-6 month for skin check in person    Staff and Scribe:      Huan Simpson MD, FAAD, FACP     Departments of Internal Medicine and Dermatology  [facility]  [place]  [phone]      __________________________________________    CC: f/u HS     HPI:  Mr. Barry Mcgrath is a(n) 31 year old male who presents today as a return patient for HS. Last seen by myself on 2/10/22, at which time no changes were made to patient care.    HS Nurse Assessment    Nurse Assessment Data 2/10/2022 8/25/2022   Over the past 30 days how many old lesions flared back up? 0 1   Over the past 30 days how many new lesions did you get? 0 2   Over the past week, how many dressing changes do you do each day? 0 0   Over the past week, has your wound drainage been: None None   Rate your HS overall from 0 - 10 (0 = no disease, 10 = worst) over the past week:  0 0   Rate your pain score from 0 - 10 (0 = no disease, 10 = worst) for the most painful/symptomatic lesion in the past week:  0 - No Pain 0 - No Pain   Over the past week, how much has HS influenced your quality of life? not at all not at all       Medications:  Current Outpatient Medications   Medication     cetirizine (ZYRTEC) 10 MG tablet     chlorhexidine (HIBICLENS) 4 % liquid     cholecalciferol (VITAMIN D3) 25 mcg (1000 units) capsule     clindamycin (CLEOCIN T) 1 % external lotion     clindamycin (CLINDAMAX) 1 % external gel     divalproex sodium extended-release (DEPAKOTE ER) 500 MG 24 hr tablet     FLUCELVAX QUADRIVALENT 0.5 ML LAKSHMI     lisinopril (PRINIVIL/ZESTRIL) 10 MG tablet     LYBALVI 20-10 MG TABS tablet     melatonin 1 MG TABS tablet     metFORMIN (GLUCOPHAGE-XR) 500 MG 24 hr tablet     mycophenolate (GENERIC EQUIVALENT) 250 MG capsule     prazosin (MINIPRESS) 1 MG capsule     predniSONE (DELTASONE) 5 MG tablet     rosuvastatin (CRESTOR) 10 MG tablet     tacrolimus (GENERIC EQUIVALENT) 0.5 MG capsule     tacrolimus (GENERIC EQUIVALENT) 1 MG capsule     No current facility-administered medications for this visit.      Past Medical/Surgical History:   Patient Active Problem List   Diagnosis     Suicidal ideation     HTN, kidney  transplant related     RAQUEL (obstructive sleep apnea)     Bipolar 1 disorder (H)     Immunosuppression (H)     Hidradenitis suppurativa     Kidney replaced by transplant     Type 2 diabetes mellitus without complication, without long-term current use of insulin (H)     Prophylactic immunotherapy     Past Medical History:   Diagnosis Date     Anxiety      Bipolar 1 disorder (H)      Bipolar 2 disorder (H)      Blind      Depressive disorder      Diabetes (H)     pre diabetic     Hypertension      PTSD (post-traumatic stress disorder)      Sleep apnea     uses cpap       CC Referred Self, MD  No address on file on close of this encounter.

## 2022-09-11 ENCOUNTER — HEALTH MAINTENANCE LETTER (OUTPATIENT)
Age: 32
End: 2022-09-11

## 2022-10-10 ENCOUNTER — TELEPHONE (OUTPATIENT)
Dept: TRANSPLANT | Facility: CLINIC | Age: 32
End: 2022-10-10

## 2022-10-11 ENCOUNTER — TELEPHONE (OUTPATIENT)
Dept: TRANSPLANT | Facility: CLINIC | Age: 32
End: 2022-10-11

## 2022-10-11 NOTE — TELEPHONE ENCOUNTER
ISSUE: patient calling to confirm his tacrolimus dose of 1 mg bid.  Patient received a new bottle from [facility] pharmacy that in the comment states 1.5 mg twice daily.    RNCC did ensure patient is on correct dose,  Pharmacy likely used old RX.  Patient has been on current dose of 1 mg bid since march 2022.  Will repeat labs in one month.    Rowan Webber RN   Transplant Coordinator  [phone]

## 2022-10-17 ENCOUNTER — TELEPHONE (OUTPATIENT)
Dept: TRANSPLANT | Facility: CLINIC | Age: 32
End: 2022-10-17

## 2022-10-17 DIAGNOSIS — Z79.899 IMMUNOSUPPRESSIVE MANAGEMENT ENCOUNTER FOLLOWING KIDNEY TRANSPLANT: ICD-10-CM

## 2022-10-17 DIAGNOSIS — Z48.298 AFTERCARE FOLLOWING ORGAN TRANSPLANT: ICD-10-CM

## 2022-10-17 DIAGNOSIS — Z94.0 IMMUNOSUPPRESSIVE MANAGEMENT ENCOUNTER FOLLOWING KIDNEY TRANSPLANT: ICD-10-CM

## 2022-10-17 DIAGNOSIS — Z94.0 KIDNEY TRANSPLANTED: ICD-10-CM

## 2022-10-17 DIAGNOSIS — Z94.0 STATUS POST KIDNEY TRANSPLANT: ICD-10-CM

## 2022-10-17 NOTE — TELEPHONE ENCOUNTER
Barry Mcgrath called and needed to talk to you coordinator about updating  medication dose change with pharmacy.Sending coord Rowan livingston.

## 2022-10-18 RX ORDER — TACROLIMUS 1 MG/1
1 CAPSULE ORAL 2 TIMES DAILY
Qty: 60 CAPSULE | Refills: 11 | Status: SHIPPED | OUTPATIENT
Start: 2022-10-18 | End: 2023-11-10

## 2022-10-18 NOTE — TELEPHONE ENCOUNTER
ISSUE: patients pharmacy need confirmation on tacrolimus dose    OUTCOME: spoke with pharmacist at [facility] pharmacy on file. Confirmed current tacrolimus dose 1 mg bid.  rx resent. Patient aware      Rowan Webber RN   Transplant Coordinator  [phone]

## 2022-10-26 DIAGNOSIS — G47.33 OSA (OBSTRUCTIVE SLEEP APNEA): Primary | ICD-10-CM

## 2022-10-26 DIAGNOSIS — G47.24 NON-24 HOUR SLEEP WAKE DISORDER: ICD-10-CM

## 2022-10-26 NOTE — TELEPHONE ENCOUNTER
Pended rx melatonin 1 mg tabs, DISP:  90 tabs, SIG:  Take 1 tablet at 6:00PM by mouth.  R^2   Last Written Prescription Date  4/15/2021  Last Fill Quantity:90   # refills: 3  Last Office Visit:5/27/2022  Future Office visit:       Routed Bennett Goltz, PA-C fpr review    Routing refill request to provider for review/approval because:  Drug not on the [facility] or [facility] refill protocol or controlled substance

## 2022-11-30 NOTE — ED NOTES
Bed: ED12  Expected date:   Expected time:   Means of arrival:   Comments:  h418  27 M   Psych Eval   No

## 2022-12-10 ENCOUNTER — TELEPHONE (OUTPATIENT)
Dept: TRANSPLANT | Facility: CLINIC | Age: 32
End: 2022-12-10

## 2022-12-10 NOTE — TELEPHONE ENCOUNTER
Barry called to inquire when his COVID immunizations had occurred.  He was going to get a booster today and needed the dates.  He was provided with what was in [facility].  He will call with further questions or concerns.

## 2022-12-18 ENCOUNTER — TELEPHONE (OUTPATIENT)
Dept: SLEEP MEDICINE | Facility: CLINIC | Age: 32
End: 2022-12-18

## 2022-12-18 NOTE — TELEPHONE ENCOUNTER
Reason for Call:  Other call back    Detailed comments: patient can not sleep and he needs help asap    Phone Number Patient can be reached at: Home number on file [phone] (home)    Best Time: anytime    Can we leave a detailed message on this number? YES    Call taken on 12/18/2022 at 5:27 PM by Betty Gillis

## 2022-12-20 NOTE — TELEPHONE ENCOUNTER
He says in the last 4-5 weeks, he has not been exercising. He had been walking 30-90 minutes. He has a treadmill.    His CPAP download shows he has been in bed as early as 8 PM and as late as 1 AM and he gets up 10:30 AM to as late as 5 PM.  Prior to the last couple of weeks, he was in bed more consistently from about 9 PM to 9-10 AM. He felt he was sleeping that whole time.  His apnea appears well-controlled.    I spoke with him about trying to curtail his time in bed again, aiming for something like 9 PM to 8 AM (and discussed advancing his wake up time by 30 minutes every few days). We talked about increasing his exercise again to help him stay awake in the day. We also discussed his use of caffeine tablets. He takes 2 per day. He was advised to stop around 1-2 PM given the 6 hour half life. He was encouraged to call me in a couple of weeks if still having trouble.  Bennett Goltz, PA-C

## 2022-12-22 ENCOUNTER — TELEPHONE (OUTPATIENT)
Dept: DERMATOLOGY | Facility: CLINIC | Age: 32
End: 2022-12-22

## 2022-12-23 ENCOUNTER — TELEPHONE (OUTPATIENT)
Dept: DERMATOLOGY | Facility: CLINIC | Age: 32
End: 2022-12-23

## 2022-12-23 NOTE — TELEPHONE ENCOUNTER
"Was paged this evening by the patient. Barry expressed concern about \"pimples on the chest\". States that he has used cleansers and topical agents in the past but he feels like he has been flaring over the past 4-5 days despite this. I asked if he had any urgent issues or concerning symptoms. Patient stated his issues are not urgent but he would like an appointment to address the worsening skin problems. Advised patient to call during the day tomorrow to schedule an appointment. He requested that his dermatologist reach out to him. Recommended that he call during business hours tomorrow to set up a time to discuss his concerns. Patient then hung up abruptly.     Nanda Burks MD MPH  PGY4 Medicine/Dermatology Resident      "

## 2022-12-23 NOTE — CONFIDENTIAL NOTE
"Called patient back to discuss concerns. Pt stated he is in \"a lot of pain\" and the flaring is getting worse on his neck and chest. No openings with Dr. Simpson, but pt open to scheduling with another provider. Assisted with scheduling. No other concerns.   "

## 2022-12-23 NOTE — TELEPHONE ENCOUNTER
[facility] Call Center    Phone Message    May a detailed message be left on voicemail: yes     Reason for Call: Symptoms or Concerns     If patient has red-flag symptoms, warm transfer to triage line    Current symptom or concern: Pt states he has sores that are pussy on the back of his head and neck that seems to be spreading.     Symptoms have been present for:  5-6 day(s)    Has patient previously been seen for this? No            Are there any new or worsening symptoms? Yes: spreading and painful.       Action Taken: Message routed to:  [facility] derm    Travel Screening: Not Applicable

## 2022-12-26 ENCOUNTER — HOSPITAL ENCOUNTER (EMERGENCY)
Facility: CLINIC | Age: 32
Discharge: HOME | End: 2022-12-26
Attending: EMERGENCY MEDICINE | Admitting: EMERGENCY MEDICINE
Payer: MEDICARE

## 2022-12-26 DIAGNOSIS — B02.9 HERPES ZOSTER WITHOUT COMPLICATION: ICD-10-CM

## 2022-12-26 DIAGNOSIS — R21 RASH: ICD-10-CM

## 2022-12-26 PROCEDURE — 99282 EMERGENCY DEPT VISIT SF MDM: CPT | Performed by: EMERGENCY MEDICINE

## 2022-12-26 PROCEDURE — 99283 EMERGENCY DEPT VISIT LOW MDM: CPT | Performed by: EMERGENCY MEDICINE

## 2022-12-26 RX ORDER — LIDOCAINE 50 MG/G
OINTMENT TOPICAL 4 TIMES DAILY PRN
Qty: 50 G | Refills: 0 | Status: SHIPPED | OUTPATIENT
Start: 2022-12-26 | End: 2024-07-26

## 2022-12-26 RX ORDER — VALACYCLOVIR HYDROCHLORIDE 1 G/1
1000 TABLET, FILM COATED ORAL 3 TIMES DAILY
Qty: 21 TABLET | Refills: 0 | Status: SHIPPED | OUTPATIENT
Start: 2022-12-26 | End: 2023-04-04

## 2022-12-26 ASSESSMENT — ACTIVITIES OF DAILY LIVING (ADL): ADLS_ACUITY_SCORE: 35

## 2022-12-27 NOTE — ED PROVIDER NOTES
History     Chief Complaint   Patient presents with     Rash     Neck and shoulder     HPI  Barry Mcgrath is a 32 year old male with PMH notable for bipolar disorder, renal transplant in 2006 who presents to the ED with rash.  Patient reports painful rash started about 1 week ago.  Location is on the left side of the neck down to the left trapezius area and left chest on the left side only.  It is itchy and painful.  The area has had some clear fluid draining and patches which then scabbed over, now has new areas that appear to be small fluid-filled spots.  No fever.  He did have chickenpox as a child.  Patient notes history of renal transplant and is chronically immunosuppressed, has had no issues with his kidney, normal urine output.     I have reviewed the Past Medical History with the patient, pertinent items: Renal transplant 2006    Review of Systems  No recent fevers, no chest pain, no abdominal pain    Physical Exam        Physical Exam  General: No acute distress. Appears stated age.   HENT: MMM, no oropharyngeal lesions  Eyes: PERRL, normal sclerae   Cardio: Regular rate, extremities well perfused  Resp: Normal work of breathing, normal respiratory rate  Neuro: alert and fully oriented. CN II-XII grossly intact. Grossly normal strength and sensation in all extremities.   MSK: no deformities. Grossly normal ROM in extremities.   Integumentary/Skin: Rash with scattered vesicles and scattered scabbed over lesions in dermatomal pattern from the left lower neck across the left anterior superior chest, no lesions on the right.  Psych: normal affect, normal behavior    ED Course      Procedures       Critical Care time:  none     Labs Ordered and Resulted from Time of ED Arrival to Time of ED Departure - No data to display  No orders to display          Assessments & Plan (with Medical Decision Making)   Patient presenting with painful rash. Vitals in the ED unremarkable. Nursing notes reviewed.  Exam  consistent with varicella-zoster.    After counseling on the diagnosis, work-up, and treatment plan, the patient was discharged. The patient was advised to follow-up with primary care in about a week. The patient was advised to return to the ED if worsening symptoms, fever, or if there are any urgent/life-threatening concerns.     Final diagnoses:   Herpes zoster without complication   Rash     New Prescriptions    LIDOCAINE (XYLOCAINE) 5 % EXTERNAL OINTMENT    Apply topically 4 times daily as needed for moderate pain (4-6) Apply 1 inch strip to painful area.    VALACYCLOVIR (VALTREX) 1000 MG TABLET    Take 1 tablet (1,000 mg) by mouth 3 times daily for 7 days       --  Darell Burgos MD   Emergency Medicine   [facility] EMERGENCY DEPARTMENT  12/26/2022     Darell Burgos MD  12/26/22 [id number]

## 2022-12-27 NOTE — DISCHARGE INSTRUCTIONS
Instructions from your doctor today:  Emergency Department testing is focused on the potential causes of your symptoms that are the most dangerous possibilities, and cannot cover every possibility. Based on the evaluation, it was deemed sufficiently safe to discharge and continue management through the clinics. Thus, follow-up is very important to assess for improvement/worsening, potential further testing, and potential treatment adjustments. If you were given opioid pain medications or other medications that can make you drowsy while in the ED, you should not drive for at least several hours and not until you feel completely back to normal.     For pain, use 650 mg acetaminophen every 6 hours as needed, and/or apply the prescribed lidocaine ointment up to 4 times per day as needed.     Use the prescribed antiviral (valacyclovir) to fight the shingles virus (varicella zoster).     Please make an appointment to follow up with:  - Your Primary Care Provider in 4-7 days  - If you do not have a primary care provider, you can be seen in follow-up and establish care by calling any of the clinics below:     - [facility] Center (phone: [phone])     - Primary Care / [facility] (phone: [phone])   - Have your clinic provider review the results from today's visit with you again, including any potential follow-up or additional testing that may be needed based on the results. Occasionally, incidental findings are found on later review by radiologists that may need follow-up.     Return to the Emergency Department immediately if you have fever, worsening symptoms, or any other urgent or potentially life-threatening concerns.

## 2022-12-29 ENCOUNTER — OFFICE VISIT (OUTPATIENT)
Dept: DERMATOLOGY | Facility: CLINIC | Age: 32
End: 2022-12-29
Payer: MEDICARE

## 2022-12-29 DIAGNOSIS — R52 PAIN: ICD-10-CM

## 2022-12-29 DIAGNOSIS — B02.8 HERPES ZOSTER WITH OTHER COMPLICATION: Primary | ICD-10-CM

## 2022-12-29 PROCEDURE — 99214 OFFICE O/P EST MOD 30 MIN: CPT | Performed by: STUDENT IN AN ORGANIZED HEALTH CARE EDUCATION/TRAINING PROGRAM

## 2022-12-29 ASSESSMENT — PAIN SCALES - GENERAL: PAINLEVEL: MODERATE PAIN (4)

## 2022-12-29 NOTE — PROGRESS NOTES
"[facility] Dermatology Note    Encounter Date: Dec 29, 2022    Dermatology Problem List:  1. History of kidney transplant in 2017: current immunosuppression mycophenolate mofetil, prednisone, and tacrolimus  2. Hidradenitis suppurativa  - Culture 7/28/21 showed normal ranjith  - Current tx: Hibiclens  - Prior tx: clindamycin BID     Social History: Legally blind.       Major PMHx  # DDKT: Stable  - Baseline Creatinine:  ~ 0.7-0.9  - Proteinuria: Normal (<0.2 grams)               # Immunosuppression: Tacrolimus immediate release (goal 4-6), Mycophenolate mofetil (dose 750 mg every 12 hours) and Prednisone (dose 5 mg daily)    ______________________________________    Impression/Plan:  Barry was seen today for derm problem.    Diagnoses and all orders for this visit:    Herpes zoster with other complication  Pain  -Recently diagnosed and treatment initiated by ER patient presenting today for second opinion  - Agree with the diagnosis and treatment patient is currently on for zoster dosing valacyclovir.  Now with healing scabs patient has some discomfort discussed that this can be transient recommended over-the-counter analgesics, and increase use of the lidocaine cream he was given by the ER  - If pain persist we can consider neurogenic pain treatment with TCA or gabapentin, patient declines this for now                Follow-up PRN.       Staff Involved:  Staff Only    Jaden Hicks MD   of Dermatology  Department of Dermatology  [facility]      CC:   Chief Complaint   Patient presents with     Derm Problem     Patient seen in ED a few days ago: shingles left side of shoulder. Would like a \"second opinion\" today       History of Present Illness:  Mr. Barry Mcgrath is a 32 year old male who presents as a new patient.    Patient presenting for second opinion for zoster recently diagnosed.  Patient is a transplant patient currently on mycophenolate " and tacrolimus    Labs:      Physical exam:  Vitals: There were no vitals taken for this visit.  GEN: well developed, well-nourished, in no acute distress, in a pleasant mood.     SKIN: Lopez phototype 2  - Waist-up skin, which includes the head/face, neck, both arms, chest, back, abdomen, digits and/or nails was examined.  - dermatomal healing crusted papules in cervico-thoracic distribution  - No other lesions of concern on areas examined.     Past Medical History:   Past Medical History:   Diagnosis Date     Anxiety      Bipolar 1 disorder (H)      Bipolar 2 disorder (H)      Blind      Depressive disorder      Diabetes (H)     pre diabetic     Hypertension      PTSD (post-traumatic stress disorder)      Sleep apnea     uses cpap     Past Surgical History:   Procedure Laterality Date     TRANSPLANT      Kidney transplant in 2006       Social History:   reports that he has never smoked. He has never used smokeless tobacco. He reports that he does not drink alcohol and does not use drugs.    Family History:  Family History   Problem Relation Age of Onset     Sleep Apnea Father      Diabetes Father      Glaucoma Maternal Grandfather      Sleep Apnea Brother      Macular Degeneration No family hx of      Skin Cancer No family hx of        Medications:  Current Outpatient Medications   Medication Sig Dispense Refill     cetirizine (ZYRTEC) 10 MG tablet Take 1 tablet (10 mg) by mouth daily as needed for allergies       chlorhexidine (HIBICLENS) 4 % liquid Apply topically daily as needed for wound care 473 mL 11     cholecalciferol (VITAMIN D3) 25 mcg (1000 units) capsule Take 1 capsule (25 mcg) by mouth daily       clindamycin (CLEOCIN T) 1 % external lotion Apply thin layer to armpits, chest, groin, and buttocks once daily. 60 mL 11     clindamycin (CLINDAMAX) 1 % external gel Apply topically 2 times daily 60 g 11     divalproex sodium extended-release (DEPAKOTE ER) 500 MG 24 hr tablet Take 5 tablets (2,500 mg)  by mouth daily  0     FLUCELVAX QUADRIVALENT 0.5 ML LAKSHMI Pharmacist to administer IM  0     lidocaine (XYLOCAINE) 5 % external ointment Apply topically 4 times daily as needed for moderate pain (4-6) Apply 1 inch strip to painful area. 50 g 0     lisinopril (PRINIVIL/ZESTRIL) 10 MG tablet Take 10 mg by mouth daily  5     LYBALVI 20-10 MG TABS tablet        melatonin 1 MG TABS tablet Take 1 tab at 6 PM 90 tablet 2     metFORMIN (GLUCOPHAGE-XR) 500 MG 24 hr tablet Take 1,000 mg by mouth 2 times daily       prazosin (MINIPRESS) 1 MG capsule Take 2 capsules (2 mg) by mouth At Bedtime 60 capsule 0     rosuvastatin (CRESTOR) 10 MG tablet Take 0.5 tablets (5 mg) by mouth daily       tacrolimus (GENERIC EQUIVALENT) 0.5 MG capsule Take 0.5 mg by mouth 2 times daily TOTAL DOSE: 1 mg twice daily. HOLD 60 capsule 11     tacrolimus (GENERIC EQUIVALENT) 1 MG capsule Take 1 capsule (1 mg) by mouth 2 times daily TOTAL DOSE: 1 mg twice daily. 60 capsule 11     valACYclovir (VALTREX) 1000 mg tablet Take 1 tablet (1,000 mg) by mouth 3 times daily for 7 days 21 tablet 0     mycophenolate (GENERIC EQUIVALENT) 250 MG capsule Take 3 capsules (750 mg) by mouth 2 times daily for 90 days 540 capsule 3     Allergies   Allergen Reactions     Seasonal Allergies Other (See Comments)     hamster hair     Cats      Itching, nasal congestion     Dogs      Itching, nasal congestion     No Known Allergies

## 2022-12-29 NOTE — NURSING NOTE
"Dermatology Rooming Note    Barry Mcgrath's goals for this visit include:   Chief Complaint   Patient presents with     Derm Problem     Patient seen in ED a few days ago: shingles left side of shoulder. Would like a \"second opinion\" today     Katherine Hidalgo LPN    "

## 2022-12-29 NOTE — LETTER
12/29/2022       RE: Barry Mcgrath  [address]  [place] [address]     Dear Colleague,    Thank you for referring your patient, Barry Mcgrath, to the [facility] DERMATOLOGY CLIN[facility] [place] [facility]. Please see a copy of my visit note below.    [facility] Dermatology Note    Encounter Date: Dec 29, 2022    Dermatology Problem List:  1. History of kidney transplant in 2017: current immunosuppression mycophenolate mofetil, prednisone, and tacrolimus  2. Hidradenitis suppurativa  - Culture 7/28/21 showed normal ranjith  - Current tx: Hibiclens  - Prior tx: clindamycin BID     Social History: Legally blind.       Major PMHx  # DDKT: Stable  - Baseline Creatinine:  ~ 0.7-0.9  - Proteinuria: Normal (<0.2 grams)               # Immunosuppression: Tacrolimus immediate release (goal 4-6), Mycophenolate mofetil (dose 750 mg every 12 hours) and Prednisone (dose 5 mg daily)    ______________________________________    Impression/Plan:  Barry was seen today for derm problem.    Diagnoses and all orders for this visit:    Herpes zoster with other complication  Pain  -Recently diagnosed and treatment initiated by ER patient presenting today for second opinion  - Agree with the diagnosis and treatment patient is currently on for zoster dosing valacyclovir.  Now with healing scabs patient has some discomfort discussed that this can be transient recommended over-the-counter analgesics, and increase use of the lidocaine cream he was given by the ER  - If pain persist we can consider neurogenic pain treatment with TCA or gabapentin, patient declines this for now                Follow-up PRN.       Staff Involved:  Staff Only    Jaden Hicks MD   of Dermatology  Department of Dermatology  [facility]      CC:   Chief Complaint   Patient presents with     Derm Problem     Patient seen in ED a  "few days ago: shingles left side of shoulder. Would like a \"second opinion\" today       History of Present Illness:  Mr. Barry Mcgrath is a 32 year old male who presents as a new patient.    Patient presenting for second opinion for zoster recently diagnosed.  Patient is a transplant patient currently on mycophenolate and tacrolimus    Labs:      Physical exam:  Vitals: There were no vitals taken for this visit.  GEN: well developed, well-nourished, in no acute distress, in a pleasant mood.     SKIN: Lopez phototype 2  - Waist-up skin, which includes the head/face, neck, both arms, chest, back, abdomen, digits and/or nails was examined.  - dermatomal healing crusted papules in cervico-thoracic distribution  - No other lesions of concern on areas examined.     Past Medical History:   Past Medical History:   Diagnosis Date     Anxiety      Bipolar 1 disorder (H)      Bipolar 2 disorder (H)      Blind      Depressive disorder      Diabetes (H)     pre diabetic     Hypertension      PTSD (post-traumatic stress disorder)      Sleep apnea     uses cpap     Past Surgical History:   Procedure Laterality Date     TRANSPLANT      Kidney transplant in 2006       Social History:   reports that he has never smoked. He has never used smokeless tobacco. He reports that he does not drink alcohol and does not use drugs.    Family History:  Family History   Problem Relation Age of Onset     Sleep Apnea Father      Diabetes Father      Glaucoma Maternal Grandfather      Sleep Apnea Brother      Macular Degeneration No family hx of      Skin Cancer No family hx of        Medications:  Current Outpatient Medications   Medication Sig Dispense Refill     cetirizine (ZYRTEC) 10 MG tablet Take 1 tablet (10 mg) by mouth daily as needed for allergies       chlorhexidine (HIBICLENS) 4 % liquid Apply topically daily as needed for wound care 473 mL 11     cholecalciferol (VITAMIN D3) 25 mcg (1000 units) capsule Take 1 capsule (25 mcg) by " mouth daily       clindamycin (CLEOCIN T) 1 % external lotion Apply thin layer to armpits, chest, groin, and buttocks once daily. 60 mL 11     clindamycin (CLINDAMAX) 1 % external gel Apply topically 2 times daily 60 g 11     divalproex sodium extended-release (DEPAKOTE ER) 500 MG 24 hr tablet Take 5 tablets (2,500 mg) by mouth daily  0     FLUCELVAX QUADRIVALENT 0.5 ML ALKSHMI Pharmacist to administer IM  0     lidocaine (XYLOCAINE) 5 % external ointment Apply topically 4 times daily as needed for moderate pain (4-6) Apply 1 inch strip to painful area. 50 g 0     lisinopril (PRINIVIL/ZESTRIL) 10 MG tablet Take 10 mg by mouth daily  5     LYBALVI 20-10 MG TABS tablet        melatonin 1 MG TABS tablet Take 1 tab at 6 PM 90 tablet 2     metFORMIN (GLUCOPHAGE-XR) 500 MG 24 hr tablet Take 1,000 mg by mouth 2 times daily       prazosin (MINIPRESS) 1 MG capsule Take 2 capsules (2 mg) by mouth At Bedtime 60 capsule 0     rosuvastatin (CRESTOR) 10 MG tablet Take 0.5 tablets (5 mg) by mouth daily       tacrolimus (GENERIC EQUIVALENT) 0.5 MG capsule Take 0.5 mg by mouth 2 times daily TOTAL DOSE: 1 mg twice daily. HOLD 60 capsule 11     tacrolimus (GENERIC EQUIVALENT) 1 MG capsule Take 1 capsule (1 mg) by mouth 2 times daily TOTAL DOSE: 1 mg twice daily. 60 capsule 11     valACYclovir (VALTREX) 1000 mg tablet Take 1 tablet (1,000 mg) by mouth 3 times daily for 7 days 21 tablet 0     mycophenolate (GENERIC EQUIVALENT) 250 MG capsule Take 3 capsules (750 mg) by mouth 2 times daily for 90 days 540 capsule 3     Allergies   Allergen Reactions     Seasonal Allergies Other (See Comments)     hamster hair     Cats      Itching, nasal congestion     Dogs      Itching, nasal congestion     No Known Allergies

## 2023-01-06 ENCOUNTER — MEDICAL CORRESPONDENCE (OUTPATIENT)
Dept: HEALTH INFORMATION MANAGEMENT | Facility: CLINIC | Age: 33
End: 2023-01-06

## 2023-01-10 ENCOUNTER — TRANSCRIBE ORDERS (OUTPATIENT)
Dept: OTHER | Age: 33
End: 2023-01-10

## 2023-01-10 ENCOUNTER — TRANSFERRED RECORDS (OUTPATIENT)
Dept: HEALTH INFORMATION MANAGEMENT | Facility: CLINIC | Age: 33
End: 2023-01-10

## 2023-01-10 DIAGNOSIS — Z13.41 ENCOUNTER FOR AUTISM SCREENING: Primary | ICD-10-CM

## 2023-01-12 ENCOUNTER — OFFICE VISIT (OUTPATIENT)
Dept: DERMATOLOGY | Facility: CLINIC | Age: 33
End: 2023-01-12
Payer: MEDICARE

## 2023-01-12 ENCOUNTER — TELEPHONE (OUTPATIENT)
Dept: NEUROPSYCHOLOGY | Facility: CLINIC | Age: 33
End: 2023-01-12

## 2023-01-12 VITALS — BODY MASS INDEX: 32.58 KG/M2 | DIASTOLIC BLOOD PRESSURE: 74 MMHG | WEIGHT: 230.3 LBS | SYSTOLIC BLOOD PRESSURE: 126 MMHG

## 2023-01-12 DIAGNOSIS — D84.9 IMMUNOSUPPRESSION (H): ICD-10-CM

## 2023-01-12 DIAGNOSIS — L73.2 HIDRADENITIS SUPPURATIVA: Primary | ICD-10-CM

## 2023-01-12 PROCEDURE — 99213 OFFICE O/P EST LOW 20 MIN: CPT | Mod: GC | Performed by: DERMATOLOGY

## 2023-01-12 NOTE — NURSING NOTE
Dermatology Rooming Note    Barry Mcgrath's goals for this visit include:   Chief Complaint   Patient presents with     Derm Problem     Barry is following up for HS

## 2023-01-12 NOTE — TELEPHONE ENCOUNTER
Neuropsych referral declined. We do not currently see patients for referral questions related to autism. Fax sent to ref prov.    [facility] neuropsychologists and psychologists include:    - Dr. Kateryna [email or web address]  [phone]    - [facility] - with locations throughout the [facility] area: [phone].    - [facility] - with locations throughout the [facility] area: [phone].    Marizol Lemus  Psychometrist

## 2023-01-12 NOTE — PROGRESS NOTES
[facility] Dermatology Note  Encounter Date: Jan 12, 2023  Office Visit     Dermatology Problem List:  1. History of kidney transplant in 2017: current immunosuppression mycophenolate mofetil, prednisone, and tacrolimus  2. Hidradenitis suppurativa  - Culture 7/28/21 showed normal ranjith  - Current tx: Hibiclens  - Prior tx: clindamycin BID   3. Herpes zoster  - valacyclovir  Social History: Legally blind.  ____________________________________________    Assessment & Plan:    # Hidradenitis suppurativa  No active lesions today. Has been over a year with no lesions. Typically HS does not just go away. I am not sure what happened to make his skin so much better. Residual axillary and inframammary scarring. Patient thinks flaring was related to not showering >1x a week during the covid pandemic. HS has resolved with Hibiclens and daily showering. Will continue maintenance plan as below:  - Continue hibiclens as body wash in the shower  - Continue clindamycin 1% lotion daily PRN (not currently using as well controlled)   - RTC PRN for HS    # Herpes zoster - almost fully resolved; minimal residual pruritus  - Recently diagnosed and treatment initiated by ER   - completed course of valacyclovir  - Lidocaine topical PRN      # Immunosuppressed with mycophenolate mofetil, prednisone, and tacrolimus for kidney transplant.  Patient aware of increased risk of skin cancers with immunosuppressing medications.  -  Low risk for skin cancer. [employer] estimated risk 1.01% at 5 years, 2.33% at 10 years post transplant. Recommendations are for skin cancer screening within 10 years of transplant. Will keep this in mind for the future.  - Continue annual skin exams  - RTC 6 months for skin check    Follow-up: 6 months in person for skin check (hx renal transplant on immunosuppression.)    Staff and Scribe:     Scribe Disclosure:  VANESA GOLDSMITH, am serving as a scribe to document services personally performed by  Huan Simpson MD based on data collection and the provider's statements to me.     Indira Lancaster PGY-4 MD    Staff Physician Comments:   I saw and evaluated the patient with the resident and I agree with the assessment and plan.  I was present for the examination.    Huan Simpson MD, FAAD    Departments of Internal Medicine and Dermatology  [facility]  [phone]    Provider Disclosure:   The documentation recorded by the scribe accurately reflects the services I personally performed and the decisions made by me.    Huan Simpson MD, FAAD    Departments of Internal Medicine and Dermatology  [facility]  [phone]      ____________________________________________    CC: Derm Problem (Barry is following up for HS)    HPI:  Mr. Barry Mcgrath is a(n) 32 year old male who presents today as a return patient for HS. Last seen by Dr. Hicks on 12/29/22, at which time the patient was continued on valacyclovir for treatment of herpes zoster.     Today, the patient reports that he is following up for his zoster and HS.    His zoster is continuing to resolve. It spread across his shoulders and upper chest. The patches are now light pink. There are still some tingling and itchy sensations that he has lidocaine cream to treat as needed.    Believes his HS has resolved since showering more. Been using Hibiclens daily but has not had any flares since the last visit. No active lesions today. Has not needed to use the clindamycin. He recently lost 15 pounds.     HS Nurse Assessment    Nurse Assessment Data 2/10/2022 8/25/2022 1/12/2023   Over the past 30 days how many old lesions flared back up? 0 1 0   Over the past 30 days how many new lesions did you get? 0 2 0   Over the past week, how many dressing changes do you do each day? 0 0 0   Over the past week, has your wound drainage been: None None None   Rate your HS overall from 0 - 10 (0 = no disease, 10 =  worst) over the past week:  0 0 0   Rate your pain score from 0 - 10 (0 = no disease, 10 = worst) for the most painful/symptomatic lesion in the past week:  0 - No Pain 0 - No Pain 0 - No Pain   Over the past week, how much has HS influenced your quality of life? not at all not at all not at all     Patient is otherwise feeling well, without additional skin concerns.    Physical Exam:  Vitals: /74   Wt 104.5 kg (230 lb 4.8 oz)   BMI 32.58 kg/m    SKIN: Waist-up skin, which includes the head/face, neck, both arms, chest, back, abdomen, digits and/or nails was examined.  - No HS lesions noted today.   - Some poorly demarcated pink plaques scattered in a dermatome distribution on the left chest and shoulders extending onto the posterior neck, several with central erosions.  - Scarring noted the the bilateral axillae.   - No other lesions of concern on areas examined.   HS Data  HS Exam Data 2/10/2022 1/12/2023   LC Type LC1 LC1   Clinical Subtypes Regular type Regular type   Acne? No No   Dissecting Cellulitis? No -   Visual analogue score (0-100) 0 0   Total Capone Stage I I   Total Inflammatory Nodules 0 0   Total Abcesses 0 0   Total Draining Tunnels 0 0   Total Abscess and Nodule Count 0 0   IHS4 Score  0 0   Total  HASI Score 0 0   HS-PGA 0 0           Medications:  Current Outpatient Medications   Medication     cetirizine (ZYRTEC) 10 MG tablet     chlorhexidine (HIBICLENS) 4 % liquid     cholecalciferol (VITAMIN D3) 25 mcg (1000 units) capsule     clindamycin (CLEOCIN T) 1 % external lotion     clindamycin (CLINDAMAX) 1 % external gel     divalproex sodium extended-release (DEPAKOTE ER) 500 MG 24 hr tablet     FLUCELVAX QUADRIVALENT 0.5 ML LAKSHMI     lisinopril (PRINIVIL/ZESTRIL) 10 MG tablet     LYBALVI 20-10 MG TABS tablet     melatonin 1 MG TABS tablet     metFORMIN (GLUCOPHAGE-XR) 500 MG 24 hr tablet     prazosin (MINIPRESS) 1 MG capsule     rosuvastatin (CRESTOR) 10 MG tablet     tacrolimus (GENERIC  EQUIVALENT) 1 MG capsule     lidocaine (XYLOCAINE) 5 % external ointment     mycophenolate (GENERIC EQUIVALENT) 250 MG capsule     tacrolimus (GENERIC EQUIVALENT) 0.5 MG capsule     valACYclovir (VALTREX) 1000 mg tablet     No current facility-administered medications for this visit.      Past Medical History:   Patient Active Problem List   Diagnosis     Suicidal ideation     HTN, kidney transplant related     RAQUEL (obstructive sleep apnea)     Bipolar 1 disorder (H)     Immunosuppression (H)     Hidradenitis suppurativa     Kidney replaced by transplant     Type 2 diabetes mellitus without complication, without long-term current use of insulin (H)     Prophylactic immunotherapy     Past Medical History:   Diagnosis Date     Anxiety      Bipolar 1 disorder (H)      Bipolar 2 disorder (H)      Blind      Depressive disorder      Diabetes (H)     pre diabetic     Hypertension      PTSD (post-traumatic stress disorder)      Sleep apnea     uses cpap        CC No referring provider defined for this encounter. on close of this encounter.

## 2023-01-12 NOTE — LETTER
1/12/2023       RE: Barry Mcgrath  [address]  [facility] [address]     Dear Colleague,    Thank you for referring your patient, Barry Mcgrath, to the [facility] [place] at [facility]. Please see a copy of my visit note below.    [facility] Dermatology Note  Encounter Date: Jan 12, 2023  Office Visit     Dermatology Problem List:  1. History of kidney transplant in 2017: current immunosuppression mycophenolate mofetil, prednisone, and tacrolimus  2. Hidradenitis suppurativa  - Culture 7/28/21 showed normal ranjith  - Current tx: Hibiclens  - Prior tx: clindamycin BID   3. Herpes zoster  - valacyclovir  Social History: Legally blind.  ____________________________________________    Assessment & Plan:    # Hidradenitis suppurativa  No active lesions today. Has been over a year with no lesions. Typically HS does not just go away. I am not sure what happened to make his skin so much better. Residual axillary and inframammary scarring. Patient thinks flaring was related to not showering >1x a week during the covid pandemic. HS has resolved with Hibiclens and daily showering. Will continue maintenance plan as below:  - Continue hibiclens as body wash in the shower  - Continue clindamycin 1% lotion daily PRN (not currently using as well controlled)   - RTC PRN for HS    # Herpes zoster - almost fully resolved; minimal residual pruritus  - Recently diagnosed and treatment initiated by ER   - completed course of valacyclovir  - Lidocaine topical PRN      # Immunosuppressed with mycophenolate mofetil, prednisone, and tacrolimus for kidney transplant.  Patient aware of increased risk of skin cancers with immunosuppressing medications.  -  Low risk for skin cancer. [employer] estimated risk 1.01% at 5 years, 2.33% at 10 years post transplant. Recommendations are for skin cancer screening within 10 years of transplant. Will  keep this in mind for the future.  - Continue annual skin exams  - RTC 6 months for skin check    Follow-up: 6 months in person for skin check (hx renal transplant on immunosuppression.)    Staff and Scribe:     Scribe Disclosure:  I, VANESA ROSE, am serving as a scribe to document services personally performed by Huan Simpson MD based on data collection and the provider's statements to me.     Indira Lancaster PGY-4 MD    Staff Physician Comments:   I saw and evaluated the patient with the resident and I agree with the assessment and plan.  I was present for the examination.    Huan Simpson MD, FAAD    Departments of Internal Medicine and Dermatology  [facility]  [phone]    Provider Disclosure:   The documentation recorded by the scribe accurately reflects the services I personally performed and the decisions made by me.    Huan Simpson MD, FAAD    Departments of Internal Medicine and Dermatology  [facility]  [phone]      ____________________________________________    CC: Derm Problem (Barry is following up for HS)    HPI:  Mr. Barry Mcgrath is a(n) 32 year old male who presents today as a return patient for HS. Last seen by Dr. Hicks on 12/29/22, at which time the patient was continued on valacyclovir for treatment of herpes zoster.     Today, the patient reports that he is following up for his zoster and HS.    His zoster is continuing to resolve. It spread across his shoulders and upper chest. The patches are now light pink. There are still some tingling and itchy sensations that he has lidocaine cream to treat as needed.    Believes his HS has resolved since showering more. Been using Hibiclens daily but has not had any flares since the last visit. No active lesions today. Has not needed to use the clindamycin. He recently lost 15 pounds.     HS Nurse Assessment    Nurse Assessment Data 2/10/2022 8/25/2022 1/12/2023   Over  the past 30 days how many old lesions flared back up? 0 1 0   Over the past 30 days how many new lesions did you get? 0 2 0   Over the past week, how many dressing changes do you do each day? 0 0 0   Over the past week, has your wound drainage been: None None None   Rate your HS overall from 0 - 10 (0 = no disease, 10 = worst) over the past week:  0 0 0   Rate your pain score from 0 - 10 (0 = no disease, 10 = worst) for the most painful/symptomatic lesion in the past week:  0 - No Pain 0 - No Pain 0 - No Pain   Over the past week, how much has HS influenced your quality of life? not at all not at all not at all     Patient is otherwise feeling well, without additional skin concerns.    Physical Exam:  Vitals: /74   Wt 104.5 kg (230 lb 4.8 oz)   BMI 32.58 kg/m    SKIN: Waist-up skin, which includes the head/face, neck, both arms, chest, back, abdomen, digits and/or nails was examined.  - No HS lesions noted today.   - Some poorly demarcated pink plaques scattered in a dermatome distribution on the left chest and shoulders extending onto the posterior neck, several with central erosions.  - Scarring noted the the bilateral axillae.   - No other lesions of concern on areas examined.   HS Data  HS Exam Data 2/10/2022 1/12/2023   LC Type LC1 LC1   Clinical Subtypes Regular type Regular type   Acne? No No   Dissecting Cellulitis? No -   Visual analogue score (0-100) 0 0   Total Capone Stage I I   Total Inflammatory Nodules 0 0   Total Abcesses 0 0   Total Draining Tunnels 0 0   Total Abscess and Nodule Count 0 0   IHS4 Score  0 0   Total  HASI Score 0 0   HS-PGA 0 0           Medications:  Current Outpatient Medications   Medication     cetirizine (ZYRTEC) 10 MG tablet     chlorhexidine (HIBICLENS) 4 % liquid     cholecalciferol (VITAMIN D3) 25 mcg (1000 units) capsule     clindamycin (CLEOCIN T) 1 % external lotion     clindamycin (CLINDAMAX) 1 % external gel     divalproex sodium extended-release (DEPAKOTE ER)  500 MG 24 hr tablet     FLUCELVAX QUADRIVALENT 0.5 ML LAKSHMI     lisinopril (PRINIVIL/ZESTRIL) 10 MG tablet     LYBALVI 20-10 MG TABS tablet     melatonin 1 MG TABS tablet     metFORMIN (GLUCOPHAGE-XR) 500 MG 24 hr tablet     prazosin (MINIPRESS) 1 MG capsule     rosuvastatin (CRESTOR) 10 MG tablet     tacrolimus (GENERIC EQUIVALENT) 1 MG capsule     lidocaine (XYLOCAINE) 5 % external ointment     mycophenolate (GENERIC EQUIVALENT) 250 MG capsule     tacrolimus (GENERIC EQUIVALENT) 0.5 MG capsule     valACYclovir (VALTREX) 1000 mg tablet     No current facility-administered medications for this visit.      Past Medical History:   Patient Active Problem List   Diagnosis     Suicidal ideation     HTN, kidney transplant related     RAQUEL (obstructive sleep apnea)     Bipolar 1 disorder (H)     Immunosuppression (H)     Hidradenitis suppurativa     Kidney replaced by transplant     Type 2 diabetes mellitus without complication, without long-term current use of insulin (H)     Prophylactic immunotherapy     Past Medical History:   Diagnosis Date     Anxiety      Bipolar 1 disorder (H)      Bipolar 2 disorder (H)      Blind      Depressive disorder      Diabetes (H)     pre diabetic     Hypertension      PTSD (post-traumatic stress disorder)      Sleep apnea     uses cpap        CC No referring provider defined for this encounter. on close of this encounter.

## 2023-01-23 ENCOUNTER — HEALTH MAINTENANCE LETTER (OUTPATIENT)
Age: 33
End: 2023-01-23

## 2023-01-28 ENCOUNTER — PATIENT OUTREACH (OUTPATIENT)
Dept: DERMATOLOGY | Facility: CLINIC | Age: 33
End: 2023-01-28
Payer: MEDICARE

## 2023-01-28 NOTE — TELEPHONE ENCOUNTER
Attempted to reach patient to schedule follow up in the Dermatology Clinic.  No answer,  LM on VM to call office and [employer] message sent..    Schedule with any provider in July for FBSE (not HS Clinic).

## 2023-02-09 ENCOUNTER — TELEPHONE (OUTPATIENT)
Dept: TRANSPLANT | Facility: CLINIC | Age: 33
End: 2023-02-09
Payer: MEDICARE

## 2023-02-09 NOTE — TELEPHONE ENCOUNTER
Called back Soumya at [phone].  No answer. Left detailed message re: Ok to give Shingrex only.  To call back if with questions.

## 2023-02-09 NOTE — TELEPHONE ENCOUNTER
Patient Call: General  Route to LPN    Reason for call: [facility] [place] [facility] in regards provider Dr. Carly Agustin asked if patient can have Shingles Vaccination. Patient had Shingles in Dec 2022. Providers number is [phone].     Call back needed? Yes    Return Call Needed  Same as documented in contacts section  When to return call?: Same day: Route High Priority

## 2023-02-15 ENCOUNTER — TELEPHONE (OUTPATIENT)
Dept: TRANSPLANT | Facility: CLINIC | Age: 33
End: 2023-02-15
Payer: MEDICARE

## 2023-02-15 NOTE — TELEPHONE ENCOUNTER
Returned call to clinic. Okay to receive shingles vaccine.      Staff member v/u    Rowan Webber RN   Transplant Coordinator  [phone]

## 2023-02-15 NOTE — TELEPHONE ENCOUNTER
Provider Call: Voicemail  Date/Time: 2/15/2023  [phone]  Reason for call: They faxed over a request if pt could receive shingle vaccine  Needs a call back  Option 6 Nurse line

## 2023-04-04 ENCOUNTER — OFFICE VISIT (OUTPATIENT)
Dept: NEPHROLOGY | Facility: CLINIC | Age: 33
End: 2023-04-04
Attending: INTERNAL MEDICINE
Payer: MEDICARE

## 2023-04-04 VITALS
SYSTOLIC BLOOD PRESSURE: 144 MMHG | HEART RATE: 91 BPM | BODY MASS INDEX: 34.46 KG/M2 | OXYGEN SATURATION: 95 % | DIASTOLIC BLOOD PRESSURE: 82 MMHG | WEIGHT: 243.6 LBS | TEMPERATURE: 97.9 F

## 2023-04-04 DIAGNOSIS — Z94.0 HTN, KIDNEY TRANSPLANT RELATED: ICD-10-CM

## 2023-04-04 DIAGNOSIS — I15.1 HTN, KIDNEY TRANSPLANT RELATED: ICD-10-CM

## 2023-04-04 DIAGNOSIS — L73.2 HIDRADENITIS SUPPURATIVA: ICD-10-CM

## 2023-04-04 DIAGNOSIS — Z48.298 AFTERCARE FOLLOWING ORGAN TRANSPLANT: ICD-10-CM

## 2023-04-04 DIAGNOSIS — Z94.0 KIDNEY REPLACED BY TRANSPLANT: Primary | ICD-10-CM

## 2023-04-04 DIAGNOSIS — D84.9 IMMUNOSUPPRESSION (H): ICD-10-CM

## 2023-04-04 PROBLEM — R45.851 SUICIDAL IDEATION: Status: RESOLVED | Noted: 2017-08-28 | Resolved: 2023-04-04

## 2023-04-04 PROBLEM — Z29.89 PROPHYLACTIC IMMUNOTHERAPY: Status: RESOLVED | Noted: 2022-06-30 | Resolved: 2023-04-04

## 2023-04-04 PROCEDURE — G0463 HOSPITAL OUTPT CLINIC VISIT: HCPCS | Performed by: INTERNAL MEDICINE

## 2023-04-04 PROCEDURE — 99214 OFFICE O/P EST MOD 30 MIN: CPT | Performed by: INTERNAL MEDICINE

## 2023-04-04 RX ORDER — CLINDAMYCIN PHOSPHATE 10 UG/ML
LOTION TOPICAL
Qty: 60 ML | Refills: 11 | COMMUNITY
Start: 2023-04-04 | End: 2023-07-09

## 2023-04-04 RX ORDER — PREDNISONE 5 MG/1
5 TABLET ORAL DAILY
COMMUNITY
End: 2023-07-17

## 2023-04-04 ASSESSMENT — PAIN SCALES - GENERAL: PAINLEVEL: NO PAIN (0)

## 2023-04-04 NOTE — LETTER
4/4/2023       RE: Barry Mcgrath  [address]  [facility] [address]     Dear Colleague,    Thank you for referring your patient, Barry Mcgrath, to the [facility] NEPHROLOGY CLINIC [place] [facility]. Please see a copy of my visit note below.      CHRONIC TRANSPLANT NEPHROLOGY VISIT    Assessment & Plan   # DDKT: Stable   - Baseline Creatinine:  ~ 0.7-0.9   - Proteinuria: Minimal (0.2-0.5 grams)   - Date DSA Last Checked: Not Known      Latest DSA: Not checked recently due to time from transplant   - BK Viremia: Not checked recently due to time from transplant   - Kidney Tx Biopsy: No    -Labs every 3 months at [facility]     # Immunosuppression: Tacrolimus immediate release (goal 4-6), Mycophenolate mofetil (dose 750 mg every 12 hours) and Prednisone (dose 5 mg daily)             - Continue with intensive monitoring of immunosuppression for efficacy and    toxicity.             - Changes: Not at this time. Check MPA level     # Infection Prophylaxis:   - PJP: None    # Hypertension: Controlled;  Goal BP: < 130/80   - Changes: Not at this time. Continue lisinopril 10mg daily, prazosin 2mg qhs    # Diabetes: Controlled (HbA1c <7%) Last HbA1c: 6.6%   - Management as per primary care. On metformin 1g BID    # Relative Erythrocytosis: Hgb: Stable;  On ACEI/ARB: Yes  Imaging: Not at this time    # Suicidal ideation:   -Has had ED presentation x3 for suicidal ideation, most recently 8/30/21    # RAQUEL:   -On CPAP. Following up with sleep clinic    # Weight gain:   -weight up to 243lbs. Recommend diet and exercise for weight loss and to improve overall health    # Hidradenitis Suppurativa:   -Recurrent. S/p I+D of L axillary abscess 5/27/21 in ED.    -Follows with dermatology, prescribed clindamycin daily as needed and hibiclens body wash      # Herpes Zoster:   -Diagnosed with Zoster on 12/26/23 in the ED. Treated with valtrex.      # Skin Cancer Risk:    - Discussed sun protection and recommend regular follow up with Dermatology.    # COVID-19 Virus Review: Discussed COVID-19 virus and the potential medical risks.  Reviewed preventative health recommendations, including wearing a mask where appropriate.  Recommended COVID vaccination should be up to date with either an initial vaccination or booster shot when appropriate.  Asked the patient to inform the transplant center if they are exposed or diagnosed with this virus.    # COVID Vaccination Up To Date: Yes       # Medical Compliance: Yes    # Transplant History:  Etiology of Kidney Failure: Congenital dysplasia  Tx: DDKT  Transplant: 2/28/2006 (Kidney), per patient 4/4/2006, will work to change date in encounter  Significant changes in immunosuppression: None  Significant transplant-related complications: None    Transplant Office Phone Number: [phone]    Assessment and plan was discussed with the patient and he voiced his understanding and agreement.    Return visit: Return in about 1 year (around 4/4/2024).      Dayton Garcia MD    Chief Complaint   Mr. Mcgrath is a 32 year old here for routine follow up, kidney transplant and immunosuppression management.    History of Present Illness   Barry was diagnosed with Zoster on 12/26/23. He was treated with valtrex with improvement.     He states that he does develop diarrhea when he drinks coca cola. Additionaly he vomits when he drinks coffee, so he doesn't drink it anymore. He denies fever, chills. He has gained weight because of the food that he eats. He has been eating a lot of [employer].     Home BP: 120s systolic per patient    Problem List   Patient Active Problem List   Diagnosis    Suicidal ideation    HTN, kidney transplant related    RAQUEL (obstructive sleep apnea)    Bipolar 1 disorder (H)    Immunosuppression (H)    Hidradenitis suppurativa    Kidney replaced by transplant    Type 2 diabetes mellitus without complication,  without long-term current use of insulin (H)    Prophylactic immunotherapy       Allergies   Allergies   Allergen Reactions    Seasonal Allergies Other (See Comments)     hamster hair    Cats      Itching, nasal congestion    Dogs      Itching, nasal congestion    No Known Allergies        Medications   Current Outpatient Medications   Medication Sig    cetirizine (ZYRTEC) 10 MG tablet Take 1 tablet (10 mg) by mouth daily as needed for allergies    chlorhexidine (HIBICLENS) 4 % liquid Apply topically daily as needed for wound care    cholecalciferol (VITAMIN D3) 25 mcg (1000 units) capsule Take 1 capsule (25 mcg) by mouth daily    clindamycin (CLEOCIN T) 1 % external lotion Apply thin layer to armpits, chest, groin, and buttocks once daily as needed    divalproex sodium extended-release (DEPAKOTE ER) 500 MG 24 hr tablet Take 5 tablets (2,500 mg) by mouth daily    FLUCELVAX QUADRIVALENT 0.5 ML LAKSHMI Pharmacist to administer IM    lisinopril (PRINIVIL/ZESTRIL) 10 MG tablet Take 10 mg by mouth daily    LYBALVI 20-10 MG TABS tablet     melatonin 1 MG TABS tablet Take 1 tab at 6 PM    metFORMIN (GLUCOPHAGE-XR) 500 MG 24 hr tablet Take 1,000 mg by mouth 2 times daily    mycophenolate (GENERIC EQUIVALENT) 250 MG capsule Take 3 capsules (750 mg) by mouth 2 times daily for 90 days    prazosin (MINIPRESS) 1 MG capsule Take 2 capsules (2 mg) by mouth At Bedtime    predniSONE (DELTASONE) 5 MG tablet Take 5 mg by mouth daily    rosuvastatin (CRESTOR) 10 MG tablet Take 0.5 tablets (5 mg) by mouth daily    tacrolimus (GENERIC EQUIVALENT) 1 MG capsule Take 1 capsule (1 mg) by mouth 2 times daily TOTAL DOSE: 1 mg twice daily.    lidocaine (XYLOCAINE) 5 % external ointment Apply topically 4 times daily as needed for moderate pain (4-6) Apply 1 inch strip to painful area.    tacrolimus (GENERIC EQUIVALENT) 0.5 MG capsule Take 0.5 mg by mouth 2 times daily TOTAL DOSE: 1 mg twice daily. HOLD     No current facility-administered  medications for this visit.     Medications Discontinued During This Encounter   Medication Reason    clindamycin (CLINDAMAX) 1 % external gel     clindamycin (CLEOCIN T) 1 % external lotion     valACYclovir (VALTREX) 1000 mg tablet        Physical Exam     Blood pressure (!) 144/82, pulse 91, temperature 97.9  F (36.6  C), temperature source Oral, weight 110.5 kg (243 lb 9.6 oz), SpO2 95 %.   GENERAL APPEARANCE: alert and no distress  HENT: mouth without ulcers or lesions  LYMPHATICS: no cervical or supraclavicular nodes  RESP: lungs clear to auscultation - no rales, rhonchi or wheezes  CV: regular rhythm, normal rate, no rub, no murmur  EDEMA: no LE edema bilaterally  ABDOMEN: soft, nondistended, nontender, bowel sounds normal  MS: extremities normal - no gross deformities noted, no evidence of inflammation in joints, no muscle tenderness  SKIN: no rash  NEURO: normal strength and tone, sensory exam grossly normal, mentation intact and speech normal  PSYCH: mentation appears normal and affect normal/bright      Data         Latest Ref Rng & Units 1/26/2023     1:29 PM 1/26/2023    12:00 AM 8/6/2022     2:05 PM   Renal   Na (external) 135 - 146 mmol/L 138       138        K (external) 3.5 - 5.3 mmol/L 4.2       4.1        Cl 98 - 110 mmol/L 103       104        Cl (external) 98 - 110 mmol/L 103       104        CO2 (external) 20 - 32 mmol/L 26       24        BUN (external) 7 - 25 mg/dL 18       18        Cr (external) 0.60 - 1.26 mg/dL 0.87       0.81        Glucose (external) 65 - 99 mg/dL 169      182      84        Ca (external) 8.6 - 10.3 mg/dL 9.7       8.9            This result is from an external source.         Latest Ref Rng & Units 1/26/2023     1:29 PM 10/8/2021     9:40 AM 6/18/2020     2:30 PM   Bone Health   Vit D Def 30 - 80 ug/L  37      Vit D Def (external) 30 - 100 ng/ml 38       56            This result is from an external source.         Latest Ref Rng & Units 1/26/2023     1:52 PM 8/6/2022      2:05 PM 3/30/2022    10:00 AM   Heme   WBC (external) 3.8 - 10.8 Thousand/uL 9.6      11.0      10.8        Hgb (external) 13.2 - 17.1 g/dL 14.6      14.3      15.0        Plt (external) 140 - 400 Thousand/uL 226      278      276        ABSOLUTE NEUTROPHILS (EXTERNAL) 1,500 - 7,800 cells/uL 6,048      5,368      5,713        ABSOLUTE LYMPHOCYTES (EXTERNAL) 850 - 3,900 cells/uL 2,582      4,059      3,154        ABSOLUTE MONOCYTES (EXTERNAL) 200 - 950 cells/uL 672      594      875        ABSOLUTE EOSINOPHILS (EXTERNAL) 15 - 500 cells/uL 250      902      972        ABSOLUTE BASOPHILS (EXTERNAL) 0 - 200 cells/uL 48      77      86            This result is from an external source.         Latest Ref Rng & Units 1/26/2023     1:29 PM 8/6/2022     2:05 PM 10/8/2021     9:40 AM   Liver   AP 45 - 120 U/L   68     AP (external) 36 - 130 U/L 66      56         TBili 0.0 - 1.0 mg/dL   0.5     TBili (external) 0.2 - 1.2 mg/dL 0.3      0.5         ALT 0 - 45 U/L   50     ALT (external) 9 - 46 U/L 30      29         AST 0 - 40 U/L   28     AST (external) 10 - 40 U/L 23      24         Tot Protein 6.0 - 8.0 g/dL   7.3     Tot Protein (external) 6.1 - 8.1 g/dL 7.3      6.9         Albumin 3.5 - 5.0 g/dL   3.7     Albumin (external) 3.6 - 5.1 g/dL 4.5      4.1             This result is from an external source.         Latest Ref Rng & Units 1/26/2023    12:00 AM 3/30/2022    10:00 AM 4/29/2021     8:30 AM   Pancreas   A1C <=5.6 %   5.8     A1C (external) <5.7 % 5.7      6.6   5.8            This result is from an external source.         Latest Ref Rng & Units 4/29/2021    10:15 AM 6/18/2020     2:30 PM 6/1/2018     3:36 PM   Iron studies   Iron 42 - 175 ug/dL 56       Iron (external) 7 - 60 U/L   21        Ferritin (external) 8 - 388 ng/mL  123             This result is from an external source.           Recent Labs   Lab Test 07/15/19  0915 03/29/21  0850 05/28/21  0855 10/08/21  0940 11/29/21  0910   DOSTAC Not Provided   --   --  10/7/2021 11/28/2021   TACROL 5.9   < > 4.1* 5.4 5.8    < > = values in this interval not displayed.             Again, thank you for allowing me to participate in the care of your patient.      Sincerely,    Dayton Garcia MD

## 2023-04-04 NOTE — NURSING NOTE
Chief Complaint   Patient presents with     RECHECK     Annual f/u       BP (!) 144/82   Pulse 91   Temp 97.9  F (36.6  C) (Oral)   Wt 110.5 kg (243 lb 9.6 oz)   SpO2 95%   BMI 34.46 kg/m      Brian Fulton on 4/4/2023 at 2:18 PM

## 2023-04-04 NOTE — PATIENT INSTRUCTIONS
Patient Recommendations:  - No new recommendations at this time.    Transplant Patient Information  Your Post Transplant Coordinator is: Rowan Webber  For non urgent items, we encourage you to contact your coordinator/care team online via [employer]  You and your care team can also contact your transplant coordinator Monday - Friday, 8am - 5pm at [phone] (Option 2 to reach the coordinator or Option 4 to schedule an appointment).  After hours for urgent matters, please call [facility] at [phone].

## 2023-04-04 NOTE — PROGRESS NOTES
CHRONIC TRANSPLANT NEPHROLOGY VISIT    Assessment & Plan   # DDKT: Stable   - Baseline Creatinine:  ~ 0.7-0.9   - Proteinuria: Minimal (0.2-0.5 grams)   - Date DSA Last Checked: Not Known      Latest DSA: Not checked recently due to time from transplant   - BK Viremia: Not checked recently due to time from transplant   - Kidney Tx Biopsy: No    -Labs every 3 months at [facility]     # Immunosuppression: Tacrolimus immediate release (goal 4-6), Mycophenolate mofetil (dose 750 mg every 12 hours) and Prednisone (dose 5 mg daily)             - Continue with intensive monitoring of immunosuppression for efficacy and    toxicity.             - Changes: Not at this time. Check MPA level     # Infection Prophylaxis:   - PJP: None    # Hypertension: Controlled;  Goal BP: < 130/80   - Changes: Not at this time. Continue lisinopril 10mg daily, prazosin 2mg qhs    # Diabetes: Controlled (HbA1c <7%) Last HbA1c: 6.6%   - Management as per primary care. On metformin 1g BID    # Relative Erythrocytosis: Hgb: Stable;  On ACEI/ARB: Yes  Imaging: Not at this time    # Suicidal ideation:   -Has had ED presentation x3 for suicidal ideation, most recently 8/30/21    # RAQUEL:   -On CPAP. Following up with sleep clinic    # Weight gain:   -weight up to 243lbs. Recommend diet and exercise for weight loss and to improve overall health    # Hidradenitis Suppurativa:   -Recurrent. S/p I+D of L axillary abscess 5/27/21 in ED.    -Follows with dermatology, prescribed clindamycin daily as needed and hibiclens body wash      # Herpes Zoster:   -Diagnosed with Zoster on 12/26/23 in the ED. Treated with valtrex.     # Skin Cancer Risk:    - Discussed sun protection and recommend regular follow up with Dermatology.    # COVID-19 Virus Review: Discussed COVID-19 virus and the potential medical risks.  Reviewed preventative health recommendations, including wearing a mask where appropriate.  Recommended COVID vaccination should be  up to date with either an initial vaccination or booster shot when appropriate.  Asked the patient to inform the transplant center if they are exposed or diagnosed with this virus.    # COVID Vaccination Up To Date: Yes       # Medical Compliance: Yes    # Transplant History:  Etiology of Kidney Failure: Congenital dysplasia  Tx: DDKT  Transplant: 2/28/2006 (Kidney), per patient 4/4/2006, will work to change date in encounter  Significant changes in immunosuppression: None  Significant transplant-related complications: None    Transplant Office Phone Number: [phone]    Assessment and plan was discussed with the patient and he voiced his understanding and agreement.    Return visit: Return in about 1 year (around 4/4/2024).      Dayton Garcia MD    Chief Complaint   Mr. Mcgrath is a 32 year old here for routine follow up, kidney transplant and immunosuppression management.    History of Present Illness   Barry was diagnosed with Zoster on 12/26/23. He was treated with valtrex with improvement.     He states that he does develop diarrhea when he drinks coca cola. Additionaly he vomits when he drinks coffee, so he doesn't drink it anymore. He denies fever, chills. He has gained weight because of the food that he eats. He has been eating a lot of [employer].     Home BP: 120s systolic per patient    Problem List   Patient Active Problem List   Diagnosis     Suicidal ideation     HTN, kidney transplant related     RAQUEL (obstructive sleep apnea)     Bipolar 1 disorder (H)     Immunosuppression (H)     Hidradenitis suppurativa     Kidney replaced by transplant     Type 2 diabetes mellitus without complication, without long-term current use of insulin (H)     Prophylactic immunotherapy       Allergies   Allergies   Allergen Reactions     Seasonal Allergies Other (See Comments)     hamster hair     Cats      Itching, nasal congestion     Dogs      Itching, nasal congestion     No Known Allergies        Medications   Current  Outpatient Medications   Medication Sig     cetirizine (ZYRTEC) 10 MG tablet Take 1 tablet (10 mg) by mouth daily as needed for allergies     chlorhexidine (HIBICLENS) 4 % liquid Apply topically daily as needed for wound care     cholecalciferol (VITAMIN D3) 25 mcg (1000 units) capsule Take 1 capsule (25 mcg) by mouth daily     clindamycin (CLEOCIN T) 1 % external lotion Apply thin layer to armpits, chest, groin, and buttocks once daily as needed     divalproex sodium extended-release (DEPAKOTE ER) 500 MG 24 hr tablet Take 5 tablets (2,500 mg) by mouth daily     FLUCELVAX QUADRIVALENT 0.5 ML LAKSHMI Pharmacist to administer IM     lisinopril (PRINIVIL/ZESTRIL) 10 MG tablet Take 10 mg by mouth daily     LYBALVI 20-10 MG TABS tablet      melatonin 1 MG TABS tablet Take 1 tab at 6 PM     metFORMIN (GLUCOPHAGE-XR) 500 MG 24 hr tablet Take 1,000 mg by mouth 2 times daily     mycophenolate (GENERIC EQUIVALENT) 250 MG capsule Take 3 capsules (750 mg) by mouth 2 times daily for 90 days     prazosin (MINIPRESS) 1 MG capsule Take 2 capsules (2 mg) by mouth At Bedtime     predniSONE (DELTASONE) 5 MG tablet Take 5 mg by mouth daily     rosuvastatin (CRESTOR) 10 MG tablet Take 0.5 tablets (5 mg) by mouth daily     tacrolimus (GENERIC EQUIVALENT) 1 MG capsule Take 1 capsule (1 mg) by mouth 2 times daily TOTAL DOSE: 1 mg twice daily.     lidocaine (XYLOCAINE) 5 % external ointment Apply topically 4 times daily as needed for moderate pain (4-6) Apply 1 inch strip to painful area.     tacrolimus (GENERIC EQUIVALENT) 0.5 MG capsule Take 0.5 mg by mouth 2 times daily TOTAL DOSE: 1 mg twice daily. HOLD     No current facility-administered medications for this visit.     Medications Discontinued During This Encounter   Medication Reason     clindamycin (CLINDAMAX) 1 % external gel      clindamycin (CLEOCIN T) 1 % external lotion      valACYclovir (VALTREX) 1000 mg tablet        Physical Exam     Blood pressure (!) 144/82, pulse 91,  temperature 97.9  F (36.6  C), temperature source Oral, weight 110.5 kg (243 lb 9.6 oz), SpO2 95 %.   GENERAL APPEARANCE: alert and no distress  HENT: mouth without ulcers or lesions  LYMPHATICS: no cervical or supraclavicular nodes  RESP: lungs clear to auscultation - no rales, rhonchi or wheezes  CV: regular rhythm, normal rate, no rub, no murmur  EDEMA: no LE edema bilaterally  ABDOMEN: soft, nondistended, nontender, bowel sounds normal  MS: extremities normal - no gross deformities noted, no evidence of inflammation in joints, no muscle tenderness  SKIN: no rash  NEURO: normal strength and tone, sensory exam grossly normal, mentation intact and speech normal  PSYCH: mentation appears normal and affect normal/bright      Data         Latest Ref Rng & Units 1/26/2023     1:29 PM 1/26/2023    12:00 AM 8/6/2022     2:05 PM   Renal   Na (external) 135 - 146 mmol/L 138       138        K (external) 3.5 - 5.3 mmol/L 4.2       4.1        Cl 98 - 110 mmol/L 103       104        Cl (external) 98 - 110 mmol/L 103       104        CO2 (external) 20 - 32 mmol/L 26       24        BUN (external) 7 - 25 mg/dL 18       18        Cr (external) 0.60 - 1.26 mg/dL 0.87       0.81        Glucose (external) 65 - 99 mg/dL 169      182      84        Ca (external) 8.6 - 10.3 mg/dL 9.7       8.9            This result is from an external source.         Latest Ref Rng & Units 1/26/2023     1:29 PM 10/8/2021     9:40 AM 6/18/2020     2:30 PM   Bone Health   Vit D Def 30 - 80 ug/L  37      Vit D Def (external) 30 - 100 ng/ml 38       56            This result is from an external source.         Latest Ref Rng & Units 1/26/2023     1:52 PM 8/6/2022     2:05 PM 3/30/2022    10:00 AM   Heme   WBC (external) 3.8 - 10.8 Thousand/uL 9.6      11.0      10.8        Hgb (external) 13.2 - 17.1 g/dL 14.6      14.3      15.0        Plt (external) 140 - 400 Thousand/uL 226      278      276        ABSOLUTE NEUTROPHILS (EXTERNAL) 1,500 - 7,800 cells/uL  6,048      5,368      5,713        ABSOLUTE LYMPHOCYTES (EXTERNAL) 850 - 3,900 cells/uL 2,582      4,059      3,154        ABSOLUTE MONOCYTES (EXTERNAL) 200 - 950 cells/uL 672      594      875        ABSOLUTE EOSINOPHILS (EXTERNAL) 15 - 500 cells/uL 250      902      972        ABSOLUTE BASOPHILS (EXTERNAL) 0 - 200 cells/uL 48      77      86            This result is from an external source.         Latest Ref Rng & Units 1/26/2023     1:29 PM 8/6/2022     2:05 PM 10/8/2021     9:40 AM   Liver   AP 45 - 120 U/L   68     AP (external) 36 - 130 U/L 66      56         TBili 0.0 - 1.0 mg/dL   0.5     TBili (external) 0.2 - 1.2 mg/dL 0.3      0.5         ALT 0 - 45 U/L   50     ALT (external) 9 - 46 U/L 30      29         AST 0 - 40 U/L   28     AST (external) 10 - 40 U/L 23      24         Tot Protein 6.0 - 8.0 g/dL   7.3     Tot Protein (external) 6.1 - 8.1 g/dL 7.3      6.9         Albumin 3.5 - 5.0 g/dL   3.7     Albumin (external) 3.6 - 5.1 g/dL 4.5      4.1             This result is from an external source.         Latest Ref Rng & Units 1/26/2023    12:00 AM 3/30/2022    10:00 AM 4/29/2021     8:30 AM   Pancreas   A1C <=5.6 %   5.8     A1C (external) <5.7 % 5.7      6.6   5.8            This result is from an external source.         Latest Ref Rng & Units 4/29/2021    10:15 AM 6/18/2020     2:30 PM 6/1/2018     3:36 PM   Iron studies   Iron 42 - 175 ug/dL 56       Iron (external) 7 - 60 U/L   21        Ferritin (external) 8 - 388 ng/mL  123             This result is from an external source.           Recent Labs   Lab Test 07/15/19  0915 03/29/21  0850 05/28/21  0855 10/08/21  0940 11/29/21  0910   DOSTAC Not Provided  --   --  10/7/2021 11/28/2021   TACROL 5.9   < > 4.1* 5.4 5.8    < > = values in this interval not displayed.

## 2023-04-12 ENCOUNTER — TELEPHONE (OUTPATIENT)
Dept: LAB | Facility: CLINIC | Age: 33
End: 2023-04-12
Payer: MEDICARE

## 2023-04-12 NOTE — TELEPHONE ENCOUNTER
Rowan,     Please order labs for every 3 months at the [facility]. With next labs please obtain MPA level.     Thanks,   Dayton      OUTCOME:  Lab orders faxed. Detailed message left for patient to obtain labs Q3 months, due this month.  Instructed patient to ensure he obtain a 12 hour mpa trough level with next labs.    Asked for CB with any questions or concerns.    Rowan Webber RN   Transplant Coordinator  [phone]

## 2023-05-06 ENCOUNTER — HEALTH MAINTENANCE LETTER (OUTPATIENT)
Age: 33
End: 2023-05-06

## 2023-05-14 NOTE — PROGRESS NOTES
Virtual Visit Details  Start Time: 3:03 PM   End Time: 3:23 PM   Type of service:  Telephone Visit   Phone call duration: 20 minutes     CPAP Follow-Up Visit:    Date on this visit: 5/15/2023    Barry Mcgrath has a follow-up visit today to review his management of non-24 sleep disorder and CPAP use for RAQUEL. His medical history is significant for complete blindness, Bipolar I, PTSD, HTN, and kidney transplant.     5/17/2018 [facility] Sleep Study (261.0 lbs) - .0/hr, .8, Supine .0, REM AHI -, Low O2% 65.3%, Time Spent ?88% 60.6, Time Spent ?89% 72.5. Treatment was titrated to a pressure of CPAP 11 with an AHI 6.9. Time spent in REM supine at this pressure was - minutes.    Barry has been sleeping better, but sleeping about 10-12 hours. He is falling asleep well and staying asleep well. He   He is on prazosin. He has nightmares occasionally, but nothing bothersome. He does not usually remember them by the morning.    PAP machine: erento Dreamstation 2 from the recall. Pressure settings: 14-18 cm    The compliance data shows that the patient used the CPAP for 30/30 nights, 90% of nights for >4 hours.  The 90th% pressure is 17 cm.  The average time in large leak is 44 sec.  The average nightly usage is 10:13.  The average AHI is 2.4/hr.    Interface:  Mask: full face mask- washes everything weekly.   Chin strap: No  Leak: No  Using Humidifier: no  Condensation in hose or mask: No     Difficulties with therapy:    [-] Snoring with CPAP:  [-] Difficulty tolerating the pressure:  [-] Epistaxis/dry nose:   [-] Nasal congestion:  [+] Dry mouth: a little, dry lips too (Carmex, bees wax lip balm)  [-] Mouth breathing:   [-] Pain/skin breakdown:      Improvements noted with CPAP:   [+] waking up more refreshed  [+] sleeping better with less arousals  [+] nocturia improved   [+] improved energy level during the day    Weight change since sleep study: 239 lbs, down a few pounds    Bedtime: 8-9 PM.   Wake time: 8-10:30 AM. Falls asleep in 5-10 minutes. Wakes 1-2 times per night for 2 minutes. Reason for waking: restroom  Naps: 0. He takes caffeine pills if he has something he wants to stay awake for.      Past medical/surgical history, family history, social history, medications and allergies were reviewed.      Problem List:  Patient Active Problem List    Diagnosis Date Noted     Aftercare following organ transplant 04/04/2023     Priority: Medium     Kidney replaced by transplant 06/13/2022     Priority: Medium     Type 2 diabetes mellitus without complication, without long-term current use of insulin (H) 06/13/2022     Priority: Medium     Hidradenitis suppurativa 03/24/2021     Priority: Medium     Bipolar 1 disorder (H) 03/13/2019     Priority: Medium     RAQUEL (obstructive sleep apnea) 05/20/2018     Priority: Medium     5/17/2018 [facility] Sleep Study (261.0 lbs) - .0, .8, Supine .0, REM AHI -, Low O2% 65.3%, Time Spent ?88% 60.6, Time Spent ?89% 72.5. Treatment was titrated to a pressure of CPAP 11 with an AHI 6.9. Time spent in REM supine at this pressure was - minutes.       HTN, kidney transplant related 04/04/2018     Priority: Medium     Immunosuppression (H) 09/13/2012     Priority: Medium         Impression/Plan:    (G47.33) RAQUEL (obstructive sleep apnea)  (primary encounter diagnosis)  Comment: Barry has been using CPAP nightly for about 10 hours per night. He does benefit from use. He feels the CPAP does not always turn on when he hits the power button. Also, he would prefer to have the ramp off.  Plan: Comprehensive DME        Continue auto CPAP 14-18 cm. A prescription was written for new supplies. We reviewed recommendations for cleaning and replacing supplies. I turned the ramp off, remotely.      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 He is on 1 mg melatonin at 6 pm. He sleeps about 10 hours per night and he  asks about that. I would guess that is a side effect of medication. It is not highly bothersome.  Plan: Continue current management.     He will follow up with me in about 1 year(s).     Phone visit duration: 20 min      Bennett Goltz, PA-C    CC: No ref. provider found

## 2023-05-15 ENCOUNTER — TELEPHONE (OUTPATIENT)
Dept: TRANSPLANT | Facility: CLINIC | Age: 33
End: 2023-05-15

## 2023-05-15 ENCOUNTER — VIRTUAL VISIT (OUTPATIENT)
Dept: SLEEP MEDICINE | Facility: CLINIC | Age: 33
End: 2023-05-15
Payer: MEDICARE

## 2023-05-15 VITALS — WEIGHT: 219 LBS | HEIGHT: 71 IN | BODY MASS INDEX: 30.66 KG/M2

## 2023-05-15 DIAGNOSIS — G47.24 NON-24 HOUR SLEEP WAKE DISORDER: ICD-10-CM

## 2023-05-15 DIAGNOSIS — G47.33 OSA (OBSTRUCTIVE SLEEP APNEA): Primary | ICD-10-CM

## 2023-05-15 DIAGNOSIS — D84.9 IMMUNOSUPPRESSION (H): ICD-10-CM

## 2023-05-15 DIAGNOSIS — Z94.0 STATUS POST KIDNEY TRANSPLANT: ICD-10-CM

## 2023-05-15 DIAGNOSIS — Z94.0 KIDNEY REPLACED BY TRANSPLANT: ICD-10-CM

## 2023-05-15 DIAGNOSIS — Z48.298 AFTERCARE FOLLOWING ORGAN TRANSPLANT: ICD-10-CM

## 2023-05-15 PROCEDURE — 99442 PR PHYSICIAN TELEPHONE EVALUATION 11-20 MIN: CPT | Mod: 95 | Performed by: PHYSICIAN ASSISTANT

## 2023-05-15 ASSESSMENT — SLEEP AND FATIGUE QUESTIONNAIRES
HOW LIKELY ARE YOU TO NOD OFF OR FALL ASLEEP WHILE SITTING INACTIVE IN A PUBLIC PLACE: WOULD NEVER DOZE
HOW LIKELY ARE YOU TO NOD OFF OR FALL ASLEEP WHILE SITTING AND READING: WOULD NEVER DOZE
HOW LIKELY ARE YOU TO NOD OFF OR FALL ASLEEP WHEN YOU ARE A PASSENGER IN A CAR FOR AN HOUR WITHOUT A BREAK: SLIGHT CHANCE OF DOZING
HOW LIKELY ARE YOU TO NOD OFF OR FALL ASLEEP WHILE WATCHING TV: WOULD NEVER DOZE
HOW LIKELY ARE YOU TO NOD OFF OR FALL ASLEEP IN A CAR, WHILE STOPPED FOR A FEW MINUTES IN TRAFFIC: WOULD NEVER DOZE
HOW LIKELY ARE YOU TO NOD OFF OR FALL ASLEEP WHILE LYING DOWN TO REST IN THE AFTERNOON WHEN CIRCUMSTANCES PERMIT: SLIGHT CHANCE OF DOZING
HOW LIKELY ARE YOU TO NOD OFF OR FALL ASLEEP WHILE SITTING AND TALKING TO SOMEONE: WOULD NEVER DOZE
HOW LIKELY ARE YOU TO NOD OFF OR FALL ASLEEP WHILE SITTING QUIETLY AFTER LUNCH WITHOUT ALCOHOL: WOULD NEVER DOZE

## 2023-05-15 NOTE — TELEPHONE ENCOUNTER
ISSUE: MPA level <0.5 obtained on 5/10/2023    PLAN:    Dayton Garcia MD Sveiven, Sara, DODIE  Yes please          Previous Messages  ----- Message -----   From: Rowan Webber RN   Sent: 5/15/2023   3:55 PM CDT   To: MD Dr Jose Freeman Frank confirms this was an accurate 12 hour MPA trough.  Confirms he is taking  mg daily with no missed doses.  Would you like to increase to 1000mg bid and repeat MPA level?     Rowan Webber RN   Transplant Coordinator   [phone]      OUTCOME:   Patient v/u of increasing MMF from 750 mg bid to 1000 mg bid and will repeat labs with 12 hour MPA trough level in 2 weeks. RX updated and lab orders sent.    Rowan Webber RN   Transplant Coordinator  [phone]

## 2023-05-15 NOTE — NURSING NOTE
Is the patient currently in the state of [place]? YES    Visit mode:VIDEO    If the visit is dropped, the patient can be reconnected by: TELEPHONE VISIT: Phone number: [phone]    Will anyone else be joining the visit? NO      How would you like to obtain your AVS? MyChart    Are changes needed to the allergy or medication list? NO    Reason for visit: CPAP Follow Up  Has patient had flu shot for current/most recent flu season? If so, when? Yes: 09/09/20

## 2023-05-15 NOTE — LETTER
The Transplant Center  Room 2[address]200  [facility],  [address]  [address]  Tel [phone]  Toll Free [phone]                OUTPATIENT LABORATORY TEST ORDER    Patient Name: Barry Mcgrath  Transplant Date: 4/4/2006 (Kidney)  YOB: 1990  Issue Date & Time: 5/16/2023 [facility]0   [facility] MR:  [id number]  Exp. Date (1 year after date issued)      Diagnoses: Kidney Transplant (ICD-10 Z94.0)   Long term use of medications (ICD-10 Z79.899)     Lab results to be available on the same day drawn.   Patient should release information to the [facility] [place].  Please fax to the Transplant Center at [phone].        Please obtain the following labs in 2 weeks  ?Hemogram and Platelet  ?Basic Metabolic Panel   ?/Tacrolimus/Prograf drug level      Mycophenolic acid level                 If you have any questions, please call The [facility] at [phone] or [phone].    Please fax labs to [phone]    Dayton Garcia MD

## 2023-05-16 ENCOUNTER — TELEPHONE (OUTPATIENT)
Dept: TRANSPLANT | Facility: CLINIC | Age: 33
End: 2023-05-16
Payer: MEDICARE

## 2023-05-16 RX ORDER — MYCOPHENOLATE MOFETIL 250 MG/1
1000 CAPSULE ORAL 2 TIMES DAILY
Qty: 720 CAPSULE | Refills: 3 | Status: SHIPPED | OUTPATIENT
Start: 2023-05-16 | End: 2023-06-28

## 2023-05-16 NOTE — TELEPHONE ENCOUNTER
Patient calling to let RNCC know that he would not like to repeat labs until 8/10 despite being told to repeat labs/ MPA level in 1-2 weeks following dose change.    Reviewed risks of not monitoring closely IS level following dose changes.  Reviewed risks of IS level being too low as well as too high.    Patient education provided.  Lab orders faxed.  rx updated.    Rowan Webber RN   Transplant Coordinator  [phone]

## 2023-05-16 NOTE — TELEPHONE ENCOUNTER
Patient Call: General  Route to LPN    Reason for call: Yaz called in to say he is tired of being poked every two weeks he says he needs a break. Yaz said he will have labs done again on 8/10/23 at next appointment, if you have any question please give yaz a call.    Call back needed? Yes    Return Call Needed  Same as documented in contacts section  When to return call?: Greater than one day: Route standard priority

## 2023-06-02 ENCOUNTER — TELEPHONE (OUTPATIENT)
Dept: TRANSPLANT | Facility: CLINIC | Age: 33
End: 2023-06-02
Payer: MEDICARE

## 2023-06-02 NOTE — TELEPHONE ENCOUNTER
[facility] Call Center    Phone Message    May a detailed message be left on voicemail: yes     Reason for Call: Other: Patient will be completing labs externally on 6/7, please reach out to him after if any questions or unable to obtain results.    Action Taken: Message routed to:  [facility] ([facility]): SOT    Travel Screening: Not Applicable

## 2023-06-02 NOTE — TELEPHONE ENCOUNTER
Patient states he will have labs drawn at 6/7/2023.      Rowan Webber RN   Transplant Coordinator  [phone]

## 2023-06-07 ENCOUNTER — EXTERNAL ORDER RESULTS (OUTPATIENT)
Dept: LAB | Facility: CLINIC | Age: 33
End: 2023-06-07
Payer: MEDICARE

## 2023-06-07 NOTE — PROGRESS NOTES
[facility] Dermatology Note  Encounter Date: Jul 6, 2023  Office Visit     Dermatology Problem List:   Last FBSE:  7/6/2023  1. History of kidney transplant in 2017: current immunosuppression mycophenolate mofetil, prednisone, and tacrolimus  2. Hidradenitis suppurativa  - Current tx: daily hibiclens  - Prior tx: clindamycin BID    Social History: Legally blind.  ____________________________________________     Assessment & Plan:  # # Immunosuppressed with mycophenolate mofetil, prednisone, and tacrolimus for kidney transplant.  Patient aware of increased risk of skin cancers with immunosuppressing medications. No concerning skin lesions on exam today.  - Low risk for skin cancer. SUNTRAC estimated risk 1.01% at 5 years, 2.33% at 10 years post transplant. Recommendations are for skin cancer screening within 10 years of transplant. Will keep this in mind for the future.  - Return for transplant skin check in 1 year     # Hidradenitis suppurativa  No active lesions today. Intermittently has leaking under the bilateral breasts, but does not feel like these bother him. Residual axillary and inframammary scarring. HS has resolved with Hibiclens and daily showering. Will continue maintenance plan as below:  - Continue hibiclens as body wash in the shower  - Continue clindamycin 1% lotion daily PRN (not currently using as well controlled)  - Pseudo-tombstone comedones in groin and scarring in inframammary folds scarring     # Hypertrophic scar, L neck  No increased vascularity or clinical signs concerning for skin cancer. The lesion does not bother him  - NTD at this time     # Dermatofibroma, left buttock     Follow-up: 1 year(s) in-person, or earlier for new or changing lesions    Staff and Scribe:     Bobby Herrera, MS4  [facility] [employer]    Staff Physician:  I was present with the medical student who participated in the service and in the documentation of the note. I have  verified the history and personally performed the physical exam and medical decision making. I agree with the assessment and plan of care as documented in the note.     Huan Simpson MD    Department of Dermatology  [facility]: Phone: [phone], Fax: [phone]  7/9/2023        ____________________________________________    CC: Follow Up (Patient is here today for a follow up )    HPI:  Mr. Barry Mcgrath is a(n) 32 year old male who presents today as a return patient for HS and skin check (hx of renal transplant and immunosuppression, no known NMSC). Last seen by myself January 12, 2023.     Today, he is here for skin check with no lesions that have been concerning to him. He continues to take his post-transplant immunosuppression as prescribed. Denies changing, itching, bleeding, or painful lesions.    He feels like his HS has been under good control with Hibiclens, however he has had some draining underneath the bilateral breasts. This area is not active today. The leaking under his axillae has fully resolved, though he states it is hard for him to tell when he has issues, given that he is legally blind.    Incidentally, he notes a bump on the L neck post-surgerical exision in 2018, which has not bothered him or hurt. It has not gotten larger since 2018.    Patient is otherwise feeling well, without additional skin concerns. His PCA, August, was on the phone with us while visiting with Barry and listened in to patient instructions.    Labs Reviewed:  N/A    Physical Exam:  Vitals: Wt 109.8 kg (242 lb)   BMI 34.23 kg/m    SKIN: Full skin, which includes the head/face, both arms, chest, back, abdomen,both legs, genitalia and/or groin buttocks, digits and/or nails, was examined.  - Hyperpigmented scarring tracts in various stages of healing in bilateral axillae and inframammary folds  - Hyperpigmented papule with  central scar on L buttock, dimples when pinched  - Scattered open and closed comedones in groin and on scrotum  - Light pink 2 cm linear papule on L anterolateral neck at site of previous surgical excision   - No other lesions of concern on areas examined.     Medications:  Current Outpatient Medications   Medication     cetirizine (ZYRTEC) 10 MG tablet     chlorhexidine (HIBICLENS) 4 % liquid     cholecalciferol (VITAMIN D3) 25 mcg (1000 units) capsule     clindamycin (CLEOCIN T) 1 % external lotion     divalproex sodium extended-release (DEPAKOTE ER) 500 MG 24 hr tablet     lisinopril (PRINIVIL/ZESTRIL) 10 MG tablet     LYBALVI 20-10 MG TABS tablet     melatonin 1 MG TABS tablet     metFORMIN (GLUCOPHAGE-XR) 500 MG 24 hr tablet     mycophenolate (GENERIC EQUIVALENT) 250 MG capsule     prazosin (MINIPRESS) 1 MG capsule     predniSONE (DELTASONE) 5 MG tablet     rosuvastatin (CRESTOR) 10 MG tablet     tacrolimus (GENERIC EQUIVALENT) 1 MG capsule     FLUCELVAX QUADRIVALENT 0.5 ML LAKSHMI     lidocaine (XYLOCAINE) 5 % external ointment     tacrolimus (GENERIC EQUIVALENT) 0.5 MG capsule     No current facility-administered medications for this visit.      Past Medical History:   Patient Active Problem List   Diagnosis     HTN, kidney transplant related     RAQUEL (obstructive sleep apnea)     Bipolar 1 disorder (H)     Immunosuppression (H)     Hidradenitis suppurativa     Kidney replaced by transplant     Type 2 diabetes mellitus without complication, without long-term current use of insulin (H)     Aftercare following organ transplant     Past Medical History:   Diagnosis Date     Anxiety      Bipolar 1 disorder (H)      Bipolar 2 disorder (H)      Blind      Depressive disorder      Diabetes (H)     pre diabetic     Hypertension      PTSD (post-traumatic stress disorder)      Sleep apnea     uses cpap        CC Huan Simpson MD  [address] [place] [address] on close of this encounter.

## 2023-06-12 LAB — Lab: 121.7 MG/L (ref 50–100)

## 2023-06-13 ENCOUNTER — TELEPHONE (OUTPATIENT)
Dept: TRANSPLANT | Facility: CLINIC | Age: 33
End: 2023-06-13
Payer: MEDICARE

## 2023-06-13 LAB
MPA GLUCURONIDE (EXTERNAL): 13.6 MCG/ML (ref 35–100)
MYCOPHENOLIC ACID  (EXTERNAL): 0.7 MCG/ML (ref 1–3.5)

## 2023-06-13 NOTE — LETTER
PHYSICIAN ORDERS      DATE & TIME ISSUED: 2023 4:39 PM  PATIENT NAME: Barry Mcgrath   : 1990     [facility] MR# [if applicable]: [id number]     DIAGNOSIS:  Kidney transplant   ICD-10 CODE: Z94.0      Please repeat the following level in 1-2 weeks  Mycophenolate level (ensure 12 hours between last dose and blood draw)    Any questions please call: [phone] option 5    Please fax results to [phone] .      Dayton Garcia MD

## 2023-06-13 NOTE — TELEPHONE ENCOUNTER
Latest Reference Range & Units 06/07/23 09:13   Mycophenolic Acid  (External) 1.0 - 3.5 mcg/mL 0.7 (L) (E)   MPA Glucuronide (External) 35.0 - 100.0 mcg/mL 13.6 (L) (E)   (L): Data is abnormally low  (E): External lab result      Dayton Garcia MD Ututalum, Teresa, RN  Please increase to 1250mg bid and repeat level. Thanks       ----- Message -----   From: Olivia Badillo RN   Sent: 6/13/2023   1:35 PM CDT   To: Dayton Garcia MD     <1.0  while on 1000 mg BID.   Results sent to Dr. Garcia.   Are you okay with this level? Or do you want to increase dose further?        PLAN:   Call and confirm this was a good 12-hour trough.   Verify current dose is 1000 mg BID  Confirm no new medications or illness (lex. Diarrhea).  MISSED DOSES?   If good trough and correct dose above, recommend:   increase dose to 1250 mg BID.   Recheck level in 1-2 weeks and make sure it is a good trough to avoid additional lab draws.

## 2023-06-13 NOTE — TELEPHONE ENCOUNTER
Call placed to patient. Patient confirms current dose and accurate trough level. Denies any recent illness, diarrhea or medication changes. Patient v\u to increase MPA to 1250 and repeat level in 1-2 weeks. Order/rx sent

## 2023-06-26 NOTE — TELEPHONE ENCOUNTER
How Severe Are Your Spot(S)?: mild LPN task:  Please call Barry Mcgrath and discuss reaching out PCP with sleeping issues.     What Type Of Note Output Would You Prefer (Optional)?: Standard Output What Is The Reason For Today's Visit?: Full Body Skin Examination

## 2023-06-28 ENCOUNTER — TELEPHONE (OUTPATIENT)
Dept: TRANSPLANT | Facility: CLINIC | Age: 33
End: 2023-06-28
Payer: MEDICARE

## 2023-06-28 DIAGNOSIS — D84.9 IMMUNOSUPPRESSION (H): ICD-10-CM

## 2023-06-28 DIAGNOSIS — Z94.0 STATUS POST KIDNEY TRANSPLANT: ICD-10-CM

## 2023-06-28 DIAGNOSIS — Z48.298 AFTERCARE FOLLOWING ORGAN TRANSPLANT: Primary | ICD-10-CM

## 2023-06-28 DIAGNOSIS — Z94.0 KIDNEY REPLACED BY TRANSPLANT: ICD-10-CM

## 2023-06-28 RX ORDER — MYCOPHENOLATE MOFETIL 250 MG/1
1250 CAPSULE ORAL 2 TIMES DAILY
Qty: 300 CAPSULE | Refills: 11 | Status: SHIPPED | OUTPATIENT
Start: 2023-06-28 | End: 2024-07-29

## 2023-06-28 NOTE — LETTER
PHYSICIAN ORDERS      DATE & TIME ISSUED: 2023 3:24 PM  PATIENT NAME: Barry Mcgrath   : 1990     [facility] MR# [if applicable]: [id number]     DIAGNOSIS:  Kidney transplanted  ICD-10 CODE: Z94.0      Please repeat the following labs in 2 weeks:  Tacrolimus level (ensure 12 hours between last dose and blood draw)  BMP  CBC w/platelets  Urine protein creatinine ratio    Any questions please call: [phone] option 5    Please fax results to [phone] .      Dayton Garcia MD

## 2023-06-28 NOTE — TELEPHONE ENCOUNTER
ISSUE: MPA: 0.7 on 6/22/2023, on current mycophenolate 1,250mg bid    PLAN: call to ensure accurate 12 hour trough level,  Inquire about any missed doses.    OUTCOME: Patient reports accurate 12 hour trough level,  Denies any missed doses.  Confirms current dose of mycophenolate 1250 mg bid    Will update Dr Jose Webber RN   Transplant Coordinator  [phone]    Addendum: RNCC spoke w/Barry. He verbalized understanding to keep mycophenolate dose at 1250 mg (5 caps) both morning and night (see below staff message). Since no dose increase needed, no need to keep checking MPA level. Patient will return to lab in 2 weeks however to check tacrolimus drug level. Opted to just check full-set of transplant labs (given recent immunosuppression med changes) and start square (for quarterly labs). He wanted SOT to know he is planning to travel to [place] in September; informed him to notify RNCC and Pharmacy in August. Barry would like to fill medications monthly all at once. He inquired about any recommended vaccinations he may need to travel out of the country; travel clinic referral sent.    Radha Rizzo RN, BSN  Solid Organ Transplant, Post Kidney and Pancreas  Transplant Care Coordinator  [phone]       Message  Received: Yesterday  Dayton Garcia MD Sveiven, Sara, RN  Ok to leave it where it is           Previous Messages       ----- Message -----   From: Rowan Webber RN   Sent: 6/28/2023  10:55 AM CDT   To: MD Dr Jose Freeman Frank and his friend ( who helps set up his medication ) confirm this is a 12.5 hour trough level.  On current dose of MMF 1,250 mg bid     MMF was increased from 1,000 mg bid to 1,250 mg bid on 6/13 for MPA level of 0.7- no change     Denies any missed doses.  He states he is not willing to increase dose any further.     Rowan Webber RN   Transplant Coordinator   [phone]

## 2023-07-06 ENCOUNTER — TELEPHONE (OUTPATIENT)
Dept: TRANSPLANT | Facility: CLINIC | Age: 33
End: 2023-07-06

## 2023-07-06 ENCOUNTER — OFFICE VISIT (OUTPATIENT)
Dept: DERMATOLOGY | Facility: CLINIC | Age: 33
End: 2023-07-06
Payer: MEDICARE

## 2023-07-06 VITALS — BODY MASS INDEX: 34.23 KG/M2 | WEIGHT: 242 LBS

## 2023-07-06 DIAGNOSIS — L73.2 HIDRADENITIS SUPPURATIVA: ICD-10-CM

## 2023-07-06 PROCEDURE — 99214 OFFICE O/P EST MOD 30 MIN: CPT | Performed by: DERMATOLOGY

## 2023-07-06 ASSESSMENT — PAIN SCALES - GENERAL: PAINLEVEL: NO PAIN (0)

## 2023-07-06 NOTE — PATIENT INSTRUCTIONS
We did a full body skin check for you today. No suspicious spots were found.    Continue your Hidradenitis suppurativa treatment with Hibiclens daily as needed.

## 2023-07-06 NOTE — TELEPHONE ENCOUNTER
Lindsey Walker, Rowan, RN  Replies will be sent to [facility] Scheduling Access Center  Good morning Rowan,     The patient called requesting results that he thinks are from Dr. Garcia. The patient is unable to read these in [facility] due to eyesight. Patient is requesting a call back. '     Thank you,   Lindsey       OUTCOME: patient aware he is to remain on current dose of MMF, no dose change at this time    Rowan Webber RN   Transplant Coordinator  [phone]

## 2023-07-06 NOTE — NURSING NOTE
Chief Complaint   Patient presents with     Follow Up     Patient is here today for a follow up      Marizol HASSAN CMA

## 2023-07-06 NOTE — LETTER
7/6/2023       RE: Barry Mcgrath  [address]  [place] [facility] [address]     Dear Colleague,    Thank you for referring your patient, Barry Mcgrath, to the [facility] [place] [facility]. Please see a copy of my visit note below.    [facility] Dermatology Note  Encounter Date: Jul 6, 2023  Office Visit     Dermatology Problem List:   Last FBSE:  7/6/2023  1. History of kidney transplant in 2017: current immunosuppression mycophenolate mofetil, prednisone, and tacrolimus  2. Hidradenitis suppurativa  - Current tx: daily hibiclens  - Prior tx: clindamycin BID    Social History: Legally blind.  ____________________________________________     Assessment & Plan:  # # Immunosuppressed with mycophenolate mofetil, prednisone, and tacrolimus for kidney transplant.  Patient aware of increased risk of skin cancers with immunosuppressing medications. No concerning skin lesions on exam today.  - Low risk for skin cancer. [employer] estimated risk 1.01% at 5 years, 2.33% at 10 years post transplant. Recommendations are for skin cancer screening within 10 years of transplant. Will keep this in mind for the future.  - Return for transplant skin check in 1 year     # Hidradenitis suppurativa  No active lesions today. Intermittently has leaking under the bilateral breasts, but does not feel like these bother him. Residual axillary and inframammary scarring. HS has resolved with Hibiclens and daily showering. Will continue maintenance plan as below:  - Continue hibiclens as body wash in the shower  - Continue clindamycin 1% lotion daily PRN (not currently using as well controlled)  - Pseudo-tombstone comedones in groin and scarring in inframammary folds scarring     # Hypertrophic scar, L neck  No increased vascularity or clinical signs concerning for skin cancer. The lesion does not bother him  - NTD at this time     #  Dermatofibroma, left buttock     Follow-up: 1 year(s) in-person, or earlier for new or changing lesions    Staff and Scribe:     Bobby Herrera, MS4  [employer]    Staff Physician:  I was present with the medical student who participated in the service and in the documentation of the note. I have verified the history and personally performed the physical exam and medical decision making. I agree with the assessment and plan of care as documented in the note.     Huan Simpson MD    Department of Dermatology  [facility]: Phone: [phone], Fax: [phone]  7/9/2023        ____________________________________________    CC: Follow Up (Patient is here today for a follow up )    HPI:  Mr. Barry Mcgrath is a(n) 32 year old male who presents today as a return patient for HS and skin check (hx of renal transplant and immunosuppression, no known NMSC). Last seen by myself January 12, 2023.     Today, he is here for skin check with no lesions that have been concerning to him. He continues to take his post-transplant immunosuppression as prescribed. Denies changing, itching, bleeding, or painful lesions.    He feels like his HS has been under good control with Hibiclens, however he has had some draining underneath the bilateral breasts. This area is not active today. The leaking under his axillae has fully resolved, though he states it is hard for him to tell when he has issues, given that he is legally blind.    Incidentally, he notes a bump on the L neck post-surgerical exision in 2018, which has not bothered him or hurt. It has not gotten larger since 2018.    Patient is otherwise feeling well, without additional skin concerns. His PCA, August, was on the phone with us while visiting with Barry and listened in to patient instructions.    Labs Reviewed:  N/A    Physical Exam:  Vitals: Wt 109.8 kg  (242 lb)   BMI 34.23 kg/m    SKIN: Full skin, which includes the head/face, both arms, chest, back, abdomen,both legs, genitalia and/or groin buttocks, digits and/or nails, was examined.  - Hyperpigmented scarring tracts in various stages of healing in bilateral axillae and inframammary folds  - Hyperpigmented papule with central scar on L buttock, dimples when pinched  - Scattered open and closed comedones in groin and on scrotum  - Light pink 2 cm linear papule on L anterolateral neck at site of previous surgical excision   - No other lesions of concern on areas examined.     Medications:  Current Outpatient Medications   Medication    cetirizine (ZYRTEC) 10 MG tablet    chlorhexidine (HIBICLENS) 4 % liquid    cholecalciferol (VITAMIN D3) 25 mcg (1000 units) capsule    clindamycin (CLEOCIN T) 1 % external lotion    divalproex sodium extended-release (DEPAKOTE ER) 500 MG 24 hr tablet    lisinopril (PRINIVIL/ZESTRIL) 10 MG tablet    LYBALVI 20-10 MG TABS tablet    melatonin 1 MG TABS tablet    metFORMIN (GLUCOPHAGE-XR) 500 MG 24 hr tablet    mycophenolate (GENERIC EQUIVALENT) 250 MG capsule    prazosin (MINIPRESS) 1 MG capsule    predniSONE (DELTASONE) 5 MG tablet    rosuvastatin (CRESTOR) 10 MG tablet    tacrolimus (GENERIC EQUIVALENT) 1 MG capsule    FLUCELVAX QUADRIVALENT 0.5 ML LAKSHMI    lidocaine (XYLOCAINE) 5 % external ointment    tacrolimus (GENERIC EQUIVALENT) 0.5 MG capsule     No current facility-administered medications for this visit.      Past Medical History:   Patient Active Problem List   Diagnosis    HTN, kidney transplant related    RAQUEL (obstructive sleep apnea)    Bipolar 1 disorder (H)    Immunosuppression (H)    Hidradenitis suppurativa    Kidney replaced by transplant    Type 2 diabetes mellitus without complication, without long-term current use of insulin (H)    Aftercare following organ transplant     Past Medical History:   Diagnosis Date    Anxiety     Bipolar 1 disorder (H)     Bipolar 2  disorder (H)     Blind     Depressive disorder     Diabetes (H)     pre diabetic    Hypertension     PTSD (post-traumatic stress disorder)     Sleep apnea     uses cpap        CC Huan Simpson MD  [address] [place] [address] on close of this encounter.

## 2023-07-09 RX ORDER — CLINDAMYCIN PHOSPHATE 10 UG/ML
LOTION TOPICAL
Qty: 60 ML | Refills: 11 | Status: SHIPPED | OUTPATIENT
Start: 2023-07-09

## 2023-07-17 ENCOUNTER — TELEPHONE (OUTPATIENT)
Dept: TRANSPLANT | Facility: CLINIC | Age: 33
End: 2023-07-17
Payer: MEDICARE

## 2023-07-17 DIAGNOSIS — Z48.298 AFTERCARE FOLLOWING ORGAN TRANSPLANT: Primary | ICD-10-CM

## 2023-07-17 RX ORDER — PREDNISONE 5 MG/1
5 TABLET ORAL DAILY
Qty: 30 TABLET | Refills: 11 | Status: SHIPPED | OUTPATIENT
Start: 2023-07-17 | End: 2024-07-29

## 2023-07-17 NOTE — TELEPHONE ENCOUNTER
[facility] Call Center    Phone Message    May a detailed message be left on voicemail: yes     Reason for Call: Medication Refill Request    Has the patient contacted the pharmacy for the refill? Yes   Name of medication being requested: predniSONE (DELTASONE) 5 MG tablet  Provider who prescribed the medication: Dr. Garcia  Pharmacy: [id number] - [place] [[address]  Date medication is needed:ASAP      Action Taken: Message routed to:  [facility] ([facility]): SOT    Travel Screening: Not Applicable

## 2023-08-10 ENCOUNTER — TELEPHONE (OUTPATIENT)
Dept: TRANSPLANT | Facility: CLINIC | Age: 33
End: 2023-08-10
Payer: MEDICARE

## 2023-08-10 NOTE — TELEPHONE ENCOUNTER
[facility] Call Center    Phone Message    May a detailed message be left on voicemail: yes     Reason for Call: Other: Patient is requesting that he receive a letter from Dr. Garcia stating that he can bring medication into [place] for post-transplant care.  Patient stated that you can give him a call back if you have any questions.  Thank you!     Action Taken: Message routed to:  [facility] ([facility]):  SOT    Travel Screening: Not Applicable

## 2023-08-11 ENCOUNTER — TELEPHONE (OUTPATIENT)
Dept: TRANSPLANT | Facility: CLINIC | Age: 33
End: 2023-08-11
Payer: MEDICARE

## 2023-08-11 ENCOUNTER — TELEPHONE (OUTPATIENT)
Dept: SLEEP MEDICINE | Facility: CLINIC | Age: 33
End: 2023-08-11
Payer: MEDICARE

## 2023-08-11 NOTE — LETTER
Barry Mcgrath  [address]  [place] [address]                August 11, 2023    To: Whom it May Concern    Barry Mcgrath has had a successful solid organ transplant and is followed clinically by the [facility]. The following medications are necessary for the above to carry with at all times. If there are any concerns regarding this, please contact us at the [facility] Solid Organ Transplant Office at [phone], or toll-free at [phone].    Current Outpatient Medications   Medication Sig Dispense Refill    cetirizine (ZYRTEC) 10 MG tablet Take 1 tablet (10 mg) by mouth daily as needed for allergies      chlorhexidine (HIBICLENS) 4 % liquid Apply topically daily as needed for wound care 473 mL 11    cholecalciferol (VITAMIN D3) 25 mcg (1000 units) capsule Take 1 capsule (25 mcg) by mouth daily      clindamycin (CLEOCIN T) 1 % external lotion Apply thin layer to armpits, chest, groin, and buttocks once daily as needed 60 mL 11    divalproex sodium extended-release (DEPAKOTE ER) 500 MG 24 hr tablet Take 5 tablets (2,500 mg) by mouth daily  0    FLUCELVAX QUADRIVALENT 0.5 ML LAKSHMI Pharmacist to administer IM (Patient not taking: Reported on 5/15/2023)  0    lidocaine (XYLOCAINE) 5 % external ointment Apply topically 4 times daily as needed for moderate pain (4-6) Apply 1 inch strip to painful area. 50 g 0    lisinopril (PRINIVIL/ZESTRIL) 10 MG tablet Take 10 mg by mouth daily  5    LYBALVI 20-10 MG TABS tablet       melatonin 1 MG TABS tablet Take 1 tab at 6 PM 90 tablet 2    metFORMIN (GLUCOPHAGE-XR) 500 MG 24 hr tablet Take 1,000 mg by mouth 2 times daily      mycophenolate (GENERIC EQUIVALENT) 250 MG capsule Take 5 capsules (1,250 mg) by mouth 2 times daily 300 capsule 11    prazosin (MINIPRESS) 1 MG capsule Take 2 capsules (2 mg) by mouth At Bedtime 60 capsule 0    predniSONE (DELTASONE) 5 MG tablet Take 1 tablet (5 mg) by mouth daily 30 tablet 11    rosuvastatin  (CRESTOR) 10 MG tablet Take 0.5 tablets (5 mg) by mouth daily      tacrolimus (GENERIC EQUIVALENT) 0.5 MG capsule Take 0.5 mg by mouth 2 times daily TOTAL DOSE: 1 mg twice daily. HOLD 60 capsule 11    tacrolimus (GENERIC EQUIVALENT) 1 MG capsule Take 1 capsule (1 mg) by mouth 2 times daily TOTAL DOSE: 1 mg twice daily. 60 capsule 11       Thank you,     Rowan Webber RN  Kidney and Pancreas Transplant Coordinator

## 2023-08-11 NOTE — LETTER
Re: Barry HERNANDEZ Ramila  Date: 8/20/2023      To Whom It May Concern,    Barry Mcgrath is followed by me at the [facility] in [place], [place]. He uses a CPAP machine to treat sleep apnea. This is a medical device and should be carried on to the plane as it could be damaged in checked luggage.   Please feel free to contact me at the above number with any questions or concerns.  Sincerely,        Bennett Goltz, PA-C

## 2023-08-11 NOTE — TELEPHONE ENCOUNTER
Med list letter sent via mail for upcoming travel to [place].    Rowan Webber RN   Transplant Coordinator  [phone]

## 2023-08-11 NOTE — TELEPHONE ENCOUNTER
Forms/Letter Request    Type of form/letter:  Letter to explain that patient needs and can bring CPAP and prescriptions for travel to [place] in September.    Have you been seen for this request: No    Do we have the form/letter: No    Who is the form from? No form. Written letter needed (if other please explain)    Where did/will the form come from? N/A    When is form/letter needed by: As soon as possible.    How would you like the form/letter returned: Mail  Is this the correct address?: Yes  [address]   [place] [address]    Patient Notified form requests are processed in 3-5 business days:No    Could we send this information to you in [employer] or would you prefer to receive a phone call?:   Patient would prefer a phone call   Okay to leave a detailed message?: Yes at Cell number on file:    Telephone Information:   Mobile [phone]

## 2023-08-11 NOTE — TELEPHONE ENCOUNTER
Health Call Center    Phone Message    May a detailed message be left on voicemail: yes     Reason for Call: Patient called requesting a letter from Dr. Dayton Garcia that gives clearance for the patient to travel to [place], to include a list of medications that are ok to travel with. Please mail this to the patient. The patient's travel dates are 9/13/23 - 9/19/23 so the patient is requesting the letter is received before the leave date. Thanks.    Action Taken: Message routed to:  Other: JARRETT WALDENT    Travel Screening: Not Applicable

## 2023-08-14 ENCOUNTER — TRANSFERRED RECORDS (OUTPATIENT)
Dept: HEALTH INFORMATION MANAGEMENT | Facility: CLINIC | Age: 33
End: 2023-08-14
Payer: MEDICARE

## 2023-08-15 ENCOUNTER — TELEPHONE (OUTPATIENT)
Dept: TRANSPLANT | Facility: CLINIC | Age: 33
End: 2023-08-15

## 2023-08-15 ENCOUNTER — TELEPHONE (OUTPATIENT)
Dept: TRANSPLANT | Facility: CLINIC | Age: 33
End: 2023-08-15
Payer: MEDICARE

## 2023-08-15 NOTE — TELEPHONE ENCOUNTER
Provider Call: General  Route to LPN    Reason for call: Brian from clinic  they faxed his labs and Tacro level yesterday  tacro was 44  she was wondering what he therapeutic level is, and if we are following lhe level and if we got the results     Call back needed? Yes OK to leave a VM     Return Call Needed  Same as documented in contacts section  When to return call?: Greater than one day: Route standard priority

## 2023-08-15 NOTE — TELEPHONE ENCOUNTER
Patient Call: General  Route to LPN    Reason for call: Melissa at [facility]  called in regards of recent message left on patient. Melissa had a question based on message sent.     Call back needed? Yes    Return Call Needed  Same as documented in contacts section  When to return call?: Same day: Route High Priority

## 2023-08-15 NOTE — TELEPHONE ENCOUNTER
"Returned call to RN at patients clinic to discuss tacrolimus level.  Labs obtained last week not yet received.    Spoke briefly to Barry, states tacrolimus level was an accurate trough.  Denies side effects of elevated tacrolimus level.    Barry states he does not wish to repeat labs.  States \" I am not a pin cushion, it is my decision.\"    Reviewed with the patient the importance of keeping IS at therapeutic goal.      Will reach back out to the patient when RNCC knows what the tacrolimus level was/ what time it was drawn.    Rowan Webber RN   Transplant Coordinator  [phone]    ADDENDUM:    Spoke with RN at PCP clinic where labs were drawn last week.  She confirms tacrolimus level was 4.4 which is within goal 4-6    Rowan Webber RN   Transplant Coordinator  [phone]        "

## 2023-08-23 ENCOUNTER — CARE COORDINATION (OUTPATIENT)
Dept: SLEEP MEDICINE | Facility: CLINIC | Age: 33
End: 2023-08-23
Payer: MEDICARE

## 2023-08-25 ENCOUNTER — TELEPHONE (OUTPATIENT)
Dept: TRANSPLANT | Facility: CLINIC | Age: 33
End: 2023-08-25
Payer: MEDICARE

## 2023-08-25 NOTE — TELEPHONE ENCOUNTER
[facility] Call Center    Phone Message    May a detailed message be left on voicemail: yes     Reason for Call: Other: patient calling in wanting to know if he is able to drink alcohol now that his transplant was 17 years ago. Please call patient back to discuss this further.     Action Taken: Message routed to:  Other: RNCC    Travel Screening: Not Applicable

## 2023-10-07 ENCOUNTER — HEALTH MAINTENANCE LETTER (OUTPATIENT)
Age: 33
End: 2023-10-07

## 2023-10-10 ENCOUNTER — OFFICE VISIT (OUTPATIENT)
Dept: FAMILY MEDICINE | Facility: CLINIC | Age: 33
End: 2023-10-10
Payer: MEDICARE

## 2023-10-10 ENCOUNTER — HOSPITAL ENCOUNTER (OUTPATIENT)
Dept: GENERAL RADIOLOGY | Facility: HOSPITAL | Age: 33
Discharge: HOME | End: 2023-10-10
Payer: MEDICARE

## 2023-10-10 VITALS
HEART RATE: 78 BPM | RESPIRATION RATE: 18 BRPM | WEIGHT: 242 LBS | DIASTOLIC BLOOD PRESSURE: 74 MMHG | SYSTOLIC BLOOD PRESSURE: 126 MMHG | TEMPERATURE: 98.2 F | BODY MASS INDEX: 34.23 KG/M2 | OXYGEN SATURATION: 95 %

## 2023-10-10 DIAGNOSIS — R19.7 DIARRHEA, UNSPECIFIED TYPE: ICD-10-CM

## 2023-10-10 DIAGNOSIS — R19.7 DIARRHEA, UNSPECIFIED TYPE: Primary | ICD-10-CM

## 2023-10-10 LAB
BASO+EOS+MONOS # BLD AUTO: NORMAL 10*3/UL
BASO+EOS+MONOS NFR BLD AUTO: NORMAL %
BASOPHILS # BLD AUTO: 0.1 10E3/UL (ref 0–0.2)
BASOPHILS NFR BLD AUTO: 1 %
EOSINOPHIL # BLD AUTO: 0.6 10E3/UL (ref 0–0.7)
EOSINOPHIL NFR BLD AUTO: 6 %
ERYTHROCYTE [DISTWIDTH] IN BLOOD BY AUTOMATED COUNT: 12.8 % (ref 10–15)
HCT VFR BLD AUTO: 41.4 % (ref 40–53)
HGB BLD-MCNC: 14.4 G/DL (ref 13.3–17.7)
IMM GRANULOCYTES # BLD: 0.2 10E3/UL
IMM GRANULOCYTES NFR BLD: 2 %
LYMPHOCYTES # BLD AUTO: 2.8 10E3/UL (ref 0.8–5.3)
LYMPHOCYTES NFR BLD AUTO: 26 %
MCH RBC QN AUTO: 30.3 PG (ref 26.5–33)
MCHC RBC AUTO-ENTMCNC: 34.8 G/DL (ref 31.5–36.5)
MCV RBC AUTO: 87 FL (ref 78–100)
MONOCYTES # BLD AUTO: 0.7 10E3/UL (ref 0–1.3)
MONOCYTES NFR BLD AUTO: 7 %
NEUTROPHILS # BLD AUTO: 6.3 10E3/UL (ref 1.6–8.3)
NEUTROPHILS NFR BLD AUTO: 59 %
PLATELET # BLD AUTO: 210 10E3/UL (ref 150–450)
RBC # BLD AUTO: 4.75 10E6/UL (ref 4.4–5.9)
WBC # BLD AUTO: 10.7 10E3/UL (ref 4–11)

## 2023-10-10 PROCEDURE — 85025 COMPLETE CBC W/AUTO DIFF WBC: CPT

## 2023-10-10 PROCEDURE — 36415 COLL VENOUS BLD VENIPUNCTURE: CPT

## 2023-10-10 PROCEDURE — 99204 OFFICE O/P NEW MOD 45 MIN: CPT

## 2023-10-10 PROCEDURE — 74019 RADEX ABDOMEN 2 VIEWS: CPT

## 2023-10-10 RX ORDER — LOPERAMIDE HYDROCHLORIDE 2 MG/1
2 TABLET ORAL 4 TIMES DAILY PRN
Qty: 30 TABLET | Refills: 0 | Status: SHIPPED | OUTPATIENT
Start: 2023-10-10

## 2023-10-10 NOTE — PROGRESS NOTES
URGENT CARE  Assessment & Plan   Assessment:   Barry Mcgrath is a 33 year old male who's clinical presentation today is consistent with:   1. Diarrhea, unspecified type  - CBC with platelets and differential; Future  - Enteric Bacteria and Virus Panel by ROBBIE Stool; Future  - Ova and Parasite Exam Routine; Future  - XR Abdomen 2 Views; Future  Plan:  Patient is well appearing, afebrile, has reassuring vital signs, a benign physical exam and there are no suspicious signs of a bacterial cause of the patient's diarrhea (no bloody stools, no fevers, no mucus in the stool); additionally the patient's abdominal xray and labs were reassuring; furthermore, the patient does not appear to be hypovolemic, there is no 'profuse' diarrhea noted, no signs of c.diff infection, and there is no suspicion for an acute community-acquired diarrheal concern; thus, will treat patient's diarrhea today symptomatically and supportively as a viral gastroenteritis. This will include: fluid repletion w/ a mix of water, sugar and salt (such as Gatorade, electrolyte mixture) and dietary modifications w/ bland foods.   Additionally given patient's symptoms are >72 hours in length (and there is no concern for a bacterial pathology) , educated the patient that it is reasonable to try an anti-motility agent, such as loperamide, cautiously, additionally informed the patient that antisecretory agents such as bismuth subsalicylate, as it helps to decrease fluid to the gastrointestinal tract and has antimicrobial properties specific to gastrointestinal pathogens; side effects of medications reviewed.   Reassurance was provided to the patient that diarrhea usually resolves on its own without treatment and no further workup is necessary, however if the diarrhea becomes persistent, lasting another 4-5 days, they should follow up; Additionally we discussed if symptoms do not improve after starting today's treatment (or if symptoms worsen) to follow up in 4-5  days.  Stool cultures sent home to assess for pathology   No alarm signs or symptoms present   Differential Diagnoses for this patient's chief complaint that I considered include:  Viral vs bacterial cause of diarrhea, parasitic or iatrogentic cause, irritable bowel, inflammatory bowel, ischemic pathology, bowel obstruction, c.diff        Patient is} agreeable to treatment plan and state they will follow-up if symptoms do not improve and/or if symptoms worsen   see patient's AVS 'monitor for' section for specific patient instructions given and discussed regarding what to watch for and when to follow up    TIANNA Shultz [facility]      ______________________________________________________________________      Subjective     HPI: Barry Mcgrath  is a 33 year old  male who presents today for evaluation the following concerns:   Patient presents today for evaluation of diarrhea for a week which started 10/3/23   he states there is discomfort in abdomen that is generalized} and denies radiation to any other quadrant or aspect of the abdomen   he states the frequency of the stool  is multiple times a day and the stool characteristics are: liquid, patient endorses being blind and unable to assess the color, if there is blood or mucus in the stool.   he describes the discomfort as dull ache . And states these symptoms seem to be stable and not worsening but also not going away     he states his diarrhea  is not  made worse by movement, pressure, eating, spicy foods, fatty foods, coffee, dairy products, odors, standing, sitting up, recumbency, urination, bowel movement, and nonsteroidal anti-inflammatory drugs  He denies any associated  symptoms such as: anorexia, dysuria, belching, flatus, fever, chills, sweats, arthralgias, myalgias, and headache   Denies any severe abdominal pain    he states there are no signs of extracellular volume depletion such as:  scant amount of urine, decreased skin  turgor, orthostatic hypotension.   Regarding potential exposures, he denies:  recent travel, eating unsafe foods, drinking untreated water, contact with ill people, recent visit to petting zoo., occupational exposure, pets, and hobbies} that could be a potential cause    Review of Systems:  Pertinent review of systems as reflected in HPI, otherwise negative.     Objective    Physical Exam:  Vitals:    10/10/23 1238   BP: 126/74   BP Location: Left arm   Patient Position: Sitting   Cuff Size: Adult Large   Pulse: 78   Resp: 18   Temp: 98.2  F (36.8  C)   TempSrc: Oral   SpO2: 95%   Weight: 109.8 kg (242 lb)      General:   alert and oriented, no acute distress, non ill-appearing   Vital signs reviewed: afebrile and normotensive    Psy/mental status: pleasant   SKIN: intact   ABD: Bowel sounds ACTIVE  in all  quadrants   soft,  non-distended   Non Tender to palpation in all quadrants    No rebound tenderness appreciated   No tympany, no organomegaly, no masses palpable,   Non-tense, no guarding present, no rigidity present,   No hernias noted, no enlarged inguinal notes, no ascites present       LABS:   Results for orders placed or performed in visit on 10/10/23   CBC with platelets and differential     Status: None   Result Value Ref Range    WBC Count 10.7 4.0 - 11.0 10e3/uL    RBC Count 4.75 4.40 - 5.90 10e6/uL    Hemoglobin 14.4 13.3 - 17.7 g/dL    Hematocrit 41.4 40.0 - 53.0 %    MCV 87 78 - 100 fL    MCH 30.3 26.5 - 33.0 pg    MCHC 34.8 31.5 - 36.5 g/dL    RDW 12.8 10.0 - 15.0 %    Platelet Count 210 150 - 450 10e3/uL    % Neutrophils 59 %    % Lymphocytes 26 %    % Monocytes 7 %    Mids % (Monos, Eos, Basos)      % Eosinophils 6 %    % Basophils 1 %    % Immature Granulocytes 2 %    Absolute Neutrophils 6.3 1.6 - 8.3 10e3/uL    Absolute Lymphocytes 2.8 0.8 - 5.3 10e3/uL    Absolute Monocytes 0.7 0.0 - 1.3 10e3/uL    Mids Abs (Monos, Eos, Basos)      Absolute Eosinophils 0.6 0.0 - 0.7 10e3/uL    Absolute  Basophils 0.1 0.0 - 0.2 10e3/uL    Absolute Immature Granulocytes 0.2 <=0.4 10e3/uL   CBC with platelets and differential     Status: None    Narrative    The following orders were created for panel order CBC with platelets and differential.  Procedure                               Abnormality         Status                     ---------                               -----------         ------                     CBC with platelets and d...[[id number]]                      Final result                 Please view results for these tests on the individual orders.       Imaging:   All images were personally read by this provider (myself).   Per my independent interpretation the xray shows nonobstructive bowel gas pattern, no free air, small amount of retained stool.     ______________________________________________________________________    I explained my diagnostic considerations and recommendations to the patient, who voiced understanding and agreement with the treatment plan.   All questions were answered.   We discussed potential side effects, risks and benefits of any prescribed or recommended therapies, as well as expectations for response to treatments.  Please see AVS for any patient instructions & handouts given.   Patient was advised to contact the Nurse Care Line, their Primary Care provider, Urgent Care, or the Emergency Department if there are new or worsening symptoms, or call 911 for emergencies.

## 2023-10-10 NOTE — PATIENT INSTRUCTIONS
Diagnosis: Most likely a viral gastroenteritis   Today:   Xray: no concerns, no obstructive cause   Labs: no bacterial infectious pathology   Plan:   Get plenty of fluids! fluid replacement with a mix of water, salt, sugar = electrolyte mix    oral rehydration solutions like Gatorade or Pedialyte have the right amount of water, sugar, and salt   Adjust diet: Eat bland and starchy foods such as cereal, crackers, or rice  Remove certain foods that irritate stomach: dairy, high fat or sugar, spicy, cofee   antimotility if >72hr reasonable And if not concerned about bacterial cause (no fevers, mucus, blood) = imodium   - The dose of loperamide is two tablets (4 mg) initially, then 2 mg after each unformed stool for ?2 days, with a maximum of 16 mg/day  Also like: antisecretory agents such as Pepto-Bismol, as they help w/ decreasing fluid to gut and also have anti-microbial properties specific to GI pathogens  Pepto-Bismol, 30 mL or two tablets every 30 minutes for eight doses  Monitor for:   diarrhea that gets worse or lasts >72hrs   Develop a new fever  has blood or mucus in the poop, or poop that is black and looks like tar  can't drink fluids, vomiting, for >24hrs,   has severe or worsening belly pain  appears dehydrated; signs include dizziness, drowsiness, dry or sticky mouth, sunken eyes, crying with few or no tears, or peeing less often  Diarrhea:  Diarrhea is poop that's loose, watery, or happens a lot.   Patients who have diarrhea lose a lot of fluid from the body through their poop.   They might also vomit (throw up) or have a fever.   If too much fluid is lost, they can get dehydrated (not have enough water in the body).   Most of the time, diarrhea goes away on its own in a few days.  Diarrhea may be caused by:  Bacterial, viral, or parasitic infections (such as Salmonella , rotavirus, or Giardia)  Food intolerances (such as dairy products)  Medicines (such as antibiotics)  Intestinal illness (such as  Crohn's disease)

## 2023-10-11 ENCOUNTER — APPOINTMENT (OUTPATIENT)
Dept: LAB | Facility: CLINIC | Age: 33
End: 2023-10-11
Payer: MEDICARE

## 2023-10-11 DIAGNOSIS — A49.8 E COLI INFECTION: Primary | ICD-10-CM

## 2023-10-11 LAB

## 2023-10-11 PROCEDURE — 87209 SMEAR COMPLEX STAIN: CPT

## 2023-10-11 PROCEDURE — 87177 OVA AND PARASITES SMEARS: CPT

## 2023-10-11 RX ORDER — AZITHROMYCIN 250 MG/1
TABLET, FILM COATED ORAL
Qty: 5 TABLET | Refills: 0 | Status: SHIPPED | OUTPATIENT
Start: 2023-10-11 | End: 2023-10-15

## 2023-10-12 LAB — O+P STL MICRO: NEGATIVE

## 2023-10-12 NOTE — PROGRESS NOTES
I called the patient with his lab results.  I have sent over azithromycin if need be, but stools seem to be slowing down, so I recommend that he continue holding off on taking the antibiotic unless symptoms worsen again.

## 2023-10-25 ENCOUNTER — TELEPHONE (OUTPATIENT)
Dept: TRANSPLANT | Facility: CLINIC | Age: 33
End: 2023-10-25
Payer: MEDICARE

## 2023-10-25 NOTE — TELEPHONE ENCOUNTER
Pt calling with his Friend Tod on the line  He is having problems with his insurance not covering the quantity of his Mycophenolate  order  to [facility] in [place]-  He received a letter in Sept but just recently read the letter  re coverageNeeds a flora back

## 2023-11-03 ENCOUNTER — TELEPHONE (OUTPATIENT)
Dept: TRANSPLANT | Facility: CLINIC | Age: 33
End: 2023-11-03
Payer: MEDICARE

## 2023-11-03 NOTE — TELEPHONE ENCOUNTER
Patient calling to report that he believes he did take his tacrolimus dose prior to lab draw this morning.  No results available as of yet.  Will reach out to discuss results once reviewed.    Rowan Webber RN   Transplant Coordinator  [phone]

## 2023-11-03 NOTE — TELEPHONE ENCOUNTER
[facility] Call Center    Phone Message    May a detailed message be left on voicemail: yes     Reason for Call: Other: Patient has questions regarding his medication and lab draw this morning. Please contact patient to advise.      Action Taken: Message routed to:  [facility] JARRETT TRENT    Travel Screening: Not Applicable

## 2023-11-07 ENCOUNTER — TELEPHONE (OUTPATIENT)
Dept: SURGERY | Facility: CLINIC | Age: 33
End: 2023-11-07
Payer: MEDICARE

## 2023-11-07 NOTE — TELEPHONE ENCOUNTER
[facility] Call Center    Phone Message    May a detailed message be left on voicemail: yes     Reason for Call: Medication Question or concern regarding medication   Prescription Clarification  Name of Medication: Hibiclens  Prescribing Provider: Dr. Simpson   Pharmacy: [facility]   What on the order needs clarification? Pt is asking if the [facility] brand works the same as the Hibiclens brand. It is easier on the pocket book. Please call pt to answer the question. Thanks       Action Taken: Message routed to:  [facility] Derm    Travel Screening: Not Applicable

## 2023-11-10 ENCOUNTER — TELEPHONE (OUTPATIENT)
Dept: TRANSPLANT | Facility: CLINIC | Age: 33
End: 2023-11-10
Payer: MEDICARE

## 2023-11-10 DIAGNOSIS — Z94.0 STATUS POST KIDNEY TRANSPLANT: ICD-10-CM

## 2023-11-10 DIAGNOSIS — Z94.0 IMMUNOSUPPRESSIVE MANAGEMENT ENCOUNTER FOLLOWING KIDNEY TRANSPLANT: ICD-10-CM

## 2023-11-10 DIAGNOSIS — Z94.0 KIDNEY TRANSPLANTED: ICD-10-CM

## 2023-11-10 DIAGNOSIS — Z48.298 AFTERCARE FOLLOWING ORGAN TRANSPLANT: ICD-10-CM

## 2023-11-10 DIAGNOSIS — Z79.899 IMMUNOSUPPRESSIVE MANAGEMENT ENCOUNTER FOLLOWING KIDNEY TRANSPLANT: ICD-10-CM

## 2023-11-10 RX ORDER — TACROLIMUS 1 MG/1
1 CAPSULE ORAL 2 TIMES DAILY
Qty: 60 CAPSULE | Refills: 11 | Status: SHIPPED | OUTPATIENT
Start: 2023-11-10

## 2023-12-20 ENCOUNTER — MYC MEDICAL ADVICE (OUTPATIENT)
Dept: SLEEP MEDICINE | Facility: CLINIC | Age: 33
End: 2023-12-20
Payer: MEDICARE

## 2023-12-20 ENCOUNTER — NURSE TRIAGE (OUTPATIENT)
Dept: NURSING | Facility: CLINIC | Age: 33
End: 2023-12-20
Payer: MEDICARE

## 2023-12-20 ENCOUNTER — TELEPHONE (OUTPATIENT)
Dept: SLEEP MEDICINE | Facility: CLINIC | Age: 33
End: 2023-12-20
Payer: MEDICARE

## 2023-12-20 NOTE — TELEPHONE ENCOUNTER
"  Symptoms-sent to triage line -Attention Dr. Goltz:    Describe your symptoms: Patient Barry Mcgrath Calling. Transferred to nurse line. Initially, he wanted to set up appointment with Dr. Shen, but patient is having sx's of no sleep and ended up wanting to talk to a nurse. He still would like to speak to Dr. Shen, or have his machine adjusted as he thinks he may not be getting enough air. \"I don't know what is going on.\"  Due to his sx's an him expressing urgency about his sx's, he was directed to triage. See triage notes. Was unable to schedule patient for an appointment at this time due to sx's.    Any pain: Unknown, sent to nurse line for triage     How long have you been having symptoms:for weeks now, even maybe for months now. Sleeping all night, sleep all morning long, and all afternoon long and still tired. I don't know if I need more air, I don't know what is going on.\"    Have you been seen for this:  No    Appointment offered?: No    Triage offered?: Yes:     Home remedies tried: Unknown     Preferred Pharmacy:   [id number] - [place] [[place]], [place] - [address]  [address] [address]  Phone: [phone] Fax: [phone]    [facility] - [facility] [address]  [address] [address]  Phone: [phone] Fax: [phone]    [id number] - [facility] [address] [address]  Phone: [phone] Fax: [phone]    [id number] - [place], [place] - [address] [facility] [address]  [place] [address]  Phone: [phone] Fax: [phone]      Could we send this information to you in [facility] or would you prefer to receive a phone call?:   N/A  Okay to leave a detailed message?:   "

## 2023-12-20 NOTE — TELEPHONE ENCOUNTER
"Nurse Triage SBAR    Is this a 2nd Level Triage? NO    Situation: Pt calling with concerns for fatigue.    Background: Pt states fatigue has been ongoing for weeks or months. He does use a CPAP at night.    Assessment: Pt states he sleeps all of the time. He will sleep through the night into the day and still feels tired. He wonders if his CPAP settings needs to be reevaluated. Denies SOB, fever, and weakness although he states he feels 'zombie like'.     Protocol Recommended Disposition:   See PCP Within 24 Hours    Recommendation: Discussed calling his PCP tomorrow. He would also like a message sent to his sleep specialist. Please review and contact pt at number listed to discuss.    Reason for Disposition   Taking a medicine that could cause weakness (e.g., blood pressure medications, diuretics)    Additional Information   Negative: Sounds like a life-threatening emergency to the triager   Negative: Difficult to awaken or acting confused (e.g., disoriented, slurred speech)   Negative: SEVERE difficulty breathing (e.g., struggling for each breath, speaks in single words)   Negative: Shock suspected (e.g., cold/pale/clammy skin, too weak to stand, low BP, rapid pulse)   Negative: [1] Fainted > 15 minutes ago AND [2] still feels too weak or dizzy to stand   Negative: [1] SEVERE weakness (i.e., unable to walk or barely able to walk, requires support) AND [2] new-onset or worsening   Negative: Difficulty breathing   Negative: Follows large amount of bleeding (e.g., from vomiting, rectum, vagina  (Exception: Small brief weakness from sight of a small amount blood.)   Negative: Black or tarry bowel movements   Negative: Heart beating < 50 beats per minute OR > 140 beats per minute   Negative: Extra heartbeats, irregular heart beating, or heart is beating very fast  (i.e., \"palpitations\")   Negative: [1] Drinking very little AND [2] dehydration suspected (e.g., no urine > 12 hours, very dry mouth, very lightheaded)   " Negative: Patient sounds very sick or weak to the triager   Negative: [1] MODERATE weakness or fatigue AND [2] from poor fluid intake AND [3] no improvement after 2 hours of rest and fluids   Negative: [1] Fever > 103 F (39.4 C) AND [2] not able to get the fever down using Fever Care Advice   Negative: [1] MODERATE weakness (i.e., interferes with work, school, normal activities) AND [2] cause unknown  (Exceptions: Weakness from acute minor illness or poor fluid intake; weakness is chronic and not worse.)   Negative: [1] Fever > 101 F (38.3 C) AND [2] age > 60 years   Negative: [1] Fever > 100.0 F (37.8 C) AND [2] bedridden (e.g., CVA, chronic illness, recovering from surgery)   Negative: [1] Fever > 100.0 F (37.8 C) AND [2] diabetes mellitus or weak immune system (e.g., HIV positive, cancer chemo, splenectomy, organ transplant, chronic steroids)   Negative: Pale skin (pallor)   Negative: [1] MODERATE weakness (i.e., interferes with work, school, normal activities) AND [2] persists > 3 days    Protocols used: Weakness (Generalized) and Fatigue-KAITLIN-CAROLE Cisneros RN  [facility] Nurse Advisor   12/20/2023  5:08 PM

## 2023-12-21 NOTE — TELEPHONE ENCOUNTER
His CPAP download looks normal. I wrote him a note stating it does not appear that under-treated apnea is contributing. I asked if he is having any other symptoms or if there has been any other change to his life that he can identify.  Bennett Goltz, PA-C

## 2023-12-21 NOTE — TELEPHONE ENCOUNTER
General Call      Reason for Call:  Thank provider Goltz    What are your questions or concerns:  NA    Date of last appointment with provider: NA    Could we send this information to you in [employer] or would you prefer to receive a phone call?:   No preference   Okay to leave a detailed message?: Yes at Cell number on file:    Telephone Information:   Mobile [phone]

## 2024-01-08 ENCOUNTER — TELEPHONE (OUTPATIENT)
Dept: TRANSPLANT | Facility: CLINIC | Age: 34
End: 2024-01-08
Payer: MEDICARE

## 2024-01-08 DIAGNOSIS — Z94.0 KIDNEY TRANSPLANTED: Primary | ICD-10-CM

## 2024-01-08 DIAGNOSIS — Z48.298 AFTERCARE FOLLOWING ORGAN TRANSPLANT: ICD-10-CM

## 2024-01-08 NOTE — TELEPHONE ENCOUNTER
[facility] Call Center    Phone Message    May a detailed message be left on voicemail: yes     Reason for Call: Other: Patient is requesting an RKT with Dr. Garcia for April 2024 or after. There are no available spots with Dr. Garcia and patient said he did not want to see another provider. Please advise and put in order who patient can see.      Action Taken: Message routed to:  [facility]  SOT    Travel Screening: Not Applicable

## 2024-01-08 NOTE — TELEPHONE ENCOUNTER
Patient states he would like to follow with Dr Brothers when Dr Garcia leave [facility] SOT     Rowan Webber RN   Transplant Coordinator  [phone]

## 2024-02-01 ENCOUNTER — TELEPHONE (OUTPATIENT)
Dept: TRANSPLANT | Facility: CLINIC | Age: 34
End: 2024-02-01
Payer: MEDICARE

## 2024-02-01 NOTE — TELEPHONE ENCOUNTER
[facility] Call Center    Phone Message    May a detailed message be left on voicemail: yes     Reason for Call: Other: Patient calling in wanting lab orders to be sent over the to  Clinic in [place] for him to have completed. Please give the patient a call regarding this and to let him know that the orders were sent.      Action Taken: Message routed to:  [facility] Rowan GREENWOOD

## 2024-02-01 NOTE — LETTER
[facility]  Room 2[address]200  [facility],  [address]  [address]  Tel [phone]  Toll Free [phone]                OUTPATIENT LABORATORY TEST ORDER    Patient Name: Barry Mcgrath  Transplant Date: 4/4/2006 (Kidney)  YOB: 1990  Issue Date & Time: 2/1/2024 1200   [facility] MR:  [id number]  Exp. Date (1 year after date issued)      Diagnoses: Kidney Transplant (ICD-10 Z94.0)   Long term use of medications (ICD-10 Z79.899)     Lab results to be available on the same day drawn.   Patient should release information to the [facility] [place]plant Center.  Please fax to the Transplant Center at [phone].        Every 3 Months  ?Hemogram and Platelet  ?Basic Metabolic Panel   ?/Tacrolimus/Prograf drug level             Every 6 Months                                          ?Urine for protein/creatinine      If you have any questions, please call The [facility] at [phone] or [phone].    Please fax labs to [phone]    [place] Garcia MD

## 2024-02-24 ENCOUNTER — HEALTH MAINTENANCE LETTER (OUTPATIENT)
Age: 34
End: 2024-02-24

## 2024-03-04 ENCOUNTER — NURSE TRIAGE (OUTPATIENT)
Dept: NURSING | Facility: CLINIC | Age: 34
End: 2024-03-04
Payer: MEDICARE

## 2024-03-05 ENCOUNTER — TELEPHONE (OUTPATIENT)
Dept: TRANSPLANT | Facility: CLINIC | Age: 34
End: 2024-03-05
Payer: MEDICARE

## 2024-03-05 NOTE — TELEPHONE ENCOUNTER
[facility] Call Center    Phone Message    May a detailed message be left on voicemail: yes     Reason for Call: Other: patient calling in stating that he redid his tacrolimus again, since it was lost by his clinic.  He stated that he did the lab draw 12 hours after taking his medication.    Action Taken: Message routed to:  Other: RNCC    Travel Screening: Not Applicable

## 2024-03-05 NOTE — TELEPHONE ENCOUNTER
Nurse Triage SBAR    Is this a 2nd Level Triage? NO    Situation: Patient asking if it is ok to take Tacrolimus this evening if he is having his level checked tomorrow morning. Clinical resources used, Advised pt to have lab drawn just prior to next morning dose.              Does the patient meet one of the following criteria for ADS visit consideration? No    Reason for Disposition   Caller has medicine question only, adult not sick, AND triager answers question    Protocols used: Medication Question Call-A-AH

## 2024-03-12 ENCOUNTER — TELEPHONE (OUTPATIENT)
Dept: TRANSPLANT | Facility: CLINIC | Age: 34
End: 2024-03-12
Payer: MEDICARE

## 2024-03-12 NOTE — TELEPHONE ENCOUNTER
Lam Reddy is calling in and asking for a call back. He is concerned about his lab results. Please call him back - and he said OK to leave him a msg if he misses your call. Thank you!  Veda

## 2024-03-12 NOTE — TELEPHONE ENCOUNTER
Reviewed lab results from 3/4/2024 with sary.  Labs stable, tac was at goal.  No concerns.  Patient V/U will contin tx labs again in 3 months    Rowan Webber RN   Transplant Coordinator  [phone]

## 2024-03-14 ENCOUNTER — TELEPHONE (OUTPATIENT)
Dept: SLEEP MEDICINE | Facility: CLINIC | Age: 34
End: 2024-03-14
Payer: MEDICARE

## 2024-03-14 NOTE — TELEPHONE ENCOUNTER
FYI - Status Update    Who is Calling: patient    Update: patient would like a call from Bennett Goltz about these sleep episodes where he is having sleep issues that have nightmares and sleep paralysis.    Does caller want a call/response back: Yes     Could we send this information to you in [employer] or would you prefer to receive a phone call?:   Patient would prefer a phone call   Okay to leave a detailed message?: Yes at Cell number on file:    Telephone Information:   Mobile [phone]

## 2024-03-15 NOTE — TELEPHONE ENCOUNTER
"Pt calling back in stating he is locked out of mychart. He wanted an update and the note from yesterday to include \"sleep paralysis\" I have added this to the note and let him know there was no further update at this time.  "

## 2024-03-15 NOTE — TELEPHONE ENCOUNTER
Spoke with patient. This is an ongoing issue since childhood. Next available appointment with provider scheduled.     Irasema AMBRIZ RN  [facility]

## 2024-04-12 ENCOUNTER — TELEPHONE (OUTPATIENT)
Dept: TRANSPLANT | Facility: CLINIC | Age: 34
End: 2024-04-12
Payer: MEDICARE

## 2024-04-12 NOTE — TELEPHONE ENCOUNTER
Let patient know that he is able to take his medications with milk.    He V/U    Rowan Webber RN   Transplant Coordinator  [phone]

## 2024-04-12 NOTE — TELEPHONE ENCOUNTER
[facility] Call Center    Phone Message    May a detailed message be left on voicemail: yes     Reason for Call: Medication Question or concern regarding medication   Patient called in this morning and asked that a message be sent to his coordinator regarding his medication.  Barry's question was if he could take his medications with milk (listed off different types of milk - whole milk, chocolate milk, etc).  He stated that he knows some medications have restrictions with it since milk coats the lining of the stomach.  Writer asked if there was a specific medication he was thinking about, but he stated that it pertains to ALL medication he is currently taking.  Barry prefers a call back, okay to [facility], instead of [facility] due to being legally blind.  Patient was very pleasant and thorough - great way to start the morning!        Action Taken: Message routed to:  [facility] JARRETT TRENT    Travel Screening: Not Applicable

## 2024-04-19 ENCOUNTER — DOCUMENTATION ONLY (OUTPATIENT)
Dept: SLEEP MEDICINE | Facility: CLINIC | Age: 34
End: 2024-04-19
Payer: MEDICARE

## 2024-04-19 DIAGNOSIS — G47.33 OSA (OBSTRUCTIVE SLEEP APNEA): Primary | ICD-10-CM

## 2024-04-19 NOTE — PROGRESS NOTES
CPAP DME order signed. He has an appointment with me in September.  Respironics  Auto-PAP 14.0 - 18.0 cmH2O 30 day usage data:    83% of days with > 4 hours of use. 0/30 days with no use.   Average use 558 minutes per day.   Average leak 40.6 LPM.  Average % of night in large leak 0%.    CPAP 90% pressure 15cm.   AHI 1.4 events per hour.       Bennett Goltz, PA-C

## 2024-07-11 ENCOUNTER — OFFICE VISIT (OUTPATIENT)
Dept: DERMATOLOGY | Facility: CLINIC | Age: 34
End: 2024-07-11
Payer: MEDICARE

## 2024-07-11 DIAGNOSIS — L81.4 SOLAR LENTIGO: ICD-10-CM

## 2024-07-11 DIAGNOSIS — L73.2 HIDRADENITIS SUPPURATIVA: Primary | ICD-10-CM

## 2024-07-11 DIAGNOSIS — D22.9 MULTIPLE BENIGN NEVI: ICD-10-CM

## 2024-07-11 DIAGNOSIS — D84.9 IMMUNOSUPPRESSION (H): ICD-10-CM

## 2024-07-11 PROCEDURE — 99214 OFFICE O/P EST MOD 30 MIN: CPT | Mod: GC | Performed by: DERMATOLOGY

## 2024-07-11 NOTE — LETTER
7/11/2024       RE: Barry Mcgrath  [address]  [facility] [address]     Dear Colleague,    Thank you for referring your patient, Barry Mcgrath, to the [facility] DERMATOLOGY CLINIC [place] at [facility]. Please see a copy of my visit note below.    [facility] Dermatology Note  Encounter Date: Jul 11, 2024  Office Visit     Dermatology Problem List:  Last TBSE 7/11/24, recommend yearly   1. History of kidney transplant in 2006: current immunosuppression mycophenolate mofetil, prednisone, and tacrolimus  2. Hidradenitis suppurativa  - Current Tx: Hibiclens  - Prior Tx: clindamycin lotion BID  3. Folliculitis  - Current Tx: Hibiclens     MHx: DMII.  SHx: Legally blind.  ____________________________________________    Assessment & Plan:  # Immunosuppressed with mycophenolate mofetil, prednisone, and tacrolimus s/p kidney transplant.  Patient aware of increased risk of skin cancers with immunosuppressing medications. No concerning skin lesions on exam today.  - Low risk for skin cancer. SUNTRAC estimated risk 1.01% at 5 years, 2.33% at 10 years post transplant. Recommendations are for yearly skin cancer screening within 10 years of transplant. Will keep this in mind for the future.  - Recommend he continue with annual transplant skin checks.    # Benign skin findings - benign nevi, seborrheic keratoses, solar lentigines, cherry angiomas.  Reassured patient of benign skin findings. There is no need for further treatment. Discussed signs/symptoms concerning for NMSC and melanoma.  - Sun protection: Counseled SPF30+ sunscreen, UPF clothing, sun avoidance, tanning bed avoidance.      # Hidradenitis suppurativa. No active lesions today with minimal involvement on the left thigh.  - Continue with daily Hibiclens.    # Folliculitis - scalp.  - Patient offered and declined ketoconazole shampoo and/or other topicals at this time.    #  Dermal nevus - right neck.  - Benign reassurance given.    Follow-up: 1 year(s) in-person for FBSE, or earlier for new or changing lesions    Staff, Resident, and Scribe:     Scribe Disclosure:   I, Lisa Lazar, am serving as a scribe to document services personally performed by Huan Simpson MD based on data collection and the provider's statements to me.     Rowan Cisse MD  PGY4 Dermatology Resident    Provider Disclosure:   The documentation recorded by the scribe accurately reflects the services I personally performed and the decisions made by me.    Huan Simpson MD, FAAD, FACP    Departments of Internal Medicine and Dermatology  [facility]    ____________________________________________    CC: Derm Problem (Barry is here for HS for follow up.)    HPI:  Mr. Barry Mcgrath is a(n) 33 year old male who presents today for follow-up  for HS and an annual FBSE for chronic immunosuppression s/p kidney transplant.    Since his last visit, he has been using an OTC Hibiclens wash on his entire body during daily showers. He states that his HS is very well controlled, and he has no concerns or active lesions today. He denies anything new, growing, changing, tender, crusting, or bleeding on his skin.    He is always active, and there is often sweating, so he often has bumps similar to pimples on his scalp/neck area. Pimples are sometimes painful, but they usually go away after a few days. He does not want to have to take any more pills.    Patient is otherwise feeling well, without additional skin concerns.    HS Nurse Assessment      1/12/2023     1:05 PM 7/6/2023     2:01 PM 7/11/2024     1:50 PM   Nurse Assessment Data   Over the past 30 days how many old lesions flared back up? 0 0 0   Over the past 30 days how many new lesions did you get? 0 0 0   Over the past week, how many dressing changes do you do each day? 0 0 0   Over the past week, has your wound drainage been: None  None None   Rate your HS overall from 0 - 10 (0 = no disease, 10 = worst) over the past week:  0 0 0   Rate your pain score from 0 - 10 (0 = no disease, 10 = worst) for the most painful/symptomatic lesion in the past week:  0 - No Pain 0 - No Pain 0 - No Pain   Over the past week, how much has HS influenced your quality of life? not at all not at all not at all   \  Physical Exam:  GEN: NAD  SKIN: Total skin excluding the undergarment areas was performed. The exam included the head/face, neck, both arms, chest, back, abdomen, both legs, digits and/or nails. (Excluding feet - patient declined.)  - A few acneiform papules on the posterior scalp and face.  - Chest is clear for HS lesions.  - Pink scarred papule on the left neck.  - 2 mm x 5 mm cerebriform symmetric homogeneous papule growing hair on the right neck.  - There are dome shaped bright red papules on the trunk and extremities.   - Multiple regular brown pigmented macules and papules are identified on the trunk and extremities.   - There are waxy stuck on tan to brown papules on the trunk and extremities.   - No other lesions of concern on areas examined.     HS Data      2/10/2022     2:15 PM 1/12/2023     1:05 PM 7/11/2024     2:41 PM   HS Exam Data   LC Type LC1 LC1 LC1   Clinical Subtypes Regular type Regular type Regular type   Acne? No No No   Dissecting Cellulitis? No  No   Visual analogue score (0-100) 0 0 10   Total Capone Stage I I I   Total Inflammatory Nodules 0 0 1   Total Abcesses 0 0 0   Total Draining Tunnels 0 0 0   Total Abscess and Nodule Count 0 0 1   IHS4 Score  0 0 1   Total  HASI Score 0 0 0   HS-PGA 0 0      Medications:  Current Outpatient Medications   Medication Sig Dispense Refill     cetirizine (ZYRTEC) 10 MG tablet Take 1 tablet (10 mg) by mouth daily as needed for allergies       chlorhexidine (HIBICLENS) 4 % liquid Apply topically daily as needed for wound care 473 mL 11     cholecalciferol (VITAMIN D3) 25 mcg (1000 units)  capsule Take 1 capsule (25 mcg) by mouth daily       clindamycin (CLEOCIN T) 1 % external lotion Apply thin layer to armpits, chest, groin, and buttocks once daily as needed 60 mL 11     divalproex sodium extended-release (DEPAKOTE ER) 500 MG 24 hr tablet Take 5 tablets (2,500 mg) by mouth daily  0     lisinopril (PRINIVIL/ZESTRIL) 10 MG tablet Take 10 mg by mouth daily  5     loperamide (IMODIUM A-D) 2 MG tablet Take 1 tablet (2 mg) by mouth 4 times daily as needed for diarrhea 30 tablet 0     LYBALVI 20-10 MG TABS tablet        melatonin 1 MG TABS tablet Take 1 tab at 6 PM 90 tablet 2     metFORMIN (GLUCOPHAGE-XR) 500 MG 24 hr tablet Take 1,000 mg by mouth 2 times daily       mycophenolate (GENERIC EQUIVALENT) 250 MG capsule Take 5 capsules (1,250 mg) by mouth 2 times daily 300 capsule 11     prazosin (MINIPRESS) 1 MG capsule Take 2 capsules (2 mg) by mouth At Bedtime 60 capsule 0     predniSONE (DELTASONE) 5 MG tablet Take 1 tablet (5 mg) by mouth daily 30 tablet 11     rosuvastatin (CRESTOR) 10 MG tablet Take 0.5 tablets (5 mg) by mouth daily       tacrolimus (GENERIC) 1 MG capsule Take 1 capsule (1 mg) by mouth 2 times daily TOTAL DOSE: 1 mg twice daily. 60 capsule 11     FLUCELVAX QUADRIVALENT 0.5 ML LAKSHMI Pharmacist to administer IM (Patient not taking: Reported on 5/15/2023)  0     lidocaine (XYLOCAINE) 5 % external ointment Apply topically 4 times daily as needed for moderate pain (4-6) Apply 1 inch strip to painful area. 50 g 0     tacrolimus (GENERIC EQUIVALENT) 0.5 MG capsule Take 0.5 mg by mouth 2 times daily TOTAL DOSE: 1 mg twice daily. HOLD 60 capsule 11     No current facility-administered medications for this visit.      Past Medical History:   Patient Active Problem List   Diagnosis     HTN, kidney transplant related     RAQUEL (obstructive sleep apnea)     Bipolar 1 disorder (H)     Immunosuppression (H24)     Hidradenitis suppurativa     Kidney replaced by transplant     Type 2 diabetes mellitus  without complication, without long-term current use of insulin (H)     Aftercare following organ transplant     Past Medical History:   Diagnosis Date     Anxiety      Bipolar 1 disorder (H)      Bipolar 2 disorder (H)      Blind      Depressive disorder      Diabetes (H)     pre diabetic     Hypertension      PTSD (post-traumatic stress disorder)      Sleep apnea     uses cpap        CC Huan Simpson MD  [address] [place] [address] on close of this encounter.      Again, thank you for allowing me to participate in the care of your patient.      Sincerely,    Huan Simpson MD

## 2024-07-11 NOTE — PROGRESS NOTES
[facility] Dermatology Note  Encounter Date: Jul 11, 2024  Office Visit     Dermatology Problem List:  Last TBSE 7/11/24, recommend yearly   1. History of kidney transplant in 2006: current immunosuppression mycophenolate mofetil, prednisone, and tacrolimus  2. Hidradenitis suppurativa  - Current Tx: Hibiclens  - Prior Tx: clindamycin lotion BID  3. Folliculitis  - Current Tx: Hibiclens     MHx: DMII.  SHx: Legally blind.  ____________________________________________    Assessment & Plan:  # Immunosuppressed with mycophenolate mofetil, prednisone, and tacrolimus s/p kidney transplant.  Patient aware of increased risk of skin cancers with immunosuppressing medications. No concerning skin lesions on exam today.  - Low risk for skin cancer. SUNTRAC estimated risk 1.01% at 5 years, 2.33% at 10 years post transplant. Recommendations are for yearly skin cancer screening within 10 years of transplant. Will keep this in mind for the future.  - Recommend he continue with annual transplant skin checks.    # Benign skin findings - benign nevi, seborrheic keratoses, solar lentigines, cherry angiomas.  Reassured patient of benign skin findings. There is no need for further treatment. Discussed signs/symptoms concerning for NMSC and melanoma.  - Sun protection: Counseled SPF30+ sunscreen, UPF clothing, sun avoidance, tanning bed avoidance.      # Hidradenitis suppurativa. No active lesions today with minimal involvement on the left thigh.  - Continue with daily Hibiclens.    # Folliculitis - scalp.  - Patient offered and declined ketoconazole shampoo and/or other topicals at this time.    # Dermal nevus - right neck.  - Benign reassurance given.    Follow-up: 1 year(s) in-person for FBSE, or earlier for new or changing lesions    Staff, Resident, and Scribe:     Scribe Disclosure:   Lisa GOLDSMITH, am serving as a scribe to document services personally performed by Huan Simpson MD based on data  collection and the provider's statements to me.     Rowan Cisse MD  PGY4 Dermatology Resident    Provider Disclosure:   The documentation recorded by the scribe accurately reflects the services I personally performed and the decisions made by me.    Huan Simpson MD, FAAD, FACP    Departments of Internal Medicine and Dermatology  [facility]    ____________________________________________    CC: Derm Problem (Barry is here for HS for follow up.)    HPI:  Mr. Barry Mcgrath is a(n) 33 year old male who presents today for follow-up  for HS and an annual FBSE for chronic immunosuppression s/p kidney transplant.    Since his last visit, he has been using an OTC Hibiclens wash on his entire body during daily showers. He states that his HS is very well controlled, and he has no concerns or active lesions today. He denies anything new, growing, changing, tender, crusting, or bleeding on his skin.    He is always active, and there is often sweating, so he often has bumps similar to pimples on his scalp/neck area. Pimples are sometimes painful, but they usually go away after a few days. He does not want to have to take any more pills.    Patient is otherwise feeling well, without additional skin concerns.    HS Nurse Assessment      1/12/2023     1:05 PM 7/6/2023     2:01 PM 7/11/2024     1:50 PM   Nurse Assessment Data   Over the past 30 days how many old lesions flared back up? 0 0 0   Over the past 30 days how many new lesions did you get? 0 0 0   Over the past week, how many dressing changes do you do each day? 0 0 0   Over the past week, has your wound drainage been: None None None   Rate your HS overall from 0 - 10 (0 = no disease, 10 = worst) over the past week:  0 0 0   Rate your pain score from 0 - 10 (0 = no disease, 10 = worst) for the most painful/symptomatic lesion in the past week:  0 - No Pain 0 - No Pain 0 - No Pain   Over the past week, how much has HS influenced your  quality of life? not at all not at all not at all   \  Physical Exam:  GEN: NAD  SKIN: Total skin excluding the undergarment areas was performed. The exam included the head/face, neck, both arms, chest, back, abdomen, both legs, digits and/or nails. (Excluding feet - patient declined.)  - A few acneiform papules on the posterior scalp and face.  - Chest is clear for HS lesions.  - Pink scarred papule on the left neck.  - 2 mm x 5 mm cerebriform symmetric homogeneous papule growing hair on the right neck.  - There are dome shaped bright red papules on the trunk and extremities.   - Multiple regular brown pigmented macules and papules are identified on the trunk and extremities.   - There are waxy stuck on tan to brown papules on the trunk and extremities.   - No other lesions of concern on areas examined.     HS Data      2/10/2022     2:15 PM 1/12/2023     1:05 PM 7/11/2024     2:41 PM   HS Exam Data   LC Type LC1 LC1 LC1   Clinical Subtypes Regular type Regular type Regular type   Acne? No No No   Dissecting Cellulitis? No  No   Visual analogue score (0-100) 0 0 10   Total Capone Stage I I I   Total Inflammatory Nodules 0 0 1   Total Abcesses 0 0 0   Total Draining Tunnels 0 0 0   Total Abscess and Nodule Count 0 0 1   IHS4 Score  0 0 1   Total  HASI Score 0 0 0   HS-PGA 0 0      Medications:  Current Outpatient Medications   Medication Sig Dispense Refill    cetirizine (ZYRTEC) 10 MG tablet Take 1 tablet (10 mg) by mouth daily as needed for allergies      chlorhexidine (HIBICLENS) 4 % liquid Apply topically daily as needed for wound care 473 mL 11    cholecalciferol (VITAMIN D3) 25 mcg (1000 units) capsule Take 1 capsule (25 mcg) by mouth daily      clindamycin (CLEOCIN T) 1 % external lotion Apply thin layer to armpits, chest, groin, and buttocks once daily as needed 60 mL 11    divalproex sodium extended-release (DEPAKOTE ER) 500 MG 24 hr tablet Take 5 tablets (2,500 mg) by mouth daily  0    lisinopril  (PRINIVIL/ZESTRIL) 10 MG tablet Take 10 mg by mouth daily  5    loperamide (IMODIUM A-D) 2 MG tablet Take 1 tablet (2 mg) by mouth 4 times daily as needed for diarrhea 30 tablet 0    LYBALVI 20-10 MG TABS tablet       melatonin 1 MG TABS tablet Take 1 tab at 6 PM 90 tablet 2    metFORMIN (GLUCOPHAGE-XR) 500 MG 24 hr tablet Take 1,000 mg by mouth 2 times daily      mycophenolate (GENERIC EQUIVALENT) 250 MG capsule Take 5 capsules (1,250 mg) by mouth 2 times daily 300 capsule 11    prazosin (MINIPRESS) 1 MG capsule Take 2 capsules (2 mg) by mouth At Bedtime 60 capsule 0    predniSONE (DELTASONE) 5 MG tablet Take 1 tablet (5 mg) by mouth daily 30 tablet 11    rosuvastatin (CRESTOR) 10 MG tablet Take 0.5 tablets (5 mg) by mouth daily      tacrolimus (GENERIC) 1 MG capsule Take 1 capsule (1 mg) by mouth 2 times daily TOTAL DOSE: 1 mg twice daily. 60 capsule 11    FLUCELVAX QUADRIVALENT 0.5 ML LAKSHMI Pharmacist to administer IM (Patient not taking: Reported on 5/15/2023)  0    lidocaine (XYLOCAINE) 5 % external ointment Apply topically 4 times daily as needed for moderate pain (4-6) Apply 1 inch strip to painful area. 50 g 0    tacrolimus (GENERIC EQUIVALENT) 0.5 MG capsule Take 0.5 mg by mouth 2 times daily TOTAL DOSE: 1 mg twice daily. HOLD 60 capsule 11     No current facility-administered medications for this visit.      Past Medical History:   Patient Active Problem List   Diagnosis    HTN, kidney transplant related    RAQUEL (obstructive sleep apnea)    Bipolar 1 disorder (H)    Immunosuppression (H24)    Hidradenitis suppurativa    Kidney replaced by transplant    Type 2 diabetes mellitus without complication, without long-term current use of insulin (H)    Aftercare following organ transplant     Past Medical History:   Diagnosis Date    Anxiety     Bipolar 1 disorder (H)     Bipolar 2 disorder (H)     Blind     Depressive disorder     Diabetes (H)     pre diabetic    Hypertension     PTSD (post-traumatic stress  disorder)     Sleep apnea     uses cpap        CC Huan Simpson MD  [address] [place] [address] on close of this encounter.

## 2024-07-11 NOTE — NURSING NOTE
Dermatology Rooming Note    Barry Mcgrath's goals for this visit include:   Chief Complaint   Patient presents with    Derm Problem     Barry is here for HS for follow up.

## 2024-07-11 NOTE — PATIENT INSTRUCTIONS

## 2024-07-13 ENCOUNTER — HEALTH MAINTENANCE LETTER (OUTPATIENT)
Age: 34
End: 2024-07-13
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
## 2024-07-26 ENCOUNTER — VIRTUAL VISIT (OUTPATIENT)
Dept: SLEEP MEDICINE | Facility: CLINIC | Age: 34
End: 2024-07-26
Payer: MEDICARE

## 2024-07-26 VITALS — BODY MASS INDEX: 32.34 KG/M2 | WEIGHT: 231 LBS | HEIGHT: 71 IN

## 2024-07-26 DIAGNOSIS — N39.44 NOCTURNAL ENURESIS: ICD-10-CM

## 2024-07-26 DIAGNOSIS — G47.33 OSA (OBSTRUCTIVE SLEEP APNEA): Primary | ICD-10-CM

## 2024-07-26 DIAGNOSIS — G47.8 SLEEP PARALYSIS: ICD-10-CM

## 2024-07-26 PROCEDURE — 99443 PR PHYSICIAN TELEPHONE EVALUATION 21-30 MIN: CPT | Mod: 93 | Performed by: PHYSICIAN ASSISTANT

## 2024-07-26 ASSESSMENT — SLEEP AND FATIGUE QUESTIONNAIRES
HOW LIKELY ARE YOU TO NOD OFF OR FALL ASLEEP WHILE SITTING INACTIVE IN A PUBLIC PLACE: WOULD NEVER DOZE
HOW LIKELY ARE YOU TO NOD OFF OR FALL ASLEEP WHILE SITTING AND READING: SLIGHT CHANCE OF DOZING
HOW LIKELY ARE YOU TO NOD OFF OR FALL ASLEEP WHILE LYING DOWN TO REST IN THE AFTERNOON WHEN CIRCUMSTANCES PERMIT: MODERATE CHANCE OF DOZING
HOW LIKELY ARE YOU TO NOD OFF OR FALL ASLEEP WHILE WATCHING TV: WOULD NEVER DOZE
HOW LIKELY ARE YOU TO NOD OFF OR FALL ASLEEP WHILE SITTING AND TALKING TO SOMEONE: SLIGHT CHANCE OF DOZING
HOW LIKELY ARE YOU TO NOD OFF OR FALL ASLEEP IN A CAR, WHILE STOPPED FOR A FEW MINUTES IN TRAFFIC: HIGH CHANCE OF DOZING
HOW LIKELY ARE YOU TO NOD OFF OR FALL ASLEEP WHEN YOU ARE A PASSENGER IN A CAR FOR AN HOUR WITHOUT A BREAK: MODERATE CHANCE OF DOZING
HOW LIKELY ARE YOU TO NOD OFF OR FALL ASLEEP WHILE SITTING QUIETLY AFTER LUNCH WITHOUT ALCOHOL: WOULD NEVER DOZE

## 2024-07-26 ASSESSMENT — PAIN SCALES - GENERAL: PAINLEVEL: NO PAIN (0)

## 2024-07-26 NOTE — NURSING NOTE
Patient declined individual medication review by support staff because -stated put yes for everything.       Current patient location: [address] [facility] [address]   [facility] [address]    Is the patient currently in the state of [place]? YES    Visit mode:VIDEO    If the visit is dropped, the patient can be reconnected by: TELEPHONE VISIT: Phone number:   Telephone Information:   Mobile [phone]       Will anyone else be joining the visit? NO  (If patient encounters technical issues they should call [phone]    How would you like to obtain your AVS? Declined    Are changes needed to the allergy or medication list? No    Are refills needed on medications prescribed by this physician? NO    Reason for visit: RECHECK    Isa CRUZF

## 2024-07-27 ENCOUNTER — NURSE TRIAGE (OUTPATIENT)
Dept: NURSING | Facility: CLINIC | Age: 34
End: 2024-07-27
Payer: MEDICARE

## 2024-07-27 NOTE — TELEPHONE ENCOUNTER
Pt calling with concerns about;    Woke up approx 20 minutes prior to this call with an excruciating cramp in left side of right leg (thigh area) that radiated to lower right back (buttocks)  'Like a hot knife slicing through my leg.  I've never experienced anything like that before in my life.'    Pt reports that pain has now subsided but still memorable and he is concerned that the cause may be a stroke or something serious    Denies;  Pain lasted longer than 20 minutes  Any recent or specific injuries  Chest pain/pressure  Difficulty breathing  Severe pain at time of this call    According to the protocol, patient should see a HCP within 4 hours (no [facility] PCP for 2 LT).  Care advice given. Patient verbalizes understanding and agrees with plan of care. States his  clinic is open on Saturdays and will call there.    Alexa Riley RN, Nurse Advisor 7:20 AM 7/27/2024  Reason for Disposition   Long-distance travel in past month (e.g., car, bus, train, plane; with trip lasting 6 or more hours)    Additional Information   Negative: Looks like a broken bone or dislocated joint (e.g., crooked or deformed)   Negative: Sounds like a life-threatening emergency to the triager   Negative: Followed a leg injury   Negative: Leg swelling is main symptom   Negative: Back pain radiating (shooting) into leg(s)   Negative: Knee pain is main symptom   Negative: Ankle pain is main symptom   Negative: Pregnant   Negative: Postpartum (from 0 to 6 weeks after delivery)   Negative: Chest pain   Negative: Difficulty breathing   Negative: Entire foot is cool or blue in comparison to other side   Negative: Unable to walk   Negative: [1] Red area or streak AND [2] fever   Negative: [1] Swollen joint AND [2] fever   Negative: [1] Cast on leg or ankle AND [2] now increased pain   Negative: Patient sounds very sick or weak to the triager   Negative: [1] SEVERE pain (e.g., excruciating, unable to do any normal activities) AND [2] not  "improved after 2 hours of pain medicine   Negative: [1] Thigh or calf pain AND [2] only 1 side AND [3] present > 1 hour (Exception: Chronic unchanged pain.)   Negative: [1] Thigh, calf, or ankle swelling AND [2] only 1 side   Negative: [1] Thigh, calf, or ankle swelling AND [2] bilateral AND [3] 1 side is more swollen   Negative: [1] Red area or streak AND [2] large (> 2 in. or 5 cm)   Negative: History of prior \"blood clot\" in leg or lungs (i.e., deep vein thrombosis, pulmonary embolism)   Negative: History of inherited increased risk of blood clots (e.g., Factor 5 Leiden, Anti-thrombin 3, Protein C or Protein S deficiency, Prothrombin mutation)   Negative: Major surgery in past month   Negative: Hip or leg fracture (broken bone) in past month (or had cast on leg or ankle in past month)   Negative: Illness requiring prolonged bedrest in past month (e.g., immobilization, long hospital stay)    Protocols used: Leg Pain-A-AH    "

## 2024-07-29 ENCOUNTER — TRANSFERRED RECORDS (OUTPATIENT)
Dept: HEALTH INFORMATION MANAGEMENT | Facility: CLINIC | Age: 34
End: 2024-07-29
Payer: MEDICARE

## 2024-07-29 ENCOUNTER — TELEPHONE (OUTPATIENT)
Dept: TRANSPLANT | Facility: CLINIC | Age: 34
End: 2024-07-29
Payer: MEDICARE

## 2024-07-29 DIAGNOSIS — D84.9 IMMUNOSUPPRESSION (H): ICD-10-CM

## 2024-07-29 DIAGNOSIS — Z94.0 KIDNEY REPLACED BY TRANSPLANT: ICD-10-CM

## 2024-07-29 DIAGNOSIS — Z94.0 STATUS POST KIDNEY TRANSPLANT: ICD-10-CM

## 2024-07-29 DIAGNOSIS — Z48.298 AFTERCARE FOLLOWING ORGAN TRANSPLANT: ICD-10-CM

## 2024-07-29 RX ORDER — PREDNISONE 5 MG/1
5 TABLET ORAL DAILY
Qty: 30 TABLET | Refills: 4 | Status: SHIPPED | OUTPATIENT
Start: 2024-07-29

## 2024-07-29 RX ORDER — MYCOPHENOLATE MOFETIL 250 MG/1
1250 CAPSULE ORAL 2 TIMES DAILY
Qty: 300 CAPSULE | Refills: 4 | Status: SHIPPED | OUTPATIENT
Start: 2024-07-29

## 2024-07-29 NOTE — TELEPHONE ENCOUNTER
[facility] Call Center    Phone Message    May a detailed message be left on voicemail: no     Reason for Call: Other: Was a patient of Dr. Garcia and is now scheduled with Dr. Brothers. Has concerns about which provider is the best one for him and does not want to be tossed around to different providers. If you could call him and make a recommendations. Also needs refill on all his medications to [facility] in [place]. Please contact patient.      Action Taken: Message routed to:  [facility] JARRETT TRENT    Travel Screening: Not Applicable     Date of Service:

## 2024-07-29 NOTE — TELEPHONE ENCOUNTER
[facility] Call Center    Phone Message    May a detailed message be left on voicemail: yes     Reason for Call: Other: Patient called back and and he needs refill on his medications. Please to [facility] in [place].      Action Taken: Message routed to:  [facility] JARRETT TRENT    Travel Screening: Not Applicable     Date of Service:

## 2024-07-29 NOTE — TELEPHONE ENCOUNTER
Call back Barry Mcgrath and verify which medications.  Requesting refills for MMF and  Prednisone.

## 2024-07-29 NOTE — TELEPHONE ENCOUNTER
Reviewed with patient that his appointment with transplant is 10/22/2024.  Leananet hesitant to see a new provider, however it is necessary as Dr Garcia is no longer with [facility].  Patient V/U,  is willing to see Dr Brothers.    Rowan Webber RN   Transplant Coordinator  [phone]

## 2024-08-02 ENCOUNTER — TELEPHONE (OUTPATIENT)
Dept: SLEEP MEDICINE | Facility: CLINIC | Age: 34
End: 2024-08-02
Payer: MEDICARE

## 2024-08-02 NOTE — TELEPHONE ENCOUNTER
Spoke with patient. Patient reports urine test was negative for uti and he was referred to urology . Will continue with prazosin, does not want to discontinue due to PTSD.    Patient reports last night he drank 4 or 5 glasses of water after last meal between 8 and 9. He went to bed between 10 and 11. He woke up around 1 am and used the toilet and voided large amount. He fell back asleep and woke up around 6 am and had urinated in bed. He verbalized frustration.    Reassured patient urology consult will provide more information to determine cause of symptoms. Encouraged patient to limit fluids after last meal and before bed.     Routing to update to provider. Any additional recommendations? Patient does not have follow up scheduled    Irasema AMBRIZ RN  [facility]

## 2024-08-02 NOTE — TELEPHONE ENCOUNTER
Patient Returning Call    Reason for call:  Pt is frustrated that he just urinated his bed this morning and wanting a callback from Dr     Information relayed to patient:  advised that someone from team would call to discuss    Patient has additional questions:  No      Could we send this information to you in [facility] or would you prefer to receive a phone call?:   Patient would prefer a phone call   Okay to leave a detailed message?: No at Cell number on file:    Telephone Information:   Mobile [phone]

## 2024-08-05 NOTE — TELEPHONE ENCOUNTER
He should certainly be restricting fluids more than he is. Try setting an alarm in the 2:30-3 AM timeframe to make sure he gets up to void. He should also talk with his primary since he has had kidney transplant.  Bennett Goltz, PA-C

## 2024-08-07 NOTE — TELEPHONE ENCOUNTER
Patient has been seen by PCP. Referral for urology was placed. Patient agrees with recommendation and will try setting alarm to get up around 230am to empty bladder    Irasema AMBRIZ RN  [facility]

## 2024-08-12 ENCOUNTER — TELEPHONE (OUTPATIENT)
Dept: SLEEP MEDICINE | Facility: CLINIC | Age: 34
End: 2024-08-12
Payer: MEDICARE

## 2024-08-12 NOTE — TELEPHONE ENCOUNTER
General Call    Contacts       Contact Date/Time Type Contact Phone/Fax    08/12/2024 03:05 PM CDT Phone (Incoming) Barry Mcgrath (Self) [phone] (M)          Reason for Call:  trouble sleeping    What are your questions or concerns:  Patient is calling in stating he is having trouble sleeping, either he sleeps to much or he can't sleep at all. He very fatigue all the time and believe his cpap machine isn't working well. This is has been going on for the last couple of months.    Date of last appointment with provider: 07/26/2024 Bennett Goltz    Could we send this information to you in [employer] or would you prefer to receive a phone call?:   Patient would prefer a phone call   Okay to leave a detailed message?: Yes at Cell number on file:    Telephone Information:   Mobile [phone]     Melissa Caldera   [facility]  Central Scheduler

## 2024-08-19 ENCOUNTER — TELEPHONE (OUTPATIENT)
Dept: TRANSPLANT | Facility: CLINIC | Age: 34
End: 2024-08-19
Payer: MEDICARE

## 2024-08-19 NOTE — TELEPHONE ENCOUNTER
General  Route to LPN  Pt said he is wetting the bed and he is taking 25 medications and he needs help   Reason for call:     Call back needed? Yes    Return Call Needed  Same as documented in contacts section  When to return call?: Greater than one day: Route standard priority

## 2024-08-19 NOTE — TELEPHONE ENCOUNTER
Called pt back and LVM that his CPAP data shows excellent control of his RAQUEL and no adjustments are needed to better manage his raquel. Advised he schedule an appt with his PCP of sleep doctor to discuss further if needed.

## 2024-08-19 NOTE — TELEPHONE ENCOUNTER
Patient upset about the amount of medications he has to take on a daily basis.  He reports he has had some urinary incontinence while sleeping lately.      He is waiting to hear back from PCP.  Denies UTI S/S.  Recommended scheduling apt with PCP to discuss.    Rowan Webber RN   Transplant Coordinator  [phone]

## 2024-08-19 NOTE — TELEPHONE ENCOUNTER
Pt called back and stated he needed to discuss bedwetting and his fatigue. He says his machine is working fine. I advised him to contact his PCP

## 2024-08-27 ENCOUNTER — TRANSCRIBE ORDERS (OUTPATIENT)
Dept: OTHER | Age: 34
End: 2024-08-27

## 2024-08-27 DIAGNOSIS — R32 ENURESIS: ICD-10-CM

## 2024-08-27 DIAGNOSIS — N39.44 NOCTURNAL ENURESIS: Primary | ICD-10-CM

## 2024-08-27 DIAGNOSIS — R39.11 URINARY HESITANCY: ICD-10-CM

## 2024-08-30 ENCOUNTER — TELEPHONE (OUTPATIENT)
Dept: TRANSPLANT | Facility: CLINIC | Age: 34
End: 2024-08-30
Payer: MEDICARE

## 2024-08-30 NOTE — TELEPHONE ENCOUNTER
Action 24  2:34 PM [place]   Action Taken  Request for records faxed to [facility] ([phone]).      MEDICAL RECORDS REQUEST   [facility]  Urology Clinic  [address] [place] [address]  PHONE: [phone]  Fax: [phone]        FUTURE VISIT INFORMATION                                                   Barry Mcgrath, : 1990 scheduled for future visit at [facility]    APPOINTMENT INFORMATION:  Date: 2024  Provider:  Kendy Perla NP  Reason for Visit/Diagnosis: Enuresis [R32], Urinary hesitancy [R39.11]    REFERRAL INFORMATION:  Referring provider:  AD ESTEVEZ DNP  Referring providers clinic:  [facility] fax number:  [phone]    RECORDS REQUESTED FOR VISIT                                                     NOTES  STATUS/DETAILS   OFFICE NOTE from referring provider Media tab YES   OFFICE NOTE from other specialist  yes   MEDICATION LIST  yes   LABS     URINALYSIS (UA) Media tab yes - 24     PRE-VISIT CHECKLIST      Joint diagnostic appointment coordinated correctly          (ensure right order & amount of time) Yes   RECORD COLLECTION COMPLETE YES

## 2024-08-30 NOTE — TELEPHONE ENCOUNTER
Clinic called stated they lennie a Tacrolimus on the 26th and it came back at 3.5. Writer did asked the caller if they could fax over the result and gave the fax number.

## 2024-09-03 ENCOUNTER — PRE VISIT (OUTPATIENT)
Dept: UROLOGY | Facility: CLINIC | Age: 34
End: 2024-09-03
Payer: MEDICARE

## 2024-09-03 NOTE — TELEPHONE ENCOUNTER
Reason for visit: consult     Relevant information: enuresis, urinary hesitancy    Records/imaging/labs/orders: in epic    Pt called: No need for a call    At Rooming: dixie Zamarripa  9/3/2024  2:20 PM

## 2024-09-09 ENCOUNTER — TELEPHONE (OUTPATIENT)
Dept: TRANSPLANT | Facility: CLINIC | Age: 34
End: 2024-09-09
Payer: MEDICARE

## 2024-09-09 NOTE — TELEPHONE ENCOUNTER
Patient Call: General  Route to LPN    Reason for call: Barry stated he did received Coordinator's call to repeat labs in a month.    Call back needed? No

## 2024-09-09 NOTE — TELEPHONE ENCOUNTER
Tacrolimus level 3.5 on 9/3/2024.  Goal 4-6.   Current dose 1 mg in AM, 1 mg in PM    Patient to remain on dose  1 mg in AM, 1 mg in PM   Recheck in one month prior to clinic appointment with Dr Brothers.    Detailed message left for the patient,  lab orders placed,  asked for CB to confirm message received.    Rowan Webber RN   Transplant Coordinator  [phone]

## 2024-09-09 NOTE — LETTER
The [facility]plant Center  Room 2[address]200  [facility],  [address]  [address]  Tel [phone]  Toll Free [phone]                OUTPATIENT LABORATORY TEST ORDER    Patient Name: Barry Mcgrath  Transplant Date: 4/4/2006 (Kidney)  YOB: 1990  Issue Date & Time: 9/9/2024  [facility] MR:  [id number]  Exp. Date (1 year after date issued)      Diagnoses: Kidney Transplant (ICD-10 Z94.0)   Long term use of medications (ICD-10 Z79.899)     Lab results to be available on the same day drawn.   Patient should release information to the [facility], Transplant Center.  Please fax to the Transplant Center at [phone].        Please obtain in 1 month then every  3 Months  ?Hemogram and Platelet  ?Basic Metabolic Panel   ?/Tacrolimus/Prograf drug level             Every 6 Months                                          ?Urine for protein/creatinine      If you have any questions, please call The [facility] at [phone] or [phone].    Please fax labs to [phone]    Dayton Garcia MD

## 2024-09-10 NOTE — PROGRESS NOTES
"HPI:  Barry Mcgrath is a 33 year old male with history of kidney transplant, RAQUEL using CPAP, complete blindness, referred by Reynaldo Gomez being seen for nocturnal enuresis.        [facility] history-  2000s - kidney transplant  7/26/2024-seen by sleep Center, discussed nocturnal enuresis  7/29/2024-seen by PCP, dysuria, UA unremarkable  \"nocturnal enuresis until age 18, it went away 4 years, and started to happen again in the past few months but infrequently\"    Today-  - Today patient reported that his nocturnal enuresis was resolved after prazosin was discontinued.    -He also reported occasional burning sensation in his penis when urinating, admits that he could have sensitive penis due to being uncircumcised.  He could not remember when this started, but the last episode was a few weeks ago  -No gross hematuria, no hesitancy, no straining, no frequency/urgency, sense of complete emptying, no UTIs, no fevers, chills, abdominal pain, flank pain.    -Last A1c was 6.5  - lives with a couple in their condo      Reviewed previous notes from Tam Gomez NP from family medicine service at [facility] care and from Bennett Goltz, PA-C from [facility] sleep [facility].    Exam:  /82 (BP Location: Right arm, Patient Position: Sitting, Cuff Size: Adult Regular)   Pulse 88   General: age-appropriate appearing male in NAD sitting in an exam chair  Abdomen: Degree of obesity is mild. Abdomen is soft and nontender.   : testicles normal without atrophy or masses and uncircumcised penis  Motor: excellent strength throughout  Uncircumcised    Review of Imaging:  The following imaging exams were independently viewed and interpreted by me and discussed with patient:  none    Review of Labs:  The following labs were reviewed by me and discussed with the patient:  Recent Results (from the past 720 hour(s))   CBC with Platelets & Differential    Collection Time: 08/26/24  9:32 AM   Result Value Ref Range    " WBC Count (External) 10.1 3.8 - 10.8 Thousand/uL    RBC Count (External) 4.60 4.20 - 5.80 Million/uL    Hemoglobin (External) 13.9 13.2 - 17.1 g/dL    Hematocrit (External) 43.3 38.5 - 50.0 %    MCV (External) 94.1 80.0 - 100.0 fL    MCH (External) 30.2 27.0 - 33.0 pg    MCHC (External) 32.1 32.0 - 36.0 g/dL    RDW (External) 13.2 11.0 - 15.0 %    Platelet Count (External) 208 140 - 400 Thousand/uL    Absolute Neutrophils (External) 4,636 1,500 - 7,800 cells/uL    Absolute Lymphocytes (External) 4,192 (H) 850 - 3,900 cells/uL    Absolute Monocytes (External) 596 200 - 950 cells/uL    Absolute Eosinophils (External) 616 (H) 15 - 500 cells/uL    Absolute Basophils (External) 61 0 - 200 cells/uL    % Neutrophils (External) 45.9 %    % Lymphocytes (External) 41.5 %    % Monocytes (External) 5.9 %    % Eosinophils (External) 6.1 %    % Basophils (External) 0.6 %   Hepatic function panel    Collection Time: 08/26/24  9:32 AM   Result Value Ref Range    Protein Total (External) 6.6 6.1 - 8.1 g/dL    Albumin (External) 4.1 3.6 - 5.1 g/dL    Bilirubin Total (External) 0.2 0.2 - 1.2 mg/dL    Alk Phosphatase (External) 55 36 - 130 U/L    AST (External) 20 10 - 40 U/L    ALT (External) 26 9 - 46 U/L   Basic metabolic panel    Collection Time: 08/26/24  9:32 AM   Result Value Ref Range    Glucose (External) 146 (H) 65 - 99 mg/d    Urea Nitrogen (External) 14 7 - 25 mg/dL    Creatinine (External) 0.80 0.60 - 1.26 mg/dL    GFR Estimated (External) 121 > or = 60 mL/min/1.73m2    Sodium (External) 140 135 - 146 mmol/L    Potassium (External) 3.7 3.5 - 5.3 mmol/L    Chloride (External) 105 98 - 110 mmol/L    CO2 (External) 27 20 - 32 mmol/L    Calcium (External) 9.1 8.6 - 10.3 mg/dL   Tacrolimus by Tandem Mass Spectrometry    Collection Time: 09/03/24 10:00 AM   Result Value Ref Range    Tacrolimus(FK-506) (External) 3.5 (L) 5.0 - 20.0 mcg/L         Assessment & Plan   Nocturnal enuresis    Nocturnal enuresis was resolved after  prazosin was discontinued.    2. Dysuria    UTI was ruled out.  I have low suspicion for BPH at his current age.  Bladder or kidney stones are less likely given lack of other symptoms.  No signs of infection on exam.      Follow-up as needed      Kendy Perla NP  [facility] [place]    ==========================      Additional Coding Information:    Problems:  4 -- two or more stable chronic illnesses    Data Reviewed  Review of external notes as documented above     Level of risk:  3 -- low risk (e.g., OTC medication or observation, minor surgery without risks)

## 2024-09-13 ENCOUNTER — PRE VISIT (OUTPATIENT)
Dept: UROLOGY | Facility: CLINIC | Age: 34
End: 2024-09-13

## 2024-09-13 ENCOUNTER — OFFICE VISIT (OUTPATIENT)
Dept: UROLOGY | Facility: CLINIC | Age: 34
End: 2024-09-13
Payer: MEDICARE

## 2024-09-13 VITALS — DIASTOLIC BLOOD PRESSURE: 82 MMHG | HEART RATE: 88 BPM | SYSTOLIC BLOOD PRESSURE: 128 MMHG

## 2024-09-13 DIAGNOSIS — R39.11 URINARY HESITANCY: ICD-10-CM

## 2024-09-13 DIAGNOSIS — R32 ENURESIS: ICD-10-CM

## 2024-09-13 PROCEDURE — 99203 OFFICE O/P NEW LOW 30 MIN: CPT

## 2024-09-13 NOTE — LETTER
"9/13/2024       RE: Barry Mcgrath  [address]  [facility] [address]     Dear Colleague,    Thank you for referring your patient, Barry Mcgrath, to the [facility] [place] [facility]. Please see a copy of my visit note below.    HPI:  Barry Mcgrath is a 33 year old male with history of kidney transplant, RAQUEL using CPAP, complete blindness, referred by Reynaldo Gomez being seen for nocturnal enuresis.        [facility] history-  2000s - kidney transplant  7/26/2024-seen by sleep Center, discussed nocturnal enuresis  7/29/2024-seen by PCP, dysuria, UA unremarkable  \"nocturnal enuresis until age 18, it went away 4 years, and started to happen again in the past few months but infrequently\"    Today-  - Today patient reported that his nocturnal enuresis was resolved after prazosin was discontinued.    -He also reported occasional burning sensation in his penis when urinating, admits that he could have sensitive penis due to being uncircumcised.  He could not remember when this started, but the last episode was a few weeks ago  -No gross hematuria, no hesitancy, no straining, no frequency/urgency, sense of complete emptying, no UTIs, no fevers, chills, abdominal pain, flank pain.    -Last A1c was 6.5  - lives with a couple in their condo      Reviewed previous notes from Tam Gomez NP from family medicine service at an [facility] primary care and from Bennett Goltz, PA-C from [facility] sleep [facility].    Exam:  /82 (BP Location: Right arm, Patient Position: Sitting, Cuff Size: Adult Regular)   Pulse 88   General: age-appropriate appearing male in NAD sitting in an exam chair  Abdomen: Degree of obesity is mild. Abdomen is soft and nontender.   : testicles normal without atrophy or masses and uncircumcised penis  Motor: excellent strength throughout  Uncircumcised    Review of Imaging:  The following imaging exams were " independently viewed and interpreted by me and discussed with patient:  none    Review of Labs:  The following labs were reviewed by me and discussed with the patient:  Recent Results (from the past 720 hour(s))   CBC with Platelets & Differential    Collection Time: 08/26/24  9:32 AM   Result Value Ref Range    WBC Count (External) 10.1 3.8 - 10.8 Thousand/uL    RBC Count (External) 4.60 4.20 - 5.80 Million/uL    Hemoglobin (External) 13.9 13.2 - 17.1 g/dL    Hematocrit (External) 43.3 38.5 - 50.0 %    MCV (External) 94.1 80.0 - 100.0 fL    MCH (External) 30.2 27.0 - 33.0 pg    MCHC (External) 32.1 32.0 - 36.0 g/dL    RDW (External) 13.2 11.0 - 15.0 %    Platelet Count (External) 208 140 - 400 Thousand/uL    Absolute Neutrophils (External) 4,636 1,500 - 7,800 cells/uL    Absolute Lymphocytes (External) 4,192 (H) 850 - 3,900 cells/uL    Absolute Monocytes (External) 596 200 - 950 cells/uL    Absolute Eosinophils (External) 616 (H) 15 - 500 cells/uL    Absolute Basophils (External) 61 0 - 200 cells/uL    % Neutrophils (External) 45.9 %    % Lymphocytes (External) 41.5 %    % Monocytes (External) 5.9 %    % Eosinophils (External) 6.1 %    % Basophils (External) 0.6 %   Hepatic function panel    Collection Time: 08/26/24  9:32 AM   Result Value Ref Range    Protein Total (External) 6.6 6.1 - 8.1 g/dL    Albumin (External) 4.1 3.6 - 5.1 g/dL    Bilirubin Total (External) 0.2 0.2 - 1.2 mg/dL    Alk Phosphatase (External) 55 36 - 130 U/L    AST (External) 20 10 - 40 U/L    ALT (External) 26 9 - 46 U/L   Basic metabolic panel    Collection Time: 08/26/24  9:32 AM   Result Value Ref Range    Glucose (External) 146 (H) 65 - 99 mg/d    Urea Nitrogen (External) 14 7 - 25 mg/dL    Creatinine (External) 0.80 0.60 - 1.26 mg/dL    GFR Estimated (External) 121 > or = 60 mL/min/1.73m2    Sodium (External) 140 135 - 146 mmol/L    Potassium (External) 3.7 3.5 - 5.3 mmol/L    Chloride (External) 105 98 - 110 mmol/L    CO2 (External) 27  20 - 32 mmol/L    Calcium (External) 9.1 8.6 - 10.3 mg/dL   Tacrolimus by Tandem Mass Spectrometry    Collection Time: 09/03/24 10:00 AM   Result Value Ref Range    Tacrolimus(FK-506) (External) 3.5 (L) 5.0 - 20.0 mcg/L         Assessment & Plan  Nocturnal enuresis    Nocturnal enuresis was resolved after prazosin was discontinued.    2. Dysuria    UTI was ruled out.  I have low suspicion for BPH at his current age.  Bladder or kidney stones are less likely given lack of other symptoms.  No signs of infection on exam.      Follow-up as needed      Kendy Perla NP  [facility] [place]    ==========================      Additional Coding Information:    Problems:  4 -- two or more stable chronic illnesses    Data Reviewed  Review of external notes as documented above     Level of risk:  3 -- low risk (e.g., OTC medication or observation, minor surgery without risks)                  Chief Complaint   Patient presents with     Consult     Bed wetting  Burning while urinating  Thinks issues have resolved       Blood pressure 128/82, pulse 88. There is no height or weight on file to calculate BMI.    Patient Active Problem List   Diagnosis     HTN, kidney transplant related     RAQUEL (obstructive sleep apnea)     Bipolar 1 disorder (H)     Immunosuppression (H24)     Hidradenitis suppurativa     Kidney replaced by transplant     Type 2 diabetes mellitus without complication, without long-term current use of insulin (H)     Aftercare following organ transplant       Allergies   Allergen Reactions     Seasonal Allergies Other (See Comments)     hamster hair     Cats      Itching, nasal congestion     Dogs      Itching, nasal congestion     No Known Allergies        Current Outpatient Medications   Medication Sig Dispense Refill     cetirizine (ZYRTEC) 10 MG tablet Take 1 tablet (10 mg) by mouth daily as needed for allergies       chlorhexidine (HIBICLENS) 4 % liquid Apply topically daily as needed  for wound care 473 mL 11     cholecalciferol (VITAMIN D3) 25 mcg (1000 units) capsule Take 1 capsule (25 mcg) by mouth daily       clindamycin (CLEOCIN T) 1 % external lotion Apply thin layer to armpits, chest, groin, and buttocks once daily as needed 60 mL 11     divalproex sodium extended-release (DEPAKOTE ER) 500 MG 24 hr tablet Take 5 tablets (2,500 mg) by mouth daily  0     lisinopril (PRINIVIL/ZESTRIL) 10 MG tablet Take 10 mg by mouth daily  5     loperamide (IMODIUM A-D) 2 MG tablet Take 1 tablet (2 mg) by mouth 4 times daily as needed for diarrhea 30 tablet 0     LYBALVI 20-10 MG TABS tablet        melatonin 1 MG TABS tablet Take 1 tab at 6 PM 90 tablet 2     metFORMIN (GLUCOPHAGE-XR) 500 MG 24 hr tablet Take 1,000 mg by mouth 2 times daily       mycophenolate (GENERIC EQUIVALENT) 250 MG capsule Take 5 capsules (1,250 mg) by mouth 2 times daily 300 capsule 4     predniSONE (DELTASONE) 5 MG tablet Take 1 tablet (5 mg) by mouth daily 30 tablet 4     rosuvastatin (CRESTOR) 10 MG tablet Take 0.5 tablets (5 mg) by mouth daily       tacrolimus (GENERIC) 1 MG capsule Take 1 capsule (1 mg) by mouth 2 times daily TOTAL DOSE: 1 mg twice daily. 60 capsule 11     prazosin (MINIPRESS) 1 MG capsule Take 2 capsules (2 mg) by mouth At Bedtime (Patient not taking: Reported on 9/13/2024) 60 capsule 0       Social History     Tobacco Use     Smoking status: Never     Passive exposure: Never     Smokeless tobacco: Never   Vaping Use     Vaping status: Never Used   Substance Use Topics     Alcohol use: No     Drug use: No       Juwan Kayezagatha  9/13/2024  9:13 AM         Again, thank you for allowing me to participate in the care of your patient.      Sincerely,    Kendy Perla NP

## 2024-09-13 NOTE — PROGRESS NOTES
Chief Complaint   Patient presents with    Consult     Bed wetting  Burning while urinating  Thinks issues have resolved       Blood pressure 128/82, pulse 88. There is no height or weight on file to calculate BMI.    Patient Active Problem List   Diagnosis    HTN, kidney transplant related    RAQUEL (obstructive sleep apnea)    Bipolar 1 disorder (H)    Immunosuppression (H24)    Hidradenitis suppurativa    Kidney replaced by transplant    Type 2 diabetes mellitus without complication, without long-term current use of insulin (H)    Aftercare following organ transplant       Allergies   Allergen Reactions    Seasonal Allergies Other (See Comments)     hamster hair    Cats      Itching, nasal congestion    Dogs      Itching, nasal congestion    No Known Allergies        Current Outpatient Medications   Medication Sig Dispense Refill    cetirizine (ZYRTEC) 10 MG tablet Take 1 tablet (10 mg) by mouth daily as needed for allergies      chlorhexidine (HIBICLENS) 4 % liquid Apply topically daily as needed for wound care 473 mL 11    cholecalciferol (VITAMIN D3) 25 mcg (1000 units) capsule Take 1 capsule (25 mcg) by mouth daily      clindamycin (CLEOCIN T) 1 % external lotion Apply thin layer to armpits, chest, groin, and buttocks once daily as needed 60 mL 11    divalproex sodium extended-release (DEPAKOTE ER) 500 MG 24 hr tablet Take 5 tablets (2,500 mg) by mouth daily  0    lisinopril (PRINIVIL/ZESTRIL) 10 MG tablet Take 10 mg by mouth daily  5    loperamide (IMODIUM A-D) 2 MG tablet Take 1 tablet (2 mg) by mouth 4 times daily as needed for diarrhea 30 tablet 0    LYBALVI 20-10 MG TABS tablet       melatonin 1 MG TABS tablet Take 1 tab at 6 PM 90 tablet 2    metFORMIN (GLUCOPHAGE-XR) 500 MG 24 hr tablet Take 1,000 mg by mouth 2 times daily      mycophenolate (GENERIC EQUIVALENT) 250 MG capsule Take 5 capsules (1,250 mg) by mouth 2 times daily 300 capsule 4    predniSONE (DELTASONE) 5 MG tablet Take 1 tablet (5 mg) by  mouth daily 30 tablet 4    rosuvastatin (CRESTOR) 10 MG tablet Take 0.5 tablets (5 mg) by mouth daily      tacrolimus (GENERIC) 1 MG capsule Take 1 capsule (1 mg) by mouth 2 times daily TOTAL DOSE: 1 mg twice daily. 60 capsule 11    prazosin (MINIPRESS) 1 MG capsule Take 2 capsules (2 mg) by mouth At Bedtime (Patient not taking: Reported on 9/13/2024) 60 capsule 0       Social History     Tobacco Use    Smoking status: Never     Passive exposure: Never    Smokeless tobacco: Never   Vaping Use    Vaping status: Never Used   Substance Use Topics    Alcohol use: No    Drug use: No       Juwan Mena  9/13/2024  9:13 AM

## 2024-09-20 ENCOUNTER — TELEPHONE (OUTPATIENT)
Dept: SCHEDULING | Facility: CLINIC | Age: 34
End: 2024-09-20
Payer: MEDICARE

## 2024-09-20 NOTE — TELEPHONE ENCOUNTER
General Call    Contacts       Contact Date/Time Type Contact Phone/Fax    09/20/2024 02:44 PM CDT Phone (Incoming) Ramila Barry HERNANDEZ (Self) [phone] (M)          Reason for Call:  PT would like to speak with Goltz.     What are your questions or concerns:  PT would like to update Dr. Goltz that he has gained some weight, is taking caffeine pills all the time to stay awake and he is going to school and falling asleep in class. Pt reports sleeping 12+ hours and still feeling exhausted during the day. He needs something to change and he is struggling with the sleep. No openings for a long time with Goltz so pt would like to hear back from someone asap and get an appt with John ocasio.     Date of last appointment with provider: 7/26/2024    Could we send this information to you in [employer] or would you prefer to receive a phone call?:   Patient would prefer a phone call   Okay to leave a detailed message?: Yes at Cell number on file:    Telephone Information:   Mobile [phone]

## 2024-10-05 ENCOUNTER — TELEPHONE (OUTPATIENT)
Dept: TRANSPLANT | Facility: CLINIC | Age: 34
End: 2024-10-05
Payer: MEDICARE

## 2024-10-05 NOTE — TELEPHONE ENCOUNTER
Barry called to report that he has a cold. He is covid negative. No fevers.    He thought he had an appt this week and wondered how to proceed.    Pt's appt is 10/22.     Pt will call with any changes.

## 2024-10-07 ENCOUNTER — TELEPHONE (OUTPATIENT)
Dept: TRANSPLANT | Facility: CLINIC | Age: 34
End: 2024-10-07

## 2024-10-07 NOTE — TELEPHONE ENCOUNTER
Pt's PCP office called stated they will fax over his tacrolimus result 7.2 from 10/1/24 writer gave the fax number.

## 2024-10-22 ENCOUNTER — OFFICE VISIT (OUTPATIENT)
Dept: TRANSPLANT | Facility: CLINIC | Age: 34
End: 2024-10-22
Attending: INTERNAL MEDICINE
Payer: MEDICARE

## 2024-10-22 VITALS
HEART RATE: 76 BPM | DIASTOLIC BLOOD PRESSURE: 71 MMHG | OXYGEN SATURATION: 96 % | WEIGHT: 238.3 LBS | SYSTOLIC BLOOD PRESSURE: 115 MMHG | BODY MASS INDEX: 33.71 KG/M2

## 2024-10-22 DIAGNOSIS — Z94.0 HTN, KIDNEY TRANSPLANT RELATED: ICD-10-CM

## 2024-10-22 DIAGNOSIS — I15.1 HTN, KIDNEY TRANSPLANT RELATED: ICD-10-CM

## 2024-10-22 DIAGNOSIS — Z94.0 KIDNEY REPLACED BY TRANSPLANT: Primary | ICD-10-CM

## 2024-10-22 DIAGNOSIS — D84.9 IMMUNOSUPPRESSED STATUS (H): ICD-10-CM

## 2024-10-22 DIAGNOSIS — E55.9 VITAMIN D DEFICIENCY: ICD-10-CM

## 2024-10-22 DIAGNOSIS — Z48.298 AFTERCARE FOLLOWING ORGAN TRANSPLANT: ICD-10-CM

## 2024-10-22 DIAGNOSIS — Z94.0 KIDNEY TRANSPLANTED: ICD-10-CM

## 2024-10-22 PROCEDURE — G2211 COMPLEX E/M VISIT ADD ON: HCPCS | Performed by: INTERNAL MEDICINE

## 2024-10-22 PROCEDURE — 99215 OFFICE O/P EST HI 40 MIN: CPT | Performed by: INTERNAL MEDICINE

## 2024-10-22 PROCEDURE — G0463 HOSPITAL OUTPT CLINIC VISIT: HCPCS | Performed by: INTERNAL MEDICINE

## 2024-10-22 ASSESSMENT — PAIN SCALES - GENERAL: PAINLEVEL: NO PAIN (0)

## 2024-10-22 NOTE — LETTER
10/22/2024      RE: Barry Mcgrath  [address]  [facility] [address]       TRANSPLANT NEPHROLOGY CLINIC VISIT     Assessment & Plan  # DDKT: CKD Stage 1 - Stable   - Baseline Creatinine: ~ 0.7-0.9   - Proteinuria: Minimal (0.2-0.5 grams)   - DSA Hx: Unknown due to being transplanted at another Transplant Center   - Last cPRA: ??%   - BK Viremia: Not checked recently due to time from transplant   - Kidney Tx Biopsy Hx: No biopsy history.    # Immunosuppression: Tacrolimus immediate release (goal 4-6), Mycophenolate mofetil (dose 1250 mg every 12 hours), and Prednisone (dose 5 mg daily)   - Induction with Recent Transplant:  Not known due to time from transplant   - Continue with intensive monitoring of immunosuppression for efficacy and toxicity.   - Historical Changes in Immunosuppression:  On higher mycophenolate dose due to low MPA levels.   - Changes: Not at this time; Will repeat MPA level and consider slightly decreasing mycophenolic acid dose.    # Infection Prevention:      Last CD4 Level: Not checked  - PJP: None      - CMV IgG Ab High Risk Discordance (D+/R-): Unknown  CMV Serostatus: Unknown  - EBV IgG Ab High Risk Discordance (D+/R-): Unknown  EBV Serostatus: Unknown    # Hypertension: Controlled;  Goal BP: < 130/80   - Changes: No    # Diabetes: Borderline control (HbA1c 7-9%) Last HbA1c: 7.2%    # Mineral Bone Disorder:    - Vitamin D; level: Not checked recently        On supplement: Yes  - Calcium; level: Normal        On supplement: No    # Electrolytes:   - Potassium; level: Normal        On supplement: No  - Bicarbonate; level: Normal        On supplement: No    # Obesity, Class I (BMI = 33.7): Weight has been stable.   - Recommend weight loss for overall health by increasing exercise and watching caloric intake.   - Patient may benefit from GLP1 agonists or SGLT2 inhibitors.    # Eosinophilia: Unclear etiology, but has been moderately elevated over the last few years, especially  increased over the last 5-6 months.   - Will repeat CBC with differential and check peripheral smear and vitamin B12 and tryptase levels.   - Would consider referral to Transplant ID and/or Hematology if remains elevated.    # Other Significant PMH:   - RAQUEL on CPAP: Patient has been compliant.   - Hidradenitis Suppurativa: Patient with recurrent episodes requiring I&D of abscesses.  Has been prescribed clindamycin daily as needed and hibiclens body wash.  Followed by Dermatology.   - Bipolar Disorder/PTSD: Stable mood lately, but has had suicidal ideation in the past.  Followed by Psychiatry and Psychologist.     # Skin Cancer Risk:    - Discussed sun protection and recommend regular follow up with Dermatology.    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    Return visit: Return in about 1 year (around 10/22/2025).    Nghia Brothers MD    I spent a total of 45 minutes on the date of the encounter doing chart review, performing a history and physical exam, completing documentation and any further activities as noted above.    The longitudinal plan of care for the diagnosis(es)/condition(s) as documented were addressed during this visit. Due to the added complexity in care, I will continue to support Barry in the subsequent management and with ongoing continuity of care.      Chief Complaint  Mr. Mcgrath is a 34 year old here for kidney transplant and immunosuppression management.     History of Present Illness   Mr. Mcgrath reports feeling good overall.  Since last clinic visit:   Hospitalizations: No   New Medical Issues: No  Chest pain or shortness of breath: No  Lower extremity swelling: No  Weight change: No  Nausea and vomiting: No He  does report a hyperactive gag reflex at times with rare vomiting.  Diarrhea: Yes; Rare loose stools  Heartburn symptoms: No  Fever, sweats or chills: No  Urinary complaints: No    Home BP: Doesn't know    Problem List  Patient Active Problem List   Diagnosis     HTN, kidney transplant related     RAQUEL on CPAP     Bipolar 1 disorder (H)     Immunosuppressed status (H)     Hidradenitis suppurativa     Kidney replaced by transplant     Type 2 diabetes mellitus without complication, without long-term current use of insulin (H)     Aftercare following organ transplant     Vitamin D deficiency     Obesity       Allergies  Allergies   Allergen Reactions     Seasonal Allergies Other (See Comments)     hamster hair     Cats      Itching, nasal congestion     Dogs      Itching, nasal congestion     No Known Allergies        Medications  Current Outpatient Medications   Medication Sig Dispense Refill     cetirizine (ZYRTEC) 10 MG tablet Take 1 tablet (10 mg) by mouth daily as needed for allergies       chlorhexidine (HIBICLENS) 4 % liquid Apply topically daily as needed for wound care 473 mL 11     cholecalciferol (VITAMIN D3) 25 mcg (1000 units) capsule Take 1 capsule (25 mcg) by mouth daily       clindamycin (CLEOCIN T) 1 % external lotion Apply thin layer to armpits, chest, groin, and buttocks once daily as needed 60 mL 11     lisinopril (PRINIVIL/ZESTRIL) 10 MG tablet Take 10 mg by mouth daily  5     LYBALVI 20-10 MG TABS tablet        metFORMIN (GLUCOPHAGE-XR) 500 MG 24 hr tablet Take 1,000 mg by mouth 2 times daily       mycophenolate (GENERIC EQUIVALENT) 250 MG capsule Take 5 capsules (1,250 mg) by mouth 2 times daily 300 capsule 4     predniSONE (DELTASONE) 5 MG tablet Take 1 tablet (5 mg) by mouth daily 30 tablet 4     rosuvastatin (CRESTOR) 10 MG tablet Take 0.5 tablets (5 mg) by mouth daily       tacrolimus (GENERIC) 1 MG capsule Take 1 capsule (1 mg) by mouth 2 times daily TOTAL DOSE: 1 mg twice daily. 60 capsule 11      divalproex sodium extended-release (DEPAKOTE ER) 500 MG 24 hr tablet Take 5 tablets (2,500 mg) by mouth daily  0     No current facility-administered medications for this visit.     Medications Discontinued During This Encounter   Medication Reason     loperamide (IMODIUM A-D) 2 MG tablet      prazosin (MINIPRESS) 1 MG capsule      melatonin 1 MG TABS tablet        Physical Exam  Vital Signs: /71 (BP Location: Right arm, Patient Position: Sitting, Cuff Size: Adult Regular)   Pulse 76   Wt 108.1 kg (238 lb 4.8 oz)   SpO2 96%   BMI 33.71 kg/m      GENERAL APPEARANCE: alert and no distress  HENT: mouth without ulcers or lesions  RESP: lungs clear to auscultation - no rales, rhonchi or wheezes  CV: regular rhythm, normal rate, no rub, no murmur  EDEMA: no LE edema bilaterally  ABDOMEN: soft, nondistended, nontender, bowel sounds normal, obese  MS: extremities normal - no gross deformities noted, no evidence of inflammation in joints, no muscle tenderness  SKIN: no rash  TX KIDNEY: normal  DIALYSIS ACCESS: none    Data        Latest Ref Rng & Units 10/15/2024    10:41 AM 8/26/2024     9:32 AM 5/29/2024     9:29 AM   Renal   Na (external) 135 - 146 mmol/L 141     140     140       K (external) 3.5 - 5.3 mmol/L 3.8     3.7     3.7       Cl 98 - 110 mmol/L 103     105     104       Cl (external) 98 - 110 mmol/L 103     105     104       CO2 (external) 20 - 32 mmol/L 29     27     26       BUN (external) 7 - 25 mg/dL 12     14     13       Cr (external) 0.60 - 1.26 mg/dL 0.81     0.80     0.91       Glucose (external) 65 - 99 mg/dL 170     146     142       Ca (external) 8.6 - 10.3 mg/dL 8.8     9.1     8.9           This result is from an external source.         Latest Ref Rng & Units 1/26/2023     1:29 PM 10/8/2021     9:40 AM 6/18/2020     2:30 PM   Bone Health   Vit D Def 30 - 80 ug/L  37     Vit D Def (external) 30 - 100 ng/ml 38      56           This result is from an external source.         Latest Ref  Rng & Units 10/15/2024    10:41 AM 8/26/2024     9:32 AM 5/29/2024     9:29 AM   Heme   WBC (external) 3.8 - 10.8 Thousand/uL 10.9     10.1     8.8       Hgb (external) 13.2 - 17.1 g/dL 14.6     13.9     14.6       Plt (external) 140 - 400 Thousand/uL 229     208     203       ABSOLUTE NEUTROPHILS (EXTERNAL) 1,500 - 7,800 cells/uL 4,829     4,636     3,687       ABSOLUTE LYMPHOCYTES (EXTERNAL) 850 - 3,900 cells/uL 4,120     4,192     3,247       ABSOLUTE MONOCYTES (EXTERNAL) 200 - 950 cells/uL 578     596     642       ABSOLUTE EOSINOPHILS (EXTERNAL) 15 - 500 cells/uL 1,297     616     1,144       ABSOLUTE BASOPHILS (EXTERNAL) 0 - 200 cells/uL 76     61     79           This result is from an external source.         Latest Ref Rng & Units 8/26/2024     9:32 AM 5/10/2023     9:55 AM 1/26/2023     1:29 PM   Liver   AP (external) 36 - 130 U/L 55     66     66       TBili (external) 0.2 - 1.2 mg/dL 0.2     0.4     0.3       ALT (external) 9 - 46 U/L 26     38     30       AST (external) 10 - 40 U/L 20     29     23       Tot Protein (external) 6.1 - 8.1 g/dL 6.6     7.0     7.3       Albumin (external) 3.6 - 5.1 g/dL 4.1     4.2     4.5           This result is from an external source.         Latest Ref Rng & Units 1/26/2023    12:00 AM 3/30/2022    10:00 AM 4/29/2021     8:30 AM   Pancreas   A1C <=5.6 %   5.8    A1C (external) <5.7 % 5.7     6.6  5.8           This result is from an external source.         Latest Ref Rng & Units 4/29/2021    10:15 AM 6/18/2020     2:30 PM 6/1/2018     3:36 PM   Iron studies   Iron 42 - 175 ug/dL 56      Iron (external) 7 - 60 U/L   21       Ferritin (external) 8 - 388 ng/mL  123            This result is from an external source.        Failed to redirect to the Timeline version of the REVFS SmartLink.  Recent Labs   Lab Test 07/15/19  0915 03/29/21  0850 05/28/21  0855 10/08/21  0940 11/29/21  0910   DOSTAC Not Provided  --   --  10/7/2021 11/28/2021   TACROL 5.9   < > 4.1* 5.4 5.8     < > = values in this interval not displayed.            Nghia Brothers MD

## 2024-10-22 NOTE — LETTER
10/22/2024      Barry Mcgrath  [address]  [facility] [address]      Dear Colleague,    Thank you for referring your patient, Barry Mcgrath, to the [facility]PL[facility]. Please see a copy of my visit note below.    TRANSPLANT NEPHROLOGY CLINIC VISIT     Assessment & Plan  # DDKT: CKD Stage 1 - Stable   - Baseline Creatinine: ~ 0.7-0.9   - Proteinuria: Minimal (0.2-0.5 grams)   - DSA Hx: Unknown due to being transplanted at another Transplant Center   - Last cPRA: ??%   - BK Viremia: Not checked recently due to time from transplant   - Kidney Tx Biopsy Hx: No biopsy history.    # Immunosuppression: Tacrolimus immediate release (goal 4-6), Mycophenolate mofetil (dose 1250 mg every 12 hours), and Prednisone (dose 5 mg daily)   - Induction with Recent Transplant:  Not known due to time from transplant   - Continue with intensive monitoring of immunosuppression for efficacy and toxicity.   - Historical Changes in Immunosuppression:  On higher mycophenolate dose due to low MPA levels.   - Changes: Not at this time; Will repeat MPA level and consider slightly decreasing mycophenolic acid dose.    # Infection Prevention:      Last CD4 Level: Not checked  - PJP: None      - CMV IgG Ab High Risk Discordance (D+/R-): Unknown  CMV Serostatus: Unknown  - EBV IgG Ab High Risk Discordance (D+/R-): Unknown  EBV Serostatus: Unknown    # Hypertension: Controlled;  Goal BP: < 130/80   - Changes: No    # Diabetes: Borderline control (HbA1c 7-9%) Last HbA1c: 7.2%    # Mineral Bone Disorder:    - Vitamin D; level: Not checked recently        On supplement: Yes  - Calcium; level: Normal        On supplement: No    # Electrolytes:   - Potassium; level: Normal        On supplement: No  - Bicarbonate; level: Normal        On supplement: No    # Obesity, Class I (BMI = 33.7): Weight has been stable.   - Recommend weight loss for overall health by increasing exercise and watching caloric intake.   - Patient may  benefit from GLP1 agonists or SGLT2 inhibitors.    # Eosinophilia: Unclear etiology, but has been moderately elevated over the last few years, especially increased over the last 5-6 months.   - Will repeat CBC with differential and check peripheral smear and vitamin B12 and tryptase levels.   - Would consider referral to Transplant ID and/or Hematology if remains elevated.    # Other Significant PMH:   - RAQUEL on CPAP: Patient has been compliant.   - Hidradenitis Suppurativa: Patient with recurrent episodes requiring I&D of abscesses.  Has been prescribed clindamycin daily as needed and hibiclens body wash.  Followed by Dermatology.   - Bipolar Disorder/PTSD: Stable mood lately, but has had suicidal ideation in the past.  Followed by Psychiatry and Psychologist.     # Skin Cancer Risk:    - Discussed sun protection and recommend regular follow up with Dermatology.    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    Return visit: Return in about 1 year (around 10/22/2025).    Nghia Brothers MD    I spent a total of 45 minutes on the date of the encounter doing chart review, performing a history and physical exam, completing documentation and any further activities as noted above.    The longitudinal plan of care for the diagnosis(es)/condition(s) as documented were addressed during this visit. Due to the added complexity in care, I will continue to support Barry in the subsequent management and with ongoing continuity of care.      Chief Complaint  Mr. Mcgrath is a 34 year old here for kidney transplant and immunosuppression management.     History of Present Illness   Mr. Mcgrath reports feeling good overall.  Since last clinic  visit:   Hospitalizations: No   New Medical Issues: No  Chest pain or shortness of breath: No  Lower extremity swelling: No  Weight change: No  Nausea and vomiting: No He does report a hyperactive gag reflex at times with rare vomiting.  Diarrhea: Yes; Rare loose stools  Heartburn symptoms: No  Fever, sweats or chills: No  Urinary complaints: No    Home BP: Doesn't know    Problem List  Patient Active Problem List   Diagnosis     HTN, kidney transplant related     RAQUEL on CPAP     Bipolar 1 disorder (H)     Immunosuppressed status (H)     Hidradenitis suppurativa     Kidney replaced by transplant     Type 2 diabetes mellitus without complication, without long-term current use of insulin (H)     Aftercare following organ transplant     Vitamin D deficiency     Obesity       Allergies  Allergies   Allergen Reactions     Seasonal Allergies Other (See Comments)     hamster hair     Cats      Itching, nasal congestion     Dogs      Itching, nasal congestion     No Known Allergies        Medications  Current Outpatient Medications   Medication Sig Dispense Refill     cetirizine (ZYRTEC) 10 MG tablet Take 1 tablet (10 mg) by mouth daily as needed for allergies       chlorhexidine (HIBICLENS) 4 % liquid Apply topically daily as needed for wound care 473 mL 11     cholecalciferol (VITAMIN D3) 25 mcg (1000 units) capsule Take 1 capsule (25 mcg) by mouth daily       clindamycin (CLEOCIN T) 1 % external lotion Apply thin layer to armpits, chest, groin, and buttocks once daily as needed 60 mL 11     lisinopril (PRINIVIL/ZESTRIL) 10 MG tablet Take 10 mg by mouth daily  5     LYBALVI 20-10 MG TABS tablet        metFORMIN (GLUCOPHAGE-XR) 500 MG 24 hr tablet Take 1,000 mg by mouth 2 times daily       mycophenolate (GENERIC EQUIVALENT) 250 MG capsule Take 5 capsules (1,250 mg) by mouth 2 times daily 300 capsule 4     predniSONE (DELTASONE) 5 MG tablet Take 1 tablet (5 mg) by mouth daily 30 tablet 4     rosuvastatin (CRESTOR) 10 MG  tablet Take 0.5 tablets (5 mg) by mouth daily       tacrolimus (GENERIC) 1 MG capsule Take 1 capsule (1 mg) by mouth 2 times daily TOTAL DOSE: 1 mg twice daily. 60 capsule 11     divalproex sodium extended-release (DEPAKOTE ER) 500 MG 24 hr tablet Take 5 tablets (2,500 mg) by mouth daily  0     No current facility-administered medications for this visit.     Medications Discontinued During This Encounter   Medication Reason     loperamide (IMODIUM A-D) 2 MG tablet      prazosin (MINIPRESS) 1 MG capsule      melatonin 1 MG TABS tablet        Physical Exam  Vital Signs: /71 (BP Location: Right arm, Patient Position: Sitting, Cuff Size: Adult Regular)   Pulse 76   Wt 108.1 kg (238 lb 4.8 oz)   SpO2 96%   BMI 33.71 kg/m      GENERAL APPEARANCE: alert and no distress  HENT: mouth without ulcers or lesions  RESP: lungs clear to auscultation - no rales, rhonchi or wheezes  CV: regular rhythm, normal rate, no rub, no murmur  EDEMA: no LE edema bilaterally  ABDOMEN: soft, nondistended, nontender, bowel sounds normal, obese  MS: extremities normal - no gross deformities noted, no evidence of inflammation in joints, no muscle tenderness  SKIN: no rash  TX KIDNEY: normal  DIALYSIS ACCESS: none    Data        Latest Ref Rng & Units 10/15/2024    10:41 AM 8/26/2024     9:32 AM 5/29/2024     9:29 AM   Renal   Na (external) 135 - 146 mmol/L 141     140     140       K (external) 3.5 - 5.3 mmol/L 3.8     3.7     3.7       Cl 98 - 110 mmol/L 103     105     104       Cl (external) 98 - 110 mmol/L 103     105     104       CO2 (external) 20 - 32 mmol/L 29     27     26       BUN (external) 7 - 25 mg/dL 12     14     13       Cr (external) 0.60 - 1.26 mg/dL 0.81     0.80     0.91       Glucose (external) 65 - 99 mg/dL 170     146     142       Ca (external) 8.6 - 10.3 mg/dL 8.8     9.1     8.9           This result is from an external source.         Latest Ref Rng & Units 1/26/2023     1:29 PM 10/8/2021     9:40 AM 6/18/2020      2:30 PM   Bone Health   Vit D Def 30 - 80 ug/L  37     Vit D Def (external) 30 - 100 ng/ml 38      56           This result is from an external source.         Latest Ref Rng & Units 10/15/2024    10:41 AM 8/26/2024     9:32 AM 5/29/2024     9:29 AM   Heme   WBC (external) 3.8 - 10.8 Thousand/uL 10.9     10.1     8.8       Hgb (external) 13.2 - 17.1 g/dL 14.6     13.9     14.6       Plt (external) 140 - 400 Thousand/uL 229     208     203       ABSOLUTE NEUTROPHILS (EXTERNAL) 1,500 - 7,800 cells/uL 4,829     4,636     3,687       ABSOLUTE LYMPHOCYTES (EXTERNAL) 850 - 3,900 cells/uL 4,120     4,192     3,247       ABSOLUTE MONOCYTES (EXTERNAL) 200 - 950 cells/uL 578     596     642       ABSOLUTE EOSINOPHILS (EXTERNAL) 15 - 500 cells/uL 1,297     616     1,144       ABSOLUTE BASOPHILS (EXTERNAL) 0 - 200 cells/uL 76     61     79           This result is from an external source.         Latest Ref Rng & Units 8/26/2024     9:32 AM 5/10/2023     9:55 AM 1/26/2023     1:29 PM   Liver   AP (external) 36 - 130 U/L 55     66     66       TBili (external) 0.2 - 1.2 mg/dL 0.2     0.4     0.3       ALT (external) 9 - 46 U/L 26     38     30       AST (external) 10 - 40 U/L 20     29     23       Tot Protein (external) 6.1 - 8.1 g/dL 6.6     7.0     7.3       Albumin (external) 3.6 - 5.1 g/dL 4.1     4.2     4.5           This result is from an external source.         Latest Ref Rng & Units 1/26/2023    12:00 AM 3/30/2022    10:00 AM 4/29/2021     8:30 AM   Pancreas   A1C <=5.6 %   5.8    A1C (external) <5.7 % 5.7     6.6  5.8           This result is from an external source.         Latest Ref Rng & Units 4/29/2021    10:15 AM 6/18/2020     2:30 PM 6/1/2018     3:36 PM   Iron studies   Iron 42 - 175 ug/dL 56      Iron (external) 7 - 60 U/L   21       Ferritin (external) 8 - 388 ng/mL  123            This result is from an external source.        Failed to redirect to the Timeline version of the REVFS SmartLink.  Recent  Labs   Lab Test 07/15/19  0915 03/29/21  0850 05/28/21  0855 10/08/21  0940 11/29/21  0910   DOSTAC Not Provided  --   --  10/7/2021 11/28/2021   TACROL 5.9   < > 4.1* 5.4 5.8    < > = values in this interval not displayed.            Again, thank you for allowing me to participate in the care of your patient.        Sincerely,        Nghia Brothers MD

## 2024-10-22 NOTE — PATIENT INSTRUCTIONS
Patient Recommendations:  - No new recommendations at this time.    Transplant Patient Information  Your Post Transplant Coordinator is: Rowan Webber  For non urgent items, we encourage you to contact your coordinator/care team online via [employer]  You and your care team can also contact your transplant coordinator Monday - Friday, 8am - 5pm at [phone] (Option 2 to reach the coordinator or Option 4 to schedule an appointment).  After hours for urgent matters, please call [facility] at [phone].

## 2024-10-22 NOTE — NURSING NOTE
Chief Complaint   Patient presents with    RECHECK     Follow up.      Vitals:    10/22/24 1552   BP: 115/71   BP Location: Right arm   Patient Position: Sitting   Cuff Size: Adult Regular   Pulse: 76   SpO2: 96%   Weight: 108.1 kg (238 lb 4.8 oz)       BP Readings from Last 3 Encounters:   10/22/24 115/71   09/13/24 128/82   10/10/23 126/74       /71 (BP Location: Right arm, Patient Position: Sitting, Cuff Size: Adult Regular)   Pulse 76   Wt 108.1 kg (238 lb 4.8 oz)   SpO2 96%   BMI 33.71 kg/m       Sunshine Heymans

## 2024-10-25 PROBLEM — E55.9 VITAMIN D DEFICIENCY: Status: ACTIVE | Noted: 2024-10-25

## 2024-10-25 PROBLEM — E66.9 OBESITY: Status: ACTIVE | Noted: 2024-10-25

## 2024-10-25 NOTE — PROGRESS NOTES
TRANSPLANT NEPHROLOGY CLINIC VISIT     Assessment & Plan   # DDKT: CKD Stage 1 - Stable   - Baseline Creatinine: ~ 0.7-0.9   - Proteinuria: Minimal (0.2-0.5 grams)   - DSA Hx: Unknown due to being transplanted at another Transplant Center   - Last cPRA: ??%   - BK Viremia: Not checked recently due to time from transplant   - Kidney Tx Biopsy Hx: No biopsy history.    # Immunosuppression: Tacrolimus immediate release (goal 4-6), Mycophenolate mofetil (dose 1250 mg every 12 hours), and Prednisone (dose 5 mg daily)   - Induction with Recent Transplant:  Not known due to time from transplant   - Continue with intensive monitoring of immunosuppression for efficacy and toxicity.   - Historical Changes in Immunosuppression:  On higher mycophenolate dose due to low MPA levels.   - Changes: Not at this time; Will repeat MPA level and consider slightly decreasing mycophenolic acid dose.    # Infection Prevention:      Last CD4 Level: Not checked  - PJP: None      - CMV IgG Ab High Risk Discordance (D+/R-): Unknown  CMV Serostatus: Unknown  - EBV IgG Ab High Risk Discordance (D+/R-): Unknown  EBV Serostatus: Unknown    # Hypertension: Controlled;  Goal BP: < 130/80   - Changes: No    # Diabetes: Borderline control (HbA1c 7-9%) Last HbA1c: 7.2%    # Mineral Bone Disorder:    - Vitamin D; level: Not checked recently        On supplement: Yes  - Calcium; level: Normal        On supplement: No    # Electrolytes:   - Potassium; level: Normal        On supplement: No  - Bicarbonate; level: Normal        On supplement: No    # Obesity, Class I (BMI = 33.7): Weight has been stable.   - Recommend weight loss for overall health by increasing exercise and watching caloric intake.   - Patient may benefit from GLP1 agonists or SGLT2 inhibitors.    # Eosinophilia: Unclear etiology, but has been moderately elevated over the last few years, especially increased over the last 5-6 months.   - Will repeat CBC with differential and check  peripheral smear and vitamin B12 and tryptase levels.   - Would consider referral to Transplant ID and/or Hematology if remains elevated.    # Other Significant PMH:   - RAQUEL on CPAP: Patient has been compliant.   - Hidradenitis Suppurativa: Patient with recurrent episodes requiring I&D of abscesses.  Has been prescribed clindamycin daily as needed and hibiclens body wash.  Followed by Dermatology.   - Bipolar Disorder/PTSD: Stable mood lately, but has had suicidal ideation in the past.  Followed by Psychiatry and Psychologist.     # Skin Cancer Risk:    - Discussed sun protection and recommend regular follow up with Dermatology.    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    Return visit: Return in about 1 year (around 10/22/2025).    Nghia Brothers MD    I spent a total of 45 minutes on the date of the encounter doing chart review, performing a history and physical exam, completing documentation and any further activities as noted above.    The longitudinal plan of care for the diagnosis(es)/condition(s) as documented were addressed during this visit. Due to the added complexity in care, I will continue to support Barry in the subsequent management and with ongoing continuity of care.      Chief Complaint   Mr. Mcgrath is a 34 year old here for kidney transplant and immunosuppression management.     History of Present Illness    Mr. Mcgrath reports feeling good overall.  Since last clinic visit:   Hospitalizations: No   New Medical Issues: No  Chest pain or shortness of breath: No  Lower extremity swelling: No  Weight change: No  Nausea and vomiting: No He does report a hyperactive gag reflex at times with rare vomiting.  Diarrhea: Yes;  Rare loose stools  Heartburn symptoms: No  Fever, sweats or chills: No  Urinary complaints: No    Home BP: Doesn't know    Problem List   Patient Active Problem List   Diagnosis    HTN, kidney transplant related    RAQUEL on CPAP    Bipolar 1 disorder (H)    Immunosuppressed status (H)    Hidradenitis suppurativa    Kidney replaced by transplant    Type 2 diabetes mellitus without complication, without long-term current use of insulin (H)    Aftercare following organ transplant    Vitamin D deficiency    Obesity       Allergies   Allergies   Allergen Reactions    Seasonal Allergies Other (See Comments)     hamster hair    Cats      Itching, nasal congestion    Dogs      Itching, nasal congestion    No Known Allergies        Medications   Current Outpatient Medications   Medication Sig Dispense Refill    cetirizine (ZYRTEC) 10 MG tablet Take 1 tablet (10 mg) by mouth daily as needed for allergies      chlorhexidine (HIBICLENS) 4 % liquid Apply topically daily as needed for wound care 473 mL 11    cholecalciferol (VITAMIN D3) 25 mcg (1000 units) capsule Take 1 capsule (25 mcg) by mouth daily      clindamycin (CLEOCIN T) 1 % external lotion Apply thin layer to armpits, chest, groin, and buttocks once daily as needed 60 mL 11    lisinopril (PRINIVIL/ZESTRIL) 10 MG tablet Take 10 mg by mouth daily  5    LYBALVI 20-10 MG TABS tablet       metFORMIN (GLUCOPHAGE-XR) 500 MG 24 hr tablet Take 1,000 mg by mouth 2 times daily      mycophenolate (GENERIC EQUIVALENT) 250 MG capsule Take 5 capsules (1,250 mg) by mouth 2 times daily 300 capsule 4    predniSONE (DELTASONE) 5 MG tablet Take 1 tablet (5 mg) by mouth daily 30 tablet 4    rosuvastatin (CRESTOR) 10 MG tablet Take 0.5 tablets (5 mg) by mouth daily      tacrolimus (GENERIC) 1 MG capsule Take 1 capsule (1 mg) by mouth 2 times daily TOTAL DOSE: 1 mg twice daily. 60 capsule 11    divalproex sodium extended-release (DEPAKOTE ER) 500 MG 24 hr tablet Take 5 tablets (2,500 mg) by  mouth daily  0     No current facility-administered medications for this visit.     Medications Discontinued During This Encounter   Medication Reason    loperamide (IMODIUM A-D) 2 MG tablet     prazosin (MINIPRESS) 1 MG capsule     melatonin 1 MG TABS tablet        Physical Exam   Vital Signs: /71 (BP Location: Right arm, Patient Position: Sitting, Cuff Size: Adult Regular)   Pulse 76   Wt 108.1 kg (238 lb 4.8 oz)   SpO2 96%   BMI 33.71 kg/m      GENERAL APPEARANCE: alert and no distress  HENT: mouth without ulcers or lesions  RESP: lungs clear to auscultation - no rales, rhonchi or wheezes  CV: regular rhythm, normal rate, no rub, no murmur  EDEMA: no LE edema bilaterally  ABDOMEN: soft, nondistended, nontender, bowel sounds normal, obese  MS: extremities normal - no gross deformities noted, no evidence of inflammation in joints, no muscle tenderness  SKIN: no rash  TX KIDNEY: normal  DIALYSIS ACCESS: none    Data         Latest Ref Rng & Units 10/15/2024    10:41 AM 8/26/2024     9:32 AM 5/29/2024     9:29 AM   Renal   Na (external) 135 - 146 mmol/L 141     140     140       K (external) 3.5 - 5.3 mmol/L 3.8     3.7     3.7       Cl 98 - 110 mmol/L 103     105     104       Cl (external) 98 - 110 mmol/L 103     105     104       CO2 (external) 20 - 32 mmol/L 29     27     26       BUN (external) 7 - 25 mg/dL 12     14     13       Cr (external) 0.60 - 1.26 mg/dL 0.81     0.80     0.91       Glucose (external) 65 - 99 mg/dL 170     146     142       Ca (external) 8.6 - 10.3 mg/dL 8.8     9.1     8.9           This result is from an external source.         Latest Ref Rng & Units 1/26/2023     1:29 PM 10/8/2021     9:40 AM 6/18/2020     2:30 PM   Bone Health   Vit D Def 30 - 80 ug/L  37     Vit D Def (external) 30 - 100 ng/ml 38      56           This result is from an external source.         Latest Ref Rng & Units 10/15/2024    10:41 AM 8/26/2024     9:32 AM 5/29/2024     9:29 AM   Heme   WBC (external)  3.8 - 10.8 Thousand/uL 10.9     10.1     8.8       Hgb (external) 13.2 - 17.1 g/dL 14.6     13.9     14.6       Plt (external) 140 - 400 Thousand/uL 229     208     203       ABSOLUTE NEUTROPHILS (EXTERNAL) 1,500 - 7,800 cells/uL 4,829     4,636     3,687       ABSOLUTE LYMPHOCYTES (EXTERNAL) 850 - 3,900 cells/uL 4,120     4,192     3,247       ABSOLUTE MONOCYTES (EXTERNAL) 200 - 950 cells/uL 578     596     642       ABSOLUTE EOSINOPHILS (EXTERNAL) 15 - 500 cells/uL 1,297     616     1,144       ABSOLUTE BASOPHILS (EXTERNAL) 0 - 200 cells/uL 76     61     79           This result is from an external source.         Latest Ref Rng & Units 8/26/2024     9:32 AM 5/10/2023     9:55 AM 1/26/2023     1:29 PM   Liver   AP (external) 36 - 130 U/L 55     66     66       TBili (external) 0.2 - 1.2 mg/dL 0.2     0.4     0.3       ALT (external) 9 - 46 U/L 26     38     30       AST (external) 10 - 40 U/L 20     29     23       Tot Protein (external) 6.1 - 8.1 g/dL 6.6     7.0     7.3       Albumin (external) 3.6 - 5.1 g/dL 4.1     4.2     4.5           This result is from an external source.         Latest Ref Rng & Units 1/26/2023    12:00 AM 3/30/2022    10:00 AM 4/29/2021     8:30 AM   Pancreas   A1C <=5.6 %   5.8    A1C (external) <5.7 % 5.7     6.6  5.8           This result is from an external source.         Latest Ref Rng & Units 4/29/2021    10:15 AM 6/18/2020     2:30 PM 6/1/2018     3:36 PM   Iron studies   Iron 42 - 175 ug/dL 56      Iron (external) 7 - 60 U/L   21       Ferritin (external) 8 - 388 ng/mL  123            This result is from an external source.        Failed to redirect to the Timeline version of the [employer]FS SmartLink.  Recent Labs   Lab Test 07/15/19  0915 03/29/21  0850 05/28/21  0855 10/08/21  0940 11/29/21  0910   DOSTAC Not Provided  --   --  10/7/2021 11/28/2021   TACROL 5.9   < > 4.1* 5.4 5.8    < > = values in this interval not displayed.

## 2024-10-29 ENCOUNTER — TELEPHONE (OUTPATIENT)
Dept: TRANSPLANT | Facility: CLINIC | Age: 34
End: 2024-10-29
Payer: MEDICARE

## 2024-10-29 DIAGNOSIS — Z94.0 HTN, KIDNEY TRANSPLANT RELATED: ICD-10-CM

## 2024-10-29 DIAGNOSIS — I15.1 HTN, KIDNEY TRANSPLANT RELATED: ICD-10-CM

## 2024-10-29 NOTE — TELEPHONE ENCOUNTER
Nghia Brothers MD Sveiven, Rowan, RN  Please check the following labs: CBC with differential, Vitamin D, Peripheral smear, Vitamin B12, Tryptase and MPA level.    If eosinophilia remains elevated, would refer patient to Transplant ID and Hematology.    Armando    OUTCOME:  Lab orders faxed.  Left detailed message for the patient with the information listed above and to ensure 12 hour trough level on MMF    Rowan Webber RN   Transplant Coordinator  [phone]    LM X2 with instruction to have labs obtained.    Rowan Webber RN   Transplant Coordinator  [phone]

## 2024-10-29 NOTE — LETTER
PHYSICIAN ORDERS      DATE & TIME ISSUED: 2024 3:26 PM  PATIENT NAME: Barry Mcgrath   : 1990     [facility] MR# [if applicable]: [id number]     DIAGNOSIS:  ***  ICD-10 CODE: ***     ***      Any questions please call: ***    Please fax each result same day as resulted/available    Critical lab results page [phone]    { TRANSPLANT DEPT FAXES:[id number]}.    .

## 2024-10-29 NOTE — LETTER
[facility]  Room 2-200  [facility],  [address]  [address]  Tel [phone]  Toll Free [phone]                OUTPATIENT LABORATORY TEST ORDER    Patient Name: Barry Mcgrath  Transplant Date: 4/4/2006 (Kidney)  YOB: 1990  Issue Date & Time: 10/29/2024  [facility] MR:  [id number]  Exp. Date (1 year after date issued)      Diagnoses: Kidney Transplant (ICD-10 Z94.0)   Long term use of medications (ICD-10 Z79.899)     Lab results to be available on the same day drawn.   Patient should release information to the [facility], [place], Transplant Center.  Please fax to the Transplant Center at [phone].      Please obtain the following labs:  CBC with differential  Vitamin D  Peripheral smear  Vitamin B12  Tryptase   Mycophenolic acid 12 hour trough level  BMP    Please obtain in 1 month then every  3 Months  ?Hemogram and Platelet  ?Basic Metabolic Panel   ?/Tacrolimus/Prograf drug level             Every 6 Months                                          ?Urine for protein/creatinine      If you have any questions, please call [facility] at [phone] or [phone].    Please fax labs to [phone]    .

## 2024-10-30 DIAGNOSIS — Z94.0 STATUS POST KIDNEY TRANSPLANT: ICD-10-CM

## 2024-10-30 DIAGNOSIS — Z94.0 KIDNEY TRANSPLANTED: ICD-10-CM

## 2024-10-30 DIAGNOSIS — Z94.0 IMMUNOSUPPRESSIVE MANAGEMENT ENCOUNTER FOLLOWING KIDNEY TRANSPLANT: ICD-10-CM

## 2024-10-30 DIAGNOSIS — Z48.298 AFTERCARE FOLLOWING ORGAN TRANSPLANT: ICD-10-CM

## 2024-10-30 DIAGNOSIS — Z79.899 IMMUNOSUPPRESSIVE MANAGEMENT ENCOUNTER FOLLOWING KIDNEY TRANSPLANT: ICD-10-CM

## 2024-10-30 NOTE — TELEPHONE ENCOUNTER
[facility]    Phone Message    May a detailed message be left on voicemail: yes     Reason for Call: Other: Barry calling in and does not want to have another lab draw completed. He stated it is just too much for him. He would like to take a break for a few months. Please call Barry back to discuss. Thanks!

## 2024-10-30 NOTE — TELEPHONE ENCOUNTER
Call placed to Barry Mcgrath, unable to reach him. RNCC left detailed VM, asked him to call SOT back to discuss plan for labs.

## 2024-10-31 RX ORDER — TACROLIMUS 1 MG/1
1 CAPSULE ORAL 2 TIMES DAILY
Qty: 60 CAPSULE | Refills: 11 | Status: SHIPPED | OUTPATIENT
Start: 2024-10-31

## 2024-11-20 ENCOUNTER — TELEPHONE (OUTPATIENT)
Dept: TRANSPLANT | Facility: CLINIC | Age: 34
End: 2024-11-20
Payer: MEDICARE

## 2024-11-20 DIAGNOSIS — Z94.0 HTN, KIDNEY TRANSPLANT RELATED: ICD-10-CM

## 2024-11-20 DIAGNOSIS — I15.1 HTN, KIDNEY TRANSPLANT RELATED: ICD-10-CM

## 2024-11-20 NOTE — LETTER
PHYSICIAN ORDERS    Patient Name: Barry Mcgrath  Transplant Date: 4/4/2006 (Kidney)  YOB: 1990  Issue Date & Time: 11/20/2024 1540  [facility] MR:  [id number]  Exp. Date (1 year after date issued)      Diagnoses: Kidney Transplant (ICD-10 Z94.0)   Long term use of medications (ICD-10 Z79.899)     Lab results to be available on the same day drawn.   Patient should release information to the [facility], Transplant Center.  Please fax to the Transplant Center at [phone].      Please obtain the following labs:  CBC with differential  Vitamin D  Peripheral smear  Vitamin B12  Tryptase   Mycophenolic acid 12 hour trough level  BMP    Please obtain in 1 month then every  3 Months  ?Hemogram and Platelet  ?Basic Metabolic Panel   ?/Tacrolimus/Prograf drug level             Every 6 Months                                          ?Urine for protein/creatinine      If you have any questions, please call The [facility] at [phone] or [phone].    Please fax labs to [phone]    .

## 2024-11-20 NOTE — LETTER
[facility]  Room 2[address]200  [facility],  [address]  [address]  Tel [phone]  Toll Free [phone]                OUTPATIENT LABORATORY TEST ORDER    Patient Name: Barry Mcgrath  Transplant Date: 4/4/2006 (Kidney)  YOB: 1990  Issue Date & Time: 10/29/2024  [facility] MR:  [id number]  Exp. Date (1 year after date issued)      Diagnoses: Kidney Transplant (ICD-10 Z94.0)   Long term use of medications (ICD-10 Z79.899)     Lab results to be available on the same day drawn.   Patient should release information to the [facility] [place], Transplant Center.  Please fax to the Transplant Center at [phone].      Please obtain the following labs:  CBC with differential  Vitamin D  Peripheral smear  Vitamin B12  Tryptase   Mycophenolic acid 12 hour trough level  BMP    Please obtain in 1 month then every  3 Months  ?Hemogram and Platelet  ?Basic Metabolic Panel   ?/Tacrolimus/Prograf drug level             Every 6 Months                                          ?Urine for protein/creatinine      If you have any questions, please call [facility] at [phone] or [phone].    Please fax labs to [phone]    .

## 2024-11-20 NOTE — TELEPHONE ENCOUNTER
[facility] Call Center    Phone Message    May a detailed message be left on voicemail: yes     Reason for Call: Other: Barry called in stating that he is ready to get his blood tests done for wbc and other special tests. Patient stated he would like RNCC to call him and talk about this. Please reach out to patient.     Action Taken: Message routed to:  Other: RNCC    Travel Screening: Not Applicable

## 2024-11-30 ENCOUNTER — HEALTH MAINTENANCE LETTER (OUTPATIENT)
Age: 34
End: 2024-11-30

## 2024-12-09 ENCOUNTER — TELEPHONE (OUTPATIENT)
Dept: TRANSPLANT | Facility: CLINIC | Age: 34
End: 2024-12-09
Payer: MEDICARE

## 2024-12-09 DIAGNOSIS — Z94.0 HTN, KIDNEY TRANSPLANT RELATED: ICD-10-CM

## 2024-12-09 DIAGNOSIS — I15.1 HTN, KIDNEY TRANSPLANT RELATED: ICD-10-CM

## 2024-12-09 NOTE — TELEPHONE ENCOUNTER
[facility] Call Center    Phone Message    May a detailed message be left on voicemail: yes     Reason for Call: Other: Pt calling in and would like a call back please to go over lab results. Thank you

## 2024-12-09 NOTE — TELEPHONE ENCOUNTER
Called patient back, verified that labs are consistent with his baseline, still awaiting Tacrolimus level.

## 2024-12-12 DIAGNOSIS — Z94.0 STATUS POST KIDNEY TRANSPLANT: ICD-10-CM

## 2024-12-12 DIAGNOSIS — Z94.0 KIDNEY REPLACED BY TRANSPLANT: ICD-10-CM

## 2024-12-12 DIAGNOSIS — D84.9 IMMUNOSUPPRESSION (H): ICD-10-CM

## 2024-12-12 DIAGNOSIS — Z48.298 AFTERCARE FOLLOWING ORGAN TRANSPLANT: ICD-10-CM

## 2024-12-12 RX ORDER — MYCOPHENOLATE MOFETIL 250 MG/1
1250 CAPSULE ORAL 2 TIMES DAILY
Qty: 300 CAPSULE | Refills: 0 | Status: SHIPPED | OUTPATIENT
Start: 2024-12-12

## 2024-12-12 RX ORDER — PREDNISONE 5 MG/1
5 TABLET ORAL DAILY
Qty: 30 TABLET | Refills: 0 | Status: SHIPPED | OUTPATIENT
Start: 2024-12-12

## 2024-12-19 ENCOUNTER — TELEPHONE (OUTPATIENT)
Dept: TRANSPLANT | Facility: CLINIC | Age: 34
End: 2024-12-19

## 2024-12-19 DIAGNOSIS — I15.1 HTN, KIDNEY TRANSPLANT RELATED: ICD-10-CM

## 2024-12-19 DIAGNOSIS — Z94.0 HTN, KIDNEY TRANSPLANT RELATED: ICD-10-CM

## 2024-12-19 NOTE — TELEPHONE ENCOUNTER
[facility] Call Center    Phone Message    May a detailed message be left on voicemail: no     Reason for Call: Appointment Intake    Patient canceled labs for tomorrow 12/20/2024 and stated that he is refusing to reschedule and is sick of coming in.  Patient hung up the phone prior to any additional information being given.  Writer unsure if coordinator is aware of his feelings.    Action Taken: Message routed to:  [facility] JARRETT TRENT    Travel Screening: Not Applicable     Date of Service:

## 2024-12-19 NOTE — TELEPHONE ENCOUNTER
Attempted to return call to patient after receiveing a message from admin that patient call upset to let RNCC know that he will not be repeating his labs including his MPA level.    It was discussed with the patient on 12/13 that orders had been placed to repeat MPA as patient states he may have missed a dose resulting in an MPA level of <0.5    Patient was agreeable on 12/13 to repeat labs and lab apt was made for the patient.    Left message for the patient letting him know that the recommendation to repeat labs still stand.  Reviewed consequences of having a low IS level on his graft.  Asked for CB to discuss    Rowan Webber RN   Transplant Coordinator  [phone]

## 2024-12-20 ENCOUNTER — LAB (OUTPATIENT)
Dept: LAB | Facility: CLINIC | Age: 34
End: 2024-12-20
Attending: INTERNAL MEDICINE
Payer: MEDICARE

## 2024-12-20 DIAGNOSIS — Z48.298 AFTERCARE FOLLOWING ORGAN TRANSPLANT: ICD-10-CM

## 2024-12-20 LAB
ANION GAP SERPL CALCULATED.3IONS-SCNC: 12 MMOL/L (ref 7–15)
BUN SERPL-MCNC: 16.8 MG/DL (ref 6–20)
CALCIUM SERPL-MCNC: 9 MG/DL (ref 8.8–10.4)
CHLORIDE SERPL-SCNC: 106 MMOL/L (ref 98–107)
CREAT SERPL-MCNC: 0.76 MG/DL (ref 0.67–1.17)
EGFRCR SERPLBLD CKD-EPI 2021: >90 ML/MIN/1.73M2
ERYTHROCYTE [DISTWIDTH] IN BLOOD BY AUTOMATED COUNT: 12.6 % (ref 10–15)
GLUCOSE SERPL-MCNC: 133 MG/DL (ref 70–99)
HCO3 SERPL-SCNC: 24 MMOL/L (ref 22–29)
HCT VFR BLD AUTO: 39.6 % (ref 40–53)
HGB BLD-MCNC: 13.8 G/DL (ref 13.3–17.7)
MCH RBC QN AUTO: 29.9 PG (ref 26.5–33)
MCHC RBC AUTO-ENTMCNC: 34.8 G/DL (ref 31.5–36.5)
MCV RBC AUTO: 86 FL (ref 78–100)
PLATELET # BLD AUTO: 211 10E3/UL (ref 150–450)
POTASSIUM SERPL-SCNC: 3.7 MMOL/L (ref 3.4–5.3)
RBC # BLD AUTO: 4.61 10E6/UL (ref 4.4–5.9)
SODIUM SERPL-SCNC: 142 MMOL/L (ref 135–145)
TACROLIMUS BLD-MCNC: 4.9 UG/L (ref 5–15)
TME LAST DOSE: ABNORMAL H
TME LAST DOSE: ABNORMAL H
WBC # BLD AUTO: 10.1 10E3/UL (ref 4–11)

## 2024-12-20 PROCEDURE — 80048 BASIC METABOLIC PNL TOTAL CA: CPT | Performed by: PATHOLOGY

## 2024-12-20 PROCEDURE — 36415 COLL VENOUS BLD VENIPUNCTURE: CPT | Performed by: PATHOLOGY

## 2024-12-20 PROCEDURE — 85027 COMPLETE CBC AUTOMATED: CPT | Performed by: PATHOLOGY

## 2024-12-20 PROCEDURE — 80197 ASSAY OF TACROLIMUS: CPT | Performed by: INTERNAL MEDICINE

## 2024-12-20 PROCEDURE — 80180 DRUG SCRN QUAN MYCOPHENOLATE: CPT | Performed by: INTERNAL MEDICINE

## 2024-12-20 PROCEDURE — 99000 SPECIMEN HANDLING OFFICE-LAB: CPT | Performed by: PATHOLOGY

## 2024-12-23 LAB
MYCOPHENOLATE SERPL LC/MS/MS-MCNC: 0.56 MG/L (ref 1–3.5)
MYCOPHENOLATE-G SERPL LC/MS/MS-MCNC: 13.5 MG/L (ref 30–95)
TME LAST DOSE: ABNORMAL H
TME LAST DOSE: ABNORMAL H

## 2024-12-26 ENCOUNTER — TELEPHONE (OUTPATIENT)
Dept: TRANSPLANT | Facility: CLINIC | Age: 34
End: 2024-12-26
Payer: MEDICARE

## 2024-12-26 DIAGNOSIS — I15.1 HTN, KIDNEY TRANSPLANT RELATED: ICD-10-CM

## 2024-12-26 DIAGNOSIS — Z94.0 HTN, KIDNEY TRANSPLANT RELATED: ICD-10-CM

## 2024-12-26 NOTE — TELEPHONE ENCOUNTER
[facility] Call Center    Phone Message    May a detailed message be left on voicemail: yes     Reason for Call: Other: Patient called to get his test results was wondering if someone can call him with that information. Please reach out to the patient to discuss. Thank you      Action Taken: Other: SOT    Travel Screening: Not Applicable     Date of Service:

## 2024-12-29 ENCOUNTER — TELEPHONE (OUTPATIENT)
Dept: TRANSPLANT | Facility: CLINIC | Age: 34
End: 2024-12-29
Payer: MEDICARE

## 2024-12-29 DIAGNOSIS — I15.1 HTN, KIDNEY TRANSPLANT RELATED: ICD-10-CM

## 2024-12-29 DIAGNOSIS — Z94.0 HTN, KIDNEY TRANSPLANT RELATED: ICD-10-CM

## 2024-12-30 NOTE — TELEPHONE ENCOUNTER
Barry called to ask what would happen if he drank alcohol and pop.    He reports that he did not drink alcohol, but he is curious. Reviewed pasted notes recommendations to patient.    Patient aware that pop and alcohol can be dehydrating.     Pt to return call with any changes.

## 2024-12-30 NOTE — TELEPHONE ENCOUNTER
Returned call to patient.  Left message asking for return call with any questions or concerns.    Rowan Webber RN   Transplant Coordinator  [phone]

## 2025-01-22 DIAGNOSIS — Z48.298 AFTERCARE FOLLOWING ORGAN TRANSPLANT: ICD-10-CM

## 2025-01-22 RX ORDER — PREDNISONE 5 MG/1
5 TABLET ORAL DAILY
Qty: 30 TABLET | Refills: 0 | Status: SHIPPED | OUTPATIENT
Start: 2025-01-22

## 2025-01-31 DIAGNOSIS — Z94.0 STATUS POST KIDNEY TRANSPLANT: ICD-10-CM

## 2025-01-31 DIAGNOSIS — Z94.0 KIDNEY REPLACED BY TRANSPLANT: ICD-10-CM

## 2025-01-31 DIAGNOSIS — Z48.298 AFTERCARE FOLLOWING ORGAN TRANSPLANT: ICD-10-CM

## 2025-01-31 DIAGNOSIS — D84.9 IMMUNOSUPPRESSION: ICD-10-CM

## 2025-02-03 RX ORDER — MYCOPHENOLATE MOFETIL 250 MG/1
1250 CAPSULE ORAL 2 TIMES DAILY
Qty: 300 CAPSULE | Refills: 0 | Status: SHIPPED | OUTPATIENT
Start: 2025-02-03

## 2025-02-19 DIAGNOSIS — Z48.298 AFTERCARE FOLLOWING ORGAN TRANSPLANT: ICD-10-CM

## 2025-02-19 DIAGNOSIS — Z94.0 KIDNEY REPLACED BY TRANSPLANT: ICD-10-CM

## 2025-02-19 DIAGNOSIS — D84.9 IMMUNOSUPPRESSION: ICD-10-CM

## 2025-02-19 DIAGNOSIS — Z94.0 STATUS POST KIDNEY TRANSPLANT: ICD-10-CM

## 2025-02-19 RX ORDER — MYCOPHENOLATE MOFETIL 250 MG/1
1250 CAPSULE ORAL 2 TIMES DAILY
Qty: 300 CAPSULE | Refills: 0 | Status: SHIPPED | OUTPATIENT
Start: 2025-02-19

## 2025-02-19 RX ORDER — PREDNISONE 5 MG/1
5 TABLET ORAL DAILY
Qty: 30 TABLET | Refills: 0 | Status: SHIPPED | OUTPATIENT
Start: 2025-02-19

## 2025-03-09 ENCOUNTER — HEALTH MAINTENANCE LETTER (OUTPATIENT)
Age: 35
End: 2025-03-09

## 2025-03-31 DIAGNOSIS — D84.9 IMMUNOSUPPRESSION: ICD-10-CM

## 2025-03-31 DIAGNOSIS — Z94.0 STATUS POST KIDNEY TRANSPLANT: ICD-10-CM

## 2025-03-31 DIAGNOSIS — Z94.0 KIDNEY REPLACED BY TRANSPLANT: ICD-10-CM

## 2025-03-31 DIAGNOSIS — Z48.298 AFTERCARE FOLLOWING ORGAN TRANSPLANT: ICD-10-CM

## 2025-03-31 RX ORDER — PREDNISONE 5 MG/1
5 TABLET ORAL DAILY
Qty: 30 TABLET | Refills: 0 | Status: SHIPPED | OUTPATIENT
Start: 2025-03-31

## 2025-03-31 RX ORDER — MYCOPHENOLATE MOFETIL 250 MG/1
1250 CAPSULE ORAL 2 TIMES DAILY
Qty: 300 CAPSULE | Refills: 0 | Status: SHIPPED | OUTPATIENT
Start: 2025-03-31

## 2025-04-07 ENCOUNTER — TELEPHONE (OUTPATIENT)
Dept: SLEEP MEDICINE | Facility: CLINIC | Age: 35
End: 2025-04-07
Payer: COMMERCIAL

## 2025-04-07 DIAGNOSIS — G47.24 NON-24 HOUR SLEEP-WAKE TYPE CIRCADIAN RHYTHM SLEEP-WAKE DISORDER: Primary | ICD-10-CM

## 2025-04-07 NOTE — TELEPHONE ENCOUNTER
General Call    Contacts       Contact Date/Time Type Contact Phone/Fax    04/07/2025 03:34 PM CDT Phone (Incoming) Barry Mcgrath (Self) [phone] (M)          Reason for Call:  Narcolepsy    What are your questions or concerns:  want to know if he has narcolepsy and how it affects sleep apnea?    Date of last appointment with provider: 07/26/2024    Could we send this information to you in Ztory or would you prefer to receive a phone call?:   Patient would prefer a phone call   Okay to leave a detailed message?: Yes at Cell number on file:    Telephone Information:   Mobile [phone]

## 2025-04-11 NOTE — TELEPHONE ENCOUNTER
Discussed sleep diagnoses. 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 Patient wondering if this should be added to his diagnosis and how it effects his sleep issues. Please advise

## 2025-04-14 ENCOUNTER — TELEPHONE (OUTPATIENT)
Dept: TRANSPLANT | Facility: CLINIC | Age: 35
End: 2025-04-14
Payer: COMMERCIAL

## 2025-04-14 DIAGNOSIS — Z94.0 HTN, KIDNEY TRANSPLANT RELATED: ICD-10-CM

## 2025-04-14 DIAGNOSIS — I15.1 HTN, KIDNEY TRANSPLANT RELATED: ICD-10-CM

## 2025-04-14 PROBLEM — G47.24: Status: ACTIVE | Noted: 2022-05-27

## 2025-04-14 NOTE — TELEPHONE ENCOUNTER
DODIE Torres, from [facility].   Tacro level drawn at their office on 4/12/2025 - not received by [facility] SOT.  [facility] [place] provided fax number to Melissa, she will have the result faxed.  Result (verbal read back) = 6.5ug/L.

## 2025-04-14 NOTE — TELEPHONE ENCOUNTER
It looks like 24 hour sleep wake disorder was on his chart but had been resolved. I am not sure who did that, but I added it back on his problem list. It would not affect his apnea but would contribute to difficulty falling asleep or waking on a regular schedule. We had him on 1 mg melatonin at 6 pm for that.  Bennett Goltz, PA-C

## 2025-04-14 NOTE — TELEPHONE ENCOUNTER
Tac: 6.5 on 4/10, at goal of 4-6 no dose change at this time    Rowan Webber RN   Transplant Coordinator  [phone]

## 2025-04-30 DIAGNOSIS — Z94.0 KIDNEY TRANSPLANTED: ICD-10-CM

## 2025-04-30 DIAGNOSIS — Z94.0 STATUS POST KIDNEY TRANSPLANT: ICD-10-CM

## 2025-04-30 DIAGNOSIS — Z94.0 HTN, KIDNEY TRANSPLANT RELATED: ICD-10-CM

## 2025-04-30 DIAGNOSIS — Z79.899 IMMUNOSUPPRESSIVE MANAGEMENT ENCOUNTER FOLLOWING KIDNEY TRANSPLANT: ICD-10-CM

## 2025-04-30 DIAGNOSIS — Z48.298 AFTERCARE FOLLOWING ORGAN TRANSPLANT: ICD-10-CM

## 2025-04-30 DIAGNOSIS — I15.1 HTN, KIDNEY TRANSPLANT RELATED: ICD-10-CM

## 2025-04-30 DIAGNOSIS — Z94.0 IMMUNOSUPPRESSIVE MANAGEMENT ENCOUNTER FOLLOWING KIDNEY TRANSPLANT: ICD-10-CM

## 2025-05-01 RX ORDER — TACROLIMUS 1 MG/1
1 CAPSULE ORAL 2 TIMES DAILY
Qty: 60 CAPSULE | Refills: 11 | Status: SHIPPED | OUTPATIENT
Start: 2025-05-01

## 2025-05-08 ENCOUNTER — TELEPHONE (OUTPATIENT)
Dept: TRANSPLANT | Facility: CLINIC | Age: 35
End: 2025-05-08
Payer: COMMERCIAL

## 2025-05-08 DIAGNOSIS — Z94.0 HTN, KIDNEY TRANSPLANT RELATED: ICD-10-CM

## 2025-05-08 DIAGNOSIS — I15.1 HTN, KIDNEY TRANSPLANT RELATED: ICD-10-CM

## 2025-05-08 NOTE — TELEPHONE ENCOUNTER
ISSUE: patient called to report that he missed his evening dose of IS yesterday evening.  He states he fell asleep before taking them.  Discussed the importance of note missing IS as well as discussed setting alarms twice daily to ensure doses are not missed in the past.  Patient was agreeable to this.    Patient became upset with RNCC, stating that he received a call on a Saturday with instruction to have urine sample completed.  Attempted to explain to the patient that it was not writer who called on a weekend as we are not in office during on Saturdays or Sundays.  Writer did however, call patient on Tuesday, April 15th with instruction to have UPC repeated as UPC was elevated on 4/3/2025. Patient continued to raise voice at RNCC stating he will not have labs obtained until July 2025.  Patient then ended phone call.    Rowan Webber RN   Transplant Coordinator  [phone]

## 2025-05-08 NOTE — TELEPHONE ENCOUNTER
[facility] Call Center    Phone Message    May a detailed message be left on voicemail: yes     Reason for Call: Other: Patient missed taking his medication last night and would like someone to call him back regarding skipping labs.  Please call him to discuss, thank you!     Action Taken: Message routed to:  [facility] JARRETT TRENT    Travel Screening: Not Applicable     Date of Service:

## 2025-05-08 NOTE — TELEPHONE ENCOUNTER
[facility] Call Center    Phone Message    May a detailed message be left on voicemail: yes     Reason for Call: Other: Patient just called back. He was very frustrated and not in a good mood. He reiterated multiple times to me that he does not want a call from clinic or any MD. He is refusing to complete any lab draws or any appts at this time until his UTI has improved. He will call back in July to make labs appts. Again, per patient does not want anyone from clinic calling him.       Action Taken: Message routed to:  [facility] JARRETT TRENT    Travel Screening: Not Applicable     Date of Service:

## 2025-05-20 DIAGNOSIS — Z48.298 AFTERCARE FOLLOWING ORGAN TRANSPLANT: Primary | ICD-10-CM

## 2025-05-20 RX ORDER — PREDNISONE 5 MG/1
5 TABLET ORAL DAILY
Qty: 30 TABLET | Refills: 11 | Status: SHIPPED | OUTPATIENT
Start: 2025-05-20

## 2025-06-09 ENCOUNTER — NURSE TRIAGE (OUTPATIENT)
Dept: NURSING | Facility: CLINIC | Age: 35
End: 2025-06-09
Payer: COMMERCIAL

## 2025-06-10 ENCOUNTER — TELEPHONE (OUTPATIENT)
Dept: TRANSPLANT | Facility: CLINIC | Age: 35
End: 2025-06-10
Payer: COMMERCIAL

## 2025-06-10 DIAGNOSIS — Z48.298 AFTERCARE FOLLOWING ORGAN TRANSPLANT: Primary | ICD-10-CM

## 2025-06-10 NOTE — TELEPHONE ENCOUNTER
[facility] Call Center    Phone Message    May a detailed message be left on voicemail: yes     Reason for Call: Other: Patient will come in next Tuesday for labs. Please update lab orders that are needed.      Action Taken: Message routed to:  [facility] JARRETT TRENT    Travel Screening: Not Applicable     Date of Service:

## 2025-06-10 NOTE — TELEPHONE ENCOUNTER
Nurse Triage SBAR    Is this a 2nd Level Triage? NO    Situation: Pt cut the end of his nose when using a new clean razor to cut his nose hair    Background: Pt reports that he is blind, Transplant hx     Assessment:     Pt was cutting a long nose hair with a new clean razor and cut the end of his nose   Was bleeding but he applied pressure with ice and a washcloth and then placed bacitracin and a bandage on the end     Protocol Recommended Disposition:   Home Care    Recommendation: Care advice given to patient for care and sx to watch for. Pt states he has a nurse visit this week and will also have them look at the area to watch for signs of infection     Reason for Disposition   Minor cut or scratch    Additional Information   Negative: [1] Major bleeding (e.g., actively dripping or spurting) AND [2] can't be stopped   Negative: Amputation   Negative: Shock suspected (e.g., cold/pale/clammy skin, too weak to stand, low BP, rapid pulse)   Negative: [1] Knife wound (or other possibly deep cut) AND [2] to chest, abdomen, back, neck, or head   Negative: [1] Self-injury (e.g., cutting, self-harm) AND [2] suicidal or out-of-control   Negative: Sounds like a life-threatening emergency to the triager   Negative: [1] Bleeding AND [2] won't stop after 10 minutes of direct pressure (using correct technique)   Negative: Skin is split open or gaping (or length > 1/2 inch or 12 mm on the skin, 1/4 inch or 6 mm on the face)   Negative: [1] Deep cut AND [2] can see bone or tendons   Negative: Cut over knuckle (MCP joint)   Negative: Sensation of something in the wound (i.e., retained object in wound)   Negative: [1] Dirt in the wound AND [2] not removed with 15 minutes of scrubbing   Negative: Wound causes numbness (i.e., loss of sensation)   Negative: Wound causes weakness (i.e., decreased ability to move hand, finger, toe)   Negative: [1] SEVERE pain AND [2] not improved 2 hours after pain medicine   Negative: [1] Fever AND  [2] spreading red area or streak   Negative: [1] Looks infected AND [2] large red area (> 2 inches or 5 cm) or streak   Negative: Suspicious history for the injury   Negative: [1] Looks infected (spreading redness, pus) AND [2] no fever   Negative: No prior tetanus shots (or is not fully vaccinated)   Negative: [1] HIV positive or severe immunodeficiency (severely weak immune system) AND [2] DIRTY cut   Negative: [1] Last tetanus shot > 5 years ago AND [2] DIRTY cut   Negative: [1] Last tetanus shot > 10 years ago AND [2] CLEAN cut   Negative: [1] After 14 days AND [2] wound isn't healed   Negative: [1] Diabetes mellitus AND [2] minor cut or scratch on foot   Negative: [1] Minor cut or scratch AND [2] from self-injury (e.g., cutting, self-harm) AND [3] stable (i.e., not suicidal, not out of control)    Protocols used: Cuts and Bpcjamiryna-M-AS  Veda Mott RN   Triage Nurse Advisor on 6/9/2025 at 9:27 PM

## 2025-06-16 ENCOUNTER — LAB (OUTPATIENT)
Dept: LAB | Facility: CLINIC | Age: 35
End: 2025-06-16
Payer: COMMERCIAL

## 2025-06-16 DIAGNOSIS — Z48.298 AFTERCARE FOLLOWING ORGAN TRANSPLANT: ICD-10-CM

## 2025-06-16 LAB
ANION GAP SERPL CALCULATED.3IONS-SCNC: 13 MMOL/L (ref 7–15)
BUN SERPL-MCNC: 15.3 MG/DL (ref 6–20)
CALCIUM SERPL-MCNC: 9.3 MG/DL (ref 8.8–10.4)
CHLORIDE SERPL-SCNC: 106 MMOL/L (ref 98–107)
CREAT SERPL-MCNC: 0.84 MG/DL (ref 0.67–1.17)
EGFRCR SERPLBLD CKD-EPI 2021: >90 ML/MIN/1.73M2
ERYTHROCYTE [DISTWIDTH] IN BLOOD BY AUTOMATED COUNT: 12.7 % (ref 10–15)
GLUCOSE SERPL-MCNC: 113 MG/DL (ref 70–99)
HCO3 SERPL-SCNC: 25 MMOL/L (ref 22–29)
HCT VFR BLD AUTO: 42.3 % (ref 40–53)
HGB BLD-MCNC: 14.3 G/DL (ref 13.3–17.7)
MCH RBC QN AUTO: 29.4 PG (ref 26.5–33)
MCHC RBC AUTO-ENTMCNC: 33.8 G/DL (ref 31.5–36.5)
MCV RBC AUTO: 87 FL (ref 78–100)
PLATELET # BLD AUTO: 213 10E3/UL (ref 150–450)
POTASSIUM SERPL-SCNC: 3.7 MMOL/L (ref 3.4–5.3)
RBC # BLD AUTO: 4.87 10E6/UL (ref 4.4–5.9)
SODIUM SERPL-SCNC: 144 MMOL/L (ref 135–145)
TACROLIMUS BLD-MCNC: 6.9 UG/L (ref 5–15)
TME LAST DOSE: NORMAL H
TME LAST DOSE: NORMAL H
WBC # BLD AUTO: 9.7 10E3/UL (ref 4–11)

## 2025-06-16 PROCEDURE — 36415 COLL VENOUS BLD VENIPUNCTURE: CPT | Performed by: PATHOLOGY

## 2025-06-16 PROCEDURE — 80197 ASSAY OF TACROLIMUS: CPT | Performed by: INTERNAL MEDICINE

## 2025-06-16 PROCEDURE — 99000 SPECIMEN HANDLING OFFICE-LAB: CPT | Performed by: PATHOLOGY

## 2025-06-16 PROCEDURE — 85027 COMPLETE CBC AUTOMATED: CPT | Performed by: PATHOLOGY

## 2025-06-16 PROCEDURE — 80048 BASIC METABOLIC PNL TOTAL CA: CPT | Performed by: PATHOLOGY

## 2025-06-17 ENCOUNTER — RESULTS FOLLOW-UP (OUTPATIENT)
Dept: TRANSPLANT | Facility: CLINIC | Age: 35
End: 2025-06-17

## 2025-06-17 ENCOUNTER — TELEPHONE (OUTPATIENT)
Dept: TRANSPLANT | Facility: CLINIC | Age: 35
End: 2025-06-17

## 2025-06-17 DIAGNOSIS — Z94.0 KIDNEY REPLACED BY TRANSPLANT: ICD-10-CM

## 2025-06-17 DIAGNOSIS — Z48.298 AFTERCARE FOLLOWING ORGAN TRANSPLANT: ICD-10-CM

## 2025-06-17 DIAGNOSIS — Z94.0 HTN, KIDNEY TRANSPLANT RELATED: ICD-10-CM

## 2025-06-17 DIAGNOSIS — D84.9 IMMUNOSUPPRESSION: ICD-10-CM

## 2025-06-17 DIAGNOSIS — Z94.0 STATUS POST KIDNEY TRANSPLANT: ICD-10-CM

## 2025-06-17 DIAGNOSIS — I15.1 HTN, KIDNEY TRANSPLANT RELATED: ICD-10-CM

## 2025-06-17 RX ORDER — MYCOPHENOLATE MOFETIL 250 MG/1
1250 CAPSULE ORAL 2 TIMES DAILY
Qty: 300 CAPSULE | Refills: 0 | Status: SHIPPED | OUTPATIENT
Start: 2025-06-17

## 2025-06-17 NOTE — TELEPHONE ENCOUNTER
[facility] Call Center    Phone Message    May a detailed message be left on voicemail: no     Reason for Call: Medication Refill Request    Has the patient contacted the pharmacy for the refill? Yes   Name of medication being requested: Mycophenolate Mofetil 1,250 mg Oral 2 TIMES DAILY   Provider who prescribed the medication: Spong      Patient stated that he needs an emergency supply either today or tomorrow due to being out of town.  Okay to call Barry if you have additional questions    Action Taken: Message routed to:  [facility] JARRETT TRENT    Travel Screening: Not Applicable     Date of Service:

## 2025-06-24 ENCOUNTER — DOCUMENTATION ONLY (OUTPATIENT)
Dept: SLEEP MEDICINE | Facility: CLINIC | Age: 35
End: 2025-06-24
Payer: COMMERCIAL

## 2025-06-24 DIAGNOSIS — G47.33 OBSTRUCTIVE SLEEP APNEA SYNDROME: Primary | ICD-10-CM

## 2025-06-24 NOTE — PROGRESS NOTES
CPAP DME order signed. Last visit was 7/2024.  Respironics  Auto-PAP 14.0 - 18.0 cmH2O 30 day usage data:    93% of days with > 4 hours of use. 0/30 days with no use.   Average use 645 minutes per day.   Average leak 33.4 LPM.  Average % of night in large leak 0%.    CPAP 90% pressure 15cm.   AHI 2.25 events per hour.     Bennett Goltz, PA-C

## 2025-06-28 ENCOUNTER — HEALTH MAINTENANCE LETTER (OUTPATIENT)
Age: 35
End: 2025-06-28

## 2025-07-02 ENCOUNTER — TELEPHONE (OUTPATIENT)
Dept: TRANSPLANT | Facility: CLINIC | Age: 35
End: 2025-07-02
Payer: COMMERCIAL

## 2025-07-02 DIAGNOSIS — Z94.0 HTN, KIDNEY TRANSPLANT RELATED: ICD-10-CM

## 2025-07-02 DIAGNOSIS — I15.1 HTN, KIDNEY TRANSPLANT RELATED: ICD-10-CM

## 2025-07-02 NOTE — TELEPHONE ENCOUNTER
Barry stated after eating breakfast and taking his medication, he started to feel off balance, and very nauseas, had a emesis, and loose stool this morning. Stated after having a emesis he's feeling really good now, and not sure if it was food poisoning requesting to have coordinator, to call him back

## 2025-07-02 NOTE — TELEPHONE ENCOUNTER
"[facility] Call Center    Phone Message    May a detailed message be left on voicemail: yes     Reason for Call: Other: patient is on ozempic, but patient is \"feeling off balance, and having off feeling\" patient stated he tried to eat breakfast and he had to throw it away because he was throwing up within minutes of eating. He stated he is also having diarrhea.     Action Taken: Message routed to:  Other: RNCC, patient transferred to admin to speak to RNCC    Travel Screening: Not Applicable                                                                      "

## 2025-07-02 NOTE — TELEPHONE ENCOUNTER
Duplicate encounter.  See additional phone call dated 7/2/2025. RNCC encouraged patient to reach out to provider who is prescribing ozempic. Patient states he is felling well now.    Rowan Webber RN   Transplant Coordinator  [phone]

## 2025-07-02 NOTE — TELEPHONE ENCOUNTER
"Patient calling to report \" feeling off\" and poor appetite while on ozempic.  RNCC encouraged patient to contact prescribing provider.  He V/U    Rowan Webber RN   Transplant Coordinator  [phone]    "

## 2025-07-14 ENCOUNTER — TELEPHONE (OUTPATIENT)
Dept: DERMATOLOGY | Facility: CLINIC | Age: 35
End: 2025-07-14
Payer: COMMERCIAL

## 2025-07-14 NOTE — TELEPHONE ENCOUNTER
7/14 SWP to reschedule RHS appt with Tatiana to [facility] Transplant Skin Check with Any MD.     Pt stated that he did not want to cancel his Tatiana appt and requested that I contact clinic to provide pt with specifications as to why Tatiana referred pt to gen derm.     Will send clinic msg.

## 2025-07-20 DIAGNOSIS — Z94.0 STATUS POST KIDNEY TRANSPLANT: ICD-10-CM

## 2025-07-20 DIAGNOSIS — Z94.0 KIDNEY REPLACED BY TRANSPLANT: Primary | ICD-10-CM

## 2025-07-20 DIAGNOSIS — D84.9 IMMUNOSUPPRESSION: ICD-10-CM

## 2025-07-20 DIAGNOSIS — Z48.298 AFTERCARE FOLLOWING ORGAN TRANSPLANT: ICD-10-CM

## 2025-07-21 ENCOUNTER — TELEPHONE (OUTPATIENT)
Dept: TRANSPLANT | Facility: CLINIC | Age: 35
End: 2025-07-21
Payer: COMMERCIAL

## 2025-07-21 DIAGNOSIS — Z94.0 HTN, KIDNEY TRANSPLANT RELATED: ICD-10-CM

## 2025-07-21 DIAGNOSIS — I15.1 HTN, KIDNEY TRANSPLANT RELATED: ICD-10-CM

## 2025-07-21 RX ORDER — MYCOPHENOLATE MOFETIL 250 MG/1
1250 CAPSULE ORAL 2 TIMES DAILY
Qty: 300 CAPSULE | Refills: 11 | Status: SHIPPED | OUTPATIENT
Start: 2025-07-21

## 2025-07-21 NOTE — TELEPHONE ENCOUNTER
Patient Call: General  Route to LPN    Reason for call: [facility]ir[facility] called to touch base about a refill request for patient's mycophenolate (GENERIC EQUIVALENT) 250 MG capsule. More details with call back.     Call back needed? Yes    Return Call Needed  Same as documented in contacts section  When to return call?: Same day: Route High Priority

## 2025-07-21 NOTE — TELEPHONE ENCOUNTER
Returned call to Kimmie. Patient will run out of medications today. Refill sent to RN coordinator for approval.  Kaylyn Reynolds LPN

## 2025-07-31 ENCOUNTER — TELEPHONE (OUTPATIENT)
Dept: TRANSPLANT | Facility: CLINIC | Age: 35
End: 2025-07-31
Payer: COMMERCIAL

## 2025-07-31 DIAGNOSIS — I15.1 HTN, KIDNEY TRANSPLANT RELATED: ICD-10-CM

## 2025-07-31 DIAGNOSIS — Z94.0 HTN, KIDNEY TRANSPLANT RELATED: ICD-10-CM

## 2025-08-27 ENCOUNTER — OFFICE VISIT (OUTPATIENT)
Dept: SLEEP MEDICINE | Facility: CLINIC | Age: 35
End: 2025-08-27
Payer: COMMERCIAL

## 2025-08-27 VITALS
BODY MASS INDEX: 31.22 KG/M2 | HEIGHT: 71 IN | WEIGHT: 223 LBS | HEART RATE: 77 BPM | SYSTOLIC BLOOD PRESSURE: 110 MMHG | DIASTOLIC BLOOD PRESSURE: 72 MMHG | OXYGEN SATURATION: 96 %

## 2025-08-27 DIAGNOSIS — F51.5 BAD DREAMS: ICD-10-CM

## 2025-08-27 DIAGNOSIS — G47.24 NON-24 HOUR SLEEP-WAKE TYPE CIRCADIAN RHYTHM SLEEP-WAKE DISORDER: ICD-10-CM

## 2025-08-27 DIAGNOSIS — G47.33 OSA ON CPAP: Primary | ICD-10-CM

## 2025-08-27 PROCEDURE — 3078F DIAST BP <80 MM HG: CPT | Performed by: PHYSICIAN ASSISTANT

## 2025-08-27 PROCEDURE — 1126F AMNT PAIN NOTED NONE PRSNT: CPT | Performed by: PHYSICIAN ASSISTANT

## 2025-08-27 PROCEDURE — 99204 OFFICE O/P NEW MOD 45 MIN: CPT | Performed by: PHYSICIAN ASSISTANT

## 2025-08-27 PROCEDURE — 3074F SYST BP LT 130 MM HG: CPT | Performed by: PHYSICIAN ASSISTANT

## 2025-08-27 RX ORDER — SEMAGLUTIDE 0.68 MG/ML
INJECTION, SOLUTION SUBCUTANEOUS
COMMUNITY
Start: 2025-06-26

## 2025-08-27 RX ORDER — RISPERIDONE 0.5 MG/1
0.5 TABLET ORAL AT BEDTIME
COMMUNITY